# Patient Record
Sex: FEMALE | Race: WHITE | HISPANIC OR LATINO | Employment: OTHER | URBAN - METROPOLITAN AREA
[De-identification: names, ages, dates, MRNs, and addresses within clinical notes are randomized per-mention and may not be internally consistent; named-entity substitution may affect disease eponyms.]

---

## 2017-03-30 ENCOUNTER — OFFICE VISIT (OUTPATIENT)
Dept: INTERNAL MEDICINE | Facility: MEDICAL CENTER | Age: 82
End: 2017-03-30
Payer: MEDICAID

## 2017-03-30 VITALS
DIASTOLIC BLOOD PRESSURE: 81 MMHG | TEMPERATURE: 98.8 F | OXYGEN SATURATION: 97 % | HEIGHT: 61 IN | SYSTOLIC BLOOD PRESSURE: 158 MMHG | WEIGHT: 134.2 LBS | BODY MASS INDEX: 25.34 KG/M2 | HEART RATE: 69 BPM

## 2017-03-30 DIAGNOSIS — Z00.00 HEALTHCARE MAINTENANCE: ICD-10-CM

## 2017-03-30 DIAGNOSIS — F51.01 PRIMARY INSOMNIA: ICD-10-CM

## 2017-03-30 DIAGNOSIS — I10 ESSENTIAL HYPERTENSION: ICD-10-CM

## 2017-03-30 DIAGNOSIS — K21.9 GASTROESOPHAGEAL REFLUX DISEASE WITHOUT ESOPHAGITIS: ICD-10-CM

## 2017-03-30 DIAGNOSIS — I48.91 ATRIAL FIBRILLATION, UNSPECIFIED TYPE (HCC): ICD-10-CM

## 2017-03-30 PROBLEM — G47.00 INSOMNIA: Status: ACTIVE | Noted: 2017-03-30

## 2017-03-30 PROCEDURE — 99204 OFFICE O/P NEW MOD 45 MIN: CPT | Mod: GC | Performed by: INTERNAL MEDICINE

## 2017-03-30 RX ORDER — CLONIDINE HYDROCHLORIDE 0.1 MG/1
0.1 TABLET ORAL
Status: DISCONTINUED | OUTPATIENT
Start: 2017-03-30 | End: 2017-05-05

## 2017-03-30 RX ORDER — LORAZEPAM 0.5 MG/1
0.5 TABLET ORAL EVERY 4 HOURS PRN
Qty: 30 TAB | Refills: 0 | Status: SHIPPED | OUTPATIENT
Start: 2017-03-30 | End: 2017-04-09

## 2017-03-30 RX ORDER — TRAZODONE HYDROCHLORIDE 50 MG/1
25 TABLET ORAL
Qty: 30 TAB | Refills: 3 | Status: SHIPPED | OUTPATIENT
Start: 2017-03-30 | End: 2017-04-09

## 2017-03-30 RX ORDER — TELMISARTAN 20 MG/1
20 TABLET ORAL DAILY
COMMUNITY
End: 2017-03-30 | Stop reason: SDUPTHER

## 2017-03-30 RX ORDER — AMIODARONE HYDROCHLORIDE 200 MG/1
200 TABLET ORAL DAILY
COMMUNITY
End: 2017-03-30 | Stop reason: SDUPTHER

## 2017-03-30 RX ORDER — OMEPRAZOLE 40 MG/1
40 CAPSULE, DELAYED RELEASE ORAL DAILY
COMMUNITY
End: 2017-03-30 | Stop reason: SDUPTHER

## 2017-03-30 RX ORDER — ASPIRIN 81 MG/1
81 TABLET, CHEWABLE ORAL DAILY
COMMUNITY
End: 2017-03-30 | Stop reason: SDUPTHER

## 2017-03-30 RX ORDER — TELMISARTAN 20 MG/1
20 TABLET ORAL DAILY
Qty: 90 TAB | Refills: 2 | Status: SHIPPED | OUTPATIENT
Start: 2017-03-30 | End: 2017-04-09

## 2017-03-30 RX ORDER — LORAZEPAM 0.5 MG/1
0.5 TABLET ORAL EVERY 4 HOURS PRN
COMMUNITY
End: 2017-03-30 | Stop reason: SDUPTHER

## 2017-03-30 RX ORDER — TEMAZEPAM 15 MG/1
15 CAPSULE ORAL NIGHTLY PRN
COMMUNITY
End: 2017-03-30

## 2017-03-30 RX ORDER — AMIODARONE HYDROCHLORIDE 200 MG/1
200 TABLET ORAL DAILY
Qty: 30 TAB | Refills: 1 | Status: SHIPPED | OUTPATIENT
Start: 2017-03-30 | End: 2017-04-09

## 2017-03-30 RX ORDER — OMEPRAZOLE 40 MG/1
40 CAPSULE, DELAYED RELEASE ORAL DAILY
Qty: 30 CAP | Refills: 11 | Status: SHIPPED | OUTPATIENT
Start: 2017-03-30 | End: 2017-04-06

## 2017-03-30 RX ORDER — ASPIRIN 81 MG/1
81 TABLET, CHEWABLE ORAL DAILY
Qty: 100 TAB | Refills: 0 | Status: SHIPPED | OUTPATIENT
Start: 2017-03-30 | End: 2017-04-09

## 2017-03-30 ASSESSMENT — ENCOUNTER SYMPTOMS
NEUROLOGICAL NEGATIVE: 1
FEVER: 0
MUSCULOSKELETAL NEGATIVE: 1
INSOMNIA: 1
NERVOUS/ANXIOUS: 1
HEADACHES: 0
SHORTNESS OF BREATH: 1
EYE REDNESS: 0
CHILLS: 0
GASTROINTESTINAL NEGATIVE: 1
CARDIOVASCULAR NEGATIVE: 1
SPUTUM PRODUCTION: 0

## 2017-03-30 ASSESSMENT — PAIN SCALES - GENERAL: PAINLEVEL: NO PAIN

## 2017-03-30 ASSESSMENT — PATIENT HEALTH QUESTIONNAIRE - PHQ9: CLINICAL INTERPRETATION OF PHQ2 SCORE: 2

## 2017-03-30 NOTE — MR AVS SNAPSHOT
"        Marleen Mix   3/30/2017 1:15 PM   Office Visit   MRN: [id number]    Department:  [facility] Med - Internal Med   Dept Phone:  [phone]    Description:  Female : 1935   Provider:  Taz Reyes M.D.           Reason for Visit     New Patient           Allergies as of 3/30/2017     No Known Allergies      You were diagnosed with     Essential hypertension   [8558620]       Atrial fibrillation, unspecified type (CMS-[facility])   [6223146]       Primary insomnia   [714633]       Gastroesophageal reflux disease without esophagitis   [624576]       Healthcare maintenance   [[id number]]         Vital Signs     Blood Pressure Pulse Temperature Height Weight Body Mass Index    158/81 mmHg 69 37.1 °C (98.8 °F) 1.562 m (5' 1.5\") 60.873 kg (134 lb 3.2 oz) 24.95 kg/m2    Oxygen Saturation Breastfeeding? Smoking Status             97% No Never Smoker          Basic Information     Date Of Birth Sex Race Ethnicity Preferred Language    1935 Female White Non- English      Your appointments     2017  1:15 PM   Established Patient with Criselda Malcolm M.D.   [facility] Med - Internal Medicine (--)    [address] [place] [address]  [address]  [facility] [address] [phone]           You will be receiving a confirmation call a few days before your appointment from our automated call confirmation system.            2017  3:00 PM   New Patient with Jory Funes M.D.   [facility] Med - Internal Medicine (--)    [address]  [address]  [facility] [id number] [phone]           Please bring Photo ID, Insurance Cards, All Medication Bottles and copies of any legal documents (such as Living Will, Power of ) If speaking a language besides English please bring an adult . Please arrive 30 minutes prior for check in and registration. You will be receiving a confirmation call a few days before your appointment from our automated call confirmation system.   "              Problem List              ICD-10-CM Priority Class Noted - Resolved    Essential hypertension I10   3/30/2017 - Present    Atrial fibrillation (CMS-[facility]) I48.91   3/30/2017 - Present    Insomnia G47.00   3/30/2017 - Present    Gastroesophageal reflux disease without esophagitis K21.9   3/30/2017 - Present      Health Maintenance     Patient has no pending health maintenance at this time      Current Immunizations     No immunizations on file.      Below and/or attached are the medications your provider expects you to take. Review all of your home medications and newly ordered medications with your provider and/or pharmacist. Follow medication instructions as directed by your provider and/or pharmacist. Please keep your medication list with you and share with your provider. Update the information when medications are discontinued, doses are changed, or new medications (including over-the-counter products) are added; and carry medication information at all times in the event of emergency situations     Allergies:  No Known Allergies          Medications  Valid as of: March 30, 2017 -  2:33 PM    Generic Name Brand Name Tablet Size Instructions for use    Amiodarone HCl (Tab) CORDARONE 200 MG Take 1 Tab by mouth every day.        Aspirin (Chew Tab) ASA 81 MG Take 1 Tab by mouth every day.        LORazepam (Tab) ATIVAN 0.5 MG Take 1 Tab by mouth every four hours as needed for Anxiety.        Omeprazole (CAPSULE DELAYED RELEASE) PRILOSEC 40 MG Take 1 Cap by mouth every day.        Telmisartan (Tab) MICARDIS 20 MG Take 1 Tab by mouth every day.        Temazepam (Cap) RESTORIL 15 MG Take 15 mg by mouth at bedtime as needed for Sleep.        TraZODone HCl (Tab) DESYREL 50 MG Take 0.5 Tabs by mouth 1 time daily as needed for Sleep.        .                 Medicines prescribed today were sent to:     [facility]Y [facility][id number] - [place], [place] - [address]    [address] [place] [address]    Phone:  [phone] Fax: [phone]    Open 24 Hours?: No      Medication refill instructions:       If your prescription bottle indicates you have medication refills left, it is not necessary to call your provider’s office. Please contact your pharmacy and they will refill your medication.    If your prescription bottle indicates you do not have any refills left, you may request refills at any time through one of the following ways: The online [employer] Liaison system (except Urgent Care), by calling your provider’s office, or by asking your pharmacy to contact your provider’s office with a refill request. Medication refills are processed only during regular business hours and may not be available until the next business day. Your provider may request additional information or to have a follow-up visit with you prior to refilling your medication.   *Please Note: Medication refills are assigned a new Rx number when refilled electronically. Your pharmacy may indicate that no refills were authorized even though a new prescription for the same medication is available at the pharmacy. Please request the medicine by name with the pharmacy before contacting your provider for a refill.        Your To Do List     Future Labs/Procedures Complete By Expires    CBC WITH DIFFERENTIAL  As directed 3/30/2018    COMP METABOLIC PANEL  As directed 3/30/2018    DX-CHEST-2 VIEWS  As directed 3/30/2018    FREE THYROXINE  As directed 3/30/2018    LIPID PROFILE  As directed 3/30/2018    TSH  As directed 3/31/2018      Referral     A referral request has been sent to our patient care coordination department. Please allow 3-5 business days for us to process this request and contact you either by phone or mail. If you do not hear from us by the 5th business day, please call us at [phone].        Instructions    Follow up in 2 wks   Bring BP log  Cardiology referral made  For Insomnia start taking trazadone  Rx sent to pharmacy  Labs before next  visit.              MyChart Status: Patient Declined

## 2017-03-31 NOTE — PROGRESS NOTES
New Patient to Establish    Reason to establish: New patient    CC: Insomnia    HPI:     80 y/o F with a h/o HTN, Afib, Insomnia and anxiety is here to establish.   Mr. Mix is accompanied by her daughter.  She was on benzodiazepine for anxiety and insomnia for years when she was in [facility].  After she moved to [place] from [facility], she was switched to Ativan 0.5mg as the type of Benzo she was getting in [facility] is not available in the use.  She states that Ativan doesn't do anything for her and she can't sleep.   Her daughter gave her 1.5 mg Ativan for sleep which didn't help either.  Her daughter is concerned that she was on benzodiazepine for years and appears that she is dependent on it.  Her daughter also states her BP has been fluctuating with very low bp causing dizziness to high BP with  's.  She was on Telmisartan and amlodipine.  Because of her hypotension, her daughter stopped amlodipine.  Today, her /81.  She has a h/o Atiral fibrillation which she takes amiodarone 200mg M-F and ASA.   She also has a pacemaker.  Her daughter is not sure why she had PM placed.   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  Daughter states that during that time, she developed arrhythmia.  Thus, pacemaker was placed again.  She denies taking susan blockers.            Patient Active Problem List    Diagnosis Date Noted   • Essential hypertension 03/30/2017   • Atrial fibrillation (CMS-[facility]) 03/30/2017   • Insomnia 03/30/2017   • Gastroesophageal reflux disease without esophagitis 03/30/2017       No past medical history on file.    Current Outpatient Prescriptions   Medication Sig Dispense Refill   • trazodone (DESYREL) 50 MG Tab Take 0.5 Tabs by mouth 1 time daily as needed for Sleep. 30 Tab 3   • amiodarone (CORDARONE) 200 MG Tab Take 1 Tab by mouth every day. 30 Tab 1   • aspirin (ASA) 81 MG Chew Tab chewable tablet Take 1 Tab by mouth every day. 100 Tab 0   • lorazepam  "(ATIVAN) 0.5 MG Tab Take 1 Tab by mouth every four hours as needed for Anxiety. 30 Tab 0   • omeprazole (PRILOSEC) 40 MG delayed-release capsule Take 1 Cap by mouth every day. 30 Cap 11   • telmisartan (MICARDIS) 20 MG tablet Take 1 Tab by mouth every day. 90 Tab 2     Current Facility-Administered Medications   Medication Dose Route Frequency Provider Last Rate Last Dose   • clonidine (CATAPRES) tablet 0.1 mg  0.1 mg Oral Once PRN Taz Reyes M.D.           Allergies as of 03/30/2017   • (No Known Allergies)       Social History     Social History   • Marital Status:      Spouse Name: N/A   • Number of Children: N/A   • Years of Education: N/A     Occupational History   • Not on file.     Social History Main Topics   • Smoking status: Never Smoker    • Smokeless tobacco: Never Used   • Alcohol Use: No   • Drug Use: No   • Sexual Activity: Not on file     Other Topics Concern   • Not on file     Social History Narrative   • No narrative on file       No family history on file.    No past surgical history on file.    ROS: As per HPI. Additional pertinent symptoms as noted below.  Review of Systems   Constitutional: Negative for fever and chills.   HENT: Negative for congestion.    Eyes: Negative for redness.   Respiratory: Positive for shortness of breath. Negative for sputum production.    Cardiovascular: Negative.    Gastrointestinal: Negative.    Musculoskeletal: Negative.    Skin: Negative for itching and rash.   Neurological: Negative.  Negative for headaches.   Endo/Heme/Allergies: Negative.    Psychiatric/Behavioral: The patient is nervous/anxious and has insomnia.        /81 mmHg  Pulse 69  Temp(Src) 37.1 °C (98.8 °F)  Ht 1.562 m (5' 1.5\")  Wt 60.873 kg (134 lb 3.2 oz)  BMI 24.95 kg/m2  SpO2 97%  Breastfeeding? No    Physical Exam  General:  Alert and oriented, No apparent distress.    Eyes: EOMI, No scleral icterus.    Throat: Clear no erythema or exudates noted.    Neck: " Supple.     Lungs: Clear to auscultation and percussion bilaterally.    Cardiovascular: Regular rate and rhythm. No murmurs, rubs or gallops.    Abdomen:  Benign. Soft. NT    Extremities: No clubbing, cyanosis, edema.    Skin: Clear. No rash or suspicious skin lesions noted.          Assessment and Plan    1. Essential hypertension  - BP at goal for her age  - Continue taking ARB  - Discontinue Amlodipine  - Patient was advised to check BP daily and bring log  - If BP>200/110, take 1 dose of clonidine 0.1 mg tab  - CloNIDine HCl  - Will re-evaluate BP in 2 wks    2. Atrial fibrillation, unspecified type (CMS-HCC)  - ChadsVasc: 4 (Age, gender, HTN)  - On Amiodarone 200mg M-F, ASA  - OE: NSR today.  HR 69  - Will refer to Cardiology.   - Will check TSH level/CXR given long time use of amiodarone  - CTM    3. Primary insomnia  - Long term benzo use for insomnia  - Currently on Ativan 0.5mg qday prn  - No respiratory or CNS depression reported per seun  - Will start Trazodone  - She may take Ativan 0.5 mg qday prn to avoid benzo withdrawal  - CTM    4. Gastroesophageal reflux disease without esophagitis  - Con't Omeprazole  - CTM    5. Healthcare maintenance  - Will get basic labs  Such as lipid panel, cbc, cmp, tsh  - Declined Pneumonia shot and flu shot  - CTM      Followup: Return in about 2 weeks (around 4/13/2017).    Signed by: Taz Reyes M.D.

## 2017-04-05 ENCOUNTER — TELEPHONE (OUTPATIENT)
Dept: INTERNAL MEDICINE | Facility: MEDICAL CENTER | Age: 82
End: 2017-04-05

## 2017-04-05 DIAGNOSIS — K21.9 GASTROESOPHAGEAL REFLUX DISEASE WITHOUT ESOPHAGITIS: ICD-10-CM

## 2017-04-05 NOTE — TELEPHONE ENCOUNTER
DOCUMENTATION OF PAR STATUS:    1. Name of Medication & Dose: omeprazole 40mg cap take 1 cap by mouth every day     2. Name of Prescription Coverage Company & phone #: Medicaid [phone]    3. Date Prior Auth Submitted: 04/05/17    4. What information was given to obtain insurance decision? Gerd    5. Prior Auth Letter Approved or Denied? Denied - preferred is nexium     6. Action Taken: Pharmacy/Patient Notified: No

## 2017-04-09 ENCOUNTER — APPOINTMENT (OUTPATIENT)
Dept: RADIOLOGY | Facility: MEDICAL CENTER | Age: 82
DRG: 480 | End: 2017-04-09
Attending: EMERGENCY MEDICINE
Payer: MEDICAID

## 2017-04-09 ENCOUNTER — RESOLUTE PROFESSIONAL BILLING HOSPITAL PROF FEE (OUTPATIENT)
Dept: HOSPITALIST | Facility: MEDICAL CENTER | Age: 82
End: 2017-04-09
Payer: MEDICAID

## 2017-04-09 ENCOUNTER — APPOINTMENT (OUTPATIENT)
Dept: RADIOLOGY | Facility: MEDICAL CENTER | Age: 82
DRG: 480 | End: 2017-04-09
Attending: HOSPITALIST
Payer: MEDICAID

## 2017-04-09 ENCOUNTER — HOSPITAL ENCOUNTER (INPATIENT)
Facility: MEDICAL CENTER | Age: 82
LOS: 8 days | DRG: 480 | End: 2017-04-17
Attending: EMERGENCY MEDICINE | Admitting: HOSPITALIST
Payer: MEDICAID

## 2017-04-09 DIAGNOSIS — R55 SYNCOPE, UNSPECIFIED SYNCOPE TYPE: ICD-10-CM

## 2017-04-09 DIAGNOSIS — S72.002A CLOSED FRACTURE OF NECK OF LEFT FEMUR, INITIAL ENCOUNTER (HCC): ICD-10-CM

## 2017-04-09 DIAGNOSIS — S72.002A CLOSED LEFT HIP FRACTURE, INITIAL ENCOUNTER (HCC): ICD-10-CM

## 2017-04-09 PROBLEM — S72.009A FEMORAL NECK FRACTURE (HCC): Status: ACTIVE | Noted: 2017-04-09

## 2017-04-09 LAB
ALBUMIN SERPL BCP-MCNC: 4.2 G/DL (ref 3.2–4.9)
ALBUMIN/GLOB SERPL: 1.5 G/DL
ALP SERPL-CCNC: 64 U/L (ref 30–99)
ALT SERPL-CCNC: 20 U/L (ref 2–50)
ANION GAP SERPL CALC-SCNC: 10 MMOL/L (ref 0–11.9)
APPEARANCE UR: CLEAR
AST SERPL-CCNC: 30 U/L (ref 12–45)
BACTERIA #/AREA URNS HPF: ABNORMAL /HPF
BASOPHILS # BLD AUTO: 0.3 % (ref 0–1.8)
BASOPHILS # BLD: 0.03 K/UL (ref 0–0.12)
BILIRUB SERPL-MCNC: 0.5 MG/DL (ref 0.1–1.5)
BILIRUB UR QL STRIP.AUTO: NEGATIVE
BNP SERPL-MCNC: 308 PG/ML (ref 0–100)
BUN SERPL-MCNC: 32 MG/DL (ref 8–22)
CALCIUM SERPL-MCNC: 9.1 MG/DL (ref 8.4–10.2)
CHLORIDE SERPL-SCNC: 104 MMOL/L (ref 96–112)
CO2 SERPL-SCNC: 24 MMOL/L (ref 20–33)
COLOR UR: YELLOW
CREAT SERPL-MCNC: 1.38 MG/DL (ref 0.5–1.4)
DEPRECATED D DIMER PPP IA-ACNC: 7271 NG/ML(D-DU)
EKG IMPRESSION: NORMAL
EOSINOPHIL # BLD AUTO: 0.02 K/UL (ref 0–0.51)
EOSINOPHIL NFR BLD: 0.2 % (ref 0–6.9)
EPI CELLS #/AREA URNS HPF: ABNORMAL /HPF
ERYTHROCYTE [DISTWIDTH] IN BLOOD BY AUTOMATED COUNT: 51.4 FL (ref 35.9–50)
GFR SERPL CREATININE-BSD FRML MDRD: 37 ML/MIN/1.73 M 2
GLOBULIN SER CALC-MCNC: 2.8 G/DL (ref 1.9–3.5)
GLUCOSE SERPL-MCNC: 93 MG/DL (ref 65–99)
GLUCOSE UR STRIP.AUTO-MCNC: NEGATIVE MG/DL
HCT VFR BLD AUTO: 41.8 % (ref 37–47)
HGB BLD-MCNC: 13.9 G/DL (ref 12–16)
IMM GRANULOCYTES # BLD AUTO: 0.1 K/UL (ref 0–0.11)
IMM GRANULOCYTES NFR BLD AUTO: 1 % (ref 0–0.9)
INR PPP: 1.06 (ref 0.87–1.13)
KETONES UR STRIP.AUTO-MCNC: NEGATIVE MG/DL
LEUKOCYTE ESTERASE UR QL STRIP.AUTO: ABNORMAL
LYMPHOCYTES # BLD AUTO: 1.63 K/UL (ref 1–4.8)
LYMPHOCYTES NFR BLD: 15.5 % (ref 22–41)
MCH RBC QN AUTO: 31.4 PG (ref 27–33)
MCHC RBC AUTO-ENTMCNC: 33.3 G/DL (ref 33.6–35)
MCV RBC AUTO: 94.6 FL (ref 81.4–97.8)
MICRO URNS: ABNORMAL
MONOCYTES # BLD AUTO: 1.02 K/UL (ref 0–0.85)
MONOCYTES NFR BLD AUTO: 9.7 % (ref 0–13.4)
NEUTROPHILS # BLD AUTO: 7.72 K/UL (ref 2–7.15)
NEUTROPHILS NFR BLD: 73.3 % (ref 44–72)
NITRITE UR QL STRIP.AUTO: NEGATIVE
NRBC # BLD AUTO: 0 K/UL
NRBC BLD AUTO-RTO: 0 /100 WBC
PH UR STRIP.AUTO: 6.5 [PH]
PLATELET # BLD AUTO: 215 K/UL (ref 164–446)
PMV BLD AUTO: 10.4 FL (ref 9–12.9)
POTASSIUM SERPL-SCNC: 4.5 MMOL/L (ref 3.6–5.5)
PROT SERPL-MCNC: 7 G/DL (ref 6–8.2)
PROT UR QL STRIP: NEGATIVE MG/DL
PROTHROMBIN TIME: 13.6 SEC (ref 12–14.6)
RBC # BLD AUTO: 4.42 M/UL (ref 4.2–5.4)
RBC # URNS HPF: ABNORMAL /HPF
RBC UR QL AUTO: NEGATIVE
SODIUM SERPL-SCNC: 138 MMOL/L (ref 135–145)
SP GR UR STRIP.AUTO: <=1.005
TROPONIN I SERPL-MCNC: <0.02 NG/ML (ref 0–0.04)
WBC # BLD AUTO: 10.5 K/UL (ref 4.8–10.8)
WBC #/AREA URNS HPF: ABNORMAL /HPF

## 2017-04-09 PROCEDURE — 99285 EMERGENCY DEPT VISIT HI MDM: CPT

## 2017-04-09 PROCEDURE — 85025 COMPLETE CBC W/AUTO DIFF WBC: CPT

## 2017-04-09 PROCEDURE — 73502 X-RAY EXAM HIP UNI 2-3 VIEWS: CPT | Mod: LT

## 2017-04-09 PROCEDURE — A9270 NON-COVERED ITEM OR SERVICE: HCPCS | Performed by: HOSPITALIST

## 2017-04-09 PROCEDURE — 93005 ELECTROCARDIOGRAM TRACING: CPT | Performed by: EMERGENCY MEDICINE

## 2017-04-09 PROCEDURE — 71010 DX-CHEST-PORTABLE (1 VIEW): CPT

## 2017-04-09 PROCEDURE — 99221 1ST HOSP IP/OBS SF/LOW 40: CPT | Performed by: HOSPITALIST

## 2017-04-09 PROCEDURE — 700105 HCHG RX REV CODE 258: Performed by: HOSPITALIST

## 2017-04-09 PROCEDURE — 85379 FIBRIN DEGRADATION QUANT: CPT

## 2017-04-09 PROCEDURE — 73552 X-RAY EXAM OF FEMUR 2/>: CPT | Mod: LT

## 2017-04-09 PROCEDURE — 700117 HCHG RX CONTRAST REV CODE 255: Performed by: HOSPITALIST

## 2017-04-09 PROCEDURE — 85610 PROTHROMBIN TIME: CPT

## 2017-04-09 PROCEDURE — 94760 N-INVAS EAR/PLS OXIMETRY 1: CPT

## 2017-04-09 PROCEDURE — 71275 CT ANGIOGRAPHY CHEST: CPT

## 2017-04-09 PROCEDURE — 83880 ASSAY OF NATRIURETIC PEPTIDE: CPT

## 2017-04-09 PROCEDURE — 36415 COLL VENOUS BLD VENIPUNCTURE: CPT

## 2017-04-09 PROCEDURE — 81001 URINALYSIS AUTO W/SCOPE: CPT

## 2017-04-09 PROCEDURE — 84484 ASSAY OF TROPONIN QUANT: CPT

## 2017-04-09 PROCEDURE — 700102 HCHG RX REV CODE 250 W/ 637 OVERRIDE(OP): Performed by: HOSPITALIST

## 2017-04-09 PROCEDURE — 70450 CT HEAD/BRAIN W/O DYE: CPT

## 2017-04-09 PROCEDURE — 73700 CT LOWER EXTREMITY W/O DYE: CPT | Mod: LT

## 2017-04-09 PROCEDURE — 770020 HCHG ROOM/CARE - TELE (206)

## 2017-04-09 PROCEDURE — 80053 COMPREHEN METABOLIC PANEL: CPT

## 2017-04-09 RX ORDER — OXYCODONE HYDROCHLORIDE 5 MG/1
5 TABLET ORAL
Status: DISCONTINUED | OUTPATIENT
Start: 2017-04-09 | End: 2017-04-12 | Stop reason: SINTOL

## 2017-04-09 RX ORDER — OMEPRAZOLE 20 MG/1
20 CAPSULE, DELAYED RELEASE ORAL DAILY
Status: DISCONTINUED | OUTPATIENT
Start: 2017-04-10 | End: 2017-04-17 | Stop reason: HOSPADM

## 2017-04-09 RX ORDER — HEPARIN SODIUM 5000 [USP'U]/ML
5000 INJECTION, SOLUTION INTRAVENOUS; SUBCUTANEOUS EVERY 8 HOURS
Status: DISCONTINUED | OUTPATIENT
Start: 2017-04-09 | End: 2017-04-09

## 2017-04-09 RX ORDER — AMIODARONE HYDROCHLORIDE 200 MG/1
200 TABLET ORAL SEE ADMIN INSTRUCTIONS
Status: ON HOLD | COMMUNITY
End: 2017-05-05

## 2017-04-09 RX ORDER — ONDANSETRON 2 MG/ML
4 INJECTION INTRAMUSCULAR; INTRAVENOUS EVERY 4 HOURS PRN
Status: DISCONTINUED | OUTPATIENT
Start: 2017-04-09 | End: 2017-04-17 | Stop reason: HOSPADM

## 2017-04-09 RX ORDER — SODIUM CHLORIDE 9 MG/ML
INJECTION, SOLUTION INTRAVENOUS CONTINUOUS
Status: DISCONTINUED | OUTPATIENT
Start: 2017-04-09 | End: 2017-04-12 | Stop reason: ALTCHOICE

## 2017-04-09 RX ORDER — LORAZEPAM 1 MG/1
1 TABLET ORAL
Status: DISCONTINUED | OUTPATIENT
Start: 2017-04-09 | End: 2017-04-09

## 2017-04-09 RX ORDER — ZINC SULFATE 50(220)MG
220 CAPSULE ORAL DAILY
Status: ON HOLD | COMMUNITY
End: 2017-04-09

## 2017-04-09 RX ORDER — OMEPRAZOLE 20 MG/1
20 CAPSULE, DELAYED RELEASE ORAL DAILY
Status: ON HOLD | COMMUNITY
End: 2017-05-05

## 2017-04-09 RX ORDER — ZINC SULFATE 50(220)MG
220 CAPSULE ORAL DAILY
Status: DISCONTINUED | OUTPATIENT
Start: 2017-04-09 | End: 2017-04-17 | Stop reason: HOSPADM

## 2017-04-09 RX ORDER — MORPHINE SULFATE 4 MG/ML
1 INJECTION, SOLUTION INTRAMUSCULAR; INTRAVENOUS
Status: DISCONTINUED | OUTPATIENT
Start: 2017-04-09 | End: 2017-04-12 | Stop reason: SINTOL

## 2017-04-09 RX ORDER — HEPARIN SODIUM 5000 [USP'U]/ML
5000 INJECTION, SOLUTION INTRAVENOUS; SUBCUTANEOUS EVERY 8 HOURS
Status: DISCONTINUED | OUTPATIENT
Start: 2017-04-09 | End: 2017-04-13

## 2017-04-09 RX ORDER — AMIODARONE HYDROCHLORIDE 200 MG/1
200 TABLET ORAL DAILY
Status: DISCONTINUED | OUTPATIENT
Start: 2017-04-09 | End: 2017-04-17 | Stop reason: HOSPADM

## 2017-04-09 RX ORDER — BISACODYL 10 MG
10 SUPPOSITORY, RECTAL RECTAL
Status: DISCONTINUED | OUTPATIENT
Start: 2017-04-09 | End: 2017-04-17 | Stop reason: HOSPADM

## 2017-04-09 RX ORDER — ASPIRIN 325 MG
1 TABLET ORAL DAILY
Status: ON HOLD | COMMUNITY
End: 2017-05-05

## 2017-04-09 RX ORDER — ONDANSETRON 4 MG/1
4 TABLET, ORALLY DISINTEGRATING ORAL EVERY 4 HOURS PRN
Status: DISCONTINUED | OUTPATIENT
Start: 2017-04-09 | End: 2017-04-17 | Stop reason: HOSPADM

## 2017-04-09 RX ORDER — LORAZEPAM 1 MG/1
1-1.5 TABLET ORAL
Status: ON HOLD | COMMUNITY
End: 2017-05-05

## 2017-04-09 RX ORDER — LORAZEPAM 1 MG/1
1 TABLET ORAL
Status: DISCONTINUED | OUTPATIENT
Start: 2017-04-09 | End: 2017-04-11

## 2017-04-09 RX ORDER — TELMISARTAN 40 MG/1
20 TABLET ORAL DAILY
Status: ON HOLD | COMMUNITY
End: 2017-05-05

## 2017-04-09 RX ORDER — OXYCODONE HYDROCHLORIDE 10 MG/1
10 TABLET ORAL
Status: DISCONTINUED | OUTPATIENT
Start: 2017-04-09 | End: 2017-04-12 | Stop reason: SINTOL

## 2017-04-09 RX ORDER — POLYETHYLENE GLYCOL 3350 17 G/17G
1 POWDER, FOR SOLUTION ORAL
Status: DISCONTINUED | OUTPATIENT
Start: 2017-04-09 | End: 2017-04-17 | Stop reason: HOSPADM

## 2017-04-09 RX ORDER — LABETALOL HYDROCHLORIDE 5 MG/ML
10 INJECTION, SOLUTION INTRAVENOUS EVERY 4 HOURS PRN
Status: DISCONTINUED | OUTPATIENT
Start: 2017-04-09 | End: 2017-04-17 | Stop reason: HOSPADM

## 2017-04-09 RX ORDER — TRAZODONE HYDROCHLORIDE 50 MG/1
50 TABLET ORAL NIGHTLY
Status: ON HOLD | COMMUNITY
End: 2017-05-05

## 2017-04-09 RX ORDER — AMOXICILLIN 250 MG
2 CAPSULE ORAL 2 TIMES DAILY
Status: DISCONTINUED | OUTPATIENT
Start: 2017-04-09 | End: 2017-04-17 | Stop reason: HOSPADM

## 2017-04-09 RX ORDER — ACETAMINOPHEN 325 MG/1
650 TABLET ORAL EVERY 6 HOURS PRN
Status: DISCONTINUED | OUTPATIENT
Start: 2017-04-09 | End: 2017-04-17 | Stop reason: HOSPADM

## 2017-04-09 RX ADMIN — LORAZEPAM 1 MG: 1 TABLET ORAL at 21:28

## 2017-04-09 RX ADMIN — IOHEXOL 75 ML: 350 INJECTION, SOLUTION INTRAVENOUS at 18:14

## 2017-04-09 RX ADMIN — SODIUM CHLORIDE: 9 INJECTION, SOLUTION INTRAVENOUS at 18:25

## 2017-04-09 ASSESSMENT — LIFESTYLE VARIABLES
EVER_SMOKED: NEVER
DO YOU DRINK ALCOHOL: NO
EVER_SMOKED: NEVER

## 2017-04-09 NOTE — ED PROVIDER NOTES
ED Provider Note    CHIEF COMPLAINT  Chief Complaint   Patient presents with   • Syncope     syncope with fall today and hit head  LOC for a few seconds  No complaints of head, neck, and back pain       HPI  Marleen Mix is a 81 y.o. female with history of GERD, hypertension and atrial fibrillation on aspirin who presents complaining of syncope and left hip pain.    Patient states she was at [employer] today when she jenifer from the [employer], felt dizzy, and had a syncopal event. She had no preceding chest pain, headache, or shortness of breath. She woke after several seconds per witness reports and complained of left hip pain.      ALLERGIES  No Known Allergies    CURRENT MEDICATIONS  Home Medications     Reviewed by Jade Atkinson R.N. (Registered Nurse) on 04/09/17 at 1830  Med List Status: Complete    Medication Last Dose Status    amiodarone (CORDARONE) 200 MG Tab 4/7/2017 Active    aspirin EC (ECOTRIN) 81 MG Tablet Delayed Response 4/9/2017 Active    Cholecalciferol (VITAMIN D PO) 4/9/2017 Active    clonidine (CATAPRES) tablet 0.1 mg  Active    lorazepam (ATIVAN) 1 MG Tab 4/8/2017 Active    Multiple Vitamins-Minerals (MULTIVITAMIN GUMMIES ADULT) Chew Tab 4/9/2017 Active    Multiple Vitamins-Minerals (ZINC PO) 4/9/2017 Active    omeprazole (PRILOSEC) 20 MG delayed-release capsule 4/9/2017 Active    trazodone (DESYREL) 50 MG Tab 4/8/2017 Active    zinc sulfate (ZINCATE) 220 MG Cap  Active                PAST MEDICAL HISTORY   has a past medical history of Arthritis; Anxiety; Hypertension; Atrial fibrillation (CMS-HCC); and Atrial fibrillation (CMS-HCC).    SURGICAL HISTORY   has past surgical history that includes abdominal hysterectomy total.    SOCIAL HISTORY  Social History     Social History Main Topics   • Smoking status: Never Smoker    • Smokeless tobacco: Never Used   • Alcohol Use: Yes      Comment: kellie 1 shot per day   • Drug Use: No   • Sexual Activity: Not on file     Here with her daughter with whom she  "lives  Patient has 3 grandchildren  Patient is originally from [facility]    REVIEW OF SYSTEMS  See HPI for further details.  All other systems are negative except as above in HPI.    PHYSICAL EXAM  VITAL SIGNS: /66 mmHg  Pulse 69  Resp 16  Ht 1.626 m (5' 4\")  Wt 54.432 kg (120 lb)  BMI 20.59 kg/m2    General:  WDWN, nontoxic appearing in NAD; A+Ox3; V/S as above   Skin: warm and dry; good color; no rash  HEENT: NCAT; EOMs intact; no scleral icterus   Neck: FROM; nontender without step-offs  Cardiovascular: Regular heart rate and rhythm.  No murmurs, rubs, or gallops; pulses 2+ bilaterally radially and DP areas  Lungs: Clear to auscultation with good air movement bilaterally.  No wheezes, rhonchi, or rales.   Abdomen: BS present; soft; NTND; no rebound, guarding, or rigidity.  No organomegaly or pulsatile mass  Extremities: ANG x 3 due to pain in the left hip with any movement, no pedal edema  Neurologic: CNs III-XII grossly intact; speech clear; distal sensation intact; strength 5/5 UE/LEs  Psychiatric: Appropriate affect, normal mood    LABS  Results for orders placed or performed during the hospital encounter of 04/09/17   CBC WITH DIFFERENTIAL   Result Value Ref Range    WBC 10.5 4.8 - 10.8 K/uL    RBC 4.42 4.20 - 5.40 M/uL    Hemoglobin 13.9 12.0 - 16.0 g/dL    Hematocrit 41.8 37.0 - 47.0 %    MCV 94.6 81.4 - 97.8 fL    MCH 31.4 27.0 - 33.0 pg    MCHC 33.3 (L) 33.6 - 35.0 g/dL    RDW 51.4 (H) 35.9 - 50.0 fL    Platelet Count 215 164 - 446 K/uL    MPV 10.4 9.0 - 12.9 fL    Neutrophils-Polys 73.30 (H) 44.00 - 72.00 %    Lymphocytes 15.50 (L) 22.00 - 41.00 %    Monocytes 9.70 0.00 - 13.40 %    Eosinophils 0.20 0.00 - 6.90 %    Basophils 0.30 0.00 - 1.80 %    Immature Granulocytes 1.00 (H) 0.00 - 0.90 %    Nucleated RBC 0.00 /100 WBC    Neutrophils (Absolute) 7.72 (H) 2.00 - 7.15 K/uL    Lymphs (Absolute) 1.63 1.00 - 4.80 K/uL    Monos (Absolute) 1.02 (H) 0.00 - 0.85 K/uL    Eos (Absolute) 0.02 0.00 - " 0.51 K/uL    Baso (Absolute) 0.03 0.00 - 0.12 K/uL    Immature Granulocytes (abs) 0.10 0.00 - 0.11 K/uL    NRBC (Absolute) 0.00 K/uL   CMP   Result Value Ref Range    Total Protein 7.0 6.0 - 8.2 g/dL    Sodium 138 135 - 145 mmol/L    Potassium 4.5 3.6 - 5.5 mmol/L    Chloride 104 96 - 112 mmol/L    Co2 24 20 - 33 mmol/L    Anion Gap 10.0 0.0 - 11.9    Glucose 93 65 - 99 mg/dL    Bun 32 (H) 8 - 22 mg/dL    Creatinine 1.38 0.50 - 1.40 mg/dL    Calcium 9.1 8.4 - 10.2 mg/dL    AST(SGOT) 30 12 - 45 U/L    ALT(SGPT) 20 2 - 50 U/L    Alkaline Phosphatase 64 30 - 99 U/L    Total Bilirubin 0.5 0.1 - 1.5 mg/dL    Albumin 4.2 3.2 - 4.9 g/dL    Globulin 2.8 1.9 - 3.5 g/dL    A-G Ratio 1.5 g/dL   TROPONIN   Result Value Ref Range    Troponin I <0.02 0.00 - 0.04 ng/mL   BNP   Result Value Ref Range    B Natriuretic Peptide 308 (H) 0 - 100 pg/mL   PROTHROMBIN TIME   Result Value Ref Range    PT 13.6 12.0 - 14.6 sec    INR 1.06 0.87 - 1.13   ESTIMATED GFR   Result Value Ref Range    GFR If  44 (A) >60 mL/min/1.73 m 2    GFR If Non  37 (A) >60 mL/min/1.73 m 2   EKG (ER)   Result Value Ref Range    Report       [facility] Emergency Dept.    Test Date:  2017  Pt Name:    DAMARIS FORRESTER               Department: EDSM  MRN:        [id number]                      Room:       -ROOM 9  Gender:     F                            Technician: JOSUE  :        [id number]                   Requested By:JARED WONG  Order #:    [id number]                    Reading MD: JARED WONG MD    Measurements  Intervals                                Axis  Rate:       70                           P:  CO:         240                          QRS:        -46  QRSD:       78                           T:          -8  QT:         404  QTc:        436    Interpretive Statements  ATRIAL-PACED RHYTHM  LEFT ANTERIOR FASCICULAR BLOCK  LOW VOLTAGE IN FRONTAL LEADS  CONSIDER ANTERIOR  INFARCT  BORDERLINE T ABNORMALITIES, INFERIOR LEADS  No previous ECG available for comparison    Electronically Signed On 4-9-2017 14:00:29 PDT by DEBORA SMITH MD         IMAGING  DX-CHEST-PORTABLE (1 VIEW)   Final Result         No acute cardiac or pulmonary abnormality is identified.      CT-HEAD W/O   Final Result      1.  White matter lucencies most consistent with small vessel ischemic change versus demyelination or gliosis.      2.  Otherwise, Head CT without contrast with no acute findings. No evidence of acute cerebral infarction, hemorrhage or mass lesion.      DX-HIP-COMPLETE - UNILATERAL 2+ LEFT   Final Result      Acute mildly impacted fracture of the left femoral neck is identified.      ECHOCARDIOGRAM COMP W/O CONT    (Results Pending)       MEDICAL RECORD  I have reviewed patient's medical record and pertinent results are listed below.      COURSE & MEDICAL DECISION MAKING  I have reviewed any medical record information, laboratory studies and radiographic results as noted.    Marleen Mix is a 81 y.o. female who presents complaining of syncope with resultant hip pain.    Patient has had dizziness on and off for several weeks. This may be related to her antihypertensives or orthostatic in nature.      Patient's blood work demonstrates a BUN of 32 and an elevation of immature granulocytes along with a BNP of 308.    Hip x-ray demonstrates a femoral neck fracture.    CT head is negative for acute process.    2:27 PM  Paging ortho and hospitalist    2:34 PM  Discussed with ortho/Deana who agrees to see the patient  I discussed the case with the hospitalist who agrees to admit the patient    6:36 PM  Urinalysis results are now available to me. It appears the patient has pyuria which is consistent with a urinary tract infection.    FINAL IMPRESSION  1. Syncope, unspecified syncope type    2. Closed left hip fracture, initial encounter (CMS-HCC)      Electronically signed by: Debora Smith,  4/9/2017 11:59 AM

## 2017-04-09 NOTE — CONSULTS
DATE OF SERVICE:  04/09/2017    CHIEF COMPLAINT:  Left hip pain.    HISTORY OF PRESENT ILLNESS:  The patient is an 81-year-old lady who fell at   [employer] today after becoming lightheaded.  She began having left hip pain and   inability to bear weight.    Roughly 1 month ago, she did fall and strike her head.  She has had episodes   of dizziness since that time.  She has no other complaints at this time.    PAST MEDICAL HISTORY:  1.  Atrial fibrillation.  2.  Anxiety.  3.  Hypertension.    PAST SURGICAL HISTORY:  1.  Hysterectomy.  2.  Tonsillectomy.  3.  Pacemaker placement.    FAMILY HISTORY:  Significant for:  1.  Cardiovascular disease.  2.  Alzheimer's.  3.  Myocardial infarction.    SOCIAL HISTORY:  She lives at home with her daughter and denies current   alcohol, tobacco, or recreational drug use.    MEDICATIONS:  Med reconciliation reviewed.    ALLERGIES:  No known drug allergies.    REVIEW OF SYSTEMS:  A 10-point review of systems negative except per HPI.    PHYSICAL EXAMINATION:  VITAL SIGNS:  Afebrile, vital signs stable.  GENERAL:  Alert and oriented x3, in no acute distress.  HEENT:  Pupils are equally round and reactive to light.  Extraocular movements   grossly intact.  NECK:  Supple.  CARDIOVASCULAR:  Regular rate and rhythm.  RESPIRATORY:  Nonlabored breathing.  ABDOMEN:  Soft, nontender, nondistended.  GENITOURINARY AND RECTAL:  Deferred.  MUSCULOSKELETAL:  Examination of the left lower extremity reveals mild   tenderness to palpation about the left hip.  There is significant pain with   log roll of the left lower extremity that localizes to the groin.  No   ecchymoses or bruising over the left hip.  No open wounds.  Left lower   extremity is motor intact to deep/superficial, peroneal, and tibial nerves.    Dorsalis pedis pulse 2+.    IMAGING DATA:  X-rays obtained today as well as CT scan obtained were   reviewed.  These show a left valgus impacted femoral neck fracture.  There is   minimal  displacement.    ASSESSMENT:  Left femoral neck fracture, valgus impacted with minimal   displacement.    PLAN:  At this time, she will be admitted to the hospital by the hospitalist   and will undergo a syncopal workup.  Once she is cleared by the hospitalist, I   will perform a percutaneous pinning of the left hip.  I discussed this plan   with the patient and her daughter.  We discussed the nonoperative and   operative treatment options.  They would like to proceed with surgical   intervention.  Risks and benefits of the procedure were explained and all   questions answered.  She will be n.p.o. after midnight and holding any   anticoagulation in the event that she is cleared for surgery tomorrow.       ____________________________________     MD HANS Salas / MONSE    DD:  04/09/2017 16:24:41  DT:  04/09/2017 16:47:52    D#:  [id number]  Job#:  [id number]

## 2017-04-10 ENCOUNTER — APPOINTMENT (OUTPATIENT)
Dept: RADIOLOGY | Facility: MEDICAL CENTER | Age: 82
DRG: 480 | End: 2017-04-10
Attending: ORTHOPAEDIC SURGERY
Payer: MEDICAID

## 2017-04-10 PROBLEM — F39 MOOD DISORDER (HCC): Status: ACTIVE | Noted: 2017-04-10

## 2017-04-10 LAB
ALBUMIN SERPL BCP-MCNC: 3.4 G/DL (ref 3.2–4.9)
ALBUMIN/GLOB SERPL: 1.1 G/DL
ALP SERPL-CCNC: 56 U/L (ref 30–99)
ALT SERPL-CCNC: 17 U/L (ref 2–50)
ANION GAP SERPL CALC-SCNC: 10 MMOL/L (ref 0–11.9)
AST SERPL-CCNC: 24 U/L (ref 12–45)
BILIRUB SERPL-MCNC: 1.3 MG/DL (ref 0.1–1.5)
BUN SERPL-MCNC: 23 MG/DL (ref 8–22)
CALCIUM SERPL-MCNC: 8.7 MG/DL (ref 8.4–10.2)
CHLORIDE SERPL-SCNC: 106 MMOL/L (ref 96–112)
CO2 SERPL-SCNC: 22 MMOL/L (ref 20–33)
CREAT SERPL-MCNC: 1.15 MG/DL (ref 0.5–1.4)
ERYTHROCYTE [DISTWIDTH] IN BLOOD BY AUTOMATED COUNT: 51 FL (ref 35.9–50)
GFR SERPL CREATININE-BSD FRML MDRD: 45 ML/MIN/1.73 M 2
GLOBULIN SER CALC-MCNC: 3.1 G/DL (ref 1.9–3.5)
GLUCOSE SERPL-MCNC: 101 MG/DL (ref 65–99)
HCT VFR BLD AUTO: 41.1 % (ref 37–47)
HGB BLD-MCNC: 13.8 G/DL (ref 12–16)
LV EJECT FRACT  99904: 70
LV EJECT FRACT MOD 2C 99903: 76.19
LV EJECT FRACT MOD 4C 99902: 68.72
LV EJECT FRACT MOD BP 99901: 74.06
MCH RBC QN AUTO: 31.3 PG (ref 27–33)
MCHC RBC AUTO-ENTMCNC: 33.6 G/DL (ref 33.6–35)
MCV RBC AUTO: 93.2 FL (ref 81.4–97.8)
PLATELET # BLD AUTO: 187 K/UL (ref 164–446)
PMV BLD AUTO: 11 FL (ref 9–12.9)
POTASSIUM SERPL-SCNC: 3.9 MMOL/L (ref 3.6–5.5)
PROT SERPL-MCNC: 6.5 G/DL (ref 6–8.2)
RBC # BLD AUTO: 4.41 M/UL (ref 4.2–5.4)
SODIUM SERPL-SCNC: 138 MMOL/L (ref 135–145)
WBC # BLD AUTO: 11.1 K/UL (ref 4.8–10.8)

## 2017-04-10 PROCEDURE — A9270 NON-COVERED ITEM OR SERVICE: HCPCS | Performed by: INTERNAL MEDICINE

## 2017-04-10 PROCEDURE — 700105 HCHG RX REV CODE 258: Performed by: HOSPITALIST

## 2017-04-10 PROCEDURE — 700105 HCHG RX REV CODE 258: Performed by: ORTHOPAEDIC SURGERY

## 2017-04-10 PROCEDURE — A6402 STERILE GAUZE <= 16 SQ IN: HCPCS | Performed by: ORTHOPAEDIC SURGERY

## 2017-04-10 PROCEDURE — 700101 HCHG RX REV CODE 250

## 2017-04-10 PROCEDURE — C1713 ANCHOR/SCREW BN/BN,TIS/BN: HCPCS | Performed by: ORTHOPAEDIC SURGERY

## 2017-04-10 PROCEDURE — 160041 HCHG SURGERY MINUTES - EA ADDL 1 MIN LEVEL 4: Performed by: ORTHOPAEDIC SURGERY

## 2017-04-10 PROCEDURE — 700111 HCHG RX REV CODE 636 W/ 250 OVERRIDE (IP): Performed by: HOSPITALIST

## 2017-04-10 PROCEDURE — 501445 HCHG STAPLER, SKIN DISP: Performed by: ORTHOPAEDIC SURGERY

## 2017-04-10 PROCEDURE — 73502 X-RAY EXAM HIP UNI 2-3 VIEWS: CPT | Mod: LT

## 2017-04-10 PROCEDURE — 93306 TTE W/DOPPLER COMPLETE: CPT

## 2017-04-10 PROCEDURE — 500891 HCHG PACK, ORTHO MAJOR: Performed by: ORTHOPAEDIC SURGERY

## 2017-04-10 PROCEDURE — 160036 HCHG PACU - EA ADDL 30 MINS PHASE I: Performed by: ORTHOPAEDIC SURGERY

## 2017-04-10 PROCEDURE — 99232 SBSQ HOSP IP/OBS MODERATE 35: CPT | Performed by: INTERNAL MEDICINE

## 2017-04-10 PROCEDURE — 500681: Performed by: ORTHOPAEDIC SURGERY

## 2017-04-10 PROCEDURE — 160048 HCHG OR STATISTICAL LEVEL 1-5: Performed by: ORTHOPAEDIC SURGERY

## 2017-04-10 PROCEDURE — 700111 HCHG RX REV CODE 636 W/ 250 OVERRIDE (IP)

## 2017-04-10 PROCEDURE — 770020 HCHG ROOM/CARE - TELE (206)

## 2017-04-10 PROCEDURE — A9270 NON-COVERED ITEM OR SERVICE: HCPCS | Performed by: HOSPITALIST

## 2017-04-10 PROCEDURE — 700102 HCHG RX REV CODE 250 W/ 637 OVERRIDE(OP): Performed by: INTERNAL MEDICINE

## 2017-04-10 PROCEDURE — 160009 HCHG ANES TIME/MIN: Performed by: ORTHOPAEDIC SURGERY

## 2017-04-10 PROCEDURE — 700102 HCHG RX REV CODE 250 W/ 637 OVERRIDE(OP): Performed by: HOSPITALIST

## 2017-04-10 PROCEDURE — 501838 HCHG SUTURE GENERAL: Performed by: ORTHOPAEDIC SURGERY

## 2017-04-10 PROCEDURE — 160029 HCHG SURGERY MINUTES - 1ST 30 MINS LEVEL 4: Performed by: ORTHOPAEDIC SURGERY

## 2017-04-10 PROCEDURE — 110382 HCHG SHELL REV 271: Performed by: ORTHOPAEDIC SURGERY

## 2017-04-10 PROCEDURE — 93306 TTE W/DOPPLER COMPLETE: CPT | Mod: 26 | Performed by: INTERNAL MEDICINE

## 2017-04-10 PROCEDURE — 110371 HCHG SHELL REV 272: Performed by: ORTHOPAEDIC SURGERY

## 2017-04-10 PROCEDURE — 85027 COMPLETE CBC AUTOMATED: CPT

## 2017-04-10 PROCEDURE — 160002 HCHG RECOVERY MINUTES (STAT): Performed by: ORTHOPAEDIC SURGERY

## 2017-04-10 PROCEDURE — 160035 HCHG PACU - 1ST 60 MINS PHASE I: Performed by: ORTHOPAEDIC SURGERY

## 2017-04-10 PROCEDURE — A4606 OXYGEN PROBE USED W OXIMETER: HCPCS | Performed by: ORTHOPAEDIC SURGERY

## 2017-04-10 PROCEDURE — 700111 HCHG RX REV CODE 636 W/ 250 OVERRIDE (IP): Performed by: ORTHOPAEDIC SURGERY

## 2017-04-10 PROCEDURE — 80053 COMPREHEN METABOLIC PANEL: CPT

## 2017-04-10 PROCEDURE — 0QS734Z REPOSITION LEFT UPPER FEMUR WITH INTERNAL FIXATION DEVICE, PERCUTANEOUS APPROACH: ICD-10-PCS | Performed by: ORTHOPAEDIC SURGERY

## 2017-04-10 DEVICE — IMPLANTABLE DEVICE: Type: IMPLANTABLE DEVICE | Status: FUNCTIONAL

## 2017-04-10 RX ORDER — SODIUM CHLORIDE, SODIUM LACTATE, POTASSIUM CHLORIDE, CALCIUM CHLORIDE 600; 310; 30; 20 MG/100ML; MG/100ML; MG/100ML; MG/100ML
INJECTION, SOLUTION INTRAVENOUS
Status: DISCONTINUED | OUTPATIENT
Start: 2017-04-10 | End: 2017-04-10

## 2017-04-10 RX ORDER — SODIUM CHLORIDE, SODIUM LACTATE, POTASSIUM CHLORIDE, CALCIUM CHLORIDE 600; 310; 30; 20 MG/100ML; MG/100ML; MG/100ML; MG/100ML
INJECTION, SOLUTION INTRAVENOUS
Status: DISCONTINUED | OUTPATIENT
Start: 2017-04-10 | End: 2017-04-12 | Stop reason: SINTOL

## 2017-04-10 RX ORDER — ACETAMINOPHEN 650 MG/1
650 SUPPOSITORY RECTAL EVERY 4 HOURS PRN
Status: DISCONTINUED | OUTPATIENT
Start: 2017-04-10 | End: 2017-04-17 | Stop reason: HOSPADM

## 2017-04-10 RX ORDER — BUPIVACAINE HYDROCHLORIDE AND EPINEPHRINE 5; 5 MG/ML; UG/ML
INJECTION, SOLUTION EPIDURAL; INTRACAUDAL; PERINEURAL
Status: DISCONTINUED | OUTPATIENT
Start: 2017-04-10 | End: 2017-04-10 | Stop reason: HOSPADM

## 2017-04-10 RX ORDER — TRAZODONE HYDROCHLORIDE 50 MG/1
50 TABLET ORAL
Status: DISCONTINUED | OUTPATIENT
Start: 2017-04-10 | End: 2017-04-12 | Stop reason: SINTOL

## 2017-04-10 RX ORDER — LIDOCAINE HYDROCHLORIDE 10 MG/ML
INJECTION, SOLUTION INFILTRATION; PERINEURAL
Status: DISCONTINUED | OUTPATIENT
Start: 2017-04-10 | End: 2017-04-10 | Stop reason: HOSPADM

## 2017-04-10 RX ADMIN — HEPARIN SODIUM 5000 UNITS: 5000 INJECTION, SOLUTION INTRAVENOUS; SUBCUTANEOUS at 21:19

## 2017-04-10 RX ADMIN — CEFAZOLIN 1 G: 1 INJECTION, POWDER, FOR SOLUTION INTRAVENOUS at 21:19

## 2017-04-10 RX ADMIN — LORAZEPAM 1 MG: 1 TABLET ORAL at 21:18

## 2017-04-10 RX ADMIN — AMIODARONE HYDROCHLORIDE 200 MG: 200 TABLET ORAL at 09:00

## 2017-04-10 RX ADMIN — TRAZODONE HYDROCHLORIDE 50 MG: 50 TABLET ORAL at 22:53

## 2017-04-10 RX ADMIN — SODIUM CHLORIDE: 9 INJECTION, SOLUTION INTRAVENOUS at 02:05

## 2017-04-10 RX ADMIN — ACETAMINOPHEN 650 MG: 650 SUPPOSITORY RECTAL at 09:41

## 2017-04-10 RX ADMIN — SODIUM CHLORIDE, POTASSIUM CHLORIDE, SODIUM LACTATE AND CALCIUM CHLORIDE: 600; 310; 30; 20 INJECTION, SOLUTION INTRAVENOUS at 11:26

## 2017-04-10 RX ADMIN — CEFAZOLIN 1 G: 1 INJECTION, POWDER, FOR SOLUTION INTRAVENOUS at 15:10

## 2017-04-10 RX ADMIN — SODIUM CHLORIDE: 9 INJECTION, SOLUTION INTRAVENOUS at 10:13

## 2017-04-10 RX ADMIN — SODIUM CHLORIDE: 9 INJECTION, SOLUTION INTRAVENOUS at 23:25

## 2017-04-10 ASSESSMENT — PAIN SCALES - GENERAL
PAINLEVEL_OUTOF10: 0

## 2017-04-10 ASSESSMENT — ENCOUNTER SYMPTOMS
ABDOMINAL PAIN: 0
HEADACHES: 1
COUGH: 0
PALPITATIONS: 0
NAUSEA: 0
SHORTNESS OF BREATH: 0
VOMITING: 0
DIARRHEA: 0

## 2017-04-10 NOTE — PROGRESS NOTES
"1600: Patient came up to floor, daughter at bedside, reports she must leave soon, would like to assist with the admit profile when she returns.     1630:  Patient reports she is \"allergic to the air conditioning\" she has been covering her face with her blanket.  Mask given to patient.  She reports this helps.      1700:  Patient to CT scan.  "

## 2017-04-10 NOTE — PROGRESS NOTES
Tele summary  Rhythm: Paced  Rate: 60's-70's  NM: 0  QRS: 0.08  QT: 0.38    Ectopy: none    Telemetry strips placed in pt chart.

## 2017-04-10 NOTE — OP REPORT
DATE OF SERVICE:  04/10/2017    PREOPERATIVE DIAGNOSIS:  Left femoral neck fracture, valgus impacted.    POSTOPERATIVE DIAGNOSIS:  Left femoral neck fracture, valgus impacted.    SURGERY PERFORMED:  Closed reduction percutaneous screw fixation, left femoral   neck fracture.    SURGEON:  Harinder Leblanc MD    ASSISTANT:  None.    IMPLANTS:  Synthes cannulated screws x3.    ESTIMATED BLOOD LOSS:  25 mL    COMPLICATIONS:  None.    INDICATIONS FOR PROCEDURE:  The patient is a pleasant 81-year-old lady who   fell yesterday while at [employer].  She had a syncopal episode.  She has been   worked up for this and cleared for surgery today.  Due to the nature of her   injury, the decision was made to take her to the operating room for the   above-mentioned procedure.    DESCRIPTION OF PROCEDURE:  On the day of surgery, the patient was seen in the   preoperative area where informed consent was obtained with all risks and   benefits of the procedure explained and all questions answered.  She wished to   proceed with surgery.  The proper site was marked.  She was subsequently   taken to the operating room and placed in the supine position with all bony   prominences well padded.  General endotracheal anesthesia was induced.  The   left lower extremity was prepped and draped in the usual sterile fashion.  A   closed reduction was performed while on the fracture table with the assistance   of fluoroscopic guidance.    A time-out was performed with all persons in attendance agreeing on the proper   patient, proper surgical site, and proper surgery to be performed.    Using fluoroscopic guidance to determine the proper placement of surgical   incision, a lateral incision was made directly over the lateral aspect of the   proximal femur.  A threaded K wire was then placed on the inferior aspect of   the femoral neck.  This K wire was threaded along the inferior aspect of the   femoral neck into the femoral head.  Fluoroscopy was  used to verify placement   of this.  Next, the provided targeting guide was placed over the initially   placed K wire.  Next, 2 additional K wires were placed in the anterior   superior and posterior inferior manner.  Fluoroscopy was again used to verify   placement of these K wires.  They were then measured for appropriate length.    With K wires in place, these were then overdrilled.  The appropriately length   screws were then placed over the K wires while they remain in place.    Fluoroscopy was used to verify acceptable fracture alignment as well as   placement of the screws.  Once these were felt to be in acceptable position,   all K wires were removed and the wound was thoroughly irrigated with copious   amounts of normal saline.  The IT band was then closed using 0 Vicryl.    Subcutaneous tissues were then closed using 2-0 Vicryl and the skin was closed   using staples.  The wound was then dressed with Xeroform, 4x4s, and Tegaderm   dressings.  General endotracheal anesthesia was reversed and she was taken to   the PACU in good and stable condition.    DISPOSITION:  She will return to the floor where she has been admitted by the   hospitalist.  She will receive 24 hours of postoperative IV antibiotics for   prophylaxis.  She will also receive chemical DVT control per the hospitalist.    She will also have SCDs on for DVT prophylaxis.  She will get up with   physical therapy on postoperative day #1 and begin touchdown weightbearing   only to the left lower extremity.       ____________________________________     MD HANS Salas / MONSE    DD:  04/10/2017 15:54:07  DT:  04/10/2017 16:47:47    D#:  [id number]  Job#:  [id number]

## 2017-04-10 NOTE — DISCHARGE PLANNING
Patient discussed in morning rounds. Patient reportedly lives at home with her daughter and her discharge plan is to return home when medically able. No current SS needs noted.

## 2017-04-10 NOTE — PROGRESS NOTES
Report received from Cyndi WOODALL. Assumed care of pt. Pt resting  in bed. Pt A&Ox4. POC discussed. Pt verbalized understanding. No signs of distress or discomfort noted at this time. Pt instructed to use call light for assistance. Call light and personal belongings within reach. Pt denies any concerns at this time. All questions answered. Safety measures in place. Will continue to monitor.

## 2017-04-10 NOTE — PROGRESS NOTES
1400- Report received from PACU RN    1420- Pt transferred to unit via hospital bed. Assumed care of pt. Pt sleepy and is disoriented to event. Dressing to L leg, CDI, +CMS. Pt denies any pain. No signs of distress or discomfort noted at this time. Pt instructed to use call light for assistance. Call light and personal belongings within reach. Pt denies any concerns at this time. All questions answered. Safety measures in place. Will continue to monitor.

## 2017-04-10 NOTE — OR NURSING
1306: To PACU from OR via bed, sleeping, respirations spontaneous and non-labored via OPA. Icepack applied over c/d/i L hip surgical dressings. L DP+2.   1320: no change  1332: Rouses spontaneously, smiling, denies pain and nausea, ANG.   1339: Report to Rose WOODALL

## 2017-04-10 NOTE — H&P
CHIEF COMPLAINT:  Syncope.    HISTORY OF PRESENT ILLNESS:  This is an 81-year-old female with past medical   history of atrial fibrillation, hypertension, mood disorder, and GERD.  She   comes in after a syncopal episode and a hip fracture.  The patient was at   [employer].  She and her family were walking out of the [employer] at which point, her   daughter reports she stepped aside to speak to someone else for a brief, less   than a minute and was soon asked to come back towards her mother where her   mother had fallen.  The patient reports that she blacked out for a split   second and fell to the ground.  She does not remember if she hit her head.    Per people around her, she may have lost consciousness for a few seconds and   she may have hit her head, but it is unknown for sure.  The paramedics came a   few minutes later.  Per daughter, her blood pressure was fine when the   paramedics came.  Per her daughter, she also had a fall similar to this a   month and a half ago at which time she was standing and peeling potatoes and   she had an episode of syncope and fall, hit her posterior head.  At that time   when her blood pressure was checked, it was low.  She does not remember   exactly what the number was.  She denies any headache at this time.  She   denies any nausea or vomiting.  She denies any fevers or chills.  No   palpitations, no chest pain at this time.  No shortness of breath.  She   reports left hip pain about 5/10 at this time, inability to move the left leg   due to pain.    REVIEW OF SYSTEMS:  As per HPI and according to the AMA and CMS.  All other   systems have been reviewed and are negative otherwise.    PAST MEDICAL HISTORY:  Atrial fibrillation, hypertension, mood disorder, and   GERD.  She has a pacemaker.    MEDICATIONS:  Amiodarone 200 mg daily, Monday through Friday except for   Saturday and Sunday, aspirin 81 mg daily, vitamin D by mouth daily, unknown   dose, Ativan 1-1.5 mg by mouth every  bedtime for sleep, multivitamin daily,   omeprazole 20 mg daily, trazodone 50 mg every evening for sleep, zinc 220 mg   by mouth every day.    PAST SURGICAL HISTORY:  Total hysterectomy and pacemaker.  She also had a   history of complications with her pacemaker with MRSA infection last year and   pacemaker was taken out and replaced.    ALLERGIES:  No known drug allergies.    SOCIAL HISTORY:  Denies tobacco, alcohol or drug use.    PRIMARY CARE PHYSICIAN:  Dr. Taz Reyes.    FAMILY HISTORY:  Mother had Alzheimer's and father had CAD.    PHYSICAL EXAMINATION:  VITAL SIGNS:  On admission, pulse 69, respiratory rate 16, blood pressure   134/66, satting 97% on room air.  CONSTITUTIONAL:  Well-developed, well-nourished, not in acute distress.  HEENT:  Normocephalic, atraumatic.  No oropharyngeal exudates.  Eyes, pupils   are equally round and reactive to light and accommodation.  Conjunctivae are   normal.  Extraocular muscles are intact.  There is no scleral icterus.  NECK:  Normal range of motion and supple.  No stridor or JVD.  CARDIOVASCULAR:  Normal rate with irregular rhythm.  PULMONARY:  Normal effort, no respiratory distress.  Breath sounds are clear.    No wheezes or rales.  ABDOMEN:  Soft, nontender and nondistended.  Positive bowel sounds.  No   rebound or guarding.  MUSCULOSKELETAL:  No edema.  There is tenderness in the left hip area.  She is   unable to move her left leg due to pain which is internally rotated, right   leg normal range of motion, 5/5 strength, upper extremities, 5/5 strength and   normal range of motion.  NEUROLOGIC:  Alert and oriented x4.  No cranial nerve deficits, alert and   keenly responsive, answers questions appropriately.  No visual field loss.  No   facial palsy.  No drift in the upper extremities.  No drift on the right   lower leg.  Unable to assess on the left side.  No upper extremity ataxia.    Normal sensation throughout.  No aphasia or dysarthria appreciated.  No    extinction or inattention.  SKIN:  Warm, dry, with no appreciable rashes.  PSYCHIATRIC:  Mood and affect appropriate to situation.    LABORATORY DATA:  UA with small leukocyte esterase, wbc's 10-20, and bacteria   few.  CMP with a BUN of 32 and creatinine of 1.38.  Troponin less than 0.02.    INR 1.06.  BNP of 308.  CBC within normal limits.    IMAGIN.  CT hip as per radiology, mildly impacted left subcapital femoral neck   fracture, moderate degenerative changes in both hip joints, moderate   degenerative changes in the right SI joint, possibly sequelae of prior   sacroiliitis.  2.  Femur x-ray, left.  There is no change in acute mildly impacted left mid   femoral neck fracture, mid and distal femur intact.  There are moderate   degenerative changes in the knee.  3.  Chest x-ray, no acute cardiac or pulmonary abnormalities identified.  4.  CT head, white matter lucencies most consistent with small vessel ischemic   changes versus demyelination or gliosis, otherwise head CT without contrast   with no acute findings, no evidence of acute cerebral infarct, hemorrhage or   mass lesion.  5.  Dx, hip, left acute mildly impacted fracture of the left femoral neck is   identified.  6.  EKG, atrial paced rate of 70 with .    ASSESSMENT AND PLAN:  1.  Syncopal episode.  This is likely secondary to hypotension versus   arrhythmia; however pacer is in place.  We will rule out pulmonary embolism.    D-dimer is pending; however, the patient is not symptomatic at this time.  She   denies any shortness of breath, chest pain.  There is no tenderness in the   lower extremities.  She has no history of pulmonary embolism.  Her Wells score   is 0, which makes her low risk at this time.  CT head is negative for any   ischemic event or hemorrhage.  Chest x-ray did not show any acute infection.    I have ordered an echocardiogram.  A.m. hospitalist to consider carotid   ultrasound and MRI of the brain.  I have placed her on  neuro checks,   aspiration, fall and seizure precautions.  I will hold off on her home   angiotensin receptor blocker, she is on telmisartan per her daughter, 20 mg.    This may be secondary to hypotension versus orthostatic hypotension as patient   had just gotten up from sitting for prolonged periods of time at [employer].    This happened to her in prior time when she was standing and peeling potatoes   for a prolonged period of time.  Orthostatic vital signs are pending per RN.  2.  Femoral neck fracture, left side.  Dr. Leblanc, orthopedics has been   consulted.  He will take her to operating room tomorrow morning.  I have   placed her on n.p.o. after midnight.  We will hold her heparin in the morning   for deep venous thrombosis prophylaxis, Dx hip as well as Dx femur and CT hip   all consistent with some left-sided femoral neck fracture.  We will provide   for pain control and deep venous thrombosis prophylaxis.  A.m. hospitalist to   order physical therapy, occupational therapy after surgical intervention   tomorrow.  For now, the patient is placed on bed rest.  3.  Asymptomatic bacteriuria.  The patient has some bacteria in the urine.    She denies any dysuria.  She has frequency of urine; however, this is not new   and it is at her baseline.  She has no white count or evidence of sepsis.  We   will hold off on antibiotics for now.  4.  Chronic kidney disease versus acute kidney injury.  Baseline BUN and   creatinine unknown at this time.  BUN is 32 and creatinine is 1.38.  We will   provide for fluid hydration and repeat labs in the morning.  Her GFR is 37.  5.  Atrial fibrillation.  We will continue her aspirin as well as amiodarone.    Patient's rate is controlled at this time in the high 60s-70s.  She also has   a pacemaker.  6.  Hypertension.  Currently, her blood pressure is normotensive.  We will   hold off on her angiotensin receptor blocker.  Per chart, she is on clonidine;   however, patient reports  she does not use this.  We will provide for p.r.n.   labetalol as needed for systolic blood pressure greater than 160 and diastolic   greater than 110.  7.  Mood disorder/insomnia.  We will continue her lorazepam 1 mg every   bedtime; however, placed as p.r.n.  These are unlikely to be component of her   syncope as the patient was in [employer] during the day when this occurred and she   takes her Ativan and trazodone only at night for sleep and anxiety.  8.  Gastroesophageal reflux disease.  We will continue her home omeprazole.    No acute issues at this time.    DISPOSITION:  Inpatient.  The patient will likely need greater than 2   midnights of care.    CORE MEASURES:  1.  Deep venous thrombosis prophylaxis with heparin.  2.  Lines:  Peripheral IV access.  3.  Tubes:  Alvarado catheter.  4.  Code:  Full code.  5.  Gastrointestinal prophylaxis with omeprazole.  6.  Antibiotics:  Not applicable.       ____________________________________     MD BRENDA Moreno / MONSE    DD:  04/09/2017 16:02:20  DT:  04/09/2017 18:47:33    D#:  [id number]  Job#:  [id number]

## 2017-04-11 PROBLEM — G93.40 ACUTE ENCEPHALOPATHY: Status: ACTIVE | Noted: 2017-04-11

## 2017-04-11 PROCEDURE — 700101 HCHG RX REV CODE 250: Performed by: HOSPITALIST

## 2017-04-11 PROCEDURE — 97162 PT EVAL MOD COMPLEX 30 MIN: CPT

## 2017-04-11 PROCEDURE — A9270 NON-COVERED ITEM OR SERVICE: HCPCS | Performed by: INTERNAL MEDICINE

## 2017-04-11 PROCEDURE — 99233 SBSQ HOSP IP/OBS HIGH 50: CPT | Performed by: HOSPITALIST

## 2017-04-11 PROCEDURE — 770020 HCHG ROOM/CARE - TELE (206)

## 2017-04-11 PROCEDURE — A9270 NON-COVERED ITEM OR SERVICE: HCPCS | Performed by: HOSPITALIST

## 2017-04-11 PROCEDURE — 700102 HCHG RX REV CODE 250 W/ 637 OVERRIDE(OP): Performed by: HOSPITALIST

## 2017-04-11 PROCEDURE — G8979 MOBILITY GOAL STATUS: HCPCS | Mod: CI

## 2017-04-11 PROCEDURE — 93880 EXTRACRANIAL BILAT STUDY: CPT

## 2017-04-11 PROCEDURE — 700102 HCHG RX REV CODE 250 W/ 637 OVERRIDE(OP): Performed by: INTERNAL MEDICINE

## 2017-04-11 PROCEDURE — 700111 HCHG RX REV CODE 636 W/ 250 OVERRIDE (IP): Performed by: HOSPITALIST

## 2017-04-11 PROCEDURE — G8978 MOBILITY CURRENT STATUS: HCPCS | Mod: CL

## 2017-04-11 RX ORDER — HALOPERIDOL 5 MG/ML
2 INJECTION INTRAMUSCULAR ONCE
Status: COMPLETED | OUTPATIENT
Start: 2017-04-11 | End: 2017-04-11

## 2017-04-11 RX ORDER — HALOPERIDOL 5 MG/ML
1-2 INJECTION INTRAMUSCULAR EVERY 4 HOURS PRN
Status: DISCONTINUED | OUTPATIENT
Start: 2017-04-11 | End: 2017-04-13

## 2017-04-11 RX ORDER — HALOPERIDOL 5 MG/ML
5 INJECTION INTRAMUSCULAR ONCE
Status: COMPLETED | OUTPATIENT
Start: 2017-04-11 | End: 2017-04-11

## 2017-04-11 RX ORDER — HALOPERIDOL 5 MG/ML
INJECTION INTRAMUSCULAR
Status: COMPLETED
Start: 2017-04-11 | End: 2017-04-11

## 2017-04-11 RX ADMIN — HALOPERIDOL LACTATE 2 MG: 5 INJECTION, SOLUTION INTRAMUSCULAR at 23:57

## 2017-04-11 RX ADMIN — TRAZODONE HYDROCHLORIDE 50 MG: 50 TABLET ORAL at 20:27

## 2017-04-11 RX ADMIN — OMEPRAZOLE 20 MG: 20 CAPSULE, DELAYED RELEASE ORAL at 07:58

## 2017-04-11 RX ADMIN — LABETALOL HYDROCHLORIDE 10 MG: 5 INJECTION INTRAVENOUS at 20:27

## 2017-04-11 RX ADMIN — MULTIVITAMIN TABLET 1 TABLET: TABLET at 07:58

## 2017-04-11 RX ADMIN — HALOPERIDOL LACTATE 2 MG: 5 INJECTION, SOLUTION INTRAMUSCULAR at 11:17

## 2017-04-11 RX ADMIN — HALOPERIDOL LACTATE 2 MG: 5 INJECTION, SOLUTION INTRAMUSCULAR at 18:25

## 2017-04-11 RX ADMIN — ACETAMINOPHEN 650 MG: 325 TABLET, FILM COATED ORAL at 03:05

## 2017-04-11 RX ADMIN — AMIODARONE HYDROCHLORIDE 200 MG: 200 TABLET ORAL at 07:58

## 2017-04-11 RX ADMIN — SENNOSIDES AND DOCUSATE SODIUM 2 TABLET: 8.6; 5 TABLET ORAL at 07:57

## 2017-04-11 RX ADMIN — ASPIRIN 81 MG: 81 TABLET, COATED ORAL at 07:58

## 2017-04-11 RX ADMIN — Medication 220 MG: at 07:57

## 2017-04-11 RX ADMIN — HALOPERIDOL LACTATE 5 MG: 5 INJECTION, SOLUTION INTRAMUSCULAR at 13:36

## 2017-04-11 ASSESSMENT — PAIN SCALES - GENERAL
PAINLEVEL_OUTOF10: 0

## 2017-04-11 ASSESSMENT — GAIT ASSESSMENTS: GAIT LEVEL OF ASSIST: UNABLE TO PARTICIPATE

## 2017-04-11 NOTE — PROGRESS NOTES
Becker cath pulled out by patient, pt stated she does not remember that she pulled out her becker. Re-insertion of new becker cath done, procedure tolerated by patient.

## 2017-04-11 NOTE — PROGRESS NOTES
Telemetry Summary    Rhythm Interpretation: Atrial Paced Rhythm, underline Sinus Rhythm with a First Degree AV Block  Heart Rate: 73  ID Interval: 0.24  QRS Interval: 0.08  QT Interval: 0.40

## 2017-04-11 NOTE — PROGRESS NOTES
Late Entry    4/11/17, 0715- Shift report received from off going RN. Patient in bed, attempting remove IV and Becker catheter. Patient reminded by staff why she has becker catheter and IV.     4/11/17, 0815- Patient removed Becker catheter. Bekcer Catheter replaced.     4/11/17, 0835- Patient fed breakfast.     4/11/17, 0900- Patient states of need to go restroom. Catheter in place.     4/11/17, 0955- Patient up to bedside commode. Full linen change completed. Bath given.     4/11/17, 1045- Patient removed becker catheter. 2 mg of Haldol ordered by physician.

## 2017-04-11 NOTE — THERAPY
"Physical Therapy Evaluation completed.   Bed Mobility:  Supine to Sit: Minimal Assist  Transfers: Sit to Stand: Minimal Assist, max cueing to maintain TTWB L LE  Gait: Level Of Assist: Unable to Participate     Plan of Care: Will benefit from Physical Therapy 7 times per week  Discharge Recommendations: Equipment: Will Continue to Assess for Equipment Needs. Post-acute therapy Discharge to a transitional care facility for continued skilled therapy services.    See \"Rehab Therapy-Acute\" Patient Summary Report for complete documentation.     "

## 2017-04-11 NOTE — PROGRESS NOTES
Hospital Medicine Progress Note, Adult, Complex               Author: Kristie Mckeon Date & Time created: 4/10/2017  7:57 PM     Interval History:  80 YO female w/ PMH A fib (on amiodarone, s/p pacemaker), HTN (on micardis, catapres) Mood disorder (on Trazodone, lorazepam), GERD (on Omeprazole) presents w/ 2nd episode syncope in 2 mos, last episode she was hypotensive but not with this one. This time she sustained a L femoral neck fracture. She was seen in the AM, before OR. Her pacemaker was interrogated this afternoon and found to be working normally. She had no complaints except L hip pain and HA pre- op.    Review of Systems:  Review of Systems   Respiratory: Negative for cough and shortness of breath.    Cardiovascular: Negative for chest pain and palpitations.   Gastrointestinal: Negative for nausea, vomiting, abdominal pain and diarrhea.   Genitourinary: Negative for dysuria and frequency.   Musculoskeletal: Positive for joint pain (L hip).   Neurological: Positive for headaches.       Physical Exam:  Physical Exam   Constitutional: She is oriented to person, place, and time. She appears well-developed and well-nourished. No distress.   Very thin, frail, peitite   HENT:   Head: Normocephalic and atraumatic.   Eyes: EOM are normal. Pupils are equal, round, and reactive to light. Right eye exhibits no discharge. Left eye exhibits no discharge.   Neck: Neck supple.   Cardiovascular: Normal rate and regular rhythm.    paced   Pulmonary/Chest: Effort normal and breath sounds normal. No respiratory distress.   Abdominal: Soft. Bowel sounds are normal. She exhibits no distension. There is no tenderness.   Musculoskeletal: She exhibits no edema.   LLE straight, RLE abducted   Neurological: She is alert and oriented to person, place, and time. No cranial nerve deficit.   Skin: Skin is warm and dry. She is not diaphoretic.   Psychiatric: She has a normal mood and affect.   Nursing note and vitals  reviewed.      Labs:        Invalid input(s): [id number]  Recent Labs      17   125   TROPONINI  <0.02   BNPBTYPENAT  308*     Recent Labs      17   1258  04/10/17   0538   SODIUM  138  138   POTASSIUM  4.5  3.9   CHLORIDE  104  106   CO2  24  22   BUN  32*  23*   CREATININE  1.38  1.15   CALCIUM  9.1  8.7     Recent Labs      17   1258  04/10/17   0538   ALTSGPT  20  17   ASTSGOT  30  24   ALKPHOSPHAT  64  56   TBILIRUBIN  0.5  1.3   GLUCOSE  93  101*     Recent Labs      17   1258  04/10/17   0538   RBC  4.42  4.41   HEMOGLOBIN  13.9  13.8   HEMATOCRIT  41.8  41.1   PLATELETCT  215  187   PROTHROMBTM  13.6   --    INR  1.06   --      Recent Labs      17   1258  04/10/17   0538   WBC  10.5  11.1*   NEUTSPOLYS  73.30*   --    LYMPHOCYTES  15.50*   --    MONOCYTES  9.70   --    EOSINOPHILS  0.20   --    BASOPHILS  0.30   --    ASTSGOT  30  24   ALTSGPT  20  17   ALKPHOSPHAT  64  56   TBILIRUBIN  0.5  1.3           Hemodynamics:  Temp (24hrs), Av.2 °C (98.9 °F), Min:36.6 °C (97.9 °F), Max:37.6 °C (99.6 °F)  Temperature: 36.9 °C (98.4 °F)  Pulse  Av  Min: 69  Max: 71Heart Rate (Monitored): 69  Blood Pressure : 128/68 mmHg, NIBP: 150/76 mmHg     Respiratory:    Respiration: 18, Pulse Oximetry: 99 %           Fluids:    Intake/Output Summary (Last 24 hours) at 04/10/17 1957  Last data filed at 04/10/17 1600   Gross per 24 hour   Intake   2225 ml   Output   2400 ml   Net   -175 ml     Weight: 60.1 kg (132 lb 7.9 oz)  GI/Nutrition:  Orders Placed This Encounter   Procedures   • DIET ORDER     Standing Status: Standing      Number of Occurrences: 1      Standing Expiration Date:      Order Specific Question:  Diet:     Answer:  Regular [1]     Medical Decision Making, by Problem:  Active Hospital Problems    Diagnosis   • Mood disorder (CMS-HCC) [F39] on home meds   • Femoral neck fracture (CMS-HCC) [S72.009A] s/p ORIF today   • Syncope [R55] recurrent, echo OK EF 70, pacer OK,  check carotids, check orthstatics when can get up   • Atrial fibrillation (CMS-HCC) [I48.91] 100% paced   • Gastroesophageal reflux disease without esophagitis [K21.9] on chronic PPI   • Essential hypertension [I10] meds on hold, cuurrently normotensive       Labs reviewed, Medications reviewed and EKG reviewed (paced)  Alvarado catheter: No Alvarado      DVT Prophylaxis: Heparin    Ulcer prophylaxis: Yes (chronic med)    Assessed for rehab: Patient was assess for and/or received rehabilitation services during this hospitalization

## 2017-04-11 NOTE — PROGRESS NOTES
Hospital Medicine Progress Note, Adult, Complex               Author: Louisa Burger Date & Time created: 4/11/2017  11:33 AM     Interval History:  80 YO female w/ PMH A fib (on amiodarone, s/p pacemaker), HTN (on micardis, catapres) Mood disorder (on Trazodone, lorazepam), GERD (on Omeprazole) presents w/ 2nd episode syncope in 2 mos, last episode she was hypotensive but not with this one. This time she sustained a L femoral neck fracture. She was seen in the AM, before OR. Her pacemaker was interrogated this afternoon and found to be working normally. She had no complaints except L hip pain and HA pre- op.    4/11: patient very agitated; pulled out becker*3 since last night; doesn't appear to be in any pain    Review of Systems:  Review of Systems   Unable to perform ROS: dementia       Physical Exam:  Physical Exam   Constitutional: She appears well-developed and well-nourished.   Very thin, frail, peitite   HENT:   Head: Normocephalic and atraumatic.   Eyes: Conjunctivae and EOM are normal. Pupils are equal, round, and reactive to light.   Neck: Normal range of motion. Neck supple.   Cardiovascular: Normal rate and regular rhythm.    paced   Pulmonary/Chest: Effort normal and breath sounds normal. No respiratory distress.   Abdominal: Soft. Bowel sounds are normal. She exhibits no distension.   Musculoskeletal: She exhibits no edema or tenderness.   LLE straight, RLE abducted   Neurological: She is alert. She is disoriented.   Skin: Skin is warm and dry.   Psychiatric: She is agitated, hyperactive and combative. Thought content is delusional.   Nursing note and vitals reviewed.      Labs:        Invalid input(s): [id number]  Recent Labs      04/09/17   1258   TROPONINI  <0.02   BNPBTYPENAT  308*     Recent Labs      04/09/17   1258  04/10/17   0538   SODIUM  138  138   POTASSIUM  4.5  3.9   CHLORIDE  104  106   CO2  24  22   BUN  32*  23*   CREATININE  1.38  1.15   CALCIUM  9.1  8.7     Recent Labs       17   1258  04/10/17   0538   ALTSGPT  20  17   ASTSGOT  30  24   ALKPHOSPHAT  64  56   TBILIRUBIN  0.5  1.3   GLUCOSE  93  101*     Recent Labs      17   1258  04/10/17   0538   RBC  4.42  4.41   HEMOGLOBIN  13.9  13.8   HEMATOCRIT  41.8  41.1   PLATELETCT  215  187   PROTHROMBTM  13.6   --    INR  1.06   --      Recent Labs      17   1258  04/10/17   0538   WBC  10.5  11.1*   NEUTSPOLYS  73.30*   --    LYMPHOCYTES  15.50*   --    MONOCYTES  9.70   --    EOSINOPHILS  0.20   --    BASOPHILS  0.30   --    ASTSGOT  30  24   ALTSGPT  20  17   ALKPHOSPHAT  64  56   TBILIRUBIN  0.5  1.3           Hemodynamics:  Temp (24hrs), Av.6 °C (97.8 °F), Min:36.4 °C (97.5 °F), Max:36.9 °C (98.4 °F)  Temperature: 36.5 °C (97.7 °F)  Pulse  Av.7  Min: 69  Max: 80Heart Rate (Monitored): 69  Blood Pressure : 151/59 mmHg, NIBP: 150/76 mmHg     Respiratory:    Respiration: 19, Pulse Oximetry: 90 %           Fluids:    Intake/Output Summary (Last 24 hours) at 17 1133  Last data filed at 17 0700   Gross per 24 hour   Intake   3870 ml   Output   1300 ml   Net   2570 ml        GI/Nutrition:  Orders Placed This Encounter   Procedures   • DIET ORDER     Standing Status: Standing      Number of Occurrences: 1      Standing Expiration Date:      Order Specific Question:  Diet:     Answer:  Regular [1]     Medical Decision Making, by Problem:  Active Hospital Problems    Diagnosis    Acute Encephalopathy  - haldol prn  - has baseline dementia     • Mood disorder (CMS-HCC) [F39]  - on home meds  - has baseline dementia     • Femoral neck fracture (CMS-HCC) [S72.009A]   - s/p ORIF POD 1  - pain control  - f/u ortho recs  - PT/OT  - will need placement: SNF vs Rehab     • Syncope [R55]   - recurrent, echo OK EF 70, pacer OK  - etiology not clear     • Atrial fibrillation (CMS-HCC) [I48.91]   - 100% paced     • Gastroesophageal reflux disease without esophagitis [K21.9]   - on chronic PPI     • Essential  hypertension [I10]   - meds on hold, currently normotensive         Labs reviewed, Medications reviewed and Radiology images reviewed (paced)  Alvarado catheter: No Alvarado      DVT Prophylaxis: Heparin    Ulcer prophylaxis: Yes (chronic med)    Assessed for rehab: Patient was assess for and/or received rehabilitation services during this hospitalization

## 2017-04-11 NOTE — DISCHARGE PLANNING
Care Transition Team Assessment    Met with pt and daughter at bedside. Pt daughter states pt will most likely discharge to rehab. Pt daughter expressed no other needs.    Information Source  Orientation : Oriented x 4  Information Given By: Relative (Pt daughter)  Informant's Name: Sun Page  Who is responsible for making decisions for patient? : Patient    Readmission Evaluation  Is this a readmission?: No    Elopement Risk  Legal Hold: No  Ambulatory or Self Mobile in Wheelchair: No-Not an Elopement Risk  Elopement Risk: Not at Risk for Elopement    Interdisciplinary Discharge Planning  Does Admitting Nurse Feel This Could be a Complex Discharge?: No  Primary Care Physician:  (Pt states none)  Lives with - Patient's Self Care Capacity: Adult Children  Do You Take your Prescribed Medications Regularly: Yes    Discharge Preparedness  What is your plan after discharge?: Skilled nursing facility  What are your discharge supports?: Child  Prior Functional Level: Ambulatory, Independent with Activities of Daily Living, Independent with Medication Management  Difficulity with ADLs: None  Difficulity with IADLs: None    Functional Assesment  Prior Functional Level: Ambulatory, Independent with Activities of Daily Living, Independent with Medication Management    Finances  Financial Barriers to Discharge: No  Prescription Coverage: Yes (Pharmacy: [facility])    Vision / Hearing Impairment  Vision Impairment : Yes  Right Eye Vision: Impaired  Left Eye Vision: Impaired  Hearing Impairment : No         Advance Directive  Advance Directive?: None  Advance Directive offered?: AD Booklet refused    Domestic Abuse  Have you ever been the victim of abuse or violence?: No  Physical Abuse or Sexual Abuse: No  Verbal Abuse or Emotional Abuse: No  Possible Abuse Reported to:: Not Applicable    Psychological Assessment  History of Substance Abuse: None  History of Psychiatric Problems: No  Non-compliant with Treatment:  No  Newly Diagnosed Illness: No    Discharge Risks or Barriers  Discharge risks or barriers?: No    Anticipated Discharge Information  Anticipated discharge disposition: Discharge needs currently unknown  Discharge Address: N/A  Discharge Contact Phone Number: [phone]

## 2017-04-11 NOTE — PROGRESS NOTES
More agitated and confused, pulling out telemetry box and IV access, talked to Dr. Burger ordered to give extra dose of haldol 5mg IV -given.

## 2017-04-11 NOTE — PROGRESS NOTES
Tele strip at 1919 shows 100% AV Paced with a HR of 71.      Measurements: p/.08/.38    Tele Shift Summary:    Rhythm : 100% AV Paced  Rate : 70s    Ectopy : Per ENOCH Suarez pt had no ectopy    Telemetry monitoring strips placed in pt chart. PRimary RN to monitor

## 2017-04-11 NOTE — PROGRESS NOTES
Pt very confused at this time trying to pull out her becker cath. Explained to pt that she has to keep this cath for her to keep dry in bed. Patient able to follow but then will repeat again her attempt to pull out cath. Pt appears to be restless and stated she has headache, medicated w/. Tylenol 650 mg. Tab. Will continue to monitor.

## 2017-04-11 NOTE — PROGRESS NOTES
Report received from JOSE C Sebastian, MD at bedside, haldol given 2mg IV as ordered for agitation., becker removed by patient. No active bleeding noted.daughter at bedside.alrm on for safety.

## 2017-04-11 NOTE — DISCHARGE PLANNING
Medical Social Work    Referral: Pt discussed at IDT rounds this AM.    Intervention: Per flowsheet, pt lives with adult daughter and expects to d.c home.  Pt possibly going to SNF or Rehab but will wait to see what PT and OT evaluations recommend.  No SS needs identified nor any requests for MARILUZ Newberry during rounds.      Plan: MARILUZ available for any assistance with d.c planning.

## 2017-04-12 ENCOUNTER — TELEPHONE (OUTPATIENT)
Dept: INTERNAL MEDICINE | Facility: MEDICAL CENTER | Age: 82
End: 2017-04-12

## 2017-04-12 DIAGNOSIS — K21.9 GASTROESOPHAGEAL REFLUX DISEASE, ESOPHAGITIS PRESENCE NOT SPECIFIED: ICD-10-CM

## 2017-04-12 PROCEDURE — A9270 NON-COVERED ITEM OR SERVICE: HCPCS | Performed by: HOSPITALIST

## 2017-04-12 PROCEDURE — 700102 HCHG RX REV CODE 250 W/ 637 OVERRIDE(OP): Performed by: HOSPITALIST

## 2017-04-12 PROCEDURE — 700111 HCHG RX REV CODE 636 W/ 250 OVERRIDE (IP): Performed by: HOSPITALIST

## 2017-04-12 PROCEDURE — A9270 NON-COVERED ITEM OR SERVICE: HCPCS | Performed by: INTERNAL MEDICINE

## 2017-04-12 PROCEDURE — 700102 HCHG RX REV CODE 250 W/ 637 OVERRIDE(OP): Performed by: INTERNAL MEDICINE

## 2017-04-12 PROCEDURE — 770006 HCHG ROOM/CARE - MED/SURG/GYN SEMI*

## 2017-04-12 PROCEDURE — 99233 SBSQ HOSP IP/OBS HIGH 50: CPT | Performed by: INTERNAL MEDICINE

## 2017-04-12 PROCEDURE — 94760 N-INVAS EAR/PLS OXIMETRY 1: CPT

## 2017-04-12 RX ORDER — TRAMADOL HYDROCHLORIDE 50 MG/1
50 TABLET ORAL EVERY 4 HOURS PRN
Status: DISCONTINUED | OUTPATIENT
Start: 2017-04-12 | End: 2017-04-17 | Stop reason: HOSPADM

## 2017-04-12 RX ORDER — QUETIAPINE FUMARATE 25 MG/1
25 TABLET, FILM COATED ORAL 3 TIMES DAILY
Status: DISCONTINUED | OUTPATIENT
Start: 2017-04-12 | End: 2017-04-13

## 2017-04-12 RX ORDER — TRAMADOL HYDROCHLORIDE 50 MG/1
100 TABLET ORAL EVERY 6 HOURS PRN
Status: DISCONTINUED | OUTPATIENT
Start: 2017-04-12 | End: 2017-04-17 | Stop reason: HOSPADM

## 2017-04-12 RX ADMIN — QUETIAPINE FUMARATE 25 MG: 25 TABLET, FILM COATED ORAL at 15:11

## 2017-04-12 RX ADMIN — AMIODARONE HYDROCHLORIDE 200 MG: 200 TABLET ORAL at 07:57

## 2017-04-12 RX ADMIN — QUETIAPINE FUMARATE 25 MG: 25 TABLET, FILM COATED ORAL at 21:15

## 2017-04-12 RX ADMIN — MULTIVITAMIN TABLET 1 TABLET: TABLET at 07:57

## 2017-04-12 RX ADMIN — QUETIAPINE FUMARATE 25 MG: 25 TABLET, FILM COATED ORAL at 09:46

## 2017-04-12 RX ADMIN — HALOPERIDOL LACTATE 2 MG: 5 INJECTION, SOLUTION INTRAMUSCULAR at 04:07

## 2017-04-12 RX ADMIN — LABETALOL HYDROCHLORIDE 10 MG: 5 INJECTION INTRAVENOUS at 03:27

## 2017-04-12 RX ADMIN — Medication 220 MG: at 07:57

## 2017-04-12 RX ADMIN — OMEPRAZOLE 20 MG: 20 CAPSULE, DELAYED RELEASE ORAL at 07:57

## 2017-04-12 RX ADMIN — ASPIRIN 81 MG: 81 TABLET, COATED ORAL at 07:57

## 2017-04-12 RX ADMIN — SENNOSIDES AND DOCUSATE SODIUM 2 TABLET: 8.6; 5 TABLET ORAL at 07:57

## 2017-04-12 ASSESSMENT — PAIN SCALES - GENERAL
PAINLEVEL_OUTOF10: 0

## 2017-04-12 NOTE — DISCHARGE PLANNING
Medical Social Work    Referral: Pt discussed at IDT rounds this AM.    Intervention: Pt will need to go to SNF.      Plan: Pt has Medicaid so SW will obtain permission to send blanket referral to all SNF's in the area including  and [place].

## 2017-04-12 NOTE — DISCHARGE PLANNING
CCS received a SNF choice form. Per the choice form the referral has been sent to all local facilities.

## 2017-04-12 NOTE — DISCHARGE PLANNING
CCS received notification from [facility] the referral has been declined no beds available.

## 2017-04-12 NOTE — PROGRESS NOTES
Bedside report received from Sathya WOODALL. Assumed care. POC discussed. Pt resting comfortably in bed. Safety precautions in place. Family at bedside. Pt calm at this time.

## 2017-04-12 NOTE — DISCHARGE PLANNING
CCS received calls from the following facilities.     [facility] - the referral has been denied due to the patient's behaviors    [facility]- the referral has been denied to no medicaid beds.

## 2017-04-12 NOTE — DISCHARGE PLANNING
CCS received notification from the following facilities.     [facility]- referral denied due to behaviors.     [facility] - the referral has been denied. No [facility]

## 2017-04-12 NOTE — PROGRESS NOTES
Ms. Mix has been agitated, irritated and disoriented.  She is being treated for this.  During my exam she is resting in bed and oriented to person and place.    AF/VSS  Dressings c/d/i  Incision well healing without evidence of infection  Motor/sensory intact to left deep/sup peroneal and tibial nerves  Mild pain in the groin with movement of the LLE    A: Left fem neck fx, s/p closed reduction and percutaneous pinning on 4/10/2017  P: Cont PT/OT as able  Touchdown WB only LLE  Daily dressing changes per nursing staff  DVT prophylaxis with SCDs and chemoprophylaxis per primary  Will cont to follow

## 2017-04-12 NOTE — PROGRESS NOTES
2040--Assessment completed. Pt medicated per MAR, declines senna and heparin. Family at bedside. Pt AAO to self only, knows she's in [place] but not the hospital, cannot remember why she is here. Pt calm and mostly cooperative at this time. Neuro check completed, no deficits noted other than confusion. Pt medicated for SBP>160. Will reassess. Pt states no other needs at this time, will monitor. Bed alarm on at all times.    2140--SBP still above 160, pt refusing repeat dose of labetalol. Will CTM.    2220--Pt resting in bed, no s/s of distress. Family at bedside. Will CTM. Bed alarm on.

## 2017-04-12 NOTE — DISCHARGE PLANNING
MARILUZ Newberry met with pt's daughter, Sun as pt is disoriented.  MARILUZ explained that due to pt insurance being Medicaid that a blanket referral to all SNF's would be a good idea.  Daughter only wants referrals sent to [place] and [place] because she doesn't even know how to get to [place] or [place].  MARILUZ will follow up with a list of SNF in [place] and [place] so she can have the addresses.  MARILUZ faxed choice form to [facility].

## 2017-04-12 NOTE — PROGRESS NOTES
Hospital Medicine Progress Note, Adult, Complex               Author: Kristie Mckeon Date & Time created: 4/12/2017  9:11 AM     Interval History:  82 YO female w/ PMH A fib (on amiodarone, s/p pacemaker), HTN (on micardis, catapres) Mood disorder (on Trazodone, lorazepam), GERD (on Omeprazole) presents w/ 2nd episode syncope in 2 mos, last episode she was hypotensive but not with this one. This time she sustained a L femoral neck fracture. She was seen in the AM, before OR. Her pacemaker was interrogated and was properly functioning.     4/11- delirious- started on Haldol    4/12 Daughter very anxious (several relatives had dementia, she worries this will be permanent) patient pulled out becker multiple times, would not leave heart monitor on. Reassured daughter that we can stabilize her with medications, will limit haldol, d/c'd narcotics and trazodone which can be contributing. Starting low dose scheduled seroquel. Patient confused and agitated, has lost her English, she is calmer if spoken to in [place].      Review of Systems:  Review of Systems   Unable to perform ROS: mental status change       Physical Exam:  Physical Exam   Constitutional: She appears well-developed. No distress.   Petite and thin   HENT:   Head: Normocephalic and atraumatic.   Eyes: EOM are normal. Pupils are equal, round, and reactive to light. Right eye exhibits no discharge. Left eye exhibits no discharge.   Neck: Normal range of motion.   Cardiovascular: Normal rate and regular rhythm.    Pulmonary/Chest: Effort normal and breath sounds normal.   Abdominal: Soft. Bowel sounds are normal. She exhibits no distension. There is no tenderness.   Musculoskeletal: She exhibits no edema.   Moves both LE's   Neurological: She is alert. No cranial nerve deficit.   Disoriented, tremulous   Skin: Skin is warm and dry. She is not diaphoretic.   Psychiatric: Her mood appears anxious. She is agitated and hyperactive. Cognition and memory are  impaired. She expresses impulsivity.   Nursing note and vitals reviewed.      Labs:        Invalid input(s): [id number]  Recent Labs      17   125   TROPONINI  <0.02   BNPBTYPENAT  308*     Recent Labs      178  04/10/17   0538   SODIUM  138  138   POTASSIUM  4.5  3.9   CHLORIDE  104  106   CO2  24  22   BUN  32*  23*   CREATININE  1.38  1.15   CALCIUM  9.1  8.7     Recent Labs      17   1258  04/10/17   0538   ALTSGPT  20  17   ASTSGOT  30  24   ALKPHOSPHAT  64  56   TBILIRUBIN  0.5  1.3   GLUCOSE  93  101*     Recent Labs      17   1258  04/10/17   0538   RBC  4.42  4.41   HEMOGLOBIN  13.9  13.8   HEMATOCRIT  41.8  41.1   PLATELETCT  215  187   PROTHROMBTM  13.6   --    INR  1.06   --      Recent Labs      178  04/10/17   0538   WBC  10.5  11.1*   NEUTSPOLYS  73.30*   --    LYMPHOCYTES  15.50*   --    MONOCYTES  9.70   --    EOSINOPHILS  0.20   --    BASOPHILS  0.30   --    ASTSGOT  30  24   ALTSGPT  20  17   ALKPHOSPHAT  64  56   TBILIRUBIN  0.5  1.3           Hemodynamics:  Temp (24hrs), Av.7 °C (98.1 °F), Min:36.7 °C (98 °F), Max:36.8 °C (98.2 °F)  Temperature: 36.8 °C (98.2 °F)  Pulse  Av.8  Min: 69  Max: 80   Blood Pressure : (!) 168/92 mmHg     Respiratory:    Respiration: 20, Pulse Oximetry: 93 %           Fluids:    Intake/Output Summary (Last 24 hours) at 17 0911  Last data filed at 17 0332   Gross per 24 hour   Intake    170 ml   Output    880 ml   Net   -710 ml        GI/Nutrition:  Orders Placed This Encounter   Procedures   • DIET ORDER     Standing Status: Standing      Number of Occurrences: 1      Standing Expiration Date:      Order Specific Question:  Diet:     Answer:  Regular [1]     Medical Decision Making, by Problem:  Active Hospital Problems    Diagnosis   • Acute encephalopathy [G93.40] try seroquel scheduled, limit haldol, stop other meds that can contribute, limit uneccesary tubes, disturbances   • Mood disorder  (CMS-HCC) [F39] on home meds (trazodone held 2/2 delirium)   • Femoral neck fracture (CMS-HCC) [S72.009A] SNF when mentation stable (order placed)   • Syncope [R55] carotids and echo OK, ? orthostasis   • Atrial fibrillation (CMS-HCC) [I48.91] chronic, paced, on Amiodarone   • Gastroesophageal reflux disease without esophagitis [K21.9] on PPI   • Essential hypertension [I10] currently increased 2/2 agitated. Monitor       Labs reviewed and Medications reviewed  Alvarado catheter: No Alvarado      DVT Prophylaxis: Heparin    Ulcer prophylaxis: Yes    Assessed for rehab: Patient unable to tolerate rehabilitation therapeutic regimen

## 2017-04-12 NOTE — PROGRESS NOTES
Neuro check completed. Pt AAO to self, time and event now. More alert and cooperative at this time. Pt bladder scanned, >500 in bladder. Pt placed on bed pan, 700 ml yellow urine out. Pt changed, perineal care performed, repositioned in bed. Pt medicated for SBP>160. Warm blanket provided to pt. Bed alarm on, pt states no other needs at this time, will CTM.

## 2017-04-12 NOTE — PROGRESS NOTES
Confused and agitated, safety precaution in placed, took medication this am with applesauce, seen by MD talked to the daughter, POC discussed, started on seroquel, foam dressing left hip CDI, will continue to monitor.

## 2017-04-12 NOTE — PROGRESS NOTES
Neuro check completed. Pt AAO to self, year and event now. Pt agitated, medicated per MAR. Pt SBP>160, refusing PRN management at this time. Will try again when pt is more calm. Pt declines need to void. Bed alarm on, will CTM.

## 2017-04-12 NOTE — PROGRESS NOTES
Bedside report given to Jacoby WOODALL. POC discussed. Pt resting comfortably in bed. Safety precautions in place.

## 2017-04-12 NOTE — DISCHARGE PLANNING
CCS received notification from [facility] the referral has been declined no beds available.

## 2017-04-12 NOTE — PROGRESS NOTES
*Pt refusing/pulling off telemetry monitor throughout the night, tele summary reflects small window of tele activity*    Tele strip at 0302 shows a-paced rhythm w/ HR of 69.    Tele Shift Summary:  Rhythm: A-paced  Rate: 60's-70's  Ectopy: Unable to assess    Telemetry monitoring strips placed in pt chart.

## 2017-04-12 NOTE — DISCHARGE PLANNING
All [place] and [place] SNF have declined.   will bring a list of [place] and [place] SNF's and obtain permission to send referrals to them.

## 2017-04-13 PROCEDURE — A9270 NON-COVERED ITEM OR SERVICE: HCPCS | Performed by: INTERNAL MEDICINE

## 2017-04-13 PROCEDURE — 700111 HCHG RX REV CODE 636 W/ 250 OVERRIDE (IP): Performed by: HOSPITALIST

## 2017-04-13 PROCEDURE — 99232 SBSQ HOSP IP/OBS MODERATE 35: CPT | Performed by: HOSPITALIST

## 2017-04-13 PROCEDURE — 770006 HCHG ROOM/CARE - MED/SURG/GYN SEMI*

## 2017-04-13 PROCEDURE — 700102 HCHG RX REV CODE 250 W/ 637 OVERRIDE(OP): Performed by: HOSPITALIST

## 2017-04-13 PROCEDURE — 700102 HCHG RX REV CODE 250 W/ 637 OVERRIDE(OP): Performed by: INTERNAL MEDICINE

## 2017-04-13 PROCEDURE — A9270 NON-COVERED ITEM OR SERVICE: HCPCS | Performed by: HOSPITALIST

## 2017-04-13 RX ORDER — QUETIAPINE FUMARATE 25 MG/1
25 TABLET, FILM COATED ORAL EVERY EVENING
Status: DISCONTINUED | OUTPATIENT
Start: 2017-04-13 | End: 2017-04-17 | Stop reason: HOSPADM

## 2017-04-13 RX ADMIN — ASPIRIN 81 MG: 81 TABLET, COATED ORAL at 08:26

## 2017-04-13 RX ADMIN — OMEPRAZOLE 20 MG: 20 CAPSULE, DELAYED RELEASE ORAL at 08:25

## 2017-04-13 RX ADMIN — QUETIAPINE FUMARATE 25 MG: 25 TABLET, FILM COATED ORAL at 08:26

## 2017-04-13 RX ADMIN — QUETIAPINE FUMARATE 25 MG: 25 TABLET, FILM COATED ORAL at 14:04

## 2017-04-13 RX ADMIN — ENOXAPARIN SODIUM 40 MG: 40 INJECTION, SOLUTION INTRAVENOUS; SUBCUTANEOUS at 15:30

## 2017-04-13 RX ADMIN — SENNOSIDES AND DOCUSATE SODIUM 2 TABLET: 8.6; 5 TABLET ORAL at 09:00

## 2017-04-13 RX ADMIN — AMIODARONE HYDROCHLORIDE 200 MG: 200 TABLET ORAL at 08:25

## 2017-04-13 RX ADMIN — Medication 220 MG: at 08:26

## 2017-04-13 RX ADMIN — MULTIVITAMIN TABLET 1 TABLET: TABLET at 08:25

## 2017-04-13 RX ADMIN — SENNOSIDES AND DOCUSATE SODIUM 2 TABLET: 8.6; 5 TABLET ORAL at 20:40

## 2017-04-13 RX ADMIN — QUETIAPINE FUMARATE 25 MG: 25 TABLET, FILM COATED ORAL at 20:40

## 2017-04-13 ASSESSMENT — PAIN SCALES - GENERAL
PAINLEVEL_OUTOF10: 0
PAINLEVEL_OUTOF10: 0

## 2017-04-13 NOTE — DISCHARGE PLANNING
MARILUZ Newberry received a call from Jaki at [facility] stating that pt looks to be a candidate for Rehab and requested a physiatry consult.  Dr. Morales entered and SW notified pt's daughter, Sun who is very happy that pt is being considered for [facility].

## 2017-04-13 NOTE — PROGRESS NOTES
Assessment and med pass completed. Pt AAO to self only at this time; knows she is in [place], not the hospital and knows the year but not the month. Cannot remember why she is here. Pt repositioned in bed, no void at this time. Pt provided with snacks/fluids with nighttime meds. Pt family at bedside, leaving now for the night. POC discussed at length, all questions/concerns addressed at this time. Bed alarm on, will monitor.

## 2017-04-13 NOTE — PROGRESS NOTES
Pt sleeping throughout the night, no s/s of distress. Pt turning self in bed. Pt declined need to void, bladder scan >500. Pt encouraged to use bedpan, void 500 ml. Neuro check unchanged, pt calm and cooperative at this time. Bed alarm on, will CTM.

## 2017-04-13 NOTE — DISCHARGE PLANNING
Aware of PMR referral from Dr. Morales. Will forward to Physiatry per protocol. Dr. Kylah Marvin to consult. Pt has Medicaid FFS insurance. [facility] to follow for Pysiatry recommendation. Thank you for the opportunity to assist this individual as she transitions to post acute services.

## 2017-04-13 NOTE — DISCHARGE PLANNING
Physiatry Dr. Felix recommending updated therapy evals to assess tolerance/participation in IRF level therapy regimen. Update to MARILUZ Newberry with request for evals. [facility] to follow for therapy notes.

## 2017-04-13 NOTE — PROGRESS NOTES
Bedside report received from Sathya WOODALL. Assumed care. POC discussed. Pt resting comfortably in bed. Safety precautions in place. Family at bedside.

## 2017-04-13 NOTE — DISCHARGE PLANNING
Medical Social Work    Referral: Pt discussed at IDT rounds this AM.    Intervention: [place] and [place] SNF all declined.  SW is waiting on decision from pt's daughter, Sun as to which [place] and  SNF's to send referrals to.    Plan: SW available for any assistance with d.c planning.

## 2017-04-13 NOTE — PROGRESS NOTES
Bedside report given to Sarahy WOODALL. POC discussed. Pt resting comfortably in bed. Safety precautions in place. Family at bedside.

## 2017-04-13 NOTE — DISCHARGE PLANNING
Current chart documentation indicates potential for inpatient rehabilitation. Please consider a PMR referral to assist with d/c planning. MARILUZ Hausery aware.

## 2017-04-13 NOTE — PROGRESS NOTES
Received bedside report from Vandana WOODALL. Patient slept through the night. Family is at beside. Plan is for the patient's daughter to find an appropriate SNF for her mother to go to until she can get strong enough to come home. Patient states that she does not have any pain.

## 2017-04-13 NOTE — CONSULTS
PM&R/Physiatry Note:    The patient is an 81-year-old female with past medical history of A. fib status post pacemaker placement, hypertension, GERD, arthritis, kidney disease, mood disorder admitted on 4/9/2017 following syncopal episode and fall, suffered left femoral neck fracture. Following medical clearance underwent closed reduction and percutaneous screw fixation on 4/10/2017. The patient is now toe-touch weightbearing left lower limb. Medical team working through medical stabilization, the patient is noted to have encephalopathy symptoms, primary team working through workup, medication adjustments. Reviewed  records, patient lives with her daughter.    Reviewed physical therapy notes from 4/11/2017, note functional deficits with transitions, was unable to proceed with gait training.    I recommend updated physical therapy evaluation, as well as occupational therapy evaluation, to assess the patient's ability to participate with therapy/rehabilitation program. In order to meet criteria for acute inpatient rehabilitation, the patient will need to be able to particpiate with 3 hours of therapy per day. Will continue to follow.

## 2017-04-13 NOTE — PROGRESS NOTES
Hospital Medicine Progress Note, Adult, Complex               Author: Juwan Morales Date & Time created: 4/13/2017  8:17 AM     Interval History:  ID: [id number] F with recent presumed syncope and had subsequent left hip fracture with repair this admit.  After surgery she has had complication of confusion.      Today:  Met with daughter and discussed patient's care.  Some baseline dementia from discussion w/ daughter with moderate improvement in confusion since surgery.  Discussed medication adjustments.  Possible Rehab discharge await OT/PT.  Daughter states they have been refusing heparin shots and I explained that the risk of refusing is a life threatening DVT potentially.  I changed heparin to lovenox in hopes they will accept as we discussed.    Review of Systems:  Review of Systems   Unable to perform ROS: dementia       Physical Exam:  Physical Exam   Constitutional: No distress.   HENT:   Head: Normocephalic and atraumatic.   Nose: Nose normal.   Mouth/Throat: Oropharynx is clear and moist. No oropharyngeal exudate.   Eyes: Conjunctivae and EOM are normal. Right eye exhibits no discharge. Left eye exhibits no discharge. No scleral icterus.   Neck: Normal range of motion. No tracheal deviation present.   Cardiovascular: Normal rate, regular rhythm, normal heart sounds and intact distal pulses.    No murmur heard.  Pulmonary/Chest: Effort normal and breath sounds normal. No stridor. No respiratory distress. She has no wheezes.   Abdominal: Soft. Bowel sounds are normal. She exhibits no distension. There is no tenderness. There is no rebound.   Musculoskeletal: She exhibits no edema.   Left lateral thigh with clean bandage no sign of bleeding.   Lymphadenopathy:     She has no cervical adenopathy.   Neurological: She is alert. No cranial nerve deficit.   Skin: Skin is warm and dry. She is not diaphoretic.   Psychiatric: She has a normal mood and affect.   Vitals reviewed.      Labs:        Invalid input(s):  [id number]      No results for input(s): SODIUM, POTASSIUM, CHLORIDE, CO2, BUN, CREATININE, MAGNESIUM, PHOSPHORUS, CALCIUM in the last 72 hours.  No results for input(s): ALTSGPT, ASTSGOT, ALKPHOSPHAT, TBILIRUBIN, DBILIRUBIN, GAMMAGT, AMYLASE, LIPASE, ALB, PREALBUMIN, GLUCOSE in the last 72 hours.  No results for input(s): RBC, HEMOGLOBIN, HEMATOCRIT, PLATELETCT, PROTHROMBTM, APTT, INR, IRON, FERRITIN, TOTIRONBC in the last 72 hours.            Hemodynamics:  Temp (24hrs), Av.8 °C (98.2 °F), Min:36.8 °C (98.2 °F), Max:36.8 °C (98.2 °F)  Temperature: 36.8 °C (98.2 °F)  Pulse  Av.7  Min: 69  Max: 80   Blood Pressure : 148/70 mmHg     Respiratory:    Respiration: 20, Pulse Oximetry: 91 %, O2 Daily Delivery Respiratory : Nasal Cannula        RUL Breath Sounds: Clear, RML Breath Sounds: Clear, RLL Breath Sounds: Diminished, MARILOU Breath Sounds: Clear, LLL Breath Sounds: Diminished  Fluids:    Intake/Output Summary (Last 24 hours) at 17 0817  Last data filed at 17 0600   Gross per 24 hour   Intake    220 ml   Output    700 ml   Net   -480 ml        GI/Nutrition:  Orders Placed This Encounter   Procedures   • DIET ORDER     Standing Status: Standing      Number of Occurrences: 1      Standing Expiration Date:      Order Specific Question:  Diet:     Answer:  Regular [1]     Medical Decision Making, by Problem:  Active Hospital Problems    Diagnosis   • Acute encephalopathy [G93.40]  - Quetiapine to be weaned to only at night.  Watch for agitation.  - stop ativan ( she has not received any ativan overnight nor today)     • Mood disorder (CMS-[facility]) [F39]     • Femoral neck fracture (CMS-[facility]) [S72.009A]  - PT/OT  - Rehab consult  - add vit D and calcium     • Syncope [R55]  - Echo, carotid U/S unremarkable.     • Atrial fibrillation (CMS-[facility]) [I48.91]  - amiodarone     • Gastroesophageal reflux disease without esophagitis [K21.9]     • Essential hypertension [I10]  - monitor vitals.       Labs reviewed,  Medications reviewed and Radiology images reviewed  Alvarado catheter: No Alvarado      DVT Prophylaxis: Enoxaparin (Lovenox)

## 2017-04-13 NOTE — DISCHARGE PLANNING
Thank you for the opportunity to assist with this patient as they transition to post acute services.  We are aware of the Rehab referral from Dr. Morales.  Dr. Felix to consult this referral. Our Transitional Case Coordinator will follow. At this time patient is showing to have Medicaid as their coverage.   Please do not hesitate to call us if you require additional assistance my phone number is [phone] [place].

## 2017-04-14 LAB
ANION GAP SERPL CALC-SCNC: 8 MMOL/L (ref 0–11.9)
BUN SERPL-MCNC: 28 MG/DL (ref 8–22)
CALCIUM SERPL-MCNC: 8.8 MG/DL (ref 8.4–10.2)
CHLORIDE SERPL-SCNC: 111 MMOL/L (ref 96–112)
CO2 SERPL-SCNC: 23 MMOL/L (ref 20–33)
CREAT SERPL-MCNC: 1.09 MG/DL (ref 0.5–1.4)
ERYTHROCYTE [DISTWIDTH] IN BLOOD BY AUTOMATED COUNT: 49.6 FL (ref 35.9–50)
GFR SERPL CREATININE-BSD FRML MDRD: 48 ML/MIN/1.73 M 2
GLUCOSE SERPL-MCNC: 113 MG/DL (ref 65–99)
HCT VFR BLD AUTO: 38.9 % (ref 37–47)
HGB BLD-MCNC: 13 G/DL (ref 12–16)
MCH RBC QN AUTO: 30.8 PG (ref 27–33)
MCHC RBC AUTO-ENTMCNC: 33.4 G/DL (ref 33.6–35)
MCV RBC AUTO: 92.2 FL (ref 81.4–97.8)
PLATELET # BLD AUTO: 205 K/UL (ref 164–446)
PMV BLD AUTO: 11.3 FL (ref 9–12.9)
POTASSIUM SERPL-SCNC: 3.8 MMOL/L (ref 3.6–5.5)
RBC # BLD AUTO: 4.22 M/UL (ref 4.2–5.4)
SODIUM SERPL-SCNC: 142 MMOL/L (ref 135–145)
WBC # BLD AUTO: 9.6 K/UL (ref 4.8–10.8)

## 2017-04-14 PROCEDURE — 770006 HCHG ROOM/CARE - MED/SURG/GYN SEMI*

## 2017-04-14 PROCEDURE — 97165 OT EVAL LOW COMPLEX 30 MIN: CPT

## 2017-04-14 PROCEDURE — A9270 NON-COVERED ITEM OR SERVICE: HCPCS | Performed by: HOSPITALIST

## 2017-04-14 PROCEDURE — 80048 BASIC METABOLIC PNL TOTAL CA: CPT

## 2017-04-14 PROCEDURE — 700102 HCHG RX REV CODE 250 W/ 637 OVERRIDE(OP): Performed by: HOSPITALIST

## 2017-04-14 PROCEDURE — G8988 SELF CARE GOAL STATUS: HCPCS | Mod: CJ

## 2017-04-14 PROCEDURE — 97530 THERAPEUTIC ACTIVITIES: CPT

## 2017-04-14 PROCEDURE — 99232 SBSQ HOSP IP/OBS MODERATE 35: CPT | Performed by: HOSPITALIST

## 2017-04-14 PROCEDURE — 700111 HCHG RX REV CODE 636 W/ 250 OVERRIDE (IP): Performed by: HOSPITALIST

## 2017-04-14 PROCEDURE — 85027 COMPLETE CBC AUTOMATED: CPT

## 2017-04-14 PROCEDURE — A9270 NON-COVERED ITEM OR SERVICE: HCPCS

## 2017-04-14 PROCEDURE — 700102 HCHG RX REV CODE 250 W/ 637 OVERRIDE(OP)

## 2017-04-14 PROCEDURE — G8987 SELF CARE CURRENT STATUS: HCPCS | Mod: CK

## 2017-04-14 RX ORDER — CALCIUM CARBONATE 500(1250)
500 TABLET ORAL
Status: DISCONTINUED | OUTPATIENT
Start: 2017-04-14 | End: 2017-04-17 | Stop reason: HOSPADM

## 2017-04-14 RX ORDER — CALCIUM CARBONATE 500(1250)
TABLET ORAL
Status: COMPLETED
Start: 2017-04-14 | End: 2017-04-14

## 2017-04-14 RX ORDER — CALCIUM CARBONATE 500 MG/1
TABLET, CHEWABLE ORAL
Status: DISCONTINUED
Start: 2017-04-14 | End: 2017-04-14

## 2017-04-14 RX ADMIN — CALCIUM 500 MG: 500 TABLET ORAL at 22:13

## 2017-04-14 RX ADMIN — SENNOSIDES AND DOCUSATE SODIUM 2 TABLET: 8.6; 5 TABLET ORAL at 07:49

## 2017-04-14 RX ADMIN — QUETIAPINE FUMARATE 25 MG: 25 TABLET, FILM COATED ORAL at 20:38

## 2017-04-14 RX ADMIN — OMEPRAZOLE 20 MG: 20 CAPSULE, DELAYED RELEASE ORAL at 07:49

## 2017-04-14 RX ADMIN — Medication 220 MG: at 07:49

## 2017-04-14 RX ADMIN — ASPIRIN 81 MG: 81 TABLET, COATED ORAL at 07:49

## 2017-04-14 RX ADMIN — ENOXAPARIN SODIUM 40 MG: 40 INJECTION, SOLUTION INTRAVENOUS; SUBCUTANEOUS at 07:48

## 2017-04-14 RX ADMIN — MULTIVITAMIN TABLET 1 TABLET: TABLET at 07:48

## 2017-04-14 RX ADMIN — AMIODARONE HYDROCHLORIDE 200 MG: 200 TABLET ORAL at 07:48

## 2017-04-14 ASSESSMENT — ACTIVITIES OF DAILY LIVING (ADL): TOILETING: INDEPENDENT

## 2017-04-14 ASSESSMENT — PAIN SCALES - GENERAL
PAINLEVEL_OUTOF10: 0

## 2017-04-14 ASSESSMENT — GAIT ASSESSMENTS
ASSISTIVE DEVICE: FRONT WHEEL WALKER
GAIT LEVEL OF ASSIST: MINIMAL ASSIST
DEVIATION: OTHER (COMMENT)
DISTANCE (FEET): 2

## 2017-04-14 NOTE — PROGRESS NOTES
Patient resting on and off in bed with no s/s of distress noted, respirations even and unlabored, bed alarm is on, CLIP

## 2017-04-14 NOTE — THERAPY
"Occupational Therapy Evaluation completed.   Functional Status:  Pt alert, able to follow 1-2 step commands in English, cooperative.  Supervised to roll with use of rail in bed, modA sidelying to sit.  Sit to stand with Carley, and Carley x2 people to pivot to bedside commode.  Needs max verbal and physical cues to keep LLE TTWB.  Pt able to toilet with modA for hygiene.  Carley x2 with FWW pivot back to bed.  Able to work on standing EOB 1-2 min with cues for TTWB, c/o dizziness, so returned to sitting.  Pt able to get back into bed SBA, and scoot hips over in bed with cues not to WB on LLE.  HOB up in bed, pt was able to feed self snack with setup.  Plan of Care: Will benefit from Occupational Therapy 3 times per week  Discharge Recommendations:  Equipment: Will Continue to Assess for Equipment Needs. Post-acute therapy:  Discharge to a transitional care facility for continued skilled therapy services.    Pt shows great potential for recovery.  She was very active prior to this hip fracture, and independent with ADL's and mobility.  She is now cognitively appropriate, and eager and willing to participate in therapy.  She is having difficulty with maintaining TTWB on LLE but likely because she is not greatly limited by pain at this time so she will require great repetition with mobility and transfers during ADL's to be able to develop a good motor pattern for TTWB precautions.  Pt appears that she would tolerate up to 3 hours of therapy a day, and when safe to d/c home will be living with daughter and 3 grandkids who are very active and motivating for her.  Will follow while in house.      See \"Rehab Therapy-Acute\" Patient Summary Report for complete documentation.    "

## 2017-04-14 NOTE — PROGRESS NOTES
2230: Patient unable to empty bladder at this time, bladder scan performed (837 ml) found in bladder, MD paged to update, received orders for prn straight cath    2300: Straight cath inserted with 1000 ml output of clear yellow urine, patient expressed comfort after catherization, linens changed, patient now laying comfortably in bed attempting to rest, denies any needs or complaints at this time

## 2017-04-14 NOTE — THERAPY
"Physical Therapy Treatment completed.   Bed Mobility:  Supine to Sit: Moderate Assist to sit up. CGA to return to supine.   Transfers: Sit to Stand: Contact Guard Assist  Gait: Level Of Assist: Minimal Assist with Front-Wheel Walker TTWB on LLE to bedside commode. Pt pivoting on RLE to move to commode.       Plan of Care: Will benefit from Physical Therapy 7 times per week  Discharge Recommendations: Equipment: Will Continue to Assess for Equipment Needs. Post-acute therapy Discharge to a transitional care facility for continued skilled therapy services.    Pt alert, agreeable and participatory in therapy today. Pt able to mobilize to bedside commode w/ min A, pivoting on RLE but limited in ability to hop due to TTWB LLE restriction. Pt was quite active and independent prior to her fall and admission. PT recommends continued intensive post acute therapy to promote return to her baseline. Pt and daughter in agreement. RN and CNA updated.      See \"Rehab Therapy-Acute\" Patient Summary Report for complete documentation.       "

## 2017-04-14 NOTE — PROGRESS NOTES
NAC overnight, no new c/o, pain well controlled.    AF/VSS  AOx3  Dressings changed  Wound well healing  No signs of infection  Minimal hip pain with LLE movement  Motor intact to deep/sup peroneal and tibial nerves  Cap refill <2 sec    A: Left fem neck fx, s/p closed reduction and percutaneous pinning on 4/10/2017  P: Cont PT/OT as able  Primary team working toward rehab/SNF placement, appreciate their help  Touchdown WB only LLE  Daily dressing changes per nursing staff  DVT prophylaxis with SCDs and chemoprophylaxis per primary  Will cont to follow

## 2017-04-14 NOTE — PREADMISSION SCREENING NOTE
Pre-Admission Screening Form    Patient Information:   Name: Marleen Mix     MRN: [id number]       : 1935      Age: 81 y.o.   Gender: female      Race: White [7]       Marital Status:  [5]  Family Contact: Sun Page Michael        Relationship: Daughter [2]  Grandchild [6]  Home Phone: [phone]             Cell Phone:   [phone]  Advanced Directives: None  Code Status:  FULL  Current Attending Provider: Juwan Morales D.O.  Referring Physician: Dr. Morales.       Physiatrist Consult: Dr. Harinder Felix       Referral Date: 2017  Primary Payor Source:  MEDICAID FFS  Secondary Payor Source:      Medical Information:   Date of Admission to [facility]  Room Number: 3311/01  Rehabilitation Diagnosis: 08.51 Unilateral Hip Replacement  @IMM@  No Known Allergies  Past Medical History   Diagnosis Date   • Arthritis    • Anxiety    • Hypertension    • Atrial fibrillation (CMS-HCC)    • Atrial fibrillation (CMS-HCC)    • Pacemaker      Past Surgical History   Procedure Laterality Date   • Abdominal hysterectomy total     • Hip cannulated screw Left 4/10/2017     Procedure: HIP CANNULATED SCREW;  Surgeon: Harinder Leblanc M.D.;  Location: SURGERY [facility];  Service:        History Leading to Admission, Conditions that Caused the Need for Rehab (CMS):     Gardenia Stoll M.D. Physician Signed  H&P 2017  4:02 PM      Expand All Collapse All    CHIEF COMPLAINT:  Syncope.    HISTORY OF PRESENT ILLNESS:  This is an 81-year-old female with past medical    history of atrial fibrillation, hypertension, mood disorder, and GERD.  She    comes in after a syncopal episode and a hip fracture.  The patient was at    [facility].  She and her family were walking out of the [facility] at which point, her   daughter reports she stepped aside to speak to someone else for a brief, less   than a minute and was soon asked to come back towards her mother where her    mother  had fallen.  The patient reports that she blacked out for a split    second and fell to the ground.  She does not remember if she hit her head.     Per people around her, she may have lost consciousness for a few seconds and    she may have hit her head, but it is unknown for sure.  The paramedics came a    few minutes later.  Per daughter, her blood pressure was fine when the    paramedics came.  Per her daughter, she also had a fall similar to this a    month and a half ago at which time she was standing and peeling potatoes and    she had an episode of syncope and fall, hit her posterior head.  At that time    when her blood pressure was checked, it was low.  She does not remember    exactly what the number was.  She denies any headache at this time.  She    denies any nausea or vomiting.  She denies any fevers or chills.  No    palpitations, no chest pain at this time.  No shortness of breath.  She    reports left hip pain about 5/10 at this time, inability to move the left leg    due to pain.        IMAGIN.  CT hip as per radiology, mildly impacted left subcapital femoral neck    fracture, moderate degenerative changes in both hip joints, moderate    degenerative changes in the right SI joint, possibly sequelae of prior    sacroiliitis.  2.  Femur x-ray, left.  There is no change in acute mildly impacted left mid    femoral neck fracture, mid and distal femur intact.  There are moderate    degenerative changes in the knee.  3.  Chest x-ray, no acute cardiac or pulmonary abnormalities identified.  4.  CT head, white matter lucencies most consistent with small vessel ischemic   changes versus demyelination or gliosis, otherwise head CT without contrast    with no acute findings, no evidence of acute cerebral infarct, hemorrhage or    mass lesion.  5.  Dx, hip, left acute mildly impacted fracture of the left femoral neck is    identified.  6.  EKG, atrial paced rate of 70 with .    ASSESSMENT AND  PLAN:  1.  Syncopal episode.  This is likely secondary to hypotension versus    arrhythmia; however pacer is in place.  We will rule out pulmonary embolism.     D-dimer is pending; however, the patient is not symptomatic at this time.  She   denies any shortness of breath, chest pain.  There is no tenderness in the    lower extremities.  She has no history of pulmonary embolism.  Her Wells score   is 0, which makes her low risk at this time.  CT head is negative for any    ischemic event or hemorrhage.  Chest x-ray did not show any acute infection.     I have ordered an echocardiogram.  A.m. hospitalist to consider carotid    ultrasound and MRI of the brain.  I have placed her on neuro checks,    aspiration, fall and seizure precautions.  I will hold off on her home    angiotensin receptor blocker, she is on telmisartan per her daughter, 20 mg.     This may be secondary to hypotension versus orthostatic hypotension as patient   had just gotten up from sitting for prolonged periods of time at [employer].     This happened to her in prior time when she was standing and peeling potatoes    for a prolonged period of time.  Orthostatic vital signs are pending per RN.  2.  Femoral neck fracture, left side.  Dr. Leblanc, orthopedics has been    consulted.  He will take her to operating room tomorrow morning.  I have    placed her on n.p.o. after midnight.  We will hold her heparin in the morning    for deep venous thrombosis prophylaxis, Dx hip as well as Dx femur and CT hip    all consistent with some left-sided femoral neck fracture.  We will provide    for pain control and deep venous thrombosis prophylaxis.  A.m. hospitalist to    order physical therapy, occupational therapy after surgical intervention    tomorrow.  For now, the patient is placed on bed rest.  3.  Asymptomatic bacteriuria.  The patient has some bacteria in the urine.     She denies any dysuria.  She has frequency of urine; however, this is not new    and  it is at her baseline.  She has no white count or evidence of sepsis.  We    will hold off on antibiotics for now.  4.  Chronic kidney disease versus acute kidney injury.  Baseline BUN and    creatinine unknown at this time.  BUN is 32 and creatinine is 1.38.  We will    provide for fluid hydration and repeat labs in the morning.  Her GFR is 37.  5.  Atrial fibrillation.  We will continue her aspirin as well as amiodarone.     Patient's rate is controlled at this time in the high 60s-70s.  She also has    a pacemaker.  6.  Hypertension.  Currently, her blood pressure is normotensive.  We will    hold off on her angiotensin receptor blocker.  Per chart, she is on clonidine;   however, patient reports she does not use this.  We will provide for p.r.n.    labetalol as needed for systolic blood pressure greater than 160 and diastolic   greater than 110.  7.  Mood disorder/insomnia.  We will continue her lorazepam 1 mg every    bedtime; however, placed as p.r.n.  These are unlikely to be component of her    syncope as the patient was in [facility] during the day when this occurred and she   takes her Ativan and trazodone only at night for sleep and anxiety.  8.  Gastroesophageal reflux disease.  We will continue her home omeprazole.     No acute issues at this time.    DISPOSITION:  Inpatient.  The patient will likely need greater than 2    midnights of care.    CORE MEASURES:  1.  Deep venous thrombosis prophylaxis with heparin.  2.  Lines:  Peripheral IV access.  3.  Tubes:  Alvarado catheter.  4.  Code:  Full code.  5.  Gastrointestinal prophylaxis with omeprazole.  6.  Antibiotics:  Not applicable.      Harinder Leblanc M.D. Physician Signed ORTHOPEDICS Consults 4/9/2017  4:24 PM      Expand All Collapse All    DATE OF SERVICE:  04/09/2017     CHIEF COMPLAINT:  Left hip pain.     HISTORY OF PRESENT ILLNESS:  The patient is an 81-year-old lady who fell at    [facility] today after becoming lightheaded.  She began having left  hip pain and    inability to bear weight.     Roughly 1 month ago, she did fall and strike her head.  She has had episodes    of dizziness since that time.  She has no other complaints at this time.           ASSESSMENT:  Left femoral neck fracture, valgus impacted with minimal    displacement.     PLAN:  At this time, she will be admitted to the hospital by the hospitalist    and will undergo a syncopal workup.  Once she is cleared by the hospitalist, I   will perform a percutaneous pinning of the left hip.  I discussed this plan    with the patient and her daughter.  We discussed the nonoperative and    operative treatment options.  They would like to proceed with surgical    intervention.  Risks and benefits of the procedure were explained and all    questions answered.  She will be n.p.o. after midnight and holding any    anticoagulation in the event that she is cleared for surgery tomorrow.        ____________________________________     Harinder Leblanc MD     MBL / NTS     DD:  04/09/2017 16:24:41    Harinder Leblanc M.D. Physician Signed ORTHOPEDICS OP Report 4/10/2017  3:54 PM      Expand All Collapse All    DATE OF SERVICE:  04/10/2017     PREOPERATIVE DIAGNOSIS:  Left femoral neck fracture, valgus impacted.     POSTOPERATIVE DIAGNOSIS:  Left femoral neck fracture, valgus impacted.     SURGERY PERFORMED:  Closed reduction percutaneous screw fixation, left femoral   neck fracture.     SURGEON:  Harinder Leblanc MD        DISPOSITION:  She will return to the floor where she has been admitted by the    hospitalist.  She will receive 24 hours of postoperative IV antibiotics for    prophylaxis.  She will also receive chemical DVT control per the hospitalist.     She will also have SCDs on for DVT prophylaxis.  She will get up with    physical therapy on postoperative day #1 and begin touchdown weightbearing    only to the left lower extremity.        ____________________________________      "Harinder Leblanc MD            Co-morbidities: as listed above   Potential Risk - Complications: Cognitive Impairment, Contractures, Deep Vein Thrombosis, Pain and Perceptual Impairment Cardiac wound and infection risk Urinary retention UTI  Level of Risk: High    Ongoing Medical Management Needed (Medical/Nursing Needs):   Patient Active Problem List    Diagnosis Date Noted   • Acute encephalopathy 04/11/2017   • Mood disorder (CMS-HCC) 04/10/2017   • Femoral neck fracture (CMS-HCC) 04/09/2017   • Syncope 04/09/2017   • Essential hypertension 03/30/2017   • Atrial fibrillation (CMS-HCC) 03/30/2017   • Insomnia 03/30/2017   • Gastroesophageal reflux disease without esophagitis 03/30/2017     Dulce Perez R.N. Registered Nurse Signed  Progress Notes 4/13/2017 11:13 PM      Expand All Collapse All    2230: Patient unable to empty bladder at this time, bladder scan performed (837 ml) found in bladder, MD paged to update, received orders for prn straight cath    2300: Straight cath inserted with 1000 ml output of clear yellow urine, patient expressed comfort after catherization, linens changed, patient now laying comfortably in bed attempting to rest, denies any needs or complaints at this time                            Current Vital Signs:   Temperature: 36.3 °C (97.3 °F) Pulse: 69 Respiration: 18 Blood Pressure : 129/69 mmHg  Weight: 60.1 kg (132 lb 7.9 oz) Height: 162.6 cm (5' 4\")  Pulse Oximetry: 97 % O2 (LPM): 0      Completed Laboratory Reports:  Recent Labs      04/14/17   0429  04/14/17   0430   WBC   --   9.6   HEMOGLOBIN   --   13.0   HEMATOCRIT   --   38.9   PLATELETCT   --   205   SODIUM  142   --    POTASSIUM  3.8   --    BUN  28*   --    CREATININE  1.09   --    GLUCOSE  113*   --    Results for DAMARIS FORRESTER (MRN [id number]) as of 4/14/2017 16:21   Ref. Range 4/9/2017 14:34   Color Unknown Yellow   Character Unknown Clear   Specific Gravity Latest Ref Range: <1.035  <=1.005   Ph Latest Ref Range: " 5.0-8.0  6.5   Glucose Latest Ref Range: Negative mg/dL Negative   Ketones Latest Ref Range: Negative mg/dL Negative   Bilirubin Latest Ref Range: Negative  Negative   Occult Blood Latest Ref Range: Negative  Negative   Protein Latest Ref Range: Negative mg/dL Negative   Nitrite Latest Ref Range: Negative  Negative   Leukocyte Esterase Latest Ref Range: Negative  Small (A)   Micro Urine Req Unknown Microscopic   WBC Latest Units: /hpf 10-20 (A)   RBC Latest Units: /hpf 0-2   Epithelial Cells Latest Ref Range: Few /hpf Few   Bacteria Latest Ref Range: None /hpf Few (A)   Culture pending  Additional Labs: Not Applicable    Prior Living Situation:   Housing / Facility: 1 Story House  Steps Into Home: 2  Steps In Home: 0  Lives with - Patient's Self Care Capacity: Adult Children, Other (Comments) (grandkids.  )  Equipment Owned: None    Prior Level of Function / Living Situation:   Physical Therapy: Prior Services: None  Housing / Facility: 1 Story House  Steps Into Home: 2  Steps In Home: 0  Rail: None  Bathroom Set up: Walk In Shower  Equipment Owned: None  Lives with - Patient's Self Care Capacity: Adult Children, Other (Comments) (grandkids.  )  Bed Mobility: Independent  Transfer Status: Independent  Ambulation: Independent  Assistive Devices Used: None  Current Level of Function:   Level Of Assist: Minimal Assist  Assistive Device: Front Wheel Walker  Distance (Feet): 2  Deviation: Other (Comment)  # of Stairs Climbed: 0  Weight Bearing Status: TTWB LLE  Skilled Intervention: Verbal Cuing, Compensatory Strategies  Comments: PT educated pt on using FWW to offload LLE and allow her to hop on RLE, however pt only have to take acouple steps due to TTWB restriction.   Supine to Sit: Moderate Assist  Sit to Supine: Contact Guard Assist  Scooting: Contact Guard Assist  Rolling: Supervised (with rail)  Skilled Intervention: Compensatory Strategies  Comments: Needed assist for trunk to sit up to EOB but able to get back in  with VCs only.   Sit to Stand: Minimal Assist  Bed, Chair, Wheelchair Transfer: Minimal Assist  Toilet Transfers: Minimal Assist (x2)  Transfer Method: Stand Pivot  Skilled Intervention: Verbal Cuing, Compensatory Strategies  Comments: PT monitoring LLE throughout  Sitting Edge of Bed: > 15 min  Standin min total  Occupational Therapy:   Self Feeding: Independent  Grooming / Hygiene: Independent  Bathing: Independent  Dressing: Independent  Toileting: Independent  Medication Management: Independent  Laundry: Independent  Kitchen Mobility: Independent  Finances: Requires Assist  Home Management: Requires Assist  Shopping: Requires Assist  Prior Level Of Mobility: Independent Without Device in Community  Driving / Transportation: Relatives / Others Provide Transportation  Prior Services: None  Housing / Facility: [address]  Occupation (Pre-Hospital Vocational): Retired Due To Age  Leisure Interests: Exercise, Hobbies  Current Level of Function:   Eating: Supervision  Upper Body Dressing: Minimal Assist  Lower Body Dressing: Not Tested  Toileting: Moderate Assist (bsc)  Speech Language Pathology:      Rehabilitation Prognosis/Potential: Good  Estimated Length of Stay: 14 days    Nursing:   Orientation : Disoriented to Time  Continent   ICP     Scope/Intensity of Services Recommended:  Physical Therapy: 1.5 hr / day  5 days / week. Therapeutic Interventions Required: Maximize Endurance, Mobility, Strength and Safety  Occupational Therapy: 1.5 hr / day 5 days / week. Therapeutic Interventions Required: Maximize Self Care, ADLs, IADLs and Energy Conservation  Rehabilitation Nursin/7. Therapeutic Interventions Required: Monitor Pain, Skin, Wound(s), Vital Signs, Intake and Output, Labs, Safety and Family Training  Rehabilitation Physician: 3 - 5 days / week. Therapeutic Interventions Required: Medical Management  Dietician: consult . Therapeutic Interventions Required: nutritional needs     Rehabilitation  Goals and Plan (Expected frequency & duration of treatment in the IRF):   Return to the Community and Modified Independent Level of Care  Anticipated Date of Rehabilitation Admission: 04/1/2017  2017Patient/Family oriented IRF level of care/facility/plan: Yes  Patient/Family willing to participate in IRF care/facility/plan: Yes  Patient able to tolerate IRF level of care proposed: Yes  Patient has potential to benefit IRF level of care proposed: Yes  Comments: Not Applicable    Special Needs or Precautions - Medical Necessity:  Safety Concerns/Precautions:  Fall Risk / High Risk for Falls and Balance  Complex Wound Care: post surgical   Pain Management  Cardiac Precautions  Current Medications:    Current Facility-Administered Medications Ordered in Epic   Medication Dose Route Frequency Provider Last Rate Last Dose   • quetiapine (SEROQUEL) tablet 25 mg  25 mg Oral Q EVENING Juwan Morales D.O.   25 mg at 04/13/17 2040   • enoxaparin (LOVENOX) inj 40 mg  40 mg Subcutaneous DAILY Juwan Morales D.O.   40 mg at 04/14/17 0748   • tramadol (ULTRAM) 50 MG tablet 50 mg  50 mg Oral Q4HRS PRN Kristie Mckeon M.D.       • tramadol (ULTRAM) 50 MG tablet 100 mg  100 mg Oral Q6HRS PRN Kristie Mckeon M.D.       • acetaminophen (TYLENOL) suppository 650 mg  650 mg Rectal Q4HRS PRN Kristie Mckeon M.D.   650 mg at 04/10/17 0941   • amiodarone (CORDARONE) tablet 200 mg  200 mg Oral DAILY Gardenia Stoll M.D.   200 mg at 04/14/17 0748   • aspirin EC (ECOTRIN) tablet 81 mg  81 mg Oral DAILY DANITZA MorenoDParesh   81 mg at 04/14/17 0749   • omeprazole (PRILOSEC) capsule 20 mg  20 mg Oral DAILY Gardenia Stoll M.D.   20 mg at 04/14/17 0749   • zinc sulfate (ZINCATE) capsule 220 mg  220 mg Oral DAILY DANITZA MorenoDParesh   220 mg at 04/14/17 0749   • multivitamin (THERAGRAN) tablet 1 Tab  1 Tab Oral DAILY Gardenia Stoll M.D.   1 Tab at 04/14/17 0748   • senna-docusate (PERICOLACE or SENOKOT S)  8.6-50 MG per tablet 2 Tab  2 Tab Oral BID Gardenia Stoll M.D.   2 Tab at 04/14/17 0749    And   • polyethylene glycol/lytes (MIRALAX) PACKET 1 Packet  1 Packet Oral QDAY PRN Gardenia Stoll M.D.        And   • magnesium hydroxide (MILK OF MAGNESIA) suspension 30 mL  30 mL Oral QDAY PRN Gardenia Stoll M.D.        And   • bisacodyl (DULCOLAX) suppository 10 mg  10 mg Rectal QDAY PRN Gardenia Stoll M.D.       • Respiratory Care per Protocol   Nebulization Continuous RT Gardenia Stoll M.D.       • acetaminophen (TYLENOL) tablet 650 mg  650 mg Oral Q6HRS PRN Gardenia Stoll M.D.   650 mg at 04/11/17 0305   • labetalol (NORMODYNE,TRANDATE) injection 10 mg  10 mg Intravenous Q4HRS PRVIOLET Stoll M.D.   10 mg at 04/12/17 0327   • ondansetron (ZOFRAN) syringe/vial injection 4 mg  4 mg Intravenous Q4HRS PRVIOLET Stoll M.D.       • ondansetron (ZOFRAN ODT) dispertab 4 mg  4 mg Oral Q4HRS EDUARDO Stoll M.D.         No current [facility]-ordered outpatient prescriptions on file.     Diet:   DIET ORDERS (Through next 24h)    Start     Ordered    04/14/17 0802  DIET COMMENT   ALL MEALS     Comments:  Decaf coffee with b-fast.    04/14/17 0801    04/10/17 1320  DIET ORDER   ALL MEALS     Question:  Diet:  Answer:  Regular    04/10/17 1326          Anticipated Discharge Destination / Patient/Family Goal:  Destination: Home with Assistance Support System: Family   Anticipated home health services: OT, PT, Nursing, Social Work and Aide  Previously used HH service/ provider: Not Applicable  Anticipated DME Needs: Walker  Outpatient Services: OT and PT  Alternative resources to address additional identified needs:     Pre-Screen Completed: 4/14/2017 4:16 PM Jason Blount

## 2017-04-14 NOTE — PROGRESS NOTES
Bedside report received from Lenka RN, patient laying comfortably in bed and denies any needs or complaints at this time, safety precautions in place, CLIP

## 2017-04-14 NOTE — PROGRESS NOTES
Assessment performed, patient remains lethargic at this time but is A&O x 2 (self and location), patient states it is April 1917 - patient reoriented to date and event at this time, neuro checks performed, due medications administered per MAR, patient's grandson is at bedside, safety precautions in place, CLIP

## 2017-04-14 NOTE — PROGRESS NOTES
Report given to Day Shift RN Lenka, patient laying comfortably in bed and has no complaints at this time, safety precautions in place, CLIP

## 2017-04-15 PROCEDURE — 700102 HCHG RX REV CODE 250 W/ 637 OVERRIDE(OP): Performed by: HOSPITALIST

## 2017-04-15 PROCEDURE — 99232 SBSQ HOSP IP/OBS MODERATE 35: CPT | Performed by: HOSPITALIST

## 2017-04-15 PROCEDURE — A9270 NON-COVERED ITEM OR SERVICE: HCPCS | Performed by: HOSPITALIST

## 2017-04-15 PROCEDURE — 770006 HCHG ROOM/CARE - MED/SURG/GYN SEMI*

## 2017-04-15 PROCEDURE — 97110 THERAPEUTIC EXERCISES: CPT

## 2017-04-15 PROCEDURE — 97530 THERAPEUTIC ACTIVITIES: CPT

## 2017-04-15 PROCEDURE — 700111 HCHG RX REV CODE 636 W/ 250 OVERRIDE (IP): Performed by: HOSPITALIST

## 2017-04-15 RX ADMIN — SENNOSIDES AND DOCUSATE SODIUM 2 TABLET: 8.6; 5 TABLET ORAL at 21:32

## 2017-04-15 RX ADMIN — OMEPRAZOLE 20 MG: 20 CAPSULE, DELAYED RELEASE ORAL at 08:37

## 2017-04-15 RX ADMIN — CALCIUM 500 MG: 500 TABLET ORAL at 17:14

## 2017-04-15 RX ADMIN — ASPIRIN 81 MG: 81 TABLET, COATED ORAL at 08:37

## 2017-04-15 RX ADMIN — AMIODARONE HYDROCHLORIDE 200 MG: 200 TABLET ORAL at 08:36

## 2017-04-15 RX ADMIN — Medication 220 MG: at 08:36

## 2017-04-15 RX ADMIN — MULTIVITAMIN TABLET 1 TABLET: TABLET at 08:37

## 2017-04-15 RX ADMIN — ENOXAPARIN SODIUM 40 MG: 40 INJECTION, SOLUTION INTRAVENOUS; SUBCUTANEOUS at 08:37

## 2017-04-15 RX ADMIN — CALCIUM 500 MG: 500 TABLET ORAL at 12:51

## 2017-04-15 RX ADMIN — CALCIUM 500 MG: 500 TABLET ORAL at 08:41

## 2017-04-15 RX ADMIN — QUETIAPINE FUMARATE 25 MG: 25 TABLET, FILM COATED ORAL at 21:32

## 2017-04-15 RX ADMIN — ACETAMINOPHEN 650 MG: 325 TABLET, FILM COATED ORAL at 13:36

## 2017-04-15 RX ADMIN — VITAMIN D, TAB 1000IU (100/BT) 1000 UNITS: 25 TAB at 08:37

## 2017-04-15 ASSESSMENT — PAIN SCALES - GENERAL
PAINLEVEL_OUTOF10: 5
PAINLEVEL_OUTOF10: 0
PAINLEVEL_OUTOF10: 0

## 2017-04-15 ASSESSMENT — GAIT ASSESSMENTS
ASSISTIVE DEVICE: FRONT WHEEL WALKER
GAIT LEVEL OF ASSIST: CONTACT GUARD ASSIST
DEVIATION: OTHER (COMMENT)
DISTANCE (FEET): 2

## 2017-04-15 NOTE — PROGRESS NOTES
1530 Patient's sitting up in bed, no c/o's at this time. No distress noted. Fall Protocol in effect.

## 2017-04-15 NOTE — PROGRESS NOTES
Assessment performed, patient is A&O x 2 (self and place) at this time, neuro checks assessed and remains at baseline, patient denies any pain or discomfort at this time, safety precautions in place, CLIP

## 2017-04-15 NOTE — PROGRESS NOTES
Bedside report received from Lenka RN, patient updated about POC and denies any needs at this time, safety precautions in place, CLIP

## 2017-04-15 NOTE — PROGRESS NOTES
0655 Bedside report received from [facility] RN (Dulce). Patient's sitting up in bed, no distress noted. Fall protocol in effect.    0836 Patient's sitting up in bed, having Breakfast. No c/o's at this time. Fall Protocol in effect. Call light within reach. Reminded patient to call for assist. Noted confusion, re-oriented. Due am medications given. Dressing to left hip CD&I. Assessment completed. No distress noted.

## 2017-04-15 NOTE — PROGRESS NOTES
7146 Patient's sitting up in a chair, no c/o's at this time. Granddaughter visiting. No distress noted.    1015 Dr Morales visited.

## 2017-04-15 NOTE — PROGRESS NOTES
Report given to Day Shift RN Alea, patient laying comfortably in bed and has no complaints at this time, safety precautions in place, CLIP

## 2017-04-15 NOTE — DISCHARGE PLANNING
MARILUZ Newberry informed bs JOSE C Cosby and then called pt's daughter to let her know that Rehab has submitted to Medicaid for approval.  Medicaid won't have an answer over the weekend but hopefully early next week.

## 2017-04-15 NOTE — PROGRESS NOTES
Patient resting comfortably in bed with no s/s of distress noted, respirations even and unlabored, safety precautions in place, CLIP

## 2017-04-15 NOTE — DISCHARGE PLANNING
Medical Social Work    Referral: Pt discussed at IDT rounds this AM.    Intervention: Rehab has submitted for Medicaid approval.  Waiting approval.      Plan: SW available for any assistance with d.c planning.

## 2017-04-15 NOTE — PROGRESS NOTES
Hospital Medicine Progress Note, Adult, Complex               Author: Juwan Morales Date & Time created: 4/15/2017  8:47 AM     Interval History:  ID: 80yo F with recent presumed syncope and had subsequent left hip fracture with repair this admit.  After surgery she has had complication of confusion.      Today:  Awake and alert.  Some confusion and had to be reminded what happen to her left leg.  Her granddaughter is at bedside.  No acute distress.      Review of Systems:  Review of Systems   Unable to perform ROS: dementia       Physical Exam:  Physical Exam   Constitutional: She is oriented to person, place, and time. She appears well-developed. No distress.   HENT:   Head: Normocephalic and atraumatic.   Nose: Nose normal.   Mouth/Throat: Oropharynx is clear and moist.   Eyes: Conjunctivae and EOM are normal. Right eye exhibits no discharge. Left eye exhibits no discharge. No scleral icterus.   Neck: No tracheal deviation present.   Cardiovascular: Normal rate, regular rhythm, normal heart sounds and intact distal pulses.    No murmur heard.  Pulmonary/Chest: Effort normal and breath sounds normal. No stridor. No respiratory distress. She has no wheezes.   Abdominal: Soft. Bowel sounds are normal. She exhibits no distension. There is no tenderness. There is no rebound.   Musculoskeletal: She exhibits no edema.   Left lateral thigh with clean bandage no sign of bleeding.   Neurological: She is alert and oriented to person, place, and time. No cranial nerve deficit.   Skin: Skin is warm and dry. She is not diaphoretic.   Slight bruising near site of left lateral surgery site.   Psychiatric: She has a normal mood and affect. Her behavior is normal.   Vitals reviewed.      Labs:        Invalid input(s): [id number]      Recent Labs      04/14/17   0429   SODIUM  142   POTASSIUM  3.8   CHLORIDE  111   CO2  23   BUN  28*   CREATININE  1.09   CALCIUM  8.8     Recent Labs      04/14/17 0429   GLUCOSE  113*      Recent Labs      17   0430   RBC  4.22   HEMOGLOBIN  13.0   HEMATOCRIT  38.9   PLATELETCT  205     Recent Labs      17   0430   WBC  9.6           Hemodynamics:  Temp (24hrs), Av.3 °C (97.4 °F), Min:36.3 °C (97.3 °F), Max:36.5 °C (97.7 °F)  Temperature: 36.5 °C (97.7 °F)  Pulse  Av.5  Min: 69  Max: 80   Blood Pressure : 117/68 mmHg     Respiratory:    Respiration: 20, Pulse Oximetry: 97 %        RUL Breath Sounds: Clear, RML Breath Sounds: Clear, RLL Breath Sounds: Diminished, MARILOU Breath Sounds: Clear, LLL Breath Sounds: Diminished  Fluids:    Intake/Output Summary (Last 24 hours) at 04/15/17 0847  Last data filed at 04/15/17 0400   Gross per 24 hour   Intake    400 ml   Output      0 ml   Net    400 ml        GI/Nutrition:  Orders Placed This Encounter   Procedures   • DIET ORDER     Standing Status: Standing      Number of Occurrences: 1      Standing Expiration Date:      Order Specific Question:  Diet:     Answer:  Regular [1]     Medical Decision Making, by Problem:  Active Hospital Problems    Diagnosis   • Acute encephalopathy [G93.40]  - Quetiapine at night only.      • Mood disorder (CMS-[facility]) [F39]     • Femoral neck fracture (CMS-[facility]) [S72.009A]  - PT/OT  - Rehab consult pending OT/PT  - vit D and calcium     • Syncope [R55]  - Echo, carotid U/S unremarkable.     • Atrial fibrillation (CMS-[facility]) [I48.91]  - amiodarone     • Gastroesophageal reflux disease without esophagitis [K21.9]     • Essential hypertension [I10]  - monitor vitals.       Labs reviewed and Medications reviewed  Alvarado catheter: No Alvarado      DVT Prophylaxis: Enoxaparin (Lovenox)

## 2017-04-15 NOTE — PROGRESS NOTES
Hospital Medicine Progress Note, Adult, Complex               Author: Juwan Morales Date & Time created: 4/14/2017  6:58 PM     Interval History:  ID: 80yo F with recent presumed syncope and had subsequent left hip fracture with repair this admit.  After surgery she has had complication of confusion.      Today:  Slightly more awake.  Daughter at bedside.  Patient with clear speech.  No distress.      Review of Systems:  Review of Systems   Unable to perform ROS: dementia       Physical Exam:  Physical Exam   Constitutional: She is oriented to person, place, and time. She appears well-developed. No distress.   HENT:   Head: Normocephalic and atraumatic.   Nose: Nose normal.   Eyes: Conjunctivae and EOM are normal. Right eye exhibits no discharge. Left eye exhibits no discharge. No scleral icterus.   Neck: No tracheal deviation present.   Cardiovascular: Normal rate, regular rhythm, normal heart sounds and intact distal pulses.    No murmur heard.  Pulmonary/Chest: Effort normal and breath sounds normal. No stridor. No respiratory distress. She has no wheezes.   Abdominal: Soft. Bowel sounds are normal. She exhibits no distension. There is no tenderness. There is no rebound.   Musculoskeletal: She exhibits no edema.   Left lateral thigh with clean bandage no sign of bleeding.   Neurological: She is alert and oriented to person, place, and time. No cranial nerve deficit.   Skin: Skin is warm and dry. She is not diaphoretic.   Slight bruising near site of left lateral surgery site.   Psychiatric: She has a normal mood and affect. Her behavior is normal.   Vitals reviewed.      Labs:        Invalid input(s): [id number]      Recent Labs      04/14/17   0429   SODIUM  142   POTASSIUM  3.8   CHLORIDE  111   CO2  23   BUN  28*   CREATININE  1.09   CALCIUM  8.8     Recent Labs      04/14/17   0429   GLUCOSE  113*     Recent Labs      04/14/17   0430   RBC  4.22   HEMOGLOBIN  13.0   HEMATOCRIT  38.9   PLATELETCT  205      Recent Labs      17   0430   WBC  9.6           Hemodynamics:  Temp (24hrs), Av.7 °C (98 °F), Min:36.3 °C (97.3 °F), Max:36.8 °C (98.3 °F)  Temperature: 36.3 °C (97.3 °F)  Pulse  Av.5  Min: 69  Max: 80   Blood Pressure : 129/69 mmHg     Respiratory:    Respiration: 18, Pulse Oximetry: 97 %        RUL Breath Sounds: Clear, RML Breath Sounds: Clear, RLL Breath Sounds: Diminished, MARILOU Breath Sounds: Clear, LLL Breath Sounds: Diminished  Fluids:    Intake/Output Summary (Last 24 hours) at 17 1858  Last data filed at 17 0820   Gross per 24 hour   Intake    560 ml   Output   1400 ml   Net   -840 ml        GI/Nutrition:  Orders Placed This Encounter   Procedures   • DIET ORDER     Standing Status: Standing      Number of Occurrences: 1      Standing Expiration Date:      Order Specific Question:  Diet:     Answer:  Regular [1]     Medical Decision Making, by Problem:  Active Hospital Problems    Diagnosis   • Acute encephalopathy [G93.40]  - Quetiapine at night only.  May discontinue it doing well.     • Mood disorder (CMS-[facility]) [F39]     • Femoral neck fracture (CMS-[facility]) [S72.009A]  - PT/OT  - Rehab consult pending OT/PT  - vit D and calcium     • Syncope [R55]  - Echo, carotid U/S unremarkable.     • Atrial fibrillation (CMS-[facility]) [I48.91]  - amiodarone     • Gastroesophageal reflux disease without esophagitis [K21.9]     • Essential hypertension [I10]  - monitor vitals.       Labs reviewed and Medications reviewed  Alvarado catheter: No Alvarado      DVT Prophylaxis: Enoxaparin (Lovenox)

## 2017-04-15 NOTE — PROGRESS NOTES
Due medications administered per MAR, patient up to the BSC with 1 person assist, patient is now back in bed laying comfortably, safety precautions in place, family remains at bedside, CLIP

## 2017-04-15 NOTE — PROGRESS NOTES
1135 Patient's sitting up in a chair, having lunch. No c/o's at this time. No distress noted. Granddaughter visiting.  Fall Protocol in effect.

## 2017-04-15 NOTE — PROGRESS NOTES
1336 Medicated with Tylenol (see MAR) for c/o's left hip pain, rates pain 5/10. Female friend visiting. Fall Protocol in effect. No distress noted.

## 2017-04-15 NOTE — THERAPY
"Physical Therapy Treatment completed.   Bed Mobility:  Supine to Sit: Moderate Assist  Transfers: Sit to Stand: Minimal Assist  Gait: Level Of Assist: Contact Guard Assist with Front-Wheel Walker pivoting on RLE to move bed<>chair. (See below).      Plan of Care: Will benefit from Physical Therapy 7 times per week  Discharge Recommendations: Equipment: Will Continue to Assess for Equipment Needs. Post-acute therapy Discharge to a transitional care facility for continued skilled therapy services.    Pt remains alert and agreeable to therapy. She is unable to take steps/walk maintaining her TTWB precaution on LLE but is able to pivot on RLE to complete transfers. Continue to recommend post acute therapy prior to return home. RN updated.      See \"Rehab Therapy-Acute\" Patient Summary Report for complete documentation.       "

## 2017-04-16 PROCEDURE — 770006 HCHG ROOM/CARE - MED/SURG/GYN SEMI*

## 2017-04-16 PROCEDURE — A9270 NON-COVERED ITEM OR SERVICE: HCPCS | Performed by: HOSPITALIST

## 2017-04-16 PROCEDURE — 700102 HCHG RX REV CODE 250 W/ 637 OVERRIDE(OP): Performed by: HOSPITALIST

## 2017-04-16 PROCEDURE — 700111 HCHG RX REV CODE 636 W/ 250 OVERRIDE (IP): Performed by: HOSPITALIST

## 2017-04-16 PROCEDURE — 97530 THERAPEUTIC ACTIVITIES: CPT

## 2017-04-16 PROCEDURE — 97116 GAIT TRAINING THERAPY: CPT

## 2017-04-16 PROCEDURE — 99232 SBSQ HOSP IP/OBS MODERATE 35: CPT | Performed by: HOSPITALIST

## 2017-04-16 RX ADMIN — QUETIAPINE FUMARATE 25 MG: 25 TABLET, FILM COATED ORAL at 21:37

## 2017-04-16 RX ADMIN — CALCIUM 500 MG: 500 TABLET ORAL at 12:11

## 2017-04-16 RX ADMIN — ASPIRIN 81 MG: 81 TABLET, COATED ORAL at 08:18

## 2017-04-16 RX ADMIN — SENNOSIDES AND DOCUSATE SODIUM 2 TABLET: 8.6; 5 TABLET ORAL at 08:18

## 2017-04-16 RX ADMIN — AMIODARONE HYDROCHLORIDE 200 MG: 200 TABLET ORAL at 08:17

## 2017-04-16 RX ADMIN — OMEPRAZOLE 20 MG: 20 CAPSULE, DELAYED RELEASE ORAL at 08:18

## 2017-04-16 RX ADMIN — VITAMIN D, TAB 1000IU (100/BT) 1000 UNITS: 25 TAB at 08:18

## 2017-04-16 RX ADMIN — MULTIVITAMIN TABLET 1 TABLET: TABLET at 08:18

## 2017-04-16 RX ADMIN — ENOXAPARIN SODIUM 40 MG: 40 INJECTION, SOLUTION INTRAVENOUS; SUBCUTANEOUS at 08:18

## 2017-04-16 RX ADMIN — CALCIUM 500 MG: 500 TABLET ORAL at 08:15

## 2017-04-16 RX ADMIN — Medication 220 MG: at 08:16

## 2017-04-16 ASSESSMENT — GAIT ASSESSMENTS
DISTANCE (FEET): 75
GAIT LEVEL OF ASSIST: CONTACT GUARD ASSIST
ASSISTIVE DEVICE: FRONT WHEEL WALKER

## 2017-04-16 ASSESSMENT — ENCOUNTER SYMPTOMS
ABDOMINAL PAIN: 0
NAUSEA: 0
NERVOUS/ANXIOUS: 0
FEVER: 0
DIZZINESS: 0
SORE THROAT: 0
SPEECH CHANGE: 0
HEADACHES: 0
SPUTUM PRODUCTION: 0

## 2017-04-16 ASSESSMENT — PAIN SCALES - GENERAL
PAINLEVEL_OUTOF10: 0
PAINLEVEL_OUTOF10: 0

## 2017-04-16 NOTE — PROGRESS NOTES
Received bedside report from Evan WOODALL. Assumed care of pt who is resting in bed with eyes closed, RR even/unlabored. Fall protocol in place. CLIP. Will continue to monitor.

## 2017-04-16 NOTE — PROGRESS NOTES
Hospital Medicine Progress Note, Adult, Complex               Author: Juwan Morales Date & Time created: 4/16/2017  8:30 AM     Interval History:  ID: 80yo F with recent presumed syncope and had subsequent left hip fracture with repair this admit.  After surgery she has had complication of confusion.      Today:  Some forgetfulness but alert to place, year.  Her speech is clear.  Some left leg pain with movement.  Eating okay.  Awaits possible Rehab acceptance.      Review of Systems:  Review of Systems   Constitutional: Negative for fever.   HENT: Negative for sore throat.    Respiratory: Negative for sputum production.    Cardiovascular: Negative for chest pain and leg swelling.   Gastrointestinal: Negative for nausea and abdominal pain.   Musculoskeletal: Positive for joint pain (left leg pain w/ movement).   Neurological: Negative for dizziness, speech change and headaches.   Psychiatric/Behavioral: The patient is not nervous/anxious.        Physical Exam:  Physical Exam   Constitutional: She is oriented to person, place, and time. She appears well-developed. No distress.   HENT:   Head: Normocephalic and atraumatic.   Nose: Nose normal.   Eyes: Conjunctivae and EOM are normal. Right eye exhibits no discharge. Left eye exhibits no discharge. No scleral icterus.   Neck: No tracheal deviation present.   Cardiovascular: Normal rate, regular rhythm, normal heart sounds and intact distal pulses.    No murmur heard.  Pulses:       Radial pulses are 2+ on the right side, and 2+ on the left side.        Dorsalis pedis pulses are 2+ on the right side, and 2+ on the left side.   Pulmonary/Chest: Effort normal and breath sounds normal. No respiratory distress. She has no wheezes. She has no rales.   Abdominal: Soft. Bowel sounds are normal. She exhibits no distension. There is no tenderness. There is no rebound.   Musculoskeletal: She exhibits no edema.   Left lateral thigh with clean bandage no sign of bleeding.    Neurological: She is alert and oriented to person, place, and time. No cranial nerve deficit.   Skin: Skin is warm and dry. She is not diaphoretic.   Slight bruising near site of left lateral surgery site.   Psychiatric: She has a normal mood and affect. Her behavior is normal.   Vitals reviewed.      Labs:        Invalid input(s): [id number]      Recent Labs      17   SODIUM  142   POTASSIUM  3.8   CHLORIDE  111   CO2  23   BUN  28*   CREATININE  1.09   CALCIUM  8.8     Recent Labs      17   GLUCOSE  113*     Recent Labs      17   RBC  4.22   HEMOGLOBIN  13.0   HEMATOCRIT  38.9   PLATELETCT  205     Recent Labs      17   WBC  9.6           Hemodynamics:  Temp (24hrs), Av.7 °C (98 °F), Min:36.4 °C (97.5 °F), Max:36.9 °C (98.4 °F)  Temperature: 36.4 °C (97.5 °F)  Pulse  Av.5  Min: 69  Max: 80   Blood Pressure : (!) 170/74 mmHg     Respiratory:    Respiration: 18, Pulse Oximetry: 98 %        RUL Breath Sounds: Diminished, RML Breath Sounds: Diminished, RLL Breath Sounds: Diminished, MARILOU Breath Sounds: Diminished, LLL Breath Sounds: Diminished  Fluids:    Intake/Output Summary (Last 24 hours) at 17 0830  Last data filed at 04/15/17 1800   Gross per 24 hour   Intake    540 ml   Output    300 ml   Net    240 ml        GI/Nutrition:  Orders Placed This Encounter   Procedures   • DIET ORDER     Standing Status: Standing      Number of Occurrences: 1      Standing Expiration Date:      Order Specific Question:  Diet:     Answer:  Regular [1]     Medical Decision Making, by Problem:  Active Hospital Problems    Diagnosis   • Acute encephalopathy [G93.40]  - Quetiapine at night only.      • Mood disorder (CMS-[facility]) [F39]     • Femoral neck fracture (CMS-[facility]) [S72.009A]  - PT/OT  - Rehab consult pending OT/PT  - vit D and calcium     • Syncope [R55]  - Echo, carotid U/S unremarkable.     • Atrial fibrillation (CMS-[facility]) [I48.91]  - amiodarone     •  Gastroesophageal reflux disease without esophagitis [K21.9]     • Essential hypertension [I10]  - monitor vitals.       Labs reviewed and Medications reviewed  Alvarado catheter: No Alvarado      DVT Prophylaxis: Enoxaparin (Lovenox)

## 2017-04-16 NOTE — PROGRESS NOTES
Assessment completed, medicated per MAR. Denies pain and nausea at this time. Bed alarm on, clip in place. AAOx3 at this time.

## 2017-04-17 ENCOUNTER — RESOLUTE PROFESSIONAL BILLING HOSPITAL PROF FEE (OUTPATIENT)
Dept: PHYSICAL MEDICINE AND REHAB | Facility: REHABILITATION | Age: 82
End: 2017-04-17
Payer: MEDICAID

## 2017-04-17 ENCOUNTER — HOSPITAL ENCOUNTER (INPATIENT)
Facility: REHABILITATION | Age: 82
LOS: 18 days | DRG: 559 | End: 2017-05-05
Attending: PHYSICAL MEDICINE & REHABILITATION | Admitting: PHYSICAL MEDICINE & REHABILITATION
Payer: MEDICAID

## 2017-04-17 VITALS
RESPIRATION RATE: 18 BRPM | HEART RATE: 70 BPM | OXYGEN SATURATION: 98 % | HEIGHT: 64 IN | DIASTOLIC BLOOD PRESSURE: 65 MMHG | TEMPERATURE: 98.7 F | WEIGHT: 132.5 LBS | BODY MASS INDEX: 22.62 KG/M2 | SYSTOLIC BLOOD PRESSURE: 153 MMHG

## 2017-04-17 PROBLEM — S72.009A HIP FRACTURE (HCC): Status: ACTIVE | Noted: 2017-04-17

## 2017-04-17 PROCEDURE — A9270 NON-COVERED ITEM OR SERVICE: HCPCS | Performed by: HOSPITALIST

## 2017-04-17 PROCEDURE — 97116 GAIT TRAINING THERAPY: CPT

## 2017-04-17 PROCEDURE — 99238 HOSP IP/OBS DSCHRG MGMT 30/<: CPT | Performed by: HOSPITALIST

## 2017-04-17 PROCEDURE — 770010 HCHG ROOM/CARE - REHAB SEMI PRIVAT*

## 2017-04-17 PROCEDURE — A9270 NON-COVERED ITEM OR SERVICE: HCPCS | Performed by: PHYSICAL MEDICINE & REHABILITATION

## 2017-04-17 PROCEDURE — 700101 HCHG RX REV CODE 250: Performed by: HOSPITALIST

## 2017-04-17 PROCEDURE — 700102 HCHG RX REV CODE 250 W/ 637 OVERRIDE(OP): Performed by: PHYSICAL MEDICINE & REHABILITATION

## 2017-04-17 PROCEDURE — 97530 THERAPEUTIC ACTIVITIES: CPT

## 2017-04-17 PROCEDURE — 700102 HCHG RX REV CODE 250 W/ 637 OVERRIDE(OP): Performed by: HOSPITALIST

## 2017-04-17 PROCEDURE — 94760 N-INVAS EAR/PLS OXIMETRY 1: CPT

## 2017-04-17 RX ORDER — OMEPRAZOLE 20 MG/1
20 CAPSULE, DELAYED RELEASE ORAL DAILY
Status: DISCONTINUED | OUTPATIENT
Start: 2017-04-18 | End: 2017-05-05 | Stop reason: HOSPADM

## 2017-04-17 RX ORDER — ZINC SULFATE 50(220)MG
220 CAPSULE ORAL DAILY
Status: CANCELLED | OUTPATIENT
Start: 2017-04-18

## 2017-04-17 RX ORDER — ACETAMINOPHEN 650 MG/1
650 SUPPOSITORY RECTAL EVERY 4 HOURS PRN
Status: DISCONTINUED | OUTPATIENT
Start: 2017-04-17 | End: 2017-05-05 | Stop reason: HOSPADM

## 2017-04-17 RX ORDER — AMIODARONE HYDROCHLORIDE 200 MG/1
200 TABLET ORAL DAILY
Status: DISCONTINUED | OUTPATIENT
Start: 2017-04-18 | End: 2017-05-05 | Stop reason: HOSPADM

## 2017-04-17 RX ORDER — TRAMADOL HYDROCHLORIDE 50 MG/1
50 TABLET ORAL EVERY 4 HOURS PRN
Status: DISCONTINUED | OUTPATIENT
Start: 2017-04-17 | End: 2017-05-05 | Stop reason: HOSPADM

## 2017-04-17 RX ORDER — ACETAMINOPHEN 325 MG/1
650 TABLET ORAL EVERY 6 HOURS PRN
Qty: 30 TAB | Refills: 0 | Status: ON HOLD
Start: 2017-04-17 | End: 2017-05-05

## 2017-04-17 RX ORDER — POLYETHYLENE GLYCOL 3350 17 G/17G
1 POWDER, FOR SOLUTION ORAL
Status: DISCONTINUED | OUTPATIENT
Start: 2017-04-17 | End: 2017-05-05 | Stop reason: HOSPADM

## 2017-04-17 RX ORDER — TRAMADOL HYDROCHLORIDE 50 MG/1
100 TABLET ORAL EVERY 6 HOURS PRN
Qty: 30 TAB | Refills: 0 | Status: ON HOLD
Start: 2017-04-17 | End: 2017-05-05

## 2017-04-17 RX ORDER — QUETIAPINE FUMARATE 25 MG/1
25 TABLET, FILM COATED ORAL EVERY EVENING
Status: CANCELLED | OUTPATIENT
Start: 2017-04-17

## 2017-04-17 RX ORDER — POLYETHYLENE GLYCOL 3350 17 G/17G
17 POWDER, FOR SOLUTION ORAL
Refills: 3 | Status: ON HOLD
Start: 2017-04-17 | End: 2017-05-05

## 2017-04-17 RX ORDER — CALCIUM CARBONATE 500(1250)
500 TABLET ORAL
Qty: 90 TAB | Status: ON HOLD
Start: 2017-04-17 | End: 2017-05-05

## 2017-04-17 RX ORDER — AMOXICILLIN 250 MG
2 CAPSULE ORAL 2 TIMES DAILY
Status: CANCELLED | OUTPATIENT
Start: 2017-04-17

## 2017-04-17 RX ORDER — AMOXICILLIN 250 MG
2 CAPSULE ORAL 2 TIMES DAILY
Qty: 30 TAB | Refills: 0 | Status: ON HOLD
Start: 2017-04-17 | End: 2017-05-05

## 2017-04-17 RX ORDER — ONDANSETRON 4 MG/1
4 TABLET, ORALLY DISINTEGRATING ORAL EVERY 4 HOURS PRN
Status: CANCELLED | OUTPATIENT
Start: 2017-04-17

## 2017-04-17 RX ORDER — BISACODYL 10 MG
10 SUPPOSITORY, RECTAL RECTAL
Status: CANCELLED | OUTPATIENT
Start: 2017-04-17

## 2017-04-17 RX ORDER — AMOXICILLIN 250 MG
2 CAPSULE ORAL 2 TIMES DAILY
Status: DISCONTINUED | OUTPATIENT
Start: 2017-04-17 | End: 2017-05-05 | Stop reason: HOSPADM

## 2017-04-17 RX ORDER — TRAMADOL HYDROCHLORIDE 50 MG/1
50 TABLET ORAL EVERY 4 HOURS PRN
Status: CANCELLED | OUTPATIENT
Start: 2017-04-17

## 2017-04-17 RX ORDER — AMIODARONE HYDROCHLORIDE 200 MG/1
200 TABLET ORAL DAILY
Status: CANCELLED | OUTPATIENT
Start: 2017-04-18

## 2017-04-17 RX ORDER — POLYETHYLENE GLYCOL 3350 17 G/17G
1 POWDER, FOR SOLUTION ORAL
Status: CANCELLED | OUTPATIENT
Start: 2017-04-17

## 2017-04-17 RX ORDER — BISACODYL 10 MG
10 SUPPOSITORY, RECTAL RECTAL
Refills: 0 | Status: ON HOLD
Start: 2017-04-17 | End: 2017-05-05

## 2017-04-17 RX ORDER — CALCIUM CARBONATE 500(1250)
500 TABLET ORAL
Status: DISCONTINUED | OUTPATIENT
Start: 2017-04-17 | End: 2017-05-05 | Stop reason: HOSPADM

## 2017-04-17 RX ORDER — ONDANSETRON 4 MG/1
4 TABLET, ORALLY DISINTEGRATING ORAL EVERY 4 HOURS PRN
Qty: 10 TAB | Refills: 0 | Status: ON HOLD
Start: 2017-04-17 | End: 2017-05-05

## 2017-04-17 RX ORDER — ACETAMINOPHEN 650 MG/1
650 SUPPOSITORY RECTAL EVERY 4 HOURS PRN
Status: CANCELLED | OUTPATIENT
Start: 2017-04-17

## 2017-04-17 RX ORDER — ONDANSETRON 4 MG/1
4 TABLET, ORALLY DISINTEGRATING ORAL EVERY 4 HOURS PRN
Status: DISCONTINUED | OUTPATIENT
Start: 2017-04-17 | End: 2017-05-05 | Stop reason: HOSPADM

## 2017-04-17 RX ORDER — TRAMADOL HYDROCHLORIDE 50 MG/1
100 TABLET ORAL EVERY 6 HOURS PRN
Status: DISCONTINUED | OUTPATIENT
Start: 2017-04-17 | End: 2017-05-05 | Stop reason: HOSPADM

## 2017-04-17 RX ORDER — ZINC SULFATE 50(220)MG
220 CAPSULE ORAL DAILY
Status: DISCONTINUED | OUTPATIENT
Start: 2017-04-18 | End: 2017-05-05 | Stop reason: HOSPADM

## 2017-04-17 RX ORDER — OMEPRAZOLE 20 MG/1
20 CAPSULE, DELAYED RELEASE ORAL DAILY
Status: CANCELLED | OUTPATIENT
Start: 2017-04-18

## 2017-04-17 RX ORDER — QUETIAPINE FUMARATE 25 MG/1
25 TABLET, FILM COATED ORAL EVERY EVENING
Status: DISCONTINUED | OUTPATIENT
Start: 2017-04-17 | End: 2017-04-25

## 2017-04-17 RX ORDER — CALCIUM CARBONATE 500(1250)
500 TABLET ORAL
Status: CANCELLED | OUTPATIENT
Start: 2017-04-17

## 2017-04-17 RX ORDER — TRAMADOL HYDROCHLORIDE 50 MG/1
100 TABLET ORAL EVERY 6 HOURS PRN
Status: CANCELLED | OUTPATIENT
Start: 2017-04-17

## 2017-04-17 RX ORDER — BISACODYL 10 MG
10 SUPPOSITORY, RECTAL RECTAL
Status: DISCONTINUED | OUTPATIENT
Start: 2017-04-17 | End: 2017-05-05 | Stop reason: HOSPADM

## 2017-04-17 RX ADMIN — LABETALOL HYDROCHLORIDE 10 MG: 5 INJECTION INTRAVENOUS at 03:39

## 2017-04-17 RX ADMIN — QUETIAPINE FUMARATE 25 MG: 25 TABLET, FILM COATED ORAL at 21:07

## 2017-04-17 RX ADMIN — STANDARDIZED SENNA CONCENTRATE AND DOCUSATE SODIUM 2 TABLET: 8.6; 5 TABLET, FILM COATED ORAL at 21:07

## 2017-04-17 RX ADMIN — AMIODARONE HYDROCHLORIDE 200 MG: 200 TABLET ORAL at 09:31

## 2017-04-17 ASSESSMENT — COPD QUESTIONNAIRES
DURING THE PAST 4 WEEKS HOW MUCH DID YOU FEEL SHORT OF BREATH: NONE/LITTLE OF THE TIME
COPD SCREENING SCORE: 2
DO YOU EVER COUGH UP ANY MUCUS OR PHLEGM?: NO/ONLY WITH OCCASIONAL COLDS OR INFECTIONS
HAVE YOU SMOKED AT LEAST 100 CIGARETTES IN YOUR ENTIRE LIFE: NO/DON'T KNOW

## 2017-04-17 ASSESSMENT — LIFESTYLE VARIABLES
ALCOHOL_USE: NO
EVER_SMOKED: NEVER
EVER_SMOKED: NEVER

## 2017-04-17 ASSESSMENT — PAIN SCALES - GENERAL: PAINLEVEL_OUTOF10: 0

## 2017-04-17 ASSESSMENT — GAIT ASSESSMENTS: GAIT LEVEL OF ASSIST: UNABLE TO PARTICIPATE

## 2017-04-17 NOTE — PROGRESS NOTES
Assessment and med pass completed. Pt up to bathroom with CNA x1 assist FWW. Back to bed safely. Pt states no pain, n/v. Dressing to left leg CDI. Safety precautions in place, CLIP, bed alarm on, will monitor.

## 2017-04-17 NOTE — DISCHARGE PLANNING
Insurance has authorized IRF for post acute services Dr. Marvin agreeable to accept when medically cleared. MARILUZ monroe.

## 2017-04-17 NOTE — THERAPY
"Physical Therapy Treatment completed.   Bed Mobility:  Supine to Sit:  (NT, pt sitting at EOB upon entry)  Transfers: Sit to Stand: Contact Guard Assist  Gait: Level Of Assist: Unable to Participate (pt reports feeling dizzy, also unable to maintain TTWB L LE)      Plan of Care: Will benefit from Physical Therapy 7 times per week  Discharge Recommendations: Equipment: Will Continue to Assess for Equipment Needs. Post-acute therapy Discharge to a transitional care facility for continued skilled therapy services.    Pt is participatry with therapy and follows simple commands but has a difficult time maintaining TTWB L LE, needs constant cues and repetition.     See \"Rehab Therapy-Acute\" Patient Summary Report for complete documentation.       "

## 2017-04-17 NOTE — PROGRESS NOTES
Pt still refusing all morning medications. Discussed importance of medications. Would take amiodarone for me. Still refused all other medications after education. Pt is aaox4, denies pain and nausea. Bed alarm on, new water provided. Adjusted blankets on bed. Bed in low position.

## 2017-04-17 NOTE — IP AVS SNAPSHOT
" Home Care Instructions                                                                                                                  Name:Marleen Mix  Medical Record Number:[id number]  CSN: [id number]    YOB: 1935   Age: 81 y.o.  Sex: female  HT:1.575 m (5' 2\") WT: 61.2 kg (134 lb 14.7 oz)          Admit Date: 4/17/2017     Discharge Date:   Today's Date: 5/5/2017  Attending Doctor:  Daya att. providers found                  Allergies:  Review of patient's allergies indicates no known allergies.            Discharge Instructions             [facility] NURSING DISCHARGE INSTRUCTIONS    Blood Pressure : 147/84 mmHg  Weight: 61.2 kg (134 lb 14.7 oz)  Nursing recommendations for Marleen Mix at time of discharge are as follows:  Client and Family Member verbalized understanding of all discharge instructions and prescriptions.     Review all your home medications and newly ordered medications with your doctor and/or pharmacist. Follow medication instructions as directed by your doctor and/or pharmacist.    Pain Management:   Discharge Pain Medication Instructions:  Comfort Goal: Comfort at Rest, Comfort with Movement, Perform Activity, Stay Alert  Notify your primary care provider if pain is unrelieved with these measures, if the pain is new, or increased in intensity.    Discharge Skin Characteristics: Warm (dry heels)  Discharge Skin Exam: Clear  Surgical Incision  Incision Left Lateral Hip (Active)   Wound Bed Pink 5/4/2017 10:00 AM   Drainage  None 5/4/2017 10:00 AM   Periwound Skin Normal;Intact 5/4/2017 10:00 AM   Daily - Wound Closure Open to Air 5/4/2017 10:00 AM   Dressing Options Open to Air 5/4/2017 10:00 AM   Dressing Status / Change Clean;Dry;Intact 4/25/2017  9:00 AM   Daily - Dressing Change Observed 4/25/2017  9:00 AM   Dressing Change Frequency As Needed 4/25/2017  9:00 AM   Weekly Photo (Inpatient Only) 04/30/17 5/3/2017  7:15 AM     Skin / Wound Care Instructions: " Please contact your primary care physician for any change in skin integrity.     If You Have Surgical Incisions / Wounds:  Monitor surgical site(s) for signs of increased swelling, redness or symptoms of drainage from the site or fever as this could indicate signs and symptoms of infection. If these symptoms are noted, notifiy your primary care provider.      Discharge Safety Instructions: Should Not Be Left Alone In The House     Discharge Safety Concerns: Wandering, Balance Problems (Dizziness, Light Headedness), History Of Falls, Unsteady Gait, Weakness (forgetfulness)  The interdisciplinary team has made recommendation that you should not be left alone  in the house due to balance problem, history of falls, weakness and unsteady gait  Anti-embolic stockings are required during the day and off at night to increase circulation to the lower extremities.    Discharge Diet: regular     Discharge Liquids: thin  Discharge Bowel Function: Continent  Please contact your primary care physician for any changes in bowel habits.  Discharge Bowel Program:    Discharge Bladder Function: Continent  Discharge Urinary Devices:        Nursing Discharge Plan:   Influenza Vaccine Indication: Patient Refuses    Depression / Suicide Risk    As you are discharged from this [facility] facility, it is important to learn how to keep safe from harming yourself.    Recognize the warning signs:  · Abrupt changes in personality, positive or negative- including increase in energy   · Giving away possessions  · Change in eating patterns- significant weight changes-  positive or negative  · Change in sleeping patterns- unable to sleep or sleeping all the time   · Unwillingness or inability to communicate  · Depression  · Unusual sadness, discouragement and loneliness  · Talk of wanting to die  · Neglect of personal appearance   · Rebelliousness- reckless behavior  · Withdrawal from people/activities they love  · Confusion- inability to  concentrate     If you or a loved one observes any of these behaviors or has concerns about self-harm, here's what you can do:  · Talk about it- your feelings and reasons for harming yourself  · Remove any means that you might use to hurt yourself (examples: pills, rope, extension cords, firearm)  · Get professional help from the community (Mental Health, Substance Abuse, psychological counseling)  · Do not be alone:Call your Safe Contact- someone whom you trust who will be there for you.  · Call your local CRISIS HOTLINE 615-6855 or 908-783-1504  · Call your local Children's Mobile Crisis Response Team [place] (603) 992-7396 or www.[employer]  · Call the toll free National Suicide Prevention Hotlines   · National Suicide Prevention Lifeline 857-222-ELTO (2162)  · National Hope Line Network 800-SUICIDE (497-9403)        Case Management Discharge Instructions:   Discharge Location: Skilled Nursing Facility  Agency Name/Address/Phone: [place] [id number]  Home Health:    Outpatient Services:    DME Provider/Phone:    Medical Equipment Ordered: Front Wheel Walker, Three in One Commode, Wheelchair (Tub transfer bench)  Prescription Faxed to:        Discharge Medication Instructions:  Below are the medications your physician expects you to take upon discharge:      Occupational Therapy Discharge Instructions for Marleen Mix    5/4/2017    Level of Assist Required for Eating: Able to Complete Eating without Assist  Level of Assist Required for Grooming: Requires Supervision with Grooming  Level of Assist Required for Dressing: Requires Physical Assist with Dressing  Equipment for Dressing: Sock Aid, Reacher, Dressing Stick  Level of Assist Required for Toileting: Requires Physical Assist with Toileting  Level of Assist Required for Toilet Transfer: Requires Physical Assist with Toilet Transfer  Equipment for Toilet Transfer: Grab Bars by Toilet, Other (Front wheeled walker)  Level of Assist Required for  Bathing: Requires Physical Assist with Bathing  Equipment for Bathing: Shower Chair, Grab Bars in Tub / Shower, Hand Held Shower Head, Long Handled Sponge  Level of Assist Required for Shower Transfer: Requires Physical Assist with Shower Transfer  Equipment for Shower Transfer: Shower Chair, Grab Bars in Tub / Shower, Other (Front wheeled walker)  Level of Assist Required for Home Mgmt: Requires Physical Assist with Home Management  Level of Assist Required for Meal Prep: Requires Physical Assist with Meal Preparation  Driving: May not Drive, Please Contact Physician for Further Information  Home Exercise Program: None Issued      Speech Therapy Discharge Instructions for Marleen Mix    5/4/2017    Diet: Regular - all foods allowed, Thin Liquids - any liquid like water, coffee, tea, juices, or clear fluids like Gatorade, etc.  Swallow Strategies: none required   Supervision: 24 hour supervision is recommended for safety   Cognition / Communication: It is recommended that Marleen use a memory notebook when at home to keep track of important appointment information, medications, and any other information she might want to remember.  Make sure you slow down and only complete one task at a time.  When completing difficult activities make sure you do them in a quiet location, free from distractions so you are able to focus.   Break complex problems down into smaller parts.  Take breaks during tasks when you feel you are having difficulty maintaining attention.  Pay attention to your fatigue level, you may need more assistance with activities if you are tired.       Follow-up Information     1. Follow up with Hairnder Leblanc M.D. On 5/16/2017.    Specialty:  Orthopaedics    Why:  Ortho-Tuesday @ 10:45am.     Contact information    [address]  [facility] [address]  [facility] [address] [phone]          2. Follow up with Jessie Hernandez M.D. On 5/10/2017.    Specialty:  Internal Medicine    Why:  Wednesday @  1:15pm with a 1:00pm check in time.  Appointiment is with another MD is the same office as Dr. Mcghee-PCP.     Contact information    [address]  [address]  [place] [address] [phone]           Discharge Medication Instructions:    Below are the medications your physician expects you to take upon discharge:    Review all your home medications and newly ordered medications with your doctor and/or pharmacist. Follow medication instructions as directed by your doctor and/or pharmacist.    Please keep your medication list with you and share with your physician.               Medication List      START taking these medications        Instructions    Morning Afternoon Evening Bedtime    amlodipine 2.5 MG Tabs   Last time this was given:  2.5 mg on 5/4/2017  5:21 PM   Commonly known as:  NORVASC        Take 1 Tab by mouth every evening.   Dose:  2.5 mg                        calcium carbonate 500 MG Tabs   Last time this was given:  500 mg on 5/5/2017  8:26 AM   Commonly known as:  OS-DIANNA 500        Take 1 Tab by mouth 3 times a day, with meals.   Dose:  500 mg                        Cholecalciferol 1000 UNIT Tabs   Last time this was given:  1,000 Units on 5/5/2017  8:25 AM   Commonly known as:  VITAMIND D3        Take 1 Tab by mouth every day.   Dose:  1000 Units                        enoxaparin 40 MG/0.4ML Soln inj   Last time this was given:  40 mg on 5/5/2017  8:25 AM   Commonly known as:  LOVENOX        Inject 40 mg as instructed every day.   Dose:  40 mg                        fludrocortisone 0.1 MG Tabs   Last time this was given:  0.1 mg on 5/5/2017  8:26 AM   Commonly known as:  FLORINEF        Take 1 Tab by mouth every morning.   Dose:  0.1 mg                        FORMULATION R 0.25-3-14-71.9 % Oint   Last time this was given:  5/5/2017 10:42 AM        Apply bid prn                        miconazole 2 % Crea   Last time this was given:  1 Applicator on 5/4/2017  9:25 PM   Commonly known as:   MONISTAT        Insert 1 Applicator in vagina every evening.   Dose:  1 Applicator                        midodrine 10 MG tablet   Last time this was given:  10 mg on 5/5/2017  8:26 AM   Commonly known as:  PROAMATINE        Take 1 Tab by mouth 3 times a day, with meals.   Dose:  10 mg                        multivitamin Tabs   Last time this was given:  1 Tab on 5/5/2017  8:26 AM        Take 1 Tab by mouth every day.   Dose:  1 Tab                        ondansetron 4 MG Tbdp   Commonly known as:  ZOFRAN ODT        Take 1 Tab by mouth every four hours as needed for Nausea/Vomiting (give PO if IV route is unavailable. May give per feeding tube.).   Dose:  4 mg                        oxybutynin 5 MG Tabs   Last time this was given:  5 mg on 5/4/2017  9:16 PM   Commonly known as:  DITROPAN        Take 1 Tab by mouth every bedtime.   Dose:  5 mg                        potassium chloride SA 20 MEQ Tbcr   Last time this was given:  40 mEq on 5/5/2017  8:25 AM   Commonly known as:  Kdur        Take 2 Tabs by mouth every day.   Dose:  40 mEq                        senna-docusate 8.6-50 MG Tabs   Last time this was given:  2 Tabs on 5/5/2017  8:25 AM   Commonly known as:  PERICOLACE or SENOKOT S        Take 2 Tabs by mouth 2 Times a Day.   Dose:  2 Tab                        trazodone 50 MG Tabs   Last time this was given:  50 mg on 5/4/2017  9:16 PM   Commonly known as:  DESYREL        Take 1 Tab by mouth every bedtime.   Dose:  50 mg                        zinc sulfate 220 MG Caps   Last time this was given:  220 mg on 5/5/2017  8:25 AM   Commonly known as:  ZINCATE        Take 1 Cap by mouth every day.   Dose:  220 mg                          CHANGE how you take these medications        Instructions    Morning Afternoon Evening Bedtime    amiodarone 200 MG Tabs   What changed:    - when to take this  - additional instructions   Last time this was given:  200 mg on 5/5/2017  8:26 AM   Commonly known as:  CORDARONE         Take 1 Tab by mouth every day.   Dose:  200 mg                        aspirin 81 MG EC tablet   What changed:  additional instructions   Last time this was given:  81 mg on 5/5/2017  8:25 AM        Take 1 Tab by mouth every day.   Dose:  81 mg                          CONTINUE taking these medications        Instructions    Morning Afternoon Evening Bedtime    omeprazole 20 MG delayed-release capsule   Last time this was given:  20 mg on 5/5/2017  8:25 AM   Commonly known as:  PRILOSEC        Take 1 Cap by mouth every day.   Dose:  20 mg                             Where to Get Your Medications      Information about where to get these medications is not yet available     ! Ask your nurse or doctor about these medications    - amiodarone 200 MG Tabs  - amlodipine 2.5 MG Tabs  - aspirin 81 MG EC tablet  - calcium carbonate 500 MG Tabs  - Cholecalciferol 1000 UNIT Tabs  - enoxaparin 40 MG/0.4ML Soln inj  - fludrocortisone 0.1 MG Tabs  - FORMULATION R 0.25-3-14-71.9 % Oint  - miconazole 2 % Crea  - midodrine 10 MG tablet  - multivitamin Tabs  - omeprazole 20 MG delayed-release capsule  - ondansetron 4 MG Tbdp  - oxybutynin 5 MG Tabs  - potassium chloride SA 20 MEQ Tbcr  - senna-docusate 8.6-50 MG Tabs  - trazodone 50 MG Tabs  - zinc sulfate 220 MG Caps            Instructions           Diet / Nutrition:    Follow any diet instructions given to you by your doctor or the dietician, including how much salt (sodium) you are allowed each day.    If you are overweight, talk to your doctor about a weight reduction plan.    Activity:    Remain physically active following your doctor's instructions about exercise and activity.    Rest often.     Any time you become even a little tired or short of breath, SIT DOWN and rest.    Worsening Symptoms:    Report any of the following signs and symptoms to the doctor's office immediately:    *Pain of jaw, arm, or neck  *Chest pain not relieved by medication                                  *Dizziness or loss of consciousness  *Difficulty breathing even when at rest   *More tired than usual                                       *Bleeding drainage or swelling of surgical site  *Swelling of feet, ankles, legs or stomach                 *Fever (>100ºF)  *Pink or blood tinged sputum  *Weight gain (3lbs/day or 5lbs /week)           *Shock from internal defibrillator (if applicable)  *Palpitations or irregular heartbeats                *Cool and/or numb extremities    Stroke Awareness    Common Risk Factors for Stroke include:    Age  Atrial Fibrillation  Carotid Artery Stenosis  Diabetes Mellitus  Excessive alcohol consumption  High blood pressure  Overweight   Physical inactivity  Smoking    Warning signs and symptoms of a stroke include:    *Sudden numbness or weakness of the face, arm or leg (especially on one side of the body).  *Sudden confusion, trouble speaking or understanding.  *Sudden trouble seeing in one or both eyes.  *Sudden trouble walking, dizziness, loss of balance or coordination.Sudden severe headache with no known cause.    It is very important to get treatment quickly when a stroke occurs. If you experience any of the above warning signs, call 911 immediately.                   Disclaimer         Quit Smoking / Tobacco Use:    I understand the use of any tobacco products increases my chance of suffering from future heart disease or stroke and could cause other illnesses which may shorten my life. Quitting the use of tobacco products is the single most important thing I can do to improve my health. For further information on smoking / tobacco cessation call a Toll Free Quit Line at 1-346.312.5833 (*National [employer] [place]) or 1-706.484.1723 (American Lung Association) or you can access the web based program at www.lungusa.org.    [place] Tobacco Users Help Line:  (994) 968-1282       Toll Free: 1-210.114.6356  Quit Tobacco Program [facility]  ([phone]    Crisis Hotline:    [place] Crisis Hotline:  7-543-UJVGZLV or 1-515.816.9094    [place] Crisis Hotline:    1-624.235.1534 or 474-717-1163    Discharge Survey:   Thank you for choosing [facility]. We hope we did everything we could to make your hospital stay a pleasant one. You may be receiving a phone survey and we would appreciate your time and participation in answering the questions. Your input is very valuable to us in our efforts to improve our service to our patients and their families.        My signature on this form indicates that:    1. I have reviewed and understand the above information.  2. My questions regarding this information have been answered to my satisfaction.  3. I have formulated a plan with my discharge nurse to obtain my prescribed medications for home.                  Disclaimer         __________________________________                     __________       ________                       Patient Signature                                                 Date                    Time

## 2017-04-17 NOTE — PROGRESS NOTES
Pt requesting bedpan, small amount of urine. Pt SBP>160, medicated per MAR. IV infiltrated, d/c'd with tip intact. New access obtained, 22g in right upper arm. Pt states no other needs, will CTM.

## 2017-04-17 NOTE — PROGRESS NOTES
Bedside report received from Adrianna WOODALL. Assumed care. POC discussed. Pt resting comfortably in bed. Safety precautions in place.

## 2017-04-17 NOTE — PROGRESS NOTES
PIV removed, tip intact. Transfer discussed, verbalized understanding. Daughter at bedside. Vitals completed.

## 2017-04-17 NOTE — PROGRESS NOTES
Pt sitting up in bed eating breakfast, pt refused medication at this time. Plan of care discussed. Will come back later for 9 am medications. Denies pain and nausea at this time. Warm wash cloth provided to wash face. CLIP, bed alarm on, fall protocol in place.

## 2017-04-17 NOTE — PROGRESS NOTES
Pt sitting up in cardiac chair at this time. Denies needs. Chair alarm on. TV on. Bedside table next to pt.

## 2017-04-17 NOTE — DISCHARGE PLANNING
MARILUZ informed that [facility] today. MARILUZ informed Dr. Morales.  Dr. Morales to completed discharge summary at this time for discharge today.

## 2017-04-17 NOTE — PREADMISSION SCREENING NOTE
Updated Pre-Screen Assessment     Name: Marleen Mix  MRN: [id number]  : 1935    Medical Status/ Changes:     Juwan Morales D.O. Physician Signed  Progress Notes 2017  8:30 AM      Expand All Collapse All    Hospital Medicine Progress Note, Adult, Complex                Author: Juwan Kellerck  Date & Time created: 2017  8:30 AM      Interval History:  ID: [id number] F with recent presumed syncope and had subsequent left hip fracture with repair this admit.  After surgery she has had complication of confusion.      Today:  Some forgetfulness but alert to place, year.  Her speech is clear.  Some left leg pain with movement.  Eating okay.  Awaits possible Rehab acceptance.      Review of Systems:  Review of Systems   Constitutional: Negative for fever.   HENT: Negative for sore throat.    Respiratory: Negative for sputum production.    Cardiovascular: Negative for chest pain and leg swelling.   Gastrointestinal: Negative for nausea and abdominal pain.   Musculoskeletal: Positive for joint pain (left leg pain w/ movement).   Neurological: Negative for dizziness, speech change and headaches.   Psychiatric/Behavioral: The patient is not nervous/anxious.        Physical Exam:  Physical Exam   Constitutional: She is oriented to person, place, and time. She appears well-developed. No distress.   HENT:    Head: Normocephalic and atraumatic.    Nose: Nose normal.   Eyes: Conjunctivae and EOM are normal. Right eye exhibits no discharge. Left eye exhibits no discharge. No scleral icterus.   Neck: No tracheal deviation present.   Cardiovascular: Normal rate, regular rhythm, normal heart sounds and intact distal pulses.     No murmur heard.  Pulses:       Radial pulses are 2+ on the right side, and 2+ on the left side.         Dorsalis pedis pulses are 2+ on the right side, and 2+ on the left side.   Pulmonary/Chest: Effort normal and breath sounds normal. No respiratory distress. She has no wheezes. She has no rales.    Abdominal: Soft. Bowel sounds are normal. She exhibits no distension. There is no tenderness. There is no rebound.   Musculoskeletal: She exhibits no edema.   Left lateral thigh with clean bandage no sign of bleeding.   Neurological: She is alert and oriented to person, place, and time. No cranial nerve deficit.   Skin: Skin is warm and dry. She is not diaphoretic.   Slight bruising near site of left lateral surgery site.   Psychiatric: She has a normal mood and affect. Her behavior is normal.   Vitals reviewed.      Labs:        Invalid input(s): [id number]      Recent Labs       17    SODIUM   142    POTASSIUM   3.8    CHLORIDE   111    CO2   23    BUN   28*    CREATININE   1.09    CALCIUM   8.8      Recent Labs       17    GLUCOSE   113*      Recent Labs       17    RBC   4.22    HEMOGLOBIN   13.0    HEMATOCRIT   38.9    PLATELETCT   205      Recent Labs       17    WBC   9.6            Hemodynamics:  Temp (24hrs), Av.7 °C (98 °F), Min:36.4 °C (97.5 °F), Max:36.9 °C (98.4 °F)  Temperature: 36.4 °C (97.5 °F)  Pulse  Av.5  Min: 69  Max: 80   Blood Pressure : (!) 170/74 mmHg     Respiratory:    Respiration: 18, Pulse Oximetry: 98 %        RUL Breath Sounds: Diminished, RML Breath Sounds: Diminished, RLL Breath Sounds: Diminished, MARILOU Breath Sounds: Diminished, LLL Breath Sounds: Diminished  Fluids:    Intake/Output Summary (Last 24 hours) at 17 0830  Last data filed at 04/15/17 1800    Gross per 24 hour    Intake     540 ml    Output     300 ml    Net     240 ml         GI/Nutrition:  Orders Placed This Encounter    Procedures    •  DIET ORDER        Standing Status:  Standing          Number of Occurrences:  1          Standing Expiration Date:          Order Specific Question:   Diet:        Answer:   Regular [1]      Medical Decision Making, by Problem:  Active Hospital Problems      Diagnosis    •  Acute encephalopathy [G93.40]  -  "Quetiapine at night only.        •  Mood disorder (CMS-HCC) [F39]      •  Femoral neck fracture (CMS-HCC) [S72.009A]  - PT/OT  - Rehab consult pending OT/PT  - vit D and calcium      •  Syncope [R55]  - Echo, carotid U/S unremarkable.      •  Atrial fibrillation (CMS-HCC) [I48.91]  - amiodarone      •  Gastroesophageal reflux disease without esophagitis [K21.9]      •  Essential hypertension [I10]  - monitor vitals.        Labs reviewed and Medications reviewed  Alvarado catheter: No Alvarado  DVT Prophylaxis: Enoxaparin (Lovenox)                                            Functional Status/ Changes:       Elisa Edouard P.T. Physical Therapist Signed  Therapy 4/15/2017 12:12 PM      Expand All Collapse All    Physical Therapy Treatment completed.    Bed Mobility:  Supine to Sit: Moderate Assist  Transfers: Sit to Stand: Minimal Assist  Gait: Level Of Assist: Contact Guard Assist with Front-Wheel Walker pivoting on RLE to move bed<>chair. (See below).      Plan of Care: Will benefit from Physical Therapy 7 times per week  Discharge Recommendations: Equipment: Will Continue to Assess for Equipment Needs. Post-acute therapy Discharge to a transitional care facility for continued skilled therapy services.    Pt remains alert and agreeable to therapy. She is unable to take steps/walk maintaining her TTWB precaution on LLE but is able to pivot on RLE to complete transfers. Continue to recommend post acute therapy prior to return home. RN updated.      See \"Rehab Therapy-Acute\" Patient Summary Report for complete documentation.                               Reviewer: Jason Blount  Date: 4/17/2017  Time: 2:44 PM      "

## 2017-04-17 NOTE — PROGRESS NOTES
Received bedside report from Vandana WOODALL. Assumed care of pt who is resting in bed with eyes closed, RR even/unlabored. Fall protocol in place. CLIP. Will continue to monitor.

## 2017-04-18 LAB
ALBUMIN SERPL BCP-MCNC: 3.5 G/DL (ref 3.2–4.9)
ALBUMIN/GLOB SERPL: 1.1 G/DL
ALP SERPL-CCNC: 70 U/L (ref 30–99)
ALT SERPL-CCNC: 25 U/L (ref 2–50)
ANION GAP SERPL CALC-SCNC: 11 MMOL/L (ref 0–11.9)
AST SERPL-CCNC: 30 U/L (ref 12–45)
BASOPHILS # BLD AUTO: 0.6 % (ref 0–1.8)
BASOPHILS # BLD: 0.06 K/UL (ref 0–0.12)
BILIRUB SERPL-MCNC: 0.5 MG/DL (ref 0.1–1.5)
BUN SERPL-MCNC: 28 MG/DL (ref 8–22)
CALCIUM SERPL-MCNC: 9.3 MG/DL (ref 8.5–10.5)
CHLORIDE SERPL-SCNC: 105 MMOL/L (ref 96–112)
CO2 SERPL-SCNC: 20 MMOL/L (ref 20–33)
CREAT SERPL-MCNC: 1.31 MG/DL (ref 0.5–1.4)
EOSINOPHIL # BLD AUTO: 0.1 K/UL (ref 0–0.51)
EOSINOPHIL NFR BLD: 1 % (ref 0–6.9)
ERYTHROCYTE [DISTWIDTH] IN BLOOD BY AUTOMATED COUNT: 50.4 FL (ref 35.9–50)
GFR SERPL CREATININE-BSD FRML MDRD: 39 ML/MIN/1.73 M 2
GLOBULIN SER CALC-MCNC: 3.2 G/DL (ref 1.9–3.5)
GLUCOSE SERPL-MCNC: 125 MG/DL (ref 65–99)
HCT VFR BLD AUTO: 39.6 % (ref 37–47)
HGB BLD-MCNC: 12.9 G/DL (ref 12–16)
IMM GRANULOCYTES # BLD AUTO: 0.16 K/UL (ref 0–0.11)
IMM GRANULOCYTES NFR BLD AUTO: 1.6 % (ref 0–0.9)
LYMPHOCYTES # BLD AUTO: 2.31 K/UL (ref 1–4.8)
LYMPHOCYTES NFR BLD: 22.8 % (ref 22–41)
MCH RBC QN AUTO: 30.8 PG (ref 27–33)
MCHC RBC AUTO-ENTMCNC: 32.6 G/DL (ref 33.6–35)
MCV RBC AUTO: 94.5 FL (ref 81.4–97.8)
MONOCYTES # BLD AUTO: 1.15 K/UL (ref 0–0.85)
MONOCYTES NFR BLD AUTO: 11.4 % (ref 0–13.4)
NEUTROPHILS # BLD AUTO: 6.35 K/UL (ref 2–7.15)
NEUTROPHILS NFR BLD: 62.6 % (ref 44–72)
NRBC # BLD AUTO: 0 K/UL
NRBC BLD AUTO-RTO: 0 /100 WBC
PLATELET # BLD AUTO: 248 K/UL (ref 164–446)
PMV BLD AUTO: 11.7 FL (ref 9–12.9)
POTASSIUM SERPL-SCNC: 4.4 MMOL/L (ref 3.6–5.5)
PREALB SERPL-MCNC: 21 MG/DL (ref 18–38)
PROT SERPL-MCNC: 6.7 G/DL (ref 6–8.2)
RBC # BLD AUTO: 4.19 M/UL (ref 4.2–5.4)
SODIUM SERPL-SCNC: 136 MMOL/L (ref 135–145)
WBC # BLD AUTO: 10.1 K/UL (ref 4.8–10.8)

## 2017-04-18 PROCEDURE — 700102 HCHG RX REV CODE 250 W/ 637 OVERRIDE(OP): Performed by: PHYSICAL MEDICINE & REHABILITATION

## 2017-04-18 PROCEDURE — 80053 COMPREHEN METABOLIC PANEL: CPT

## 2017-04-18 PROCEDURE — A9270 NON-COVERED ITEM OR SERVICE: HCPCS

## 2017-04-18 PROCEDURE — A9270 NON-COVERED ITEM OR SERVICE: HCPCS | Performed by: PHYSICAL MEDICINE & REHABILITATION

## 2017-04-18 PROCEDURE — 97530 THERAPEUTIC ACTIVITIES: CPT

## 2017-04-18 PROCEDURE — 99223 1ST HOSP IP/OBS HIGH 75: CPT | Performed by: PHYSICAL MEDICINE & REHABILITATION

## 2017-04-18 PROCEDURE — 700102 HCHG RX REV CODE 250 W/ 637 OVERRIDE(OP)

## 2017-04-18 PROCEDURE — 700111 HCHG RX REV CODE 636 W/ 250 OVERRIDE (IP): Performed by: PHYSICAL MEDICINE & REHABILITATION

## 2017-04-18 PROCEDURE — 97112 NEUROMUSCULAR REEDUCATION: CPT

## 2017-04-18 PROCEDURE — 84134 ASSAY OF PREALBUMIN: CPT

## 2017-04-18 PROCEDURE — 770010 HCHG ROOM/CARE - REHAB SEMI PRIVAT*

## 2017-04-18 PROCEDURE — 85025 COMPLETE CBC W/AUTO DIFF WBC: CPT

## 2017-04-18 PROCEDURE — 97166 OT EVAL MOD COMPLEX 45 MIN: CPT

## 2017-04-18 PROCEDURE — 97110 THERAPEUTIC EXERCISES: CPT

## 2017-04-18 PROCEDURE — 81001 URINALYSIS AUTO W/SCOPE: CPT

## 2017-04-18 PROCEDURE — 97162 PT EVAL MOD COMPLEX 30 MIN: CPT

## 2017-04-18 RX ADMIN — AMIODARONE HYDROCHLORIDE 200 MG: 200 TABLET ORAL at 08:29

## 2017-04-18 RX ADMIN — CHOLECALCIFEROL TAB 25 MCG (1000 UNIT) 1000 UNITS: 25 TAB at 08:29

## 2017-04-18 RX ADMIN — Medication 500 MG: at 08:29

## 2017-04-18 RX ADMIN — THERA TABS 1 TABLET: TAB at 08:30

## 2017-04-18 RX ADMIN — Medication 220 MG: at 08:29

## 2017-04-18 RX ADMIN — STANDARDIZED SENNA CONCENTRATE AND DOCUSATE SODIUM 2 TABLET: 8.6; 5 TABLET, FILM COATED ORAL at 08:29

## 2017-04-18 RX ADMIN — OMEPRAZOLE 20 MG: 20 CAPSULE, DELAYED RELEASE ORAL at 08:30

## 2017-04-18 RX ADMIN — STANDARDIZED SENNA CONCENTRATE AND DOCUSATE SODIUM 2 TABLET: 8.6; 5 TABLET, FILM COATED ORAL at 20:38

## 2017-04-18 RX ADMIN — MINERAL OIL, PETROLATUM, PHENYLEPHRINE HCL: 2.5; 140; 749 OINTMENT TOPICAL at 20:39

## 2017-04-18 RX ADMIN — ENOXAPARIN SODIUM 40 MG: 100 INJECTION SUBCUTANEOUS at 08:30

## 2017-04-18 RX ADMIN — QUETIAPINE FUMARATE 25 MG: 25 TABLET, FILM COATED ORAL at 20:38

## 2017-04-18 RX ADMIN — Medication 500 MG: at 17:57

## 2017-04-18 RX ADMIN — Medication 500 MG: at 12:03

## 2017-04-18 RX ADMIN — ASPIRIN 81 MG: 81 TABLET, COATED ORAL at 08:29

## 2017-04-18 ASSESSMENT — BRIEF INTERVIEW FOR MENTAL STATUS (BIMS)
ASKED TO RECALL BED: NO, COULD NOT RECALL
WHAT DAY OF THE WEEK IS IT: INCORRECT
ASKED TO RECALL BLUE: NO, COULD NOT RECALL
INITIAL REPETITION OF BED BLUE SOCK - FIRST ATTEMPT: 3
WHAT MONTH IS IT: ACCURATE WITHIN 5 DAYS
BIMS SUMMARY SCORE: 8
WHAT YEAR IS IT: CORRECT
ASKED TO RECALL SOCK: NO, COULD NOT RECALL

## 2017-04-18 ASSESSMENT — PAIN SCALES - GENERAL
PAINLEVEL_OUTOF10: 0
PAINLEVEL_OUTOF10: 0

## 2017-04-18 ASSESSMENT — GAIT ASSESSMENTS
ASSISTIVE DEVICE: FRONT WHEEL WALKER
DISTANCE (FEET): 20
DEVIATION: STEP TO;DECREASED BASE OF SUPPORT;BRADYKINETIC;DECREASED HEEL STRIKE;DECREASED TOE OFF;OTHER (COMMENT)
DISTANCE (FEET): 5
DEVIATION: STEP TO;ANTALGIC;BRADYKINETIC;SHUFFLED GAIT
GAIT LEVEL OF ASSIST: CONTACT GUARD ASSIST
ASSISTIVE DEVICE: FRONT WHEEL WALKER
GAIT LEVEL OF ASSIST: MINIMAL ASSIST

## 2017-04-18 ASSESSMENT — ACTIVITIES OF DAILY LIVING (ADL): TOILETING: INDEPENDENT

## 2017-04-18 NOTE — PROGRESS NOTES
"Two RN skin check performed with Missy Redman RN.  Marleen's skin is intact excepting the dressing present over the surgical wound on her L hip.  We left this dressing intact at this time, as it was clean and dry, and only a 1 mm square area of bruising was visible extending out from the island dressing.    Marleen has no noticeable swelling around this site, and has minimal complaints of pain.  She says \"It only hurts a little bit, like it stings.\"  She rolled over onto her L side without complaint for the skin check.      Marleen could not recall why she was in the hospital and asked for details of her surgery.  We reviewed her hospital stay together and she recalled being in [employer] before passing out.  She asked if she was in [place].      She is a native Chinese speaker from [facility], although she speaks English well, only searching for an occasional word (like \"hammer\", when she was trying to describe her surgery.)  She was calm and extremely pleasant.  "

## 2017-04-18 NOTE — CARE PLAN
Problem: Safety  Goal: Will remain free from injury  Patient has intermittent confusion. Does not consistently use call light. Bed alarm and hourly rounds in placed for safety.    Problem: Bowel/Gastric:  Goal: Normal bowel function is maintained or improved  Patient continent of bowel. On bowel meds. LBM 4/17/17.

## 2017-04-18 NOTE — PROGRESS NOTES
Patient admitted to facility at 1700 via w/c; accompanied by hospital transport.  Patient assisted to room and positioned in bed for comfort and safety; call light within reach.  Patient assisted with stowing belongings and oriented to room and facility.  Admission assessment performed and documented in computer.  Admission paperwork completed; signed copies placed in chart.  Will continue to monitor.

## 2017-04-18 NOTE — H&P
REHABILITATION HISTORY AND PHYSICAL/POST ADMISSION EVALUATION    4/18/2017  9:32 AM  Marleen Mix  [id number]/02    Reason for admission: left femoral neck fracture      HPI:  The patient is an 81-year-old female.  She was at [employer] and  had syncopal episode. She was brought April 9, 2017 to [facility] and found to have a femoral neck fracture.   Echocardiogram on 4/10/2017 with left ventricular ejection fraction 70%,  moderate concentric left ventricular hypertrophy, aortic sclerosis without  stenosis, RVSP with moderate pulmonary hypertension.  Carotid duplex on  4/11/2017 showed mild carotid atherosclerotic plaque with no stenosis or  occlusion noted.  Chest x-ray on April 10th shows portable fluoroscopy for    surgical repair.  X-ray of the femur, 2 view, on 4/9/2017 showed no change in  acutely mildly impacted left mid femoral neck fracture.  Mid and distal femur  are intact.  There is moderate degenerative change in the knee.  CT head shows white matter lucencies most consistent with small vessel ischemic change  versus demyelination or gliosis.  Otherwise, no acute findings.  CT hip  without contrast showed mildly impacted left subcapital femoral neck fracture.  Moderate degenerative change in both the hip joints.  There is moderate  degenerative change in the right SI joint, possibly sequelae of prior  sacroiliitis.  CTA of the chest on April 9 showed no evidence of acute  pulmonary embolism, no pneumonia or effusion.  There is atherosclerosis.his was surgically repaired by SURGEON:  Harinder Leblanc MD   during course of hospitalization.  During the  patient's hospitalization, she was having confusion secondary to medications  and anesthesia.  She eventually had improvement of this during her  hospitalization to what appeared to be her baseline. The patient requires assistance with self-care and mobility.    Patient was evaluated by Rehab Medicine physician and therapists and determined to be  appropriate for acute inpatient rehab and was transferred to [facility] on 4-.    ROS:  no F/C, N/V, HA, vision changes/dizziness, CP/SOB, abd pain/constipation, diarrhea, dysuria/frequency/urgency, bowel/bladder incontinence, calf pain/swelling, joint pain/swelling/myalgias, anxiety/depression/mood changes.    PMH:  Past Medical History   Diagnosis Date   • Arthritis    • Anxiety    • Hypertension    • Atrial fibrillation (CMS-HCC)    • Atrial fibrillation (CMS-HCC)    • Pacemaker        PSH:  Past Surgical History   Procedure Laterality Date   • Abdominal hysterectomy total     • Hip cannulated screw Left 4/10/2017     Procedure: HIP CANNULATED SCREW;  Surgeon: Harinder Leblanc M.D.;  Location: SURGERY [facility];  Service:        Family Hx: no hx of DVT    MEDICATIONS:  Current Facility-Administered Medications   Medication Dose   • ondansetron (ZOFRAN ODT) dispertab 4 mg  4 mg   • senna-docusate (PERICOLACE or SENOKOT S) 8.6-50 MG per tablet 2 Tab  2 Tab    And   • polyethylene glycol/lytes (MIRALAX) PACKET 1 Packet  1 Packet    And   • magnesium hydroxide (MILK OF MAGNESIA) suspension 30 mL  30 mL    And   • bisacodyl (DULCOLAX) suppository 10 mg  10 mg   • acetaminophen (TYLENOL) suppository 650 mg  650 mg   • amiodarone (CORDARONE) tablet 200 mg  200 mg   • aspirin EC (ECOTRIN) tablet 81 mg  81 mg   • calcium carbonate (OS-DIANNA 500) tablet 500 mg  500 mg   • enoxaparin (LOVENOX) inj 40 mg  40 mg   • multivitamin (THERAGRAN) tablet 1 Tab  1 Tab   • omeprazole (PRILOSEC) capsule 20 mg  20 mg   • quetiapine (SEROQUEL) tablet 25 mg  25 mg   • tramadol (ULTRAM) 50 MG tablet 100 mg  100 mg   • tramadol (ULTRAM) 50 MG tablet 50 mg  50 mg   • vitamin D (cholecalciferol) tablet 1,000 Units  1,000 Units   • zinc sulfate (ZINCATE) capsule 220 mg  220 mg       ALLERGIES:  Review of patient's allergies indicates no known allergies.  Prior Living Situation:    Housing / Facility: 1  [address]  Steps Into Home: 2  Steps In Home: 0  Lives with - Patient's Self Care Capacity: Adult Children, Other (Comments) (grandkids.  )  Equipment Owned: None    Prior Level of Function / Living Situation:    Physical Therapy: Prior Services: None  Housing / Facility: 1 Story House  Steps Into Home: 2  Steps In Home: 0  Rail: None  Bathroom Set up: Walk In Shower  Equipment Owned: None  Lives with - Patient's Self Care Capacity: Adult Children, Other (Comments) (grandkids.  )  Bed Mobility: Independent  Transfer Status: Independent  Ambulation: Independent  Assistive Devices Used: None  Current Level of Function:   Level Of Assist: Minimal Assist  Assistive Device: Front Wheel Walker  Distance (Feet): 2  Deviation: Other (Comment)  # of Stairs Climbed: 0  Weight Bearing Status: TTWB LLE  Skilled Intervention: Verbal Cuing, Compensatory Strategies  Comments: PT educated pt on using FWW to offload LLE and allow her to hop on RLE, however pt only have to take acouple steps due to TTWB restriction.    Supine to Sit: Moderate Assist  Sit to Supine: Contact Guard Assist  Scooting: Contact Guard Assist  Rolling: Supervised (with rail)  Skilled Intervention: Compensatory Strategies  Comments: Needed assist for trunk to sit up to EOB but able to get back in with VCs only.    Sit to Stand: Minimal Assist  Bed, Chair, Wheelchair Transfer: Minimal Assist  Toilet Transfers: Minimal Assist (x2)  Transfer Method: Stand Pivot  Skilled Intervention: Verbal Cuing, Compensatory Strategies  Comments: PT monitoring LLE throughout  Sitting Edge of Bed: > 15 min  Standin min total  Occupational Therapy:    Self Feeding: Independent  Grooming / Hygiene: Independent  Bathing: Independent  Dressing: Independent  Toileting: Independent  Medication Management: Independent  Laundry: Independent  Kitchen Mobility: Independent  Finances: Requires Assist  Home Management: Requires Assist  Shopping: Requires Assist  Prior Level Of  "Mobility: Independent Without Device in Community  Driving / Transportation: Relatives / Others Provide Transportation  Prior Services: None  Housing / Facility: 1 Story House  Occupation (Pre-Hospital Vocational): Retired Due To Age  Leisure Interests: Exercise, Hobbies  Current Level of Function:   Eating: Supervision  Upper Body Dressing: Minimal Assist  Lower Body Dressing: Not Tested  Toileting: Moderate Assist (bsc)  Speech Language Pathology:        Rehabilitation Prognosis/Potential: Good  Estimated Length of Stay: 14 days    Nursing:    Orientation : Disoriented to Time  Continent    ICP   PHYSICAL EXAM:     height is 1.575 m (5' 2\") and weight is 60.419 kg (133 lb 3.2 oz). Her temperature is 37.1 °C (98.7 °F). Her blood pressure is 139/64 and her pulse is 70. Her respiration is 18 and oxygen saturation is 95%.     GEN: NAD  HEENT: NC/AT; EOMI, PERRL, no nystagmus  CARDIAC: RRR  LUNGS: CTA  ABD: +BS, soft, NT/ND  EXT: No contractures, spasticity,+ edema thigh, with ecchymosis around the incision site staples in place.  No bilateral calf tenderness  2+ bilateral DP/PT pulses.    NEURO:    Mental status:  A&Ox4 (person, place, date, situation), answers questions appropriately, follows commands    Speech: fluent, no aphasia or dysarthria    CRANIAL NERVES:  2,3: visual acuity grossly intact, PERRL  3,4,6: EOMI bilaterally, no nystagmus or diplopia  7: no facial asymmetry  8: hearing grossly intact  9,10: symmetric palate elevation  11: SCM/Trapezius strength 5/5 bilaterally  12: tongue protrudes midline    Motor:    Right Left  Upper extremities 3+ bilaterally  Right lower extremity 3+, left lower extremity trace for hip flexion extension knee flexion extension, dorsi flexion and plantar flexion is 3 out of 5  Sensory: intact to light touch through out    DTRs: 2+ in bilateral biceps and patellar tendons      Tone: no spasticity noted    Proprioception:  Coordination: finger to nose, fine motor slowed with " fingers to thumb    Imaging:    Labs:                  PRIMARY REHAB DIAGNOSIS:    left femoral fracture Closed reduction percutaneous screw fixation with  ttwb left lower extremity    syncope  Early dementia with some encephalopathy during hospitalization, likely  secondary to medications with improvement during hospitalization.  Atrial fibrillation.  Gastroesophageal reflux disease.  Hypertension  Initiate bladder program  Initiate bowel program  Check admit labs  IMPAIRMENTS:   Mobility  Self-care  IADLs    SECONDARY DIAGNOSIS/MEDICAL CO-MORBIDITIES AFFECTING FUNCTION:    RELEVANT CHANGES SINCE PREADMISSION EVALUATION:    Status unchanged    The patient's rehabilitation potential is good  The patient's medical prognosis is good    PLAN:   Discussion and Recommendations:   1. The patient requires an acute inpatient rehabilitation program with a coordinated program of care at an intensity and frequency not available at a lower level of care. This recommendation is substantiated by the patient's medical physicians who recommend that the patient's intervention and assessment of medical issues needs to be done at an acute level of care for patient's safety and maximum outcome.   2. A coordinated program of care will be supplied by an interdisciplinary team of physical therapy, occupational therapy, rehab physician, rehab nursing, and, if needed, speech therapy and rehab psychology. Rehab team presents a patient-specific rehabilitation and education program concentrating on prevention of future problems related to accessibility, mobility, skin, bowel, bladder, sexuality, and psychosocial and medical/surgical problems.   3. Need for Rehabilitation Physician: The rehab physician will be evaluating the patient on a multi-weekly basis to help coordinate the program of care. The rehab physician communicates between medical physicians, therapists, and nurses to maximize the patient's potential outcome. Specific areas in  which the rehab physician will be providing daily assessment include the following:   A. Assessing the patient's heart rate and blood pressure response (vitals monitoring) to activity and making adjustments in medications or conservative measures as needed.   B. The rehab physician will be assessing the frequency at which the program can be increased to allow the patient to reach optimal functional outcome.   C. The rehab physician will also provide assessments in daily skin care, especially in light of patient's impairments in mobility.   D. The rehab physician will provide special expertise in understanding how to work with functional impairment and recommend appropriate interventions, compensatory techniques, and education that will facilitate the patient's outcome.   4. Rehab R.N.   The rehab RN will be working with patient to carry over in room mobility and activities of daily living when the patient is not in 3 hours of skilled therapy. Rehab nursing will be working in conjunction with rehab physician to address all the medical issues above and continue to assess laboratory work and discuss abnormalities with the treating physicians, assess vitals, and response to activity, and discuss and report abnormalities with the rehab physician. Rehab RN will also continue daily skin care, supervise bladder/bowel program, instruct in medication administration, and ensure patient safety.     MEDICAL DECISION MAKING and INTERDISCIPLINARY PLAN OF CARE:    REHABILITATION ISSUES/ADVERSE POTENTIAL::  1. Left femoral neck fracture Patient demonstrates functional deficits in strength, balance, coordination, and ADL's. Patient is admitted to [facility] for comprehensive rehabilitation therapy as described below.   Rehabilitation nursing monitors bowel and bladder control, educates on medication administration, co-morbidities and monitors patient safety.    Therapies to treat at intensity and frequency of (may  change after completion of evaluation by all therapeutic disciplines):       PT:  Physical therapy to address mobility, transfer, gait training and evaluation for adaptive equipment needs 1.5hour/day at least 5 days/week for the duration of the ELOS (see below)       OT:  Occupational therapy to address ADLs, self-care, home management training, functional mobility/transfers and assistive device evaluation, and community re-intergration 1.5hour/day at least 5 days/week for the duration of the ELOS (see below).        ST/Dysphagia:  Speech therapy to address speech, language,and cognitive deficits as well as swallowing difficulties with retraining/dysphagia management and community re-integration with comprehension, expression, cognitive training 0 hour/day at least 5 days/week for the duration of the ELOS (see below).     2.  Neurostimulants: None at this time but continue to assess daily for need to initiate should status change.    3.  DVT prophylaxis:  Patient is on Lovenox for anticoagulation upon transfer. Encourage OOB. Monitor daily for signs and symptoms of DVT including but not limited to swelling and pain to prevent the development of DVT that may interfere with therapies.    4.  GI prophylaxis:  On prilosec to prevent gastritis/dyspepsia which may interfere with therapies.    5.  Pain: No issues with pain currently / Controlled with Ultram    6.  Nutrition/Dysphagia: Dietician monitors nutrient intake, recommend supplements prn and provide nutrition education to pt/family to promote optimal nutrition for wound healing/recovery.     7.  Bladder/bowel:  Start bowel and bladder program as described below, to prevent constipation, urinary retention (which may lead to UTI), and urinary incontinence (which will impact upon pt's functional independence).   - TV Q3h while awake with post void bladder scans, I&O cath for PVRs >400  - up to commode after meal     8.  Skin/dermal ulcer prophylaxis: Monitor for new  skin conditions with q.2 h. turns as required to prevent the development of skin breakdown.     9.  Cognition/Behavior:  Psychologist Dr. Schaefr provides adjustment counseling to illness and psychosocial barriers that may be potential barriers to rehabilitation.     10. Respiratory therapy: RT performs O2 management prn, breathing retraining, pulmonary hygeine and bronchospasm management prn to optimize participation in therapies.     MEDICAL CO-MORBIDITIES/ADVERSE POTENTIAL AFFECTING FUNCTION:       GOALS/EXPECTED LEVEL OF FUNCTION BASED ON CURRENT MEDICAL AND FUNCTIONAL STATUS (may change based on patient's medical status and rate of impairment recovery):     Transfers: Supervised to modified independent     Mobility/Gait: Supervised to modified independent     ADL's: Supervised to modified independent     Cognition: Independent    DISPOSITION: home with assistance     ELOS: 14 days

## 2017-04-18 NOTE — DISCHARGE PLANNING
CASE MANAGEMENT INITIAL ASSESSMENT    Admit Date:  4/17/2017     Reviewed medical record; Attempted to meet w/ pt, but not in room.  I left my card and introduction letter at bedside.  Will follow up.     Patient is a  81 y.o. female transferred from [facility] where she was hospitalized from 4/9 to 4/17 after falling at [employer].   She is s/p left hip replacement performed by Dr. Leblanc on 4/10/2017.      Accepting MD to [facility] is Dr. Kylah Marvin.     Diagnosis: Unilateral Hip Replacement  Hip fracture (CMS-HCC)    Co-morbidities:   Patient Active Problem List    Diagnosis Date Noted   • Hip fracture (CMS-HCC) 04/17/2017   • Acute encephalopathy 04/11/2017   • Mood disorder (CMS-HCC) 04/10/2017   • Femoral neck fracture (CMS-HCC) 04/09/2017   • Syncope 04/09/2017   • Essential hypertension 03/30/2017   • Atrial fibrillation (CMS-HCC) 03/30/2017   • Insomnia 03/30/2017   • Gastroesophageal reflux disease without esophagitis 03/30/2017     Prior Living Situation:  Housing / Facility: 1 Story House in [place]; 2 steps to enter; walk in shower  Lives with - Patient's Self Care Capacity: Adult Children (+ grandchildren )    Prior Level of Function:  Medication Management: Independent  Finances: Independent  Home Management: Independent  Shopping: Independent  Prior Level Of Mobility: Independent Without Device in Community  Driving / Transportation: Relatives / Others Provide Transportation    Support Systems:  Primary : Sun Page Dtr   Other support systems: 3 grandchildren to include Harinder Page ; member of her [employer].   Advance Directives: No  Power of  (Name & Phone): none    Previous Services Utilized:   Equipment Owned: None  Prior Services: None    Other Information:  Pharmacy: [facility]  Primary Payor Source: Medicaid  Primary Care Practitioner : Dr. Mcghee   Other MDs: Dr. Deana Morataya     Patient / Family Goal:  Increase  strength/endurance=return home with help from family    Plan:  1. Continue to follow patient through hospitalization and provide discharge planning in collaboration with patient, family, physicians and ancillary services.     2. Utilize community resources to ensure a safe discharge.     3.  Will meet w/ pt on 4/20.

## 2017-04-18 NOTE — PROGRESS NOTES
Assumed care of patient, received report from Night Shift RN. Assessment completed. A/O x4. Patient denies pain. Call light within reach, educated about fall and safety precautions, bed alarm is on and in use. No complaints at this time. Hourly rounding in place. All needs met.    Patient stated that she was in pain on her buttocks, took out pain medication then patient stated that she no longer had pain to buttocks area, CNA states no wound on buttocks. Will monitor and assess. Returned pain medication.

## 2017-04-18 NOTE — THERAPY
Occupational Therapy Initial Plan of Care Note    1) Assessment:  Patient is 81 y.o. female with a diagnosis of L hip replacement post surgery confusion.  Additional factors influencing patient status / progress (ie: cognitive factors, co-morbidities, social support, etc): PT lives with supportive available daughter.  Long term and short term goals have been discussed with patient and they are in agreement.  2) Plan:  Recommend Occupational Therapy  minutes per day 5-7 days per week for 1-2  weeks for the following treatments:  OT Self Care/ADL, OT Cognitive Skill Dev, OT Community Reintegration, OT Neuro Re-Ed/Balance, OT Therapeutic Activity, OT Evaluation and OT Therapeutic Exercise.  3) Goals:  Please refer to care plan for goals.

## 2017-04-18 NOTE — DISCHARGE SUMMARY
DATE OF ADMISSION:  2017    DATE OF TRANSFER:  2017    ADMITTING PHYSICIAN:  Gardenia Stoll MD    CONSULTING PHYSICIAN:  Harinder Leblanc MD, orthopedist.    PROCEDURES AND IMAGIN.  Closed reduction percutaneous screw fixation of the left femoral neck   fracture.  2.  Echocardiogram on 4/10/2017 with left ventricular ejection fraction 70%,   moderate concentric left ventricular hypertrophy, aortic sclerosis without   stenosis, RVSP with moderate pulmonary hypertension.  Carotid duplex on   2017 showed mild carotid atherosclerotic plaque with no stenosis or   occlusion noted.  Chest x-ray on  shows portable fluoroscopy for   surgical repair.  X-ray of the femur, 2 view, on 2017 showed no change in   acutely mildly impacted left mid femoral neck fracture.  Mid and distal femur   are intact.  There is moderate degenerative change in the knee.  CT head shows   white matter lucencies most consistent with small vessel ischemic change   versus demyelination or gliosis.  Otherwise, no acute findings.  CT hip   without contrast showed mildly impacted left subcapital femoral neck fracture.    Moderate degenerative change in both the hip joints.  There is moderate   degenerative change in the right SI joint, possibly sequelae of prior   sacroiliitis.  CTA of the chest on  showed no evidence of acute   pulmonary embolism, no pneumonia or effusion.  There is atherosclerosis.    DISCHARGE DIAGNOSES:  1.  Concern of syncope.  2.  Left femoral neck fracture with repair during course of hospitalization.  3.  Early dementia with some encephalopathy during hospitalization, likely   secondary to medications with improvement during hospitalization.  4.  Physical debilitation secondary to #2 femoral neck fracture.  5.  Atrial fibrillation.  6.  Gastroesophageal reflux disease.  7.  Hypertension.    COURSE OF HOSPITALIZATION:  This is an 81-year-old female.  She was at [facility].    She had  syncopal episode.  She was found to have a femoral neck fracture.    This was surgically repaired during course of hospitalization.  During the   patient's hospitalization, she was having confusion secondary to medications   and anesthesia.  She eventually had improvement of this during her   hospitalization to what appeared to be her baseline.  She was having ongoing   physical debility.  Rehab had been consulted and will be taking the patient to   rehabilitation.    LABORATORY DATA:  On April 10, her white blood cell count is 10.5 and   hemoglobin of 13.9.  On April 14, her white count was 9.6 and hemoglobin is   13.  Comprehensive metabolic panel showed a BUN of 32 and creatinine of 1.38.    On April 9 and April 14, her BUN is 28 and creatinine of 1.09.  Liver enzymes   are unremarkable.  Troponin less than 0.02 and BNP of 308.  She did have a   d-dimer of 7271 on April 9th.  Urinalysis was unremarkable for acute   infection.    DISCHARGE DIET:  Recommend a healthy balanced diet.    DISCHARGE INSTRUCTIONS:  Patient will follow up with Dr. Leblanc.  The   patient will have ongoing physical and occupational therapy at rehabilitation.    Watch for any medication causing confusion.    MEDICATIONS AT TIME OF ADMISSION WERE AS FOLLOWS:  1.  Amiodarone 200 mg Monday, Tuesday, Wednesday, Thursday, and Friday.  2.  Aspirin 81 mg daily.  3.  Vitamin D, unknown dose daily.  4.  Clonidine 0.1 mg as needed for elevated blood pressure.  5.  Ativan 1 mg at bedtime, but may take an additional 0.5 mg if needed for   sleep.  6.  Multivitamin with minerals daily.  7.  Omeprazole 20 mg daily.  8.  Micardis 20 mg daily.  9.  Trazodone 50 mg in the evening.    MEDICATIONS AT THE TIME OF TRANSFER:  1.  Amiodarone daily.  2.  Aspirin 81 mg a day.  3.  Calcium carbonate 500 mg 3 times a day with meals.  4.  Enoxaparin 40 mg subcutaneous daily.  5.  Multivitamin daily.  6.  Omeprazole 20 mg daily.  7.  Trazodone 50 mg at bedtime.   Please note, patient was on Seroquel 25 mg   every evening.  Family has requested we convert her back to her home   medications and watch for any mental status changes.  8.  Bowel protocol.  9.  Vitamin D 1000 units daily.  10.  Zinc sulfate 220 mg oral daily.  11.  P.r.n. medications is Tylenol 650 mg q. 4 hours p.r.n. for fever or mild   pain.  12.  Zofran ODT 4 mg every 4 hours p.r.n. for nausea or vomiting.  13.  Ultram 50 mg every 6 hours p.r.n. for moderate left hip pain.    Please note that discharge procedural discharge process 32 minutes.       ____________________________________     DO EILEEN CORNELL / MONSE    DD:  04/17/2017 16:06:06  DT:  04/17/2017 18:09:03    D#:  [id number]  Job#:  [id number]

## 2017-04-18 NOTE — THERAPY
Physical Therapy Initial Plan of Care Note    1) Assessment: Patient is 81 y.o. female with a diagnosis of left femoral neck fracture postoperative TTWB.  Additional factors influencing patient status / progress (ie: cognitive factors, co-morbidities, social support, etc): Toe-touch weightbearing, syncope, confusion postoperatively, impaired bed mobility and transfers, impaired standing balance, impaired gait, fall risk, complaints of dizziness, TTWB.  Long term and short term goals have been discussed with patient and they are in agreement.  2) Plan: Recommend Physical Therapy  minutes per day 5-7 days per week for 2  weeks for the following treatments:  PT Gait Training, PT Therapeutic Exercises, PT Neuro Re-Ed/Balance, PT Therapeutic Activity and PT Evaluation.  3) Goals:  Please refer to care plan for goals.

## 2017-04-18 NOTE — CARE PLAN
Problem: Safety  Goal: Will remain free from falls  Outcome: PROGRESSING AS EXPECTED  Patient educated about safety and fall prevention, prevention measures in place. Non skid socks on, call light within reach, bed/chair alarms in use and hourly rounding in place.    Problem: Knowledge Deficit  Goal: Knowledge of disease process/condition, treatment plan, diagnostic tests, and medications will improve  Outcome: PROGRESSING AS EXPECTED  Updated and educated patient about plan of care for the day, answered all of patient's and daughters questions. Patient is forgetful at times.

## 2017-04-19 LAB
APPEARANCE UR: ABNORMAL
APPEARANCE UR: ABNORMAL
BACTERIA #/AREA URNS HPF: ABNORMAL /HPF
BACTERIA #/AREA URNS HPF: ABNORMAL /HPF
BILIRUB UR QL STRIP.AUTO: NEGATIVE
BILIRUB UR QL STRIP.AUTO: NEGATIVE
COLOR UR: COLORLESS
COLOR UR: YELLOW
EPI CELLS #/AREA URNS HPF: ABNORMAL /HPF
GLUCOSE UR STRIP.AUTO-MCNC: NEGATIVE MG/DL
GLUCOSE UR STRIP.AUTO-MCNC: NEGATIVE MG/DL
KETONES UR STRIP.AUTO-MCNC: NEGATIVE MG/DL
KETONES UR STRIP.AUTO-MCNC: NEGATIVE MG/DL
LEUKOCYTE ESTERASE UR QL STRIP.AUTO: ABNORMAL
LEUKOCYTE ESTERASE UR QL STRIP.AUTO: ABNORMAL
MICRO URNS: ABNORMAL
MICRO URNS: ABNORMAL
MUCOUS THREADS #/AREA URNS HPF: ABNORMAL /HPF
NITRITE UR QL STRIP.AUTO: NEGATIVE
NITRITE UR QL STRIP.AUTO: NEGATIVE
PH UR STRIP.AUTO: 5 [PH]
PH UR STRIP.AUTO: 5.5 [PH]
PROT UR QL STRIP: NEGATIVE MG/DL
PROT UR QL STRIP: NEGATIVE MG/DL
RBC # URNS HPF: ABNORMAL /HPF
RBC # URNS HPF: ABNORMAL /HPF
RBC UR QL AUTO: NEGATIVE
RBC UR QL AUTO: NEGATIVE
SP GR UR STRIP.AUTO: 1
SP GR UR STRIP.AUTO: 1.01
TRANS CELLS #/AREA URNS HPF: ABNORMAL /HPF
WBC #/AREA URNS HPF: ABNORMAL /HPF
WBC #/AREA URNS HPF: ABNORMAL /HPF

## 2017-04-19 PROCEDURE — 97116 GAIT TRAINING THERAPY: CPT

## 2017-04-19 PROCEDURE — 770010 HCHG ROOM/CARE - REHAB SEMI PRIVAT*

## 2017-04-19 PROCEDURE — 700111 HCHG RX REV CODE 636 W/ 250 OVERRIDE (IP): Performed by: PHYSICAL MEDICINE & REHABILITATION

## 2017-04-19 PROCEDURE — 87077 CULTURE AEROBIC IDENTIFY: CPT

## 2017-04-19 PROCEDURE — 87186 SC STD MICRODIL/AGAR DIL: CPT

## 2017-04-19 PROCEDURE — 99232 SBSQ HOSP IP/OBS MODERATE 35: CPT | Performed by: PHYSICAL MEDICINE & REHABILITATION

## 2017-04-19 PROCEDURE — 97110 THERAPEUTIC EXERCISES: CPT

## 2017-04-19 PROCEDURE — 87086 URINE CULTURE/COLONY COUNT: CPT

## 2017-04-19 PROCEDURE — 97530 THERAPEUTIC ACTIVITIES: CPT

## 2017-04-19 PROCEDURE — 97535 SELF CARE MNGMENT TRAINING: CPT

## 2017-04-19 PROCEDURE — 81001 URINALYSIS AUTO W/SCOPE: CPT

## 2017-04-19 PROCEDURE — 700102 HCHG RX REV CODE 250 W/ 637 OVERRIDE(OP): Performed by: PHYSICAL MEDICINE & REHABILITATION

## 2017-04-19 PROCEDURE — A9270 NON-COVERED ITEM OR SERVICE: HCPCS | Performed by: PHYSICAL MEDICINE & REHABILITATION

## 2017-04-19 RX ORDER — AMLODIPINE BESYLATE 5 MG/1
5 TABLET ORAL
Status: DISCONTINUED | OUTPATIENT
Start: 2017-04-19 | End: 2017-04-26

## 2017-04-19 RX ADMIN — Medication 220 MG: at 08:37

## 2017-04-19 RX ADMIN — AMIODARONE HYDROCHLORIDE 200 MG: 200 TABLET ORAL at 08:37

## 2017-04-19 RX ADMIN — Medication 500 MG: at 17:39

## 2017-04-19 RX ADMIN — THERA TABS 1 TABLET: TAB at 08:37

## 2017-04-19 RX ADMIN — OMEPRAZOLE 20 MG: 20 CAPSULE, DELAYED RELEASE ORAL at 08:37

## 2017-04-19 RX ADMIN — Medication 500 MG: at 12:11

## 2017-04-19 RX ADMIN — Medication 500 MG: at 08:38

## 2017-04-19 RX ADMIN — ASPIRIN 81 MG: 81 TABLET, COATED ORAL at 08:37

## 2017-04-19 RX ADMIN — STANDARDIZED SENNA CONCENTRATE AND DOCUSATE SODIUM 2 TABLET: 8.6; 5 TABLET, FILM COATED ORAL at 08:37

## 2017-04-19 RX ADMIN — ENOXAPARIN SODIUM 40 MG: 100 INJECTION SUBCUTANEOUS at 08:37

## 2017-04-19 RX ADMIN — TRAMADOL HYDROCHLORIDE 50 MG: 50 TABLET, FILM COATED ORAL at 16:34

## 2017-04-19 RX ADMIN — STANDARDIZED SENNA CONCENTRATE AND DOCUSATE SODIUM 2 TABLET: 8.6; 5 TABLET, FILM COATED ORAL at 19:52

## 2017-04-19 RX ADMIN — CHOLECALCIFEROL TAB 25 MCG (1000 UNIT) 1000 UNITS: 25 TAB at 08:37

## 2017-04-19 RX ADMIN — AMLODIPINE BESYLATE 5 MG: 5 TABLET ORAL at 10:23

## 2017-04-19 RX ADMIN — QUETIAPINE FUMARATE 25 MG: 25 TABLET, FILM COATED ORAL at 19:52

## 2017-04-19 ASSESSMENT — GAIT ASSESSMENTS
GAIT LEVEL OF ASSIST: MINIMAL ASSIST
DEVIATION: STEP TO;DECREASED BASE OF SUPPORT;BRADYKINETIC;SHUFFLED GAIT;DECREASED HEEL STRIKE;DECREASED TOE OFF;OTHER (COMMENT)
ASSISTIVE DEVICE: PARALLEL BARS;FRONT WHEEL WALKER
DISTANCE (FEET): 20

## 2017-04-19 ASSESSMENT — PAIN SCALES - GENERAL
PAINLEVEL_OUTOF10: 7
PAINLEVEL_OUTOF10: 0

## 2017-04-19 NOTE — CARE PLAN
Problem: Infection  Goal: Will remain free from infection  Outcome: PROGRESSING AS EXPECTED  Patient remains free from s/s infection; afebrile. Patient's skin remains intact and free from new or accidental injury this shift.      Problem: Venous Thromboembolism (VTW)/Deep Vein Thrombosis (DVT) Prevention:  Goal: Patient will participate in Venous Thrombosis (VTE)/Deep Vein Thrombosis (DVT)Prevention Measures  Outcome: PROGRESSING AS EXPECTED  Pt is up and, participates in therapies, and up to dinning room for all meals.

## 2017-04-19 NOTE — PROGRESS NOTES
LMM for on call MD and patient's doctor, Yon Asking for preparation H. Patient complains of burning/itching of anus area. Will await order.

## 2017-04-19 NOTE — REHAB-PHARMACY IDT TEAM NOTE
Pharmacy  Pharmacy  Antibiotics/Antifungals/Antivirals:  Medication:      Active Orders     None        Route:         None  Stop Date:  N/A  Reason:   Antihypertensives/Cardiac:  Medication:    Orders (72h ago through future)    Start     Ordered    04/19/17 0915  amlodipine (NORVASC) tablet 5 mg   Q DAY      04/19/17 0858    04/18/17 0900  amiodarone (CORDARONE) tablet 200 mg   DAILY      04/17/17 1724        Patient Vitals for the past 24 hrs:   BP Pulse   04/19/17 1023 122/78 mmHg -   04/19/17 0929 (!) 99/71 mmHg 75   04/19/17 0925 124/69 mmHg 70   04/19/17 0700 140/88 mmHg 69   04/18/17 1900 151/79 mmHg 69   04/18/17 1430 132/68 mmHg 68       Anticoagulation:  Medication:  lovenox  INR:      Other key medications:          A review of the medication list reveals no issues at this time. Patient is currently on antihypertensive(s). Recommend home blood pressure monitoring/recording if antihypertensive(s) regimen(s) continue.    Section completed by:  Rafael Medina [facility]

## 2017-04-19 NOTE — CARE PLAN
Problem: Bathing  Goal: STG-Within one week, patient will bathe  1) Individualized Goal: Min A seated on tub bench  2) Interventions: OT Self Care/ADL, OT Cognitive Skill Dev, OT Community Reintegration, OT Neuro Re-Ed/Balance, OT Therapeutic Activity, OT Evaluation and OT Therapeutic Exercise  Outcome: MET Date Met:  04/19/17  CGA in stance to wash buttocks able to wash 10/10 body parts    Problem: Dressing  Goal: STG-Within one week, patient will dress LB  1) Individualized Goal: MIn A with AE prn  2) Interventions: OT Self Care/ADL, OT Cognitive Skill Dev, OT Community Reintegration, OT Neuro Re-Ed/Balance, OT Therapeutic Activity, OT Evaluation and OT Therapeutic Exercise   Outcome: MET Date Met:  04/19/17  Min A to don sock onto LLE and intermittent assist to thread LLE, max v/cs for seq  Goal: STG-Within one week, patient will retrieve clothing  1) Individualized Goal: With CGA mainaining WB precautions  2) Interventions: OT Self Care/ADL, OT Cognitive Skill Dev, OT Community Reintegration, OT Neuro Re-Ed/Balance, OT Therapeutic Activity, OT Evaluation and OT Therapeutic Exercise   Outcome: NOT MET  Poor adherence to WB precautions

## 2017-04-19 NOTE — REHAB-OT IDT TEAM NOTE
Occupational Therapy  Activities of Daily Living  Eating Initial:  6 - Modified Independent  Eating Current:  6 - Modified Independent   Eating Description:     Grooming Initial:  5 - Standby Prompting/Supervision or Set-up  Grooming Current:  5 - Standby Prompting/Supervision or Set-up   Grooming Description:   (set-up seated)  Bathing Initial:  0 - Not tested, patient refused  Bathing Current:  4 - Minimal Assistance   Bathing Description:  Grab bar, Tub bench (CGA in stance v/cs for thoroughness and sequencing)  Upper Body Dressing Initial:  5 - Standby Prompting/Supervision or Set-up  Upper Body Dressing Current:  4 - Minimal Assistance   Upper Body Dressing Description:   (Min A to clasp bra and bring sweater behind back 9/11 tasks)  Lower Body Dressing Initial:  1 - Total Assistance  Lower Body Dressing Current:  4 - Minimal Assistance   Lower Body Dressing Description:  4 - Minimal Assistance  Toileting Initial:  2 - Max Assistance  Toileting Current:  4 - Minimal Assistance   Toileting Description:  Grab bar (CGA in stance poor adherence to TTWB LLE)  Toilet Transfer Initial:  2 - Max Assistance  Toilet Transfer Current:  4 - Minimal Assistance   Toilet Transfer Description:  4 - Minimal Assistance  Tub / Shower Transfer Initial:  0 - Not tested, patient refused  Tub / Shower Transfer Current:  4 - Minimal Assistance   Tub / Shower Transfer Description:  Grab bar, Verbal cueing, Supervision for safety (CGA v/cs for seq poor TTWB LLE)  IADL:  N/A   Family Training/Education:  TBD   DME/DC Recommendations:  TBD      Strengths:  Making steady progress towards goals, Pleasant and cooperative and Willingly participates in therapeutic activities  Barriers:  Other: poor memory/carryover of learning, poor adherence to WB precautions, poor sequencing, impaired insight, generalized weakness     # of short term goals set= 3    # of short term goals met= 2          Occupational Therapy Goals           Problem: Dressing      Dates: Start: 04/18/17       Goal: STG-Within one week, patient will retrieve clothing     Dates: Start: 04/18/17      Description: 1) Individualized Goal:  With CGA mainaining WB precautions    2) Interventions:  OT Self Care/ADL, OT Cognitive Skill Dev, OT Community Reintegration, OT Neuro Re-Ed/Balance, OT Therapeutic Activity, OT Evaluation and OT Therapeutic Exercise    Note: Goal Note filed on 04/19/17 4136 by Willow Jama MS,OTR/L    Goal: STG-Within one week, patient will retrieve clothing  Outcome: NOT MET  Poor adherence to WB precautions            Problem: OT Long Term Goals     Dates: Start: 04/18/17       Goal: LTG-By discharge, patient will complete basic self care tasks     Dates: Start: 04/18/17      Description: 1) Individualized Goal:  MIn A per daughter    2) Interventions:  OT Self Care/ADL, OT Cognitive Skill Dev, OT Community Reintegration, OT Neuro Re-Ed/Balance, OT Therapeutic Activity, OT Evaluation and OT Therapeutic Exercise        Goal: LTG-By discharge, patient will perform bathroom transfers     Dates: Start: 04/18/17      Description: 1) Individualized Goal:  SUpervision maintaining WB precautions    2) Interventions:  OT Self Care/ADL, OT Cognitive Skill Dev, OT Community Reintegration, OT Neuro Re-Ed/Balance, OT Therapeutic Activity, OT Evaluation and OT Therapeutic Exercise              Section completed by:  Willow Jama MS,OTR/L

## 2017-04-19 NOTE — CARE PLAN
Problem: Communication  Goal: The ability to communicate needs accurately and effectively will improve  Outcome: PROGRESSING AS EXPECTED  Per pt's daughter, pt was taking Telmisartan QDay to manage blood pressure. Pt's daughter has a bag of home medications that she showed this RN and took home with her. Will follow up in the morning.

## 2017-04-19 NOTE — CARE PLAN
Problem: Bowel/Gastric:  Goal: Normal bowel function is maintained or improved  Outcome: PROGRESSING SLOWER THAN EXPECTED  Pt reports pain and burning to area around anus. PRN order for Preparation H received and cream applied this HS. Will monitor for improvement of symptoms.

## 2017-04-19 NOTE — CARE PLAN
Problem: Urinary Elimination:  Goal: Ability to reestablish a normal urinary elimination pattern will improve  Outcome: PROGRESSING SLOWER THAN EXPECTED  Pt is having urinary frequency and urgency despite low PVRs. UA sent during day shift. Awaiting results.

## 2017-04-19 NOTE — PROGRESS NOTES
"Rehab Progress Note     Interval Events (Subjective)  Patient seen and examined today. Patient not drinking enough fluids  Ros: Last bowel movement 4/19/17    Objective:  VITAL SIGNS: /88 mmHg  Pulse 69  Temp(Src) 36.4 °C (97.6 °F)  Resp 16  Ht 1.575 m (5' 2\")  Wt 60.419 kg (133 lb 3.2 oz)  BMI 24.36 kg/m2  SpO2 96%  Heart regular rate and rhythm   lungs clear to auscultation  Abdominal exam bowel sounds ×4      Recent Results (from the past 72 hour(s))   CBC WITH DIFFERENTIAL    Collection Time: 04/18/17  2:35 PM   Result Value Ref Range    WBC 10.1 4.8 - 10.8 K/uL    RBC 4.19 (L) 4.20 - 5.40 M/uL    Hemoglobin 12.9 12.0 - 16.0 g/dL    Hematocrit 39.6 37.0 - 47.0 %    MCV 94.5 81.4 - 97.8 fL    MCH 30.8 27.0 - 33.0 pg    MCHC 32.6 (L) 33.6 - 35.0 g/dL    RDW 50.4 (H) 35.9 - 50.0 fL    Platelet Count 248 164 - 446 K/uL    MPV 11.7 9.0 - 12.9 fL    Neutrophils-Polys 62.60 44.00 - 72.00 %    Lymphocytes 22.80 22.00 - 41.00 %    Monocytes 11.40 0.00 - 13.40 %    Eosinophils 1.00 0.00 - 6.90 %    Basophils 0.60 0.00 - 1.80 %    Immature Granulocytes 1.60 (H) 0.00 - 0.90 %    Nucleated RBC 0.00 /100 WBC    Neutrophils (Absolute) 6.35 2.00 - 7.15 K/uL    Lymphs (Absolute) 2.31 1.00 - 4.80 K/uL    Monos (Absolute) 1.15 (H) 0.00 - 0.85 K/uL    Eos (Absolute) 0.10 0.00 - 0.51 K/uL    Baso (Absolute) 0.06 0.00 - 0.12 K/uL    Immature Granulocytes (abs) 0.16 (H) 0.00 - 0.11 K/uL    NRBC (Absolute) 0.00 K/uL   COMP METABOLIC PANEL    Collection Time: 04/18/17  2:35 PM   Result Value Ref Range    Sodium 136 135 - 145 mmol/L    Potassium 4.4 3.6 - 5.5 mmol/L    Chloride 105 96 - 112 mmol/L    Co2 20 20 - 33 mmol/L    Anion Gap 11.0 0.0 - 11.9    Glucose 125 (H) 65 - 99 mg/dL    Bun 28 (H) 8 - 22 mg/dL    Creatinine 1.31 0.50 - 1.40 mg/dL    Calcium 9.3 8.5 - 10.5 mg/dL    AST(SGOT) 30 12 - 45 U/L    ALT(SGPT) 25 2 - 50 U/L    Alkaline Phosphatase 70 30 - 99 U/L    Total Bilirubin 0.5 0.1 - 1.5 mg/dL    Albumin 3.5 " 3.2 - 4.9 g/dL    Total Protein 6.7 6.0 - 8.2 g/dL    Globulin 3.2 1.9 - 3.5 g/dL    A-G Ratio 1.1 g/dL   PREALBUMIN    Collection Time: 04/18/17  2:35 PM   Result Value Ref Range    Pre-Albumin 21.0 18.0 - 38.0 mg/dL   ESTIMATED GFR    Collection Time: 04/18/17  2:35 PM   Result Value Ref Range    GFR If  47 (A) >60 mL/min/1.73 m 2    GFR If Non  39 (A) >60 mL/min/1.73 m 2       Current Facility-Administered Medications   Medication Frequency   • FORMULATION R 0.25-3-14-71.9 % ointment PRN   • ondansetron (ZOFRAN ODT) dispertab 4 mg Q4HRS PRN   • senna-docusate (PERICOLACE or SENOKOT S) 8.6-50 MG per tablet 2 Tab BID    And   • polyethylene glycol/lytes (MIRALAX) PACKET 1 Packet QDAY PRN    And   • magnesium hydroxide (MILK OF MAGNESIA) suspension 30 mL QDAY PRN    And   • bisacodyl (DULCOLAX) suppository 10 mg QDAY PRN   • acetaminophen (TYLENOL) suppository 650 mg Q4HRS PRN   • amiodarone (CORDARONE) tablet 200 mg DAILY   • aspirin EC (ECOTRIN) tablet 81 mg DAILY   • calcium carbonate (OS-DIANNA 500) tablet 500 mg TID WITH MEALS   • enoxaparin (LOVENOX) inj 40 mg DAILY   • multivitamin (THERAGRAN) tablet 1 Tab DAILY   • omeprazole (PRILOSEC) capsule 20 mg DAILY   • quetiapine (SEROQUEL) tablet 25 mg Q EVENING   • tramadol (ULTRAM) 50 MG tablet 100 mg Q6HRS PRN   • tramadol (ULTRAM) 50 MG tablet 50 mg Q4HRS PRN   • vitamin D (cholecalciferol) tablet 1,000 Units DAILY   • zinc sulfate (ZINCATE) capsule 220 mg DAILY       Orders Placed This Encounter   Procedures   • DIET ORDER     Standing Status: Standing      Number of Occurrences: 1      Standing Expiration Date:      Order Specific Question:  Diet:     Answer:  Regular [1]       Assessment:  Active Hospital Problems    Diagnosis   • Hip fracture (CMS-HCC)   left femoral fracture Closed reduction percutaneous screw fixation with  ttwb left lower extremity    syncope  Early dementia with some encephalopathy during hospitalization,  likely  secondary to medications with improvement during hospitalization.  Atrial fibrillation.  Gastroesophageal reflux disease.  Hypertension  Initiate bladder program  Initiate bowel program  Check admit labs  IMPAIRMENTS:    Mobility  Self-care  IADLs    PLAN:    Discussion and Recommendations:    1. The patient requires an acute inpatient rehabilitation program with a coordinated program of care at an intensity and frequency not available at a lower level of care. This recommendation is substantiated by the patient's medical physicians who recommend that the patient's intervention and assessment of medical issues needs to be done at an acute level of care for patient's safety and maximum outcome.    2. A coordinated program of care will be supplied by an interdisciplinary team of physical therapy, occupational therapy, rehab physician, rehab nursing, and, if needed, speech therapy and rehab psychology. Rehab team presents a patient-specific rehabilitation and education program concentrating on prevention of future problems related to accessibility, mobility, skin, bowel, bladder, sexuality, and psychosocial and medical/surgical problems.    3. Need for Rehabilitation Physician: The rehab physician will be evaluating the patient on a multi-weekly basis to help coordinate the program of care. The rehab physician communicates between medical physicians, therapists, and nurses to maximize the patient's potential outcome. Specific areas in which the rehab physician will be providing daily assessment include the following:    A. Assessing the patient's heart rate and blood pressure response (vitals monitoring) to activity and making adjustments in medications or conservative measures as needed.    B. The rehab physician will be assessing the frequency at which the program can be increased to allow the patient to reach optimal functional outcome.    C. The rehab physician will also provide assessments in daily skin  care, especially in light of patient's impairments in mobility.    D. The rehab physician will provide special expertise in understanding how to work with functional impairment and recommend appropriate interventions, compensatory techniques, and education that will facilitate the patient's outcome.    4. Rehab R.N.    The rehab RN will be working with patient to carry over in room mobility and activities of daily living when the patient is not in 3 hours of skilled therapy. Rehab nursing will be working in conjunction with rehab physician to address all the medical issues above and continue to assess laboratory work and discuss abnormalities with the treating physicians, assess vitals, and response to activity, and discuss and report abnormalities with the rehab physician. Rehab RN will also continue daily skin care, supervise bladder/bowel program, instruct in medication administration, and ensure patient safety.     MEDICAL DECISION MAKING and INTERDISCIPLINARY PLAN OF CARE:    REHABILITATION ISSUES/ADVERSE POTENTIAL::  1. Left femoral neck fracture Patient demonstrates functional deficits in strength, balance, coordination, and ADL's. Patient is admitted to [facility] for comprehensive rehabilitation therapy as described below.   Rehabilitation nursing monitors bowel and bladder control, educates on medication administration, co-morbidities and monitors patient safety.    Therapies to treat at intensity and frequency of (may change after completion of evaluation by all therapeutic disciplines):       PT:  Physical therapy to address mobility, transfer, gait training and evaluation for adaptive equipment needs 1.5hour/day at least 5 days/week for the duration of the ELOS (see below)       OT:  Occupational therapy to address ADLs, self-care, home management training, functional mobility/transfers and assistive device evaluation, and community re-intergration 1.5hour/day at least 5 days/week  for the duration of the ELOS (see below).        ST/Dysphagia:  Speech therapy to address speech, language,and cognitive deficits as well as swallowing difficulties with retraining/dysphagia management and community re-integration with comprehension, expression, cognitive training 0 hour/day at least 5 days/week for the duration of the ELOS (see below).     2.  Neurostimulants: None at this time but continue to assess daily for need to initiate should status change.    3.  DVT prophylaxis:  Patient is on Lovenox for anticoagulation upon transfer. Encourage OOB. Monitor daily for signs and symptoms of DVT including but not limited to swelling and pain to prevent the development of DVT that may interfere with therapies.    4.  GI prophylaxis:  On prilosec to prevent gastritis/dyspepsia which may interfere with therapies.    5.  Pain: No issues with pain currently / Controlled with Ultram    6.  Nutrition/Dysphagia: Dietician monitors nutrient intake, recommend supplements prn and provide nutrition education to pt/family to promote optimal nutrition for wound healing/recovery.     7.  Bladder/bowel:  Start bowel and bladder program as described below, to prevent constipation, urinary retention (which may lead to UTI), and urinary incontinence (which will impact upon pt's functional independence).    - TV Q3h while awake with post void bladder scans, I&O cath for PVRs >400  - up to commode after meal     8.  Skin/dermal ulcer prophylaxis: Monitor for new skin conditions with q.2 h. turns as required to prevent the development of skin breakdown.     9.  Cognition/Behavior:  Psychologist Dr. Schafer provides adjustment counseling to illness and psychosocial barriers that may be potential barriers to rehabilitation.     10. Respiratory therapy: RT performs O2 management prn, breathing retraining, pulmonary hygeine and bronchospasm management prn to optimize participation in therapies.      MEDICAL CO-MORBIDITIES/ADVERSE  POTENTIAL AFFECTING FUNCTION:        GOALS/EXPECTED LEVEL OF FUNCTION BASED ON CURRENT MEDICAL AND FUNCTIONAL STATUS (may change based on patient's medical status and rate of impairment recovery):     Transfers: Supervised to modified independent     Mobility/Gait: Supervised to modified independent     ADL's: Supervised to modified independent     Cognition: Independent    DISPOSITION: home with assistance     ELOS: 14 days    Medical Decision Making and Plan:  Nursing to assist patient with fluid intake patient to be reminded every couple of hours to take into 200 mL of fluids while awake    Total time:  >25 minutes.  I spent greater than 50% of the time for patient care and coordination on this date, including unit/floor time, and face-to-face time with the patient as per assessment and plan above.    Kylah Marvin D.O.

## 2017-04-20 PROCEDURE — 97535 SELF CARE MNGMENT TRAINING: CPT

## 2017-04-20 PROCEDURE — A9270 NON-COVERED ITEM OR SERVICE: HCPCS | Performed by: PHYSICAL MEDICINE & REHABILITATION

## 2017-04-20 PROCEDURE — 99233 SBSQ HOSP IP/OBS HIGH 50: CPT | Performed by: PHYSICAL MEDICINE & REHABILITATION

## 2017-04-20 PROCEDURE — 97530 THERAPEUTIC ACTIVITIES: CPT

## 2017-04-20 PROCEDURE — 770010 HCHG ROOM/CARE - REHAB SEMI PRIVAT*

## 2017-04-20 PROCEDURE — 700111 HCHG RX REV CODE 636 W/ 250 OVERRIDE (IP): Performed by: PHYSICAL MEDICINE & REHABILITATION

## 2017-04-20 PROCEDURE — 92523 SPEECH SOUND LANG COMPREHEN: CPT

## 2017-04-20 PROCEDURE — 97110 THERAPEUTIC EXERCISES: CPT

## 2017-04-20 PROCEDURE — 97116 GAIT TRAINING THERAPY: CPT

## 2017-04-20 PROCEDURE — 700102 HCHG RX REV CODE 250 W/ 637 OVERRIDE(OP): Performed by: PHYSICAL MEDICINE & REHABILITATION

## 2017-04-20 RX ORDER — SULFAMETHOXAZOLE AND TRIMETHOPRIM 800; 160 MG/1; MG/1
0.5 TABLET ORAL EVERY 12 HOURS
Status: DISCONTINUED | OUTPATIENT
Start: 2017-04-20 | End: 2017-04-26

## 2017-04-20 RX ORDER — SULFAMETHOXAZOLE AND TRIMETHOPRIM 800; 160 MG/1; MG/1
1 TABLET ORAL EVERY 12 HOURS
Status: DISCONTINUED | OUTPATIENT
Start: 2017-04-20 | End: 2017-04-20

## 2017-04-20 RX ORDER — CIPROFLOXACIN 500 MG/1
250 TABLET, FILM COATED ORAL EVERY 12 HOURS
Status: DISCONTINUED | OUTPATIENT
Start: 2017-04-20 | End: 2017-04-20

## 2017-04-20 RX ADMIN — ASPIRIN 81 MG: 81 TABLET, COATED ORAL at 08:36

## 2017-04-20 RX ADMIN — SULFAMETHOXAZOLE AND TRIMETHOPRIM 0.5 TABLET: 800; 160 TABLET ORAL at 11:41

## 2017-04-20 RX ADMIN — AMIODARONE HYDROCHLORIDE 200 MG: 200 TABLET ORAL at 08:36

## 2017-04-20 RX ADMIN — Medication 500 MG: at 08:36

## 2017-04-20 RX ADMIN — Medication 500 MG: at 17:43

## 2017-04-20 RX ADMIN — Medication 220 MG: at 08:36

## 2017-04-20 RX ADMIN — ENOXAPARIN SODIUM 40 MG: 100 INJECTION SUBCUTANEOUS at 08:35

## 2017-04-20 RX ADMIN — THERA TABS 1 TABLET: TAB at 08:36

## 2017-04-20 RX ADMIN — AMLODIPINE BESYLATE 5 MG: 5 TABLET ORAL at 05:25

## 2017-04-20 RX ADMIN — QUETIAPINE FUMARATE 25 MG: 25 TABLET, FILM COATED ORAL at 20:52

## 2017-04-20 RX ADMIN — STANDARDIZED SENNA CONCENTRATE AND DOCUSATE SODIUM 2 TABLET: 8.6; 5 TABLET, FILM COATED ORAL at 08:35

## 2017-04-20 RX ADMIN — SULFAMETHOXAZOLE AND TRIMETHOPRIM 0.5 TABLET: 800; 160 TABLET ORAL at 20:52

## 2017-04-20 RX ADMIN — OMEPRAZOLE 20 MG: 20 CAPSULE, DELAYED RELEASE ORAL at 08:35

## 2017-04-20 RX ADMIN — STANDARDIZED SENNA CONCENTRATE AND DOCUSATE SODIUM 2 TABLET: 8.6; 5 TABLET, FILM COATED ORAL at 20:52

## 2017-04-20 RX ADMIN — CHOLECALCIFEROL TAB 25 MCG (1000 UNIT) 1000 UNITS: 25 TAB at 08:35

## 2017-04-20 RX ADMIN — Medication 500 MG: at 11:41

## 2017-04-20 ASSESSMENT — GAIT ASSESSMENTS
ASSISTIVE DEVICE: PARALLEL BARS
GAIT LEVEL OF ASSIST: MINIMAL ASSIST
DISTANCE (FEET): 40

## 2017-04-20 ASSESSMENT — BRIEF INTERVIEW FOR MENTAL STATUS (BIMS)
ASKED TO RECALL BED: NO, COULD NOT RECALL
INITIAL REPETITION OF BED BLUE SOCK - FIRST ATTEMPT: 3
WHAT MONTH IS IT: ACCURATE WITHIN 5 DAYS
WHAT DAY OF THE WEEK IS IT: INCORRECT
ASKED TO RECALL SOCK: NO, COULD NOT RECALL
ASKED TO RECALL BLUE: NO, COULD NOT RECALL
WHAT YEAR IS IT: CORRECT
BIMS SUMMARY SCORE: 8

## 2017-04-20 ASSESSMENT — PAIN SCALES - GENERAL: PAINLEVEL_OUTOF10: 0

## 2017-04-20 NOTE — CARE PLAN
Problem: Safety  Goal: Will remain free from falls  Intervention: Assess risk factors for falls  Patient is impulsive, educated pt on use of call light we she wants to get up. Alarms in place patient in w/c.      Problem: Infection  Goal: Will remain free from infection  Patient placed on Bactrim for UTI per Dr. Marvin orders.

## 2017-04-20 NOTE — REHAB-NURSING IDT TEAM NOTE
Nursing  Nursing  Diet/Nutrition:  Regular  Medication Administration:  Whole with Liquid Wash  % consumed at meals in last 24 hours:  Consumed 30%-100% of meals per documentation.  Breakfast:  100%   Lunch:  100%  Dinner:  30%   Snack schedule:  None  Appetite:  Good  Fluid Intake/Output in past 24 hours: In: 1140 [P.O.:1140]  Out: 950   Admit Weight:  Weight: 60 kg (132 lb 4.4 oz)  Weight Last 7 Days   [60 kg (132 lb 4.4 oz)-60.419 kg (133 lb 3.2 oz)] 60.419 kg (133 lb 3.2 oz) (04/17 1735)    Pain Issues:    Location:  Leg (04/19 1635)  Left (04/19 1635)         Severity:  Moderate   Scheduled pain medications:  None     PRN pain medications used in last 24 hours:  tramadol (Ultram)   Non Pharmacologic Interventions:  emotional support, repositioned and rest  Effectiveness of pain management plan:  good=patient states acceptable comfort after interventions    Bowel:    Bowel Assist Initial Score:  2 - Max Assistance  Bowel Assist Current Score:  2 - Max Assistance  Bowl Accidents in last 7 days:  1  Last bowel movement: 04/19/17  Stool: Small, Soft     Usual bowel pattern:  every other day  Scheduled bowel medications:  senna-docusate (PERICOLACE or SENOKOT S)   PRN bowel medications used in last 24 hours:  None  Nursing Interventions:  Increased time, Scheduled medication, Supervision, Positioning on commode/toilet, Brief  Effectiveness of bowel program:   fair=sometimes needs prn bowel meds for constipation  Bladder:    Bladder Assist Initial Score:  2 - Max Assistance  Bladder Assist Current Score:  3 - Moderate Assistance  Bladder Accidents in last 7 days:     PVR range for past 24-48 hours:  []   Medications affecting bladder:  None    Interventions:  Increased time, Supervision, Brief  Effectiveness of bladder training:  Good=regular, predictable, emptying of bladder, patient initiates time voiding    Wound:      Patient Lines/Drains/Airways Status    Active Current Wounds     Name: Placement date:  Placement time: Site: Days:    Surgical Incision  Incision Left Lateral Hip 04/17/17  2100    2                   Effectiveness of intervention:  wound is unchanged     Sleep/wake cycle:   Average hours slept:  Sleeps 4-6 hours without waking  Sleep medication usage:  None    Patient/Family Training/Education:  Bladder Management/Training, Bowel Management/Training, Diet/Nutrition, Fall Prevention, General Self Care, Medication Management, Pain Management, Respiratory Hygiene, Safety and Wound Care    Strengths: Supportive family and Manages pain appropriately   Barriers:   Confused, Fatigue, Generalized weakness, Impulsive and Limited mobility            Nursing Problems           Problem: Bowel/Gastric:     Goal: Normal bowel function is maintained or improved         Goal: Will not experience complications related to bowel motility           Problem: Communication     Goal: The ability to communicate needs accurately and effectively will improve           Problem: Discharge Barriers/Planning     Goal: Patient's continuum of care needs will be met           Problem: Infection     Goal: Will remain free from infection           Problem: Knowledge Deficit     Goal: Knowledge of disease process/condition, treatment plan, diagnostic tests, and medications will improve         Goal: Knowledge of the prescribed therapeutic regimen will improve           Problem: Mobility     Goal: Risk for activity intolerance will decrease           Problem: Pain Management     Goal: Pain level will decrease to patient's comfort goal           Problem: Safety     Goal: Will remain free from injury         Goal: Will remain free from falls           Problem: Urinary Elimination:     Goal: Ability to reestablish a normal urinary elimination pattern will improve           Problem: Venous Thromboembolism (VTW)/Deep Vein Thrombosis (DVT) Prevention:     Goal: Patient will participate in Venous Thrombosis (VTE)/Deep Vein Thrombosis  (DVT)Prevention Measures     Flowsheet: Taken at 04/19/17 1506    Pharmacologic Prophylaxis Used LMWH: Enoxaparin(Lovenox) by Nancy Fernando R.N.              Long Term Goals:   At discharge patient will be able to function safely at home and in the community with support.    Section completed by:  Bijal Glass R.N.

## 2017-04-20 NOTE — REHAB-SLP IDT TEAM NOTE
Speech Therapy  Cognitive Linquistic Functions  Comprehension Initial:  3 - Moderate Assistance  Comprehension Current:  4 - Minimal Assistance   Comprehension Description:  Verbal cues  Expression Initial:  5 - Stand-by Prompting/Supervision or Set-up  Expression Current:  5 - Stand-by Prompting/Supervision or Set-up   Expression Description:  Verbal cueing  Social Interaction Initial:  5 - Stand-by Prompting/Supervision or Set-up  Social Interaction Current:  5 - Stand-by Prompting/Supervision or Set-up   Social Interaction Description:     Problem Solving Initial:  3 - Moderate Assistance  Problem Solving Current:  2 - Max Assistance   Problem Solving Description:  Verbal cueing, Increased time  Memory Initial:  3 - Moderate Assistance  Memory Current:  2 - Max Assistance   Memory Description:  Verbal cueing  Executive Functioning / Organization Initial:     Executive Functioning / Organization Current:  2- Max Assistance    Executive Functioning Desciption:  Verbal cuing.  Swallowing  Swallowing Status:  Patient does not receive ST intervention at this time.   Orders Placed This Encounter   Procedures   • DIET ORDER     Standing Status: Standing      Number of Occurrences: 1      Standing Expiration Date:      Order Specific Question:  Diet:     Answer:  Regular [1]     Behavior Modification Plan  Keep instructions simple/concrete, Give clear feedback, Set clear goals, Redirect to task/topic, Decrease the chance of failure by offering activities that are within the patient's abilities and Analyze tasks (break down into smaller steps)  Assistive Technology  Low tech: Calendar, planner, schedule, alarms/timers, pill organizer, post-it notes, lists  Family Training/Education:  To be scheduled.  DC Recommendations: To be determined.  Strengths:  Alert and oriented, Motivated for self care and independence, Pleasant and cooperative, Supportive family and Willingly participates in therapeutic activities  Barriers:   Poor carryover of learning and Poor insight/denial of deficits  # of short term goals set= 2  # of short term goals met= 0        Speech Therapy Problems           Problem: Memory STGs     Dates: Start: 04/20/17       Goal: STG-Within one week, patient will     Dates: Start: 04/20/17      Description: 1) Individualized goal:  With recall safety sequences with 60% acc or mod A.    2) Interventions: SLP Speech Language Treatment, SLP Self Care / ADL Training  and SLP Cognitive Skill Development              Problem: Problem Solving STGs     Dates: Start: 04/20/17       Goal: STG-Within one week, patient will solve basic problems     Dates: Start: 04/20/17      Description: 1) Individualized goal:  With 60%with mod A.    2) Interventions:  SLP Speech Language Treatment, SLP Self Care / ADL Training  and SLP Cognitive Skill Development              Problem: Speech/Swallowing LTGs     Dates: Start: 04/20/17       Goal: LTG-By discharge, patient will solve basic problems     Dates: Start: 04/20/17      Description: 1) Individualized goal:  With 70% acc with min to mod A.    2) Interventions:  SLP Speech Language Treatment and SLP Self Care / ADL Training     2) Interventions:  SLP Speech Language Treatment, SLP Self Care / ADL Training  and SLP Cognitive Skill Development            Goal: LTG-By discharge, patient will     Dates: Start: 04/20/17                 Section completed by:  Jacquelyn Ambriz MS,CCC-SLP

## 2017-04-20 NOTE — PROGRESS NOTES
"Rehab Progress Note     Interval Events (Subjective)  Patient seen and examined today. Patient not drinking enough fluids  Ros: Last bowel movement 4/19/17    Objective:  VITAL SIGNS: /80 mmHg  Pulse 70  Temp(Src) 36.2 °C (97.1 °F)  Resp 18  Ht 1.575 m (5' 2\")  Wt 60.419 kg (133 lb 3.2 oz)  BMI 24.36 kg/m2  SpO2 96%  Heart regular rate and rhythm   lungs clear to auscultation  Abdominal exam bowel sounds ×4      Recent Results (from the past 72 hour(s))   CBC WITH DIFFERENTIAL    Collection Time: 04/18/17  2:35 PM   Result Value Ref Range    WBC 10.1 4.8 - 10.8 K/uL    RBC 4.19 (L) 4.20 - 5.40 M/uL    Hemoglobin 12.9 12.0 - 16.0 g/dL    Hematocrit 39.6 37.0 - 47.0 %    MCV 94.5 81.4 - 97.8 fL    MCH 30.8 27.0 - 33.0 pg    MCHC 32.6 (L) 33.6 - 35.0 g/dL    RDW 50.4 (H) 35.9 - 50.0 fL    Platelet Count 248 164 - 446 K/uL    MPV 11.7 9.0 - 12.9 fL    Neutrophils-Polys 62.60 44.00 - 72.00 %    Lymphocytes 22.80 22.00 - 41.00 %    Monocytes 11.40 0.00 - 13.40 %    Eosinophils 1.00 0.00 - 6.90 %    Basophils 0.60 0.00 - 1.80 %    Immature Granulocytes 1.60 (H) 0.00 - 0.90 %    Nucleated RBC 0.00 /100 WBC    Neutrophils (Absolute) 6.35 2.00 - 7.15 K/uL    Lymphs (Absolute) 2.31 1.00 - 4.80 K/uL    Monos (Absolute) 1.15 (H) 0.00 - 0.85 K/uL    Eos (Absolute) 0.10 0.00 - 0.51 K/uL    Baso (Absolute) 0.06 0.00 - 0.12 K/uL    Immature Granulocytes (abs) 0.16 (H) 0.00 - 0.11 K/uL    NRBC (Absolute) 0.00 K/uL   COMP METABOLIC PANEL    Collection Time: 04/18/17  2:35 PM   Result Value Ref Range    Sodium 136 135 - 145 mmol/L    Potassium 4.4 3.6 - 5.5 mmol/L    Chloride 105 96 - 112 mmol/L    Co2 20 20 - 33 mmol/L    Anion Gap 11.0 0.0 - 11.9    Glucose 125 (H) 65 - 99 mg/dL    Bun 28 (H) 8 - 22 mg/dL    Creatinine 1.31 0.50 - 1.40 mg/dL    Calcium 9.3 8.5 - 10.5 mg/dL    AST(SGOT) 30 12 - 45 U/L    ALT(SGPT) 25 2 - 50 U/L    Alkaline Phosphatase 70 30 - 99 U/L    Total Bilirubin 0.5 0.1 - 1.5 mg/dL    Albumin 3.5 " 3.2 - 4.9 g/dL    Total Protein 6.7 6.0 - 8.2 g/dL    Globulin 3.2 1.9 - 3.5 g/dL    A-G Ratio 1.1 g/dL   PREALBUMIN    Collection Time: 04/18/17  2:35 PM   Result Value Ref Range    Pre-Albumin 21.0 18.0 - 38.0 mg/dL   ESTIMATED GFR    Collection Time: 04/18/17  2:35 PM   Result Value Ref Range    GFR If  47 (A) >60 mL/min/1.73 m 2    GFR If Non  39 (A) >60 mL/min/1.73 m 2   URINALYSIS    Collection Time: 04/18/17  5:00 PM   Result Value Ref Range    Micro Urine Req Microscopic     Color Yellow     Character Sl Cloudy (A)     Specific Gravity 1.013 <1.035    Ph 5.5 5.0-8.0    Glucose Negative Negative mg/dL    Ketones Negative Negative mg/dL    Protein Negative Negative mg/dL    Bilirubin Negative Negative    Nitrite Negative Negative    Leukocyte Esterase Moderate (A) Negative    Occult Blood Negative Negative   URINE MICROSCOPIC (W/UA)    Collection Time: 04/18/17  5:00 PM   Result Value Ref Range    WBC 20-50 (A) /hpf    RBC 0-2 /hpf    Bacteria Many (A) None /hpf    Mucous Threads Few /hpf   URINALYSIS    Collection Time: 04/19/17  3:00 PM   Result Value Ref Range    Micro Urine Req Microscopic     Color Colorless     Character Sl Cloudy (A)     Specific Gravity 1.005 <1.035    Ph 5.0 5.0-8.0    Glucose Negative Negative mg/dL    Ketones Negative Negative mg/dL    Protein Negative Negative mg/dL    Bilirubin Negative Negative    Nitrite Negative Negative    Leukocyte Esterase Large (A) Negative    Occult Blood Negative Negative   URINE MICROSCOPIC (W/UA)    Collection Time: 04/19/17  3:00 PM   Result Value Ref Range    WBC 20-50 (A) /hpf    RBC 2-5 (A) /hpf    Bacteria Moderate (A) None /hpf    Epithelial Cells Few /hpf    Trans Epithelial Cells Few /hpf       Current Facility-Administered Medications   Medication Frequency   • amlodipine (NORVASC) tablet 5 mg Q DAY   • FORMULATION R 0.25-3-14-71.9 % ointment PRN   • ondansetron (ZOFRAN ODT) dispertab 4 mg Q4HRS PRN   •  senna-docusate (PERICOLACE or SENOKOT S) 8.6-50 MG per tablet 2 Tab BID    And   • polyethylene glycol/lytes (MIRALAX) PACKET 1 Packet QDAY PRN    And   • magnesium hydroxide (MILK OF MAGNESIA) suspension 30 mL QDAY PRN    And   • bisacodyl (DULCOLAX) suppository 10 mg QDAY PRN   • acetaminophen (TYLENOL) suppository 650 mg Q4HRS PRN   • amiodarone (CORDARONE) tablet 200 mg DAILY   • aspirin EC (ECOTRIN) tablet 81 mg DAILY   • calcium carbonate (OS-DIANNA 500) tablet 500 mg TID WITH MEALS   • enoxaparin (LOVENOX) inj 40 mg DAILY   • multivitamin (THERAGRAN) tablet 1 Tab DAILY   • omeprazole (PRILOSEC) capsule 20 mg DAILY   • quetiapine (SEROQUEL) tablet 25 mg Q EVENING   • tramadol (ULTRAM) 50 MG tablet 100 mg Q6HRS PRN   • tramadol (ULTRAM) 50 MG tablet 50 mg Q4HRS PRN   • vitamin D (cholecalciferol) tablet 1,000 Units DAILY   • zinc sulfate (ZINCATE) capsule 220 mg DAILY       Orders Placed This Encounter   Procedures   • DIET ORDER     Standing Status: Standing      Number of Occurrences: 1      Standing Expiration Date:      Order Specific Question:  Diet:     Answer:  Regular [1]       Assessment:  Active Hospital Problems    Diagnosis   • Hip fracture (CMS-HCC)   left femoral fracture Closed reduction percutaneous screw fixation with  ttwb left lower extremity    syncope  Early dementia with some encephalopathy during hospitalization, likely  secondary to medications with improvement during hospitalization.  Atrial fibrillation.  Gastroesophageal reflux disease.  Hypertension  Initiate bladder program  Initiate bowel program  Check admit labs  IMPAIRMENTS:    Mobility  Self-care  IADLs    PLAN:    Discussion and Recommendations:    1. The patient requires an acute inpatient rehabilitation program with a coordinated program of care at an intensity and frequency not available at a lower level of care. This recommendation is substantiated by the patient's medical physicians who recommend that the patient's  intervention and assessment of medical issues needs to be done at an acute level of care for patient's safety and maximum outcome.    2. A coordinated program of care will be supplied by an interdisciplinary team of physical therapy, occupational therapy, rehab physician, rehab nursing, and, if needed, speech therapy and rehab psychology. Rehab team presents a patient-specific rehabilitation and education program concentrating on prevention of future problems related to accessibility, mobility, skin, bowel, bladder, sexuality, and psychosocial and medical/surgical problems.    3. Need for Rehabilitation Physician: The rehab physician will be evaluating the patient on a multi-weekly basis to help coordinate the program of care. The rehab physician communicates between medical physicians, therapists, and nurses to maximize the patient's potential outcome. Specific areas in which the rehab physician will be providing daily assessment include the following:    A. Assessing the patient's heart rate and blood pressure response (vitals monitoring) to activity and making adjustments in medications or conservative measures as needed.    B. The rehab physician will be assessing the frequency at which the program can be increased to allow the patient to reach optimal functional outcome.    C. The rehab physician will also provide assessments in daily skin care, especially in light of patient's impairments in mobility.    D. The rehab physician will provide special expertise in understanding how to work with functional impairment and recommend appropriate interventions, compensatory techniques, and education that will facilitate the patient's outcome.    4. Rehab R.N.    The rehab RN will be working with patient to carry over in room mobility and activities of daily living when the patient is not in 3 hours of skilled therapy. Rehab nursing will be working in conjunction with rehab physician to address all the medical issues  above and continue to assess laboratory work and discuss abnormalities with the treating physicians, assess vitals, and response to activity, and discuss and report abnormalities with the rehab physician. Rehab RN will also continue daily skin care, supervise bladder/bowel program, instruct in medication administration, and ensure patient safety.     MEDICAL DECISION MAKING and INTERDISCIPLINARY PLAN OF CARE:    REHABILITATION ISSUES/ADVERSE POTENTIAL::  1. Left femoral neck fracture Patient demonstrates functional deficits in strength, balance, coordination, and ADL's. Patient is admitted to [facility] for comprehensive rehabilitation therapy as described below.   Rehabilitation nursing monitors bowel and bladder control, educates on medication administration, co-morbidities and monitors patient safety.    Therapies to treat at intensity and frequency of (may change after completion of evaluation by all therapeutic disciplines):       PT:  Physical therapy to address mobility, transfer, gait training and evaluation for adaptive equipment needs 1.5hour/day at least 5 days/week for the duration of the ELOS (see below)       OT:  Occupational therapy to address ADLs, self-care, home management training, functional mobility/transfers and assistive device evaluation, and community re-intergration 1.5hour/day at least 5 days/week for the duration of the ELOS (see below).        ST/Dysphagia:  Speech therapy to address speech, language,and cognitive deficits as well as swallowing difficulties with retraining/dysphagia management and community re-integration with comprehension, expression, cognitive training 0 hour/day at least 5 days/week for the duration of the ELOS (see below).     2.  Neurostimulants: None at this time but continue to assess daily for need to initiate should status change.    3.  DVT prophylaxis:  Patient is on Lovenox for anticoagulation upon transfer. Encourage OOB. Monitor daily  for signs and symptoms of DVT including but not limited to swelling and pain to prevent the development of DVT that may interfere with therapies.    4.  GI prophylaxis:  On prilosec to prevent gastritis/dyspepsia which may interfere with therapies.    5.  Pain: No issues with pain currently / Controlled with Ultram    6.  Nutrition/Dysphagia: Dietician monitors nutrient intake, recommend supplements prn and provide nutrition education to pt/family to promote optimal nutrition for wound healing/recovery.     7.  Bladder/bowel:  Start bowel and bladder program as described below, to prevent constipation, urinary retention (which may lead to UTI), and urinary incontinence (which will impact upon pt's functional independence).    - TV Q3h while awake with post void bladder scans, I&O cath for PVRs >400  - up to commode after meal     8.  Skin/dermal ulcer prophylaxis: Monitor for new skin conditions with q.2 h. turns as required to prevent the development of skin breakdown.     9.  Cognition/Behavior:  Psychologist Dr. Schafer provides adjustment counseling to illness and psychosocial barriers that may be potential barriers to rehabilitation.     10. Respiratory therapy: RT performs O2 management prn, breathing retraining, pulmonary hygeine and bronchospasm management prn to optimize participation in therapies.      MEDICAL CO-MORBIDITIES/ADVERSE POTENTIAL AFFECTING FUNCTION:        GOALS/EXPECTED LEVEL OF FUNCTION BASED ON CURRENT MEDICAL AND FUNCTIONAL STATUS (may change based on patient's medical status and rate of impairment recovery):     Transfers: Supervised to modified independent     Mobility/Gait: Supervised to modified independent     ADL's: Supervised to modified independent     Cognition: Independent    DISPOSITION: home with assistance     ELOS: 14 days    Medical Decision Making and Plan:  Nursing to assist patient with fluid intake patient to be reminded every couple of hours to take into 200 mL of  fluids while awake    I attended and led team conference 4-  Nursing:uti on bactrim, confusion, continent of bowel and bladder, incision intact, fluids 500cc, meals % , daughter brought in food  PT: limited by ttwb, bed mob sba, transfers with fww cga and lots of cues, gait with 2 step pattern but difficult will work on // bars.   OT: sup seated grooming, mod I eating, cga bathing stance, impaired balance and weight bearing, thoroughness, upper body dressing bra sweater seated in wc min, lower body min, with max verbal cues for strategies, cga toileting with poor adherance to ttwb, poor carryover  ST: max problem solving memory and executive function, continuum for day program  Case management:lives with daughter and grandchildren , neurology consult after discharge, 24 hour supervision and assist  Discharge date: 4-      Total time:  >35 minutes.  I spent greater than 50% of the time for patient care and coordination on this date, including unit/floor time, and face-to-face time with the patient as per assessment and plan above.    Kylah Marvin D.O.

## 2017-04-20 NOTE — THERAPY
Speech Therapy Initial Plan of Care Note    1) Assessment:  Patient is 81 y.o. female with a diagnosis of left hip fracture.  Additional factors influencing patient status / progress (ie: cognitive factors, co-morbidities, social support, etc): early dementia.  Long term and short term goals have been discussed with patient and they are in agreement.  2) Plan:  Recommend Speech Therapy 30-60 minutes per day 5-6 days per week for 3  weeks for the following treatments:  SLP Speech Language Treatment, SLP Self Care / ADL Training  and SLP Cognitive Skill Development.  3) Goals:  Please refer to care plan for goals.

## 2017-04-20 NOTE — REHAB-PT IDT TEAM NOTE
Physical Therapy  Mobility  Bed mobility: Standby assist supine to/from sit  Bed /Chair/Wheelchair Transfer Initial:  3 - Moderate Assistance  Bed /Chair/Wheelchair Transfer Current:  5 - Standby Prompting/Supervision or Set-up   Bed/Chair/Wheelchair Transfer Description:  Adaptive equipment, Supervision for safety, Verbal cueing, Set-up of equipment, verbal and tactile cues for TTWB LLE  Walk Initial:  1 - Total Assistance  Walk Current:  1 - Total Assistance   Walk Description:   (20 feet parallel bars inconsistent TTWB precaution, minimal assistance)  Wheelchair Initial:  5E - Household Exception  Wheelchair Current:  2 - Max Assistance   Wheelchair Description:   (x120' in manual wc with max cues for technique, and one instance of physical assist to complete turn)  Stairs Initial:  0 - Not tested,medical condition  Stairs Current: 0 - Not tested,unsafe activity   Stairs Description:    Patient/Family Training/Education: In progress  DME/DC Recommendations:  TBD  Strengths:  Independent PLOF, Making steady progress towards goals, Pleasant and cooperative, Supportive family and Willingly participates in therapeutic activities  Barriers:   Other: TTWB LLE, impaired cognition, impaired memory for sequencing TTWB, orthostatic hypotension, fall risk, needs 24-hour supervision  # of short term goals set= 5  # of short term goals met=2        Physical Therapy Problems           Problem: Balance     Dates: Start: 04/18/17       Goal: STG-Within one week, patient will maintain dynamic standing     Dates: Start: 04/18/17      Description: 1) Individualized goal: Maintain dynamic standing balance fww sba for transfers and gait one week    2) Interventions:  PT Gait Training, PT Therapeutic Exercises, PT Neuro Re-Ed/Balance and PT Therapeutic Activity        Note: Goal Note filed on 04/20/17 1148 by Lico Rodriguez M.S.P.T.    Goal: STG-Within one week, patient will maintain dynamic standing  Outcome: NOT MET  Complaints of  dizziness when standing, orthostatic hypotension          Goal: STG-Within one week, patient will     Dates: Start: 04/18/17      Description: 1) Individualized goal: Maintain TTWB LLE fww during transfers and gait one week    2) Interventions:  PT Gait Training, PT Therapeutic Exercises, PT Neuro Re-Ed/Balance, PT Therapeutic Activity and PT Evaluation        Note: Goal Note filed on 04/20/17 1148 by TRENA Tello.S.P.T.    Goal: STG-Within one week, patient will  Outcome: NOT MET  Inconsistent TTWB LLE            Problem: Mobility     Dates: Start: 04/18/17       Goal: STG-Within one week, patient will     Dates: Start: 04/18/17      Description: 1) Individualized goal: Gait fww sba 125 ft one week    2) Interventions:  PT Gait Training, PT Therapeutic Exercises, PT Therapeutic Activity and PT Evaluation        Note: Goal Note filed on 04/20/17 1148 by TRENA Tello.S.P.T.    Goal: STG-Within one week, patient will  Outcome: NOT MET  Orthostatic hypotension, inconsistent TTWB            Problem: Mobility Transfers     Dates: Start: 04/18/17       Goal: STG-Within one week, patient will transfer bed to chair     Dates: Start: 04/20/17      Description: 1) Individualized goal: Transfer bed to/from chair fww spv ttwb one week    2) Interventions:  PT Gait Training, PT Therapeutic Exercises and PT Therapeutic Activity              Problem: PT-Long Term Goals     Dates: Start: 04/18/17       Goal: LTG-By discharge, patient will ambulate     Dates: Start: 04/18/17      Description: 1) Individualized goal: Gait fww household 150 ft, community 300 ft modified independent at discharge    2) Interventions:  PT Gait Training, PT Therapeutic Exercises, PT Neuro Re-Ed/Balance, PT Therapeutic Activity and PT Evaluation            Goal: LTG-By discharge, patient will transfer one surface to another     Dates: Start: 04/18/17      Description: 1) Individualized goal: Transfer one surface to another fww modified  independent at discharge    2) Interventions:  PT Gait Training, PT Therapeutic Exercises, PT Neuro Re-Ed/Balance, PT Therapeutic Activity and PT Evaluation            Goal: LTG-By discharge, patient will ambulate up/down 4-6 stairs     Dates: Start: 04/18/17      Description: 1) Individualized goal: Up/down ramp or 2 stairs with railings supervision at discharge    2) Interventions:  PT Gait Training, PT Therapeutic Exercises, PT Neuro Re-Ed/Balance, PT Therapeutic Activity and PT Evaluation            Goal: LTG-By discharge, patient will transfer in/out of a car     Dates: Start: 04/18/17      Description: 1) Individualized goal: Car transfer fww supervision at discharge    2) Interventions:  PT Gait Training, PT Therapeutic Exercises, PT Neuro Re-Ed/Balance, PT Therapeutic Activity and PT Evaluation                    Section completed by:  Lico Rodriguez M.S.P.T.

## 2017-04-20 NOTE — REHAB-COLLABORATIVE ONGOING IDT TEAM NOTE
Weekly Interdisciplinary Team Conference Note    Weekly Interdisciplinary Team Conference # 1  Date:  4/20/2017    Clinicians present and reporting at team conference include the following:   MD: Kylah Marvin DO   RN:  Nancy Fernando RN   PT:   Lico Rodriguez, PT, MPT, MEd  OT:  Willow Jama, MS, OTR/L   ST:  Jacquelyn Ambriz, MS, CCC-SLP  CM:  Kylah August, LSW, LUIS  REC:  Not Applicable  RT:  Not Applicable  RPh:  Eliu Castillo [facility]  Other:   None  All reporting clinicians have a working knowledge of this patient's plan of care.    Targeted DC Date:  4/28/2017 Friday     Medical    Patient Active Problem List    Diagnosis Date Noted   • Hip fracture (CMS-HCC) 04/17/2017   • Acute encephalopathy 04/11/2017   • Mood disorder (CMS-HCC) 04/10/2017   • Femoral neck fracture (CMS-HCC) 04/09/2017   • Syncope 04/09/2017   • Essential hypertension 03/30/2017   • Atrial fibrillation (CMS-HCC) 03/30/2017   • Insomnia 03/30/2017   • Gastroesophageal reflux disease without esophagitis 03/30/2017     Results     Procedure Component Value Ref Range Date/Time    URINE CULTURE-EXISTING-LESS THAN 48 HOURS [[id number]] Collected:  04/19/17 1500    Order Status:  Completed Lab Status:  In process Updated:  04/20/17 1152    Specimen Information:  Urine from Urine, Clean Catch     Narrative:      [facility]  Indication for culture:->Dysuria/Frequency/Burning    URINALYSIS [[id number]]  (Abnormal) Collected:  04/19/17 1500    Order Status:  Completed Lab Status:  Final result Updated:  04/19/17 1922    Specimen Information:  Urine from Urine, Clean Catch      Micro Urine Req Microscopic      Color Colorless      Character Sl Cloudy (A)      Specific Gravity 1.005 <1.035       Ph 5.0 5.0-8.0       Glucose Negative Negative mg/dL      Ketones Negative Negative mg/dL      Protein Negative Negative mg/dL      Bilirubin Negative Negative       Nitrite Negative Negative       Leukocyte Esterase Large (A) Negative        Occult Blood Negative Negative      Narrative:      [facility]    URINE MICROSCOPIC (W/UA) [[id number]]  (Abnormal) Collected:  04/19/17 1500    Order Status:  Completed Lab Status:  Final result Updated:  04/19/17 1922     WBC 20-50 (A) /hpf      Comment: Female  <12 Yr 0-2  >12 Yr 0-5  Male   None          RBC 2-5 (A) /hpf      Comment: Female  >12 Yr 0-2  Male   None          Bacteria Moderate (A) None /hpf      Epithelial Cells Few /hpf      Trans Epithelial Cells Few /hpf     Narrative:      Collected By:[facility] PITTMAN        Nursing  Diet/Nutrition:  Regular  Medication Administration:  Whole with Liquid Wash  % consumed at meals in last 24 hours:  Consumed 30%-100% of meals per documentation.  Breakfast:  100%   Lunch:  100%  Dinner:  30%   Snack schedule:  None  Appetite:  Good  Fluid Intake/Output in past 24 hours: In: 1140 [P.O.:1140]  Out: 950   Admit Weight:  Weight: 60 kg (132 lb 4.4 oz)  Weight Last 7 Days   [60 kg (132 lb 4.4 oz)-60.419 kg (133 lb 3.2 oz)] 60.419 kg (133 lb 3.2 oz) (04/17 1735)    Pain Issues:    Location:  Leg (04/19 1635)  Left (04/19 1635)         Severity:  Moderate   Scheduled pain medications:  None     PRN pain medications used in last 24 hours:  tramadol (Ultram)   Non Pharmacologic Interventions:  emotional support, repositioned and rest  Effectiveness of pain management plan:  good=patient states acceptable comfort after interventions    Bowel:    Bowel Assist Initial Score:  2 - Max Assistance  Bowel Assist Current Score:  2 - Max Assistance  Bowl Accidents in last 7 days:  1  Last bowel movement: 04/19/17  Stool: Small, Soft     Usual bowel pattern:  every other day  Scheduled bowel medications:  senna-docusate (PERICOLACE or SENOKOT S)   PRN bowel medications used in last 24 hours:  None  Nursing Interventions:  Increased time, Scheduled medication, Supervision, Positioning on commode/toilet, Brief  Effectiveness of bowel  program:   fair=sometimes needs prn bowel meds for constipation  Bladder:    Bladder Assist Initial Score:  2 - Max Assistance  Bladder Assist Current Score:  3 - Moderate Assistance  Bladder Accidents in last 7 days:     PVR range for past 24-48 hours:  []   Medications affecting bladder:  None    Interventions:  Increased time, Supervision, Brief  Effectiveness of bladder training:  Good=regular, predictable, emptying of bladder, patient initiates time voiding    Wound:      Patient Lines/Drains/Airways Status    Active Current Wounds     Name: Placement date: Placement time: Site: Days:    Surgical Incision  Incision Left Lateral Hip 04/17/17  2100    2                   Effectiveness of intervention:  wound is unchanged     Sleep/wake cycle:   Average hours slept:  Sleeps 4-6 hours without waking  Sleep medication usage:  None    Patient/Family Training/Education:  Bladder Management/Training, Bowel Management/Training, Diet/Nutrition, Fall Prevention, General Self Care, Medication Management, Pain Management, Respiratory Hygiene, Safety and Wound Care    Strengths: Supportive family and Manages pain appropriately   Barriers:   Confused, Fatigue, Generalized weakness, Impulsive and Limited mobility            Nursing Problems           Problem: Bowel/Gastric:     Goal: Normal bowel function is maintained or improved         Goal: Will not experience complications related to bowel motility           Problem: Communication     Goal: The ability to communicate needs accurately and effectively will improve           Problem: Discharge Barriers/Planning     Goal: Patient's continuum of care needs will be met           Problem: Infection     Goal: Will remain free from infection           Problem: Knowledge Deficit     Goal: Knowledge of disease process/condition, treatment plan, diagnostic tests, and medications will improve         Goal: Knowledge of the prescribed therapeutic regimen will improve            Problem: Mobility     Goal: Risk for activity intolerance will decrease           Problem: Pain Management     Goal: Pain level will decrease to patient's comfort goal           Problem: Safety     Goal: Will remain free from injury         Goal: Will remain free from falls           Problem: Urinary Elimination:     Goal: Ability to reestablish a normal urinary elimination pattern will improve           Problem: Venous Thromboembolism (VTW)/Deep Vein Thrombosis (DVT) Prevention:     Goal: Patient will participate in Venous Thrombosis (VTE)/Deep Vein Thrombosis (DVT)Prevention Measures     Flowsheet: Taken at 04/19/17 5147    Pharmacologic Prophylaxis Used LMWH: Enoxaparin(Lovenox) by Nancy Fernando R.N.              Long Term Goals:   At discharge patient will be able to function safely at home and in the community with support.    Section completed by:  Bijal Glass R.N.           Mobility  Bed mobility: Standby assist supine to/from sit  Bed /Chair/Wheelchair Transfer Initial:  3 - Moderate Assistance  Bed /Chair/Wheelchair Transfer Current:  5 - Standby Prompting/Supervision or Set-up   Bed/Chair/Wheelchair Transfer Description:  Adaptive equipment, Supervision for safety, Verbal cueing, Set-up of equipment, verbal and tactile cues for TTWB LLE  Walk Initial:  1 - Total Assistance  Walk Current:  1 - Total Assistance   Walk Description:   (20 feet parallel bars inconsistent TTWB precaution, minimal assistance)  Wheelchair Initial:  5E - Household Exception  Wheelchair Current:  2 - Max Assistance   Wheelchair Description:   (x120' in manual wc with max cues for technique, and one instance of physical assist to complete turn)  Stairs Initial:  0 - Not tested,medical condition  Stairs Current: 0 - Not tested,unsafe activity   Stairs Description:    Patient/Family Training/Education: In progress  DME/DC Recommendations:  TBD  Strengths:  Independent PLOF, Making steady progress towards goals,  Pleasant and cooperative, Supportive family and Willingly participates in therapeutic activities  Barriers:   Other: TTWB LLE, impaired cognition, impaired memory for sequencing TTWB, orthostatic hypotension, fall risk, needs 24-hour supervision  # of short term goals set= 5  # of short term goals met=2        Physical Therapy Problems           Problem: Balance     Dates: Start: 04/18/17       Goal: STG-Within one week, patient will maintain dynamic standing     Dates: Start: 04/18/17      Description: 1) Individualized goal: Maintain dynamic standing balance fww sba for transfers and gait one week    2) Interventions:  PT Gait Training, PT Therapeutic Exercises, PT Neuro Re-Ed/Balance and PT Therapeutic Activity        Note: Goal Note filed on 04/20/17 1148 by TRENA Tello.S.P.T.    Goal: STG-Within one week, patient will maintain dynamic standing  Outcome: NOT MET  Complaints of dizziness when standing, orthostatic hypotension          Goal: STG-Within one week, patient will     Dates: Start: 04/18/17      Description: 1) Individualized goal: Maintain TTWB LLE fww during transfers and gait one week    2) Interventions:  PT Gait Training, PT Therapeutic Exercises, PT Neuro Re-Ed/Balance, PT Therapeutic Activity and PT Evaluation        Note: Goal Note filed on 04/20/17 1148 by TRENA Tello.S.P.T.    Goal: STG-Within one week, patient will  Outcome: NOT MET  Inconsistent TTWB LLE            Problem: Mobility     Dates: Start: 04/18/17       Goal: STG-Within one week, patient will     Dates: Start: 04/18/17      Description: 1) Individualized goal: Gait fww sba 125 ft one week    2) Interventions:  PT Gait Training, PT Therapeutic Exercises, PT Therapeutic Activity and PT Evaluation        Note: Goal Note filed on 04/20/17 1148 by Lico Rodriguez M.S.P.T.    Goal: STG-Within one week, patient will  Outcome: NOT MET  Orthostatic hypotension, inconsistent TTWB            Problem: Mobility Transfers      Dates: Start: 04/18/17       Goal: STG-Within one week, patient will transfer bed to chair     Dates: Start: 04/20/17      Description: 1) Individualized goal: Transfer bed to/from chair fww spv ttwb one week    2) Interventions:  PT Gait Training, PT Therapeutic Exercises and PT Therapeutic Activity              Problem: PT-Long Term Goals     Dates: Start: 04/18/17       Goal: LTG-By discharge, patient will ambulate     Dates: Start: 04/18/17      Description: 1) Individualized goal: Gait fww household 150 ft, community 300 ft modified independent at discharge    2) Interventions:  PT Gait Training, PT Therapeutic Exercises, PT Neuro Re-Ed/Balance, PT Therapeutic Activity and PT Evaluation            Goal: LTG-By discharge, patient will transfer one surface to another     Dates: Start: 04/18/17      Description: 1) Individualized goal: Transfer one surface to another fww modified independent at discharge    2) Interventions:  PT Gait Training, PT Therapeutic Exercises, PT Neuro Re-Ed/Balance, PT Therapeutic Activity and PT Evaluation            Goal: LTG-By discharge, patient will ambulate up/down 4-6 stairs     Dates: Start: 04/18/17      Description: 1) Individualized goal: Up/down ramp or 2 stairs with railings supervision at discharge    2) Interventions:  PT Gait Training, PT Therapeutic Exercises, PT Neuro Re-Ed/Balance, PT Therapeutic Activity and PT Evaluation            Goal: LTG-By discharge, patient will transfer in/out of a car     Dates: Start: 04/18/17      Description: 1) Individualized goal: Car transfer fww supervision at discharge    2) Interventions:  PT Gait Training, PT Therapeutic Exercises, PT Neuro Re-Ed/Balance, PT Therapeutic Activity and PT Evaluation                    Section completed by:  DANITZA TelloS.P.TParesh    Activities of Daily Living  Eating Initial:  6 - Modified Independent  Eating Current:  6 - Modified Independent   Eating Description:     Grooming Initial:  5 - Standby  Prompting/Supervision or Set-up  Grooming Current:  5 - Standby Prompting/Supervision or Set-up   Grooming Description:   (set-up seated)  Bathing Initial:  0 - Not tested, patient refused  Bathing Current:  4 - Minimal Assistance   Bathing Description:  Grab bar, Tub bench (CGA in stance v/cs for thoroughness and sequencing)  Upper Body Dressing Initial:  5 - Standby Prompting/Supervision or Set-up  Upper Body Dressing Current:  4 - Minimal Assistance   Upper Body Dressing Description:   (Min A to clasp bra and bring sweater behind back 9/11 tasks)  Lower Body Dressing Initial:  1 - Total Assistance  Lower Body Dressing Current:  4 - Minimal Assistance   Lower Body Dressing Description:  4 - Minimal Assistance  Toileting Initial:  2 - Max Assistance  Toileting Current:  4 - Minimal Assistance   Toileting Description:  Grab bar (CGA in stance poor adherence to TTWB LLE)  Toilet Transfer Initial:  2 - Max Assistance  Toilet Transfer Current:  4 - Minimal Assistance   Toilet Transfer Description:  4 - Minimal Assistance  Tub / Shower Transfer Initial:  0 - Not tested, patient refused  Tub / Shower Transfer Current:  4 - Minimal Assistance   Tub / Shower Transfer Description:  Grab bar, Verbal cueing, Supervision for safety (CGA v/cs for seq poor TTWB LLE)  IADL:  N/A   Family Training/Education:  TBD   DME/DC Recommendations:  TBD      Strengths:  Making steady progress towards goals, Pleasant and cooperative and Willingly participates in therapeutic activities  Barriers:  Other: poor memory/carryover of learning, poor adherence to WB precautions, poor sequencing, impaired insight, generalized weakness     # of short term goals set= 3    # of short term goals met= 2          Occupational Therapy Goals           Problem: Dressing     Dates: Start: 04/18/17       Goal: STG-Within one week, patient will retrieve clothing     Dates: Start: 04/18/17      Description: 1) Individualized Goal:  With CGA mainaining WB  precautions    2) Interventions:  OT Self Care/ADL, OT Cognitive Skill Dev, OT Community Reintegration, OT Neuro Re-Ed/Balance, OT Therapeutic Activity, OT Evaluation and OT Therapeutic Exercise    Note: Goal Note filed on 04/19/17 1516 by Willow Jama, MS,OTR/L    Goal: STG-Within one week, patient will retrieve clothing  Outcome: NOT MET  Poor adherence to WB precautions            Problem: OT Long Term Goals     Dates: Start: 04/18/17       Goal: LTG-By discharge, patient will complete basic self care tasks     Dates: Start: 04/18/17      Description: 1) Individualized Goal:  MIn A per daughter    2) Interventions:  OT Self Care/ADL, OT Cognitive Skill Dev, OT Community Reintegration, OT Neuro Re-Ed/Balance, OT Therapeutic Activity, OT Evaluation and OT Therapeutic Exercise        Goal: LTG-By discharge, patient will perform bathroom transfers     Dates: Start: 04/18/17      Description: 1) Individualized Goal:  SUpervision maintaining WB precautions    2) Interventions:  OT Self Care/ADL, OT Cognitive Skill Dev, OT Community Reintegration, OT Neuro Re-Ed/Balance, OT Therapeutic Activity, OT Evaluation and OT Therapeutic Exercise              Section completed by:  Willow Jama, MS,OTR/L    Cognitive Linquistic Functions  Comprehension Initial:  3 - Moderate Assistance  Comprehension Current:  4 - Minimal Assistance   Comprehension Description:  Verbal cues  Expression Initial:  5 - Stand-by Prompting/Supervision or Set-up  Expression Current:  5 - Stand-by Prompting/Supervision or Set-up   Expression Description:  Verbal cueing  Social Interaction Initial:  5 - Stand-by Prompting/Supervision or Set-up  Social Interaction Current:  5 - Stand-by Prompting/Supervision or Set-up   Social Interaction Description:     Problem Solving Initial:  3 - Moderate Assistance  Problem Solving Current:  2 - Max Assistance   Problem Solving Description:  Verbal cueing, Increased time  Memory Initial:  3 - Moderate  Assistance  Memory Current:  2 - Max Assistance   Memory Description:  Verbal cueing  Executive Functioning / Organization Initial:     Executive Functioning / Organization Current:  2- Max Assistance    Executive Functioning Desciption:  Verbal cuing.  Swallowing  Swallowing Status:  Patient does not receive ST intervention at this time.   Orders Placed This Encounter   Procedures   • DIET ORDER     Standing Status: Standing      Number of Occurrences: 1      Standing Expiration Date:      Order Specific Question:  Diet:     Answer:  Regular [1]     Behavior Modification Plan  Keep instructions simple/concrete, Give clear feedback, Set clear goals, Redirect to task/topic, Decrease the chance of failure by offering activities that are within the patient's abilities and Analyze tasks (break down into smaller steps)  Assistive Technology  Low tech: Calendar, planner, schedule, alarms/timers, pill organizer, post-it notes, lists  Family Training/Education:  To be scheduled.  DC Recommendations: To be determined.  Strengths:  Alert and oriented, Motivated for self care and independence, Pleasant and cooperative, Supportive family and Willingly participates in therapeutic activities  Barriers:  Poor carryover of learning and Poor insight/denial of deficits  # of short term goals set= 2  # of short term goals met= 0        Speech Therapy Problems           Problem: Memory STGs     Dates: Start: 04/20/17       Goal: STG-Within one week, patient will     Dates: Start: 04/20/17      Description: 1) Individualized goal:  With recall safety sequences with 60% acc or mod A.    2) Interventions: SLP Speech Language Treatment, SLP Self Care / ADL Training  and SLP Cognitive Skill Development              Problem: Problem Solving STGs     Dates: Start: 04/20/17       Goal: STG-Within one week, patient will solve basic problems     Dates: Start: 04/20/17      Description: 1) Individualized goal:  With 60%with mod A.    2)  Interventions:  SLP Speech Language Treatment, SLP Self Care / ADL Training  and SLP Cognitive Skill Development              Problem: Speech/Swallowing LTGs     Dates: Start: 04/20/17       Goal: LTG-By discharge, patient will solve basic problems     Dates: Start: 04/20/17      Description: 1) Individualized goal:  With 70% acc with min to mod A.    2) Interventions:  SLP Speech Language Treatment and SLP Self Care / ADL Training     2) Interventions:  SLP Speech Language Treatment, SLP Self Care / ADL Training  and SLP Cognitive Skill Development            Goal: LTG-By discharge, patient will     Dates: Start: 04/20/17                 Section completed by:  Jacquelyn Ambriz MS,CCC-SLP          REHAB-Pharmacy IDT Team Note by Rafael Medina RPH at 4/19/2017  2:21 PM  Version 1 of 1    Author:  Rafael Medina RPH Service:  (none) Author Type:  Pharmacist    Filed:  4/19/2017  2:22 PM Note Time:  4/19/2017  2:21 PM Status:  Signed    :  Rafael Medina RPH (Pharmacist)           Pharmacy  Pharmacy  Antibiotics/Antifungals/Antivirals:  Medication:      Active Orders     None        Route:         None  Stop Date:  N/A  Reason:   Antihypertensives/Cardiac:  Medication:    Orders (72h ago through future)    Start     Ordered    04/19/17 0915  amlodipine (NORVASC) tablet 5 mg   Q DAY      04/19/17 0858    04/18/17 0900  amiodarone (CORDARONE) tablet 200 mg   DAILY      04/17/17 1724        Patient Vitals for the past 24 hrs:   BP Pulse   04/19/17 1023 122/78 mmHg -   04/19/17 0929 (!) 99/71 mmHg 75   04/19/17 0925 124/69 mmHg 70   04/19/17 0700 140/88 mmHg 69   04/18/17 1900 151/79 mmHg 69   04/18/17 1430 132/68 mmHg 68       Anticoagulation:  Medication:  lovenox  INR:      Other key medications:          A review of the medication list reveals no issues at this time. Patient is currently on antihypertensive(s). Recommend home blood pressure monitoring/recording if antihypertensive(s) regimen(s)  continue.    Section completed by:  Rafael Medina [facility]           DC Planning  DC destination/dispostion:  Return to her one story home in [place] with 2 entry steps, where she lives with her daughter, Sun and grandchildren    Referrals: Possible home health services    DC Needs: DME - TBD, patient currently does not have any medical equipment; family training for daughter; follow up visits with PCP, Dr. Mcghee and ortho, Dr. Leblanc    Barriers to discharge:  Decreased mobility due to hip fracture, WB status     Strengths:  Supportive family, cooperative with therapy program    Section completed by:  Umu Foster CM MSW    Summary:  +UTI; AB started; Neurology consult; recommend pt will need 24 hour care @ home; If dc home, pt will need home health for PT/OT and a fww.     Physician Summary  Kylah Marvin DO participated and led team conference discussion.

## 2017-04-20 NOTE — REHAB-CM IDT TEAM NOTE
Case Management   DC Planning  DC destination/dispostion:  Return to her one story home in [place] with 2 entry steps, where she lives with her daughter, Sun and grandchildren    Referrals: Possible home health services    DC Needs: DME - TBD, patient currently does not have any medical equipment; family training for daughter; follow up visits with PCP, Dr. Mcghee and ortho, Dr. Leblanc    Barriers to discharge:  Decreased mobility due to hip fracture, WB status     Strengths:  Supportive family, cooperative with therapy program    Section completed by:  Umu Foster CM MSW

## 2017-04-20 NOTE — CARE PLAN
Problem: Safety  Goal: Will remain free from falls  Intervention: Implement fall precautions  Pt is impulsive and restless at the start of shift.Side rails up, bed in low position, non skid socks/shoes on when out of bed.Alarms in place for safety.Call light and things within reach.Will continue to monitor and assess needs and safety.      Problem: Pain Management  Goal: Pain level will decrease to patient’s comfort goal  Pt denies any pain and no sign of acute distress noted.Repositioned with pillows for comfort.Will continue to monitor and assess pain level and medicate as needed.

## 2017-04-21 PROCEDURE — 97110 THERAPEUTIC EXERCISES: CPT

## 2017-04-21 PROCEDURE — 97532 HCHG COGNITIVE SKILL DEV. 15 MIN: CPT

## 2017-04-21 PROCEDURE — 770010 HCHG ROOM/CARE - REHAB SEMI PRIVAT*

## 2017-04-21 PROCEDURE — 700102 HCHG RX REV CODE 250 W/ 637 OVERRIDE(OP): Performed by: PHYSICAL MEDICINE & REHABILITATION

## 2017-04-21 PROCEDURE — 97116 GAIT TRAINING THERAPY: CPT

## 2017-04-21 PROCEDURE — 97535 SELF CARE MNGMENT TRAINING: CPT

## 2017-04-21 PROCEDURE — 700111 HCHG RX REV CODE 636 W/ 250 OVERRIDE (IP): Performed by: PHYSICAL MEDICINE & REHABILITATION

## 2017-04-21 PROCEDURE — A9270 NON-COVERED ITEM OR SERVICE: HCPCS | Performed by: PHYSICAL MEDICINE & REHABILITATION

## 2017-04-21 RX ADMIN — Medication 500 MG: at 08:30

## 2017-04-21 RX ADMIN — Medication 500 MG: at 17:41

## 2017-04-21 RX ADMIN — SULFAMETHOXAZOLE AND TRIMETHOPRIM 0.5 TABLET: 800; 160 TABLET ORAL at 08:30

## 2017-04-21 RX ADMIN — AMIODARONE HYDROCHLORIDE 200 MG: 200 TABLET ORAL at 08:31

## 2017-04-21 RX ADMIN — ENOXAPARIN SODIUM 40 MG: 100 INJECTION SUBCUTANEOUS at 08:31

## 2017-04-21 RX ADMIN — ASPIRIN 81 MG: 81 TABLET, COATED ORAL at 08:31

## 2017-04-21 RX ADMIN — THERA TABS 1 TABLET: TAB at 08:31

## 2017-04-21 RX ADMIN — QUETIAPINE FUMARATE 25 MG: 25 TABLET, FILM COATED ORAL at 20:26

## 2017-04-21 RX ADMIN — STANDARDIZED SENNA CONCENTRATE AND DOCUSATE SODIUM 2 TABLET: 8.6; 5 TABLET, FILM COATED ORAL at 08:31

## 2017-04-21 RX ADMIN — Medication 220 MG: at 08:30

## 2017-04-21 RX ADMIN — CHOLECALCIFEROL TAB 25 MCG (1000 UNIT) 1000 UNITS: 25 TAB at 08:31

## 2017-04-21 RX ADMIN — STANDARDIZED SENNA CONCENTRATE AND DOCUSATE SODIUM 2 TABLET: 8.6; 5 TABLET, FILM COATED ORAL at 20:26

## 2017-04-21 RX ADMIN — Medication 500 MG: at 11:32

## 2017-04-21 RX ADMIN — SULFAMETHOXAZOLE AND TRIMETHOPRIM 0.5 TABLET: 800; 160 TABLET ORAL at 20:27

## 2017-04-21 RX ADMIN — OMEPRAZOLE 20 MG: 20 CAPSULE, DELAYED RELEASE ORAL at 08:31

## 2017-04-21 RX ADMIN — AMLODIPINE BESYLATE 5 MG: 5 TABLET ORAL at 05:12

## 2017-04-21 ASSESSMENT — PAIN SCALES - GENERAL
PAINLEVEL_OUTOF10: 0
PAINLEVEL_OUTOF10: 0

## 2017-04-21 ASSESSMENT — GAIT ASSESSMENTS
DEVIATION: STEP TO;DECREASED BASE OF SUPPORT;BRADYKINETIC;DECREASED HEEL STRIKE;DECREASED TOE OFF;OTHER (COMMENT)
DISTANCE (FEET): 40
GAIT LEVEL OF ASSIST: MINIMAL ASSIST
ASSISTIVE DEVICE: PARALLEL BARS

## 2017-04-21 NOTE — CARE PLAN
Problem: Safety  Goal: Will remain free from falls  Intervention: Implement fall precautions  Pt is confused and impulsive.Constant reminder to use call light for assistance needed.Bed in low position, non skid socks/shoes on when out of bed, side rails up.Alarms in place for safety.Call light and things within reach.Will continue to monitor and assess needs and safety.      Problem: Infection  Goal: Will remain free from infection  Intervention: Assess signs and symptoms of infection  Pt on scheduled Bactrim DS for UTI, no adverse reaction noted.Will continue to monitor and assess.      Problem: Pain Management  Goal: Pain level will decrease to patient’s comfort goal  Pt denies any pain.Repositioned with pillows for comfort.Will continue to monitor and assess pain level and medicate as needed.

## 2017-04-21 NOTE — PROGRESS NOTES
Received shift report and assumed care of patient.  Patient awake, calm and stable, currently sitting up in wheelchair. call light within reach.  Denies pain or discomfort at this time.  Will continue to monitor.

## 2017-04-22 LAB
BACTERIA UR CULT: ABNORMAL
BACTERIA UR CULT: ABNORMAL
SIGNIFICANT IND 70042: ABNORMAL
SITE SITE: ABNORMAL
SOURCE SOURCE: ABNORMAL

## 2017-04-22 PROCEDURE — 97110 THERAPEUTIC EXERCISES: CPT

## 2017-04-22 PROCEDURE — 770010 HCHG ROOM/CARE - REHAB SEMI PRIVAT*

## 2017-04-22 PROCEDURE — 97530 THERAPEUTIC ACTIVITIES: CPT

## 2017-04-22 PROCEDURE — 700111 HCHG RX REV CODE 636 W/ 250 OVERRIDE (IP): Performed by: PHYSICAL MEDICINE & REHABILITATION

## 2017-04-22 PROCEDURE — 97116 GAIT TRAINING THERAPY: CPT

## 2017-04-22 PROCEDURE — 700102 HCHG RX REV CODE 250 W/ 637 OVERRIDE(OP): Performed by: PHYSICAL MEDICINE & REHABILITATION

## 2017-04-22 PROCEDURE — A9270 NON-COVERED ITEM OR SERVICE: HCPCS | Performed by: PHYSICAL MEDICINE & REHABILITATION

## 2017-04-22 PROCEDURE — 97535 SELF CARE MNGMENT TRAINING: CPT

## 2017-04-22 RX ADMIN — ENOXAPARIN SODIUM 40 MG: 100 INJECTION SUBCUTANEOUS at 08:20

## 2017-04-22 RX ADMIN — AMLODIPINE BESYLATE 5 MG: 5 TABLET ORAL at 05:07

## 2017-04-22 RX ADMIN — THERA TABS 1 TABLET: TAB at 08:20

## 2017-04-22 RX ADMIN — QUETIAPINE FUMARATE 25 MG: 25 TABLET, FILM COATED ORAL at 21:47

## 2017-04-22 RX ADMIN — STANDARDIZED SENNA CONCENTRATE AND DOCUSATE SODIUM 2 TABLET: 8.6; 5 TABLET, FILM COATED ORAL at 21:47

## 2017-04-22 RX ADMIN — CHOLECALCIFEROL TAB 25 MCG (1000 UNIT) 1000 UNITS: 25 TAB at 08:20

## 2017-04-22 RX ADMIN — Medication 500 MG: at 11:40

## 2017-04-22 RX ADMIN — Medication 220 MG: at 08:20

## 2017-04-22 RX ADMIN — SULFAMETHOXAZOLE AND TRIMETHOPRIM 0.5 TABLET: 800; 160 TABLET ORAL at 21:46

## 2017-04-22 RX ADMIN — AMIODARONE HYDROCHLORIDE 200 MG: 200 TABLET ORAL at 08:20

## 2017-04-22 RX ADMIN — Medication 500 MG: at 17:35

## 2017-04-22 RX ADMIN — ASPIRIN 81 MG: 81 TABLET, COATED ORAL at 08:20

## 2017-04-22 RX ADMIN — Medication 500 MG: at 08:20

## 2017-04-22 RX ADMIN — OMEPRAZOLE 20 MG: 20 CAPSULE, DELAYED RELEASE ORAL at 08:20

## 2017-04-22 RX ADMIN — STANDARDIZED SENNA CONCENTRATE AND DOCUSATE SODIUM 2 TABLET: 8.6; 5 TABLET, FILM COATED ORAL at 08:20

## 2017-04-22 RX ADMIN — SULFAMETHOXAZOLE AND TRIMETHOPRIM 0.5 TABLET: 800; 160 TABLET ORAL at 08:20

## 2017-04-22 ASSESSMENT — GAIT ASSESSMENTS
DISTANCE (FEET): 2
ASSISTIVE DEVICE: PARALLEL BARS
ASSISTIVE DEVICE: PARALLEL BARS
GAIT LEVEL OF ASSIST: MODERATE ASSIST
GAIT LEVEL OF ASSIST: MODERATE ASSIST
DISTANCE (FEET): 2

## 2017-04-22 ASSESSMENT — PAIN SCALES - GENERAL
PAINLEVEL_OUTOF10: 0

## 2017-04-22 NOTE — PROGRESS NOTES
Received shift report and assumed care of patient.  Patient awake, calm and stable, currently assisted into wheelchair. Denies pain or discomfort at this time.  Will continue to monitor.

## 2017-04-22 NOTE — CARE PLAN
Problem: Safety  Goal: Will remain free from injury  Outcome: PROGRESSING AS EXPECTED  Reviewed fall and safety precautions with patient at start of shift and throughout shift. Patient has periodic confusion and requires reminders to lock her wheelchair before transfers. Pt has used her call light consistently and appropriately and has not attempted self transfer so far this shift. Able to verbalize needs. Call light and belongings within reach, bed and chair alarms in use, bed in lowest position, treaded socks in use, and hourly rounding in place.        Problem: Pain Management  Goal: Pain level will decrease to patient’s comfort goal  Outcome: PROGRESSING AS EXPECTED  Patient reported occasional mild pain with movement of her left leg this shift, but she declined pain interventions. Patient with toe touch weight bearing on LLE.  Will continue to monitor for pain.

## 2017-04-22 NOTE — CARE PLAN
Problem: Safety  Goal: Will remain free from injury  Outcome: PROGRESSING SLOWER THAN EXPECTED  Patient is confused and impulsive. Have poor safety awareness. Hourly Rounds done by staff. Remains free from injury.     Problem: Pain Management  Goal: Pain level will decrease to patient’s comfort goal  Outcome: PROGRESSING AS EXPECTED  Patient denied any pain or discomfort during shift.

## 2017-04-23 PROCEDURE — 700111 HCHG RX REV CODE 636 W/ 250 OVERRIDE (IP): Performed by: PHYSICAL MEDICINE & REHABILITATION

## 2017-04-23 PROCEDURE — 700102 HCHG RX REV CODE 250 W/ 637 OVERRIDE(OP): Performed by: PHYSICAL MEDICINE & REHABILITATION

## 2017-04-23 PROCEDURE — A9270 NON-COVERED ITEM OR SERVICE: HCPCS | Performed by: PHYSICAL MEDICINE & REHABILITATION

## 2017-04-23 PROCEDURE — 770010 HCHG ROOM/CARE - REHAB SEMI PRIVAT*

## 2017-04-23 RX ADMIN — AMIODARONE HYDROCHLORIDE 200 MG: 200 TABLET ORAL at 08:12

## 2017-04-23 RX ADMIN — STANDARDIZED SENNA CONCENTRATE AND DOCUSATE SODIUM 2 TABLET: 8.6; 5 TABLET, FILM COATED ORAL at 20:09

## 2017-04-23 RX ADMIN — SULFAMETHOXAZOLE AND TRIMETHOPRIM 0.5 TABLET: 800; 160 TABLET ORAL at 20:09

## 2017-04-23 RX ADMIN — ENOXAPARIN SODIUM 40 MG: 100 INJECTION SUBCUTANEOUS at 08:12

## 2017-04-23 RX ADMIN — TRAMADOL HYDROCHLORIDE 50 MG: 50 TABLET, FILM COATED ORAL at 21:29

## 2017-04-23 RX ADMIN — Medication 220 MG: at 08:12

## 2017-04-23 RX ADMIN — THERA TABS 1 TABLET: TAB at 08:12

## 2017-04-23 RX ADMIN — Medication 500 MG: at 08:12

## 2017-04-23 RX ADMIN — CHOLECALCIFEROL TAB 25 MCG (1000 UNIT) 1000 UNITS: 25 TAB at 08:12

## 2017-04-23 RX ADMIN — ASPIRIN 81 MG: 81 TABLET, COATED ORAL at 08:12

## 2017-04-23 RX ADMIN — QUETIAPINE FUMARATE 25 MG: 25 TABLET, FILM COATED ORAL at 20:09

## 2017-04-23 RX ADMIN — Medication 500 MG: at 11:40

## 2017-04-23 RX ADMIN — SULFAMETHOXAZOLE AND TRIMETHOPRIM 0.5 TABLET: 800; 160 TABLET ORAL at 08:12

## 2017-04-23 RX ADMIN — OMEPRAZOLE 20 MG: 20 CAPSULE, DELAYED RELEASE ORAL at 08:12

## 2017-04-23 RX ADMIN — Medication 500 MG: at 17:46

## 2017-04-23 RX ADMIN — AMLODIPINE BESYLATE 5 MG: 5 TABLET ORAL at 05:31

## 2017-04-23 ASSESSMENT — PAIN SCALES - GENERAL
PAINLEVEL_OUTOF10: 0
PAINLEVEL_OUTOF10: 4
PAINLEVEL_OUTOF10: 0

## 2017-04-23 NOTE — CARE PLAN
Problem: Safety  Goal: Will remain free from injury  Outcome: PROGRESSING SLOWER THAN EXPECTED  Patient has periodic confusion and is forgetful. Use call light occasionally for assistance. Have poor safety awareness. Patient said she feels bored in her room. Kept patient close to nurses station and provided her with coloring book for distraction. Remains free from injury. Will continue to monitor.       Problem: Pain Management  Goal: Pain level will decrease to patient’s comfort goal  Outcome: PROGRESSING AS EXPECTED  Patient denied any pain or discomfort during shift.

## 2017-04-23 NOTE — PROGRESS NOTES
Received shift report and assumed care of patient.  Patient awake, calm and stable, assisted up in wheelchair. call light within reach.  Denies pain or discomfort at this time.  Will continue to monitor.

## 2017-04-23 NOTE — CARE PLAN
"Problem: Pain Management  Goal: Pain level will decrease to patient’s comfort goal  Outcome: PROGRESSING AS EXPECTED  Patient c/o mild neck pain 2/10. Decline any pain med, said \" I am fine.\" Will continue to monitor.     Problem: Urinary Elimination:  Goal: Ability to reestablish a normal urinary elimination pattern will improve  Outcome: PROGRESSING SLOWER THAN EXPECTED  Patient is currently on antibiotic for UTI. C/o urgency and asked to go to bathroom very often. Denied any pain or burning when urinate. Will continue to monitor.         "

## 2017-04-23 NOTE — CARE PLAN
Problem: Safety  Goal: Will remain free from injury  Outcome: PROGRESSING AS EXPECTED  Reviewed fall and safety precautions with patient at start of shift and throughout shift. Patient has periodic confusion and requires reminders to lock her wheelchair before transfers. Patient assisted with transfers to maintain toe touch weight bearing on LLE. Call light and belongings within reach, bed and chair alarms in use, bed in lowest position, treaded socks in use, and hourly rounding in place.        Problem: Pain Management  Goal: Pain level will decrease to patient’s comfort goal  Outcome: PROGRESSING AS EXPECTED  Patient has denied pain so far this shift. Will continue to monitor for pain.

## 2017-04-24 PROCEDURE — 97116 GAIT TRAINING THERAPY: CPT

## 2017-04-24 PROCEDURE — 700111 HCHG RX REV CODE 636 W/ 250 OVERRIDE (IP): Performed by: PHYSICAL MEDICINE & REHABILITATION

## 2017-04-24 PROCEDURE — 97535 SELF CARE MNGMENT TRAINING: CPT

## 2017-04-24 PROCEDURE — 99232 SBSQ HOSP IP/OBS MODERATE 35: CPT | Performed by: PHYSICAL MEDICINE & REHABILITATION

## 2017-04-24 PROCEDURE — 97110 THERAPEUTIC EXERCISES: CPT

## 2017-04-24 PROCEDURE — 700102 HCHG RX REV CODE 250 W/ 637 OVERRIDE(OP): Performed by: PHYSICAL MEDICINE & REHABILITATION

## 2017-04-24 PROCEDURE — 97532 HCHG COGNITIVE SKILL DEV. 15 MIN: CPT

## 2017-04-24 PROCEDURE — A9270 NON-COVERED ITEM OR SERVICE: HCPCS | Performed by: PHYSICAL MEDICINE & REHABILITATION

## 2017-04-24 PROCEDURE — 770010 HCHG ROOM/CARE - REHAB SEMI PRIVAT*

## 2017-04-24 RX ADMIN — QUETIAPINE FUMARATE 25 MG: 25 TABLET, FILM COATED ORAL at 19:45

## 2017-04-24 RX ADMIN — ASPIRIN 81 MG: 81 TABLET, COATED ORAL at 08:10

## 2017-04-24 RX ADMIN — Medication 500 MG: at 17:27

## 2017-04-24 RX ADMIN — TRAMADOL HYDROCHLORIDE 50 MG: 50 TABLET, FILM COATED ORAL at 19:45

## 2017-04-24 RX ADMIN — AMLODIPINE BESYLATE 5 MG: 5 TABLET ORAL at 05:33

## 2017-04-24 RX ADMIN — AMIODARONE HYDROCHLORIDE 200 MG: 200 TABLET ORAL at 08:10

## 2017-04-24 RX ADMIN — Medication 220 MG: at 08:10

## 2017-04-24 RX ADMIN — SULFAMETHOXAZOLE AND TRIMETHOPRIM 0.5 TABLET: 800; 160 TABLET ORAL at 08:10

## 2017-04-24 RX ADMIN — STANDARDIZED SENNA CONCENTRATE AND DOCUSATE SODIUM 2 TABLET: 8.6; 5 TABLET, FILM COATED ORAL at 19:45

## 2017-04-24 RX ADMIN — OMEPRAZOLE 20 MG: 20 CAPSULE, DELAYED RELEASE ORAL at 08:10

## 2017-04-24 RX ADMIN — SULFAMETHOXAZOLE AND TRIMETHOPRIM 0.5 TABLET: 800; 160 TABLET ORAL at 19:45

## 2017-04-24 RX ADMIN — CHOLECALCIFEROL TAB 25 MCG (1000 UNIT) 1000 UNITS: 25 TAB at 08:10

## 2017-04-24 RX ADMIN — TRAMADOL HYDROCHLORIDE 50 MG: 50 TABLET, FILM COATED ORAL at 08:10

## 2017-04-24 RX ADMIN — Medication 500 MG: at 08:10

## 2017-04-24 RX ADMIN — THERA TABS 1 TABLET: TAB at 08:10

## 2017-04-24 RX ADMIN — ENOXAPARIN SODIUM 40 MG: 100 INJECTION SUBCUTANEOUS at 08:12

## 2017-04-24 RX ADMIN — Medication 500 MG: at 11:30

## 2017-04-24 RX ADMIN — STANDARDIZED SENNA CONCENTRATE AND DOCUSATE SODIUM 2 TABLET: 8.6; 5 TABLET, FILM COATED ORAL at 08:10

## 2017-04-24 ASSESSMENT — PAIN SCALES - GENERAL
PAINLEVEL_OUTOF10: 4
PAINLEVEL_OUTOF10: 0
PAINLEVEL_OUTOF10: 2
PAINLEVEL_OUTOF10: 4

## 2017-04-24 ASSESSMENT — GAIT ASSESSMENTS
DISTANCE (FEET): 40
ASSISTIVE DEVICE: PARALLEL BARS;FRONT WHEEL WALKER
GAIT LEVEL OF ASSIST: CONTACT GUARD ASSIST

## 2017-04-24 NOTE — PROGRESS NOTES
"Rehab Progress Note     Interval Events (Subjective)  Patient seen and examined today. Patient more alert today.  Ros: Last bowel movement 4/23/17    Objective:  VITAL SIGNS: /68 mmHg  Pulse 71  Temp(Src) 36.8 °C (98.2 °F)  Resp 18  Ht 1.575 m (5' 2\")  Wt 56.1 kg (123 lb 10.9 oz)  BMI 22.62 kg/m2  SpO2 95%  Heart regular rate and rhythm   lungs clear to auscultation  Abdominal exam bowel sounds ×4      No results found for this or any previous visit (from the past 72 hour(s)).    Current Facility-Administered Medications   Medication Frequency   • sulfamethoxazole-trimethoprim (BACTRIM DS) 800-160 MG tablet 0.5 Tab Q12HRS   • amlodipine (NORVASC) tablet 5 mg Q DAY   • FORMULATION R 0.25-3-14-71.9 % ointment PRN   • ondansetron (ZOFRAN ODT) dispertab 4 mg Q4HRS PRN   • senna-docusate (PERICOLACE or SENOKOT S) 8.6-50 MG per tablet 2 Tab BID    And   • polyethylene glycol/lytes (MIRALAX) PACKET 1 Packet QDAY PRN    And   • magnesium hydroxide (MILK OF MAGNESIA) suspension 30 mL QDAY PRN    And   • bisacodyl (DULCOLAX) suppository 10 mg QDAY PRN   • acetaminophen (TYLENOL) suppository 650 mg Q4HRS PRN   • amiodarone (CORDARONE) tablet 200 mg DAILY   • aspirin EC (ECOTRIN) tablet 81 mg DAILY   • calcium carbonate (OS-DIANNA 500) tablet 500 mg TID WITH MEALS   • enoxaparin (LOVENOX) inj 40 mg DAILY   • multivitamin (THERAGRAN) tablet 1 Tab DAILY   • omeprazole (PRILOSEC) capsule 20 mg DAILY   • quetiapine (SEROQUEL) tablet 25 mg Q EVENING   • tramadol (ULTRAM) 50 MG tablet 100 mg Q6HRS PRN   • tramadol (ULTRAM) 50 MG tablet 50 mg Q4HRS PRN   • vitamin D (cholecalciferol) tablet 1,000 Units DAILY   • zinc sulfate (ZINCATE) capsule 220 mg DAILY       Orders Placed This Encounter   Procedures   • DIET ORDER     Standing Status: Standing      Number of Occurrences: 1      Standing Expiration Date:      Order Specific Question:  Diet:     Answer:  Regular [1]       Assessment:  Active Hospital Problems    Diagnosis "   • Hip fracture (CMS[facility])   left femoral fracture Closed reduction percutaneous screw fixation with  ttwb left lower extremity    syncope  Early dementia with some encephalopathy during hospitalization, likely  secondary to medications with improvement during hospitalization.  Atrial fibrillation.  Gastroesophageal reflux disease.  Hypertension  bladder program  bowel program  UTI: On Bactrim and culture is sensitive we'll treat for 1 more day  IMPAIRMENTS:    Mobility  Self-care  IADLs    PLAN:    Discussion and Recommendations:    1. The patient requires an acute inpatient rehabilitation program with a coordinated program of care at an intensity and frequency not available at a lower level of care. This recommendation is substantiated by the patient's medical physicians who recommend that the patient's intervention and assessment of medical issues needs to be done at an acute level of care for patient's safety and maximum outcome.    2. A coordinated program of care will be supplied by an interdisciplinary team of physical therapy, occupational therapy, rehab physician, rehab nursing, and, if needed, speech therapy and rehab psychology. Rehab team presents a patient-specific rehabilitation and education program concentrating on prevention of future problems related to accessibility, mobility, skin, bowel, bladder, sexuality, and psychosocial and medical/surgical problems.    3. Need for Rehabilitation Physician: The rehab physician will be evaluating the patient on a multi-weekly basis to help coordinate the program of care. The rehab physician communicates between medical physicians, therapists, and nurses to maximize the patient's potential outcome. Specific areas in which the rehab physician will be providing daily assessment include the following:    A. Assessing the patient's heart rate and blood pressure response (vitals monitoring) to activity and making adjustments in medications or conservative measures  as needed.    B. The rehab physician will be assessing the frequency at which the program can be increased to allow the patient to reach optimal functional outcome.    C. The rehab physician will also provide assessments in daily skin care, especially in light of patient's impairments in mobility.    D. The rehab physician will provide special expertise in understanding how to work with functional impairment and recommend appropriate interventions, compensatory techniques, and education that will facilitate the patient's outcome.    4. Rehab R.N.    The rehab RN will be working with patient to carry over in room mobility and activities of daily living when the patient is not in 3 hours of skilled therapy. Rehab nursing will be working in conjunction with rehab physician to address all the medical issues above and continue to assess laboratory work and discuss abnormalities with the treating physicians, assess vitals, and response to activity, and discuss and report abnormalities with the rehab physician. Rehab RN will also continue daily skin care, supervise bladder/bowel program, instruct in medication administration, and ensure patient safety.     MEDICAL DECISION MAKING and INTERDISCIPLINARY PLAN OF CARE:    REHABILITATION ISSUES/ADVERSE POTENTIAL::  1. Left femoral neck fracture Patient demonstrates functional deficits in strength, balance, coordination, and ADL's. Patient is admitted to [facility] for comprehensive rehabilitation therapy as described below.   Rehabilitation nursing monitors bowel and bladder control, educates on medication administration, co-morbidities and monitors patient safety.    Therapies to treat at intensity and frequency of (may change after completion of evaluation by all therapeutic disciplines):       PT:  Physical therapy to address mobility, transfer, gait training and evaluation for adaptive equipment needs 1.5hour/day at least 5 days/week for the duration of  the ELOS (see below)       OT:  Occupational therapy to address ADLs, self-care, home management training, functional mobility/transfers and assistive device evaluation, and community re-intergration 1.5hour/day at least 5 days/week for the duration of the ELOS (see below).        ST/Dysphagia:  Speech therapy to address speech, language,and cognitive deficits as well as swallowing difficulties with retraining/dysphagia management and community re-integration with comprehension, expression, cognitive training 0 hour/day at least 5 days/week for the duration of the ELOS (see below).     2.  Neurostimulants: None at this time but continue to assess daily for need to initiate should status change.    3.  DVT prophylaxis:  Patient is on Lovenox for anticoagulation upon transfer. Encourage OOB. Monitor daily for signs and symptoms of DVT including but not limited to swelling and pain to prevent the development of DVT that may interfere with therapies.    4.  GI prophylaxis:  On prilosec to prevent gastritis/dyspepsia which may interfere with therapies.    5.  Pain: No issues with pain currently / Controlled with Ultram    6.  Nutrition/Dysphagia: Dietician monitors nutrient intake, recommend supplements prn and provide nutrition education to pt/family to promote optimal nutrition for wound healing/recovery.     7.  Bladder/bowel:  Start bowel and bladder program as described below, to prevent constipation, urinary retention (which may lead to UTI), and urinary incontinence (which will impact upon pt's functional independence).    - TV Q3h while awake with post void bladder scans, I&O cath for PVRs >400  - up to commode after meal     8.  Skin/dermal ulcer prophylaxis: Monitor for new skin conditions with q.2 h. turns as required to prevent the development of skin breakdown.     9.  Cognition/Behavior:  Psychologist Dr. Schafer provides adjustment counseling to illness and psychosocial barriers that may be potential  barriers to rehabilitation.     10. Respiratory therapy: RT performs O2 management prn, breathing retraining, pulmonary hygeine and bronchospasm management prn to optimize participation in therapies.      MEDICAL CO-MORBIDITIES/ADVERSE POTENTIAL AFFECTING FUNCTION:        GOALS/EXPECTED LEVEL OF FUNCTION BASED ON CURRENT MEDICAL AND FUNCTIONAL STATUS (may change based on patient's medical status and rate of impairment recovery):     Transfers: Supervised to modified independent     Mobility/Gait: Supervised to modified independent     ADL's: Supervised to modified independent     Cognition: Independent    DISPOSITION: home with assistance     ELOS: 14 days    Medical Decision Making and Plan:  Nursing to assist patient with fluid intake patient to be reminded every couple of hours to take into 200 mL of fluids while awake    I attended and led team conference 4-  Nursing:uti on bactrim, confusion, continent of bowel and bladder, incision intact, fluids 500cc, meals % , daughter brought in food  PT: limited by ttwb, bed mob sba, transfers with fww cga and lots of cues, gait with 2 step pattern but difficult will work on // bars.   OT: sup seated grooming, mod I eating, cga bathing stance, impaired balance and weight bearing, thoroughness, upper body dressing bra sweater seated in wc min, lower body min, with max verbal cues for strategies, cga toileting with poor adherance to ttwb, poor carryover  ST: max problem solving memory and executive function, continuum for day program  Case management:lives with daughter and grandchildren , neurology consult after discharge, 24 hour supervision and assist  Discharge date: 4-    Urine culture sensitive for Bactrim we'll treat for 1 more day  Total time:  >25 minutes.  I spent greater than 50% of the time for patient care and coordination on this date, including unit/floor time, and face-to-face time with the patient as per assessment and plan  above.    Kylah Marvin D.O.

## 2017-04-24 NOTE — PROGRESS NOTES
Assumed care of patient, received report from Night Shift RN. Assessment completed. A/O x3-4. Patient medicated for pain last night, will medicate this AM as necesary. Call light within reach, educated about fall and safety precautions, bed alarm is on and in use. No complaints at this time. Hourly rounding in place. All needs met.

## 2017-04-24 NOTE — CARE PLAN
Problem: Safety  Goal: Will remain free from falls  Outcome: PROGRESSING AS EXPECTED  Patient is a fall risk and is sometimes confused during the shift. Safety and fall precautions in place, non skid socks on, bed alarm and chair alarms in use, call light within reach and hourly rounding in place.    Problem: Venous Thromboembolism (VTW)/Deep Vein Thrombosis (DVT) Prevention:  Goal: Patient will participate in Venous Thrombosis (VTE)/Deep Vein Thrombosis (DVT)Prevention Measures  Outcome: PROGRESSING AS EXPECTED  Patient is TTWB on the left from nailing of hip and fracture, is at risk for DVT. Medicated per the MAR with lovenox.

## 2017-04-24 NOTE — CARE PLAN
Problem: Safety  Goal: Will remain free from falls  Intervention: Implement fall precautions  Use of kam light encouraged to make needs known.Side rails up, bed in low position, non skid socks/shoes on when out of bed.Alarms in place for safety.Call light and things within reach.Will continue to monitor and assess needs and safety.      Problem: Pain Management  Goal: Pain level will decrease to patient’s comfort goal  Intervention: Follow pain managment plan developed in collaboration with patient and Interdisciplinary Team  Medicated per request for pain with good effect.Repositioned with pillows for comfort.Will continue to monitor and assess pain level and medicate as needed.      Problem: Urinary Elimination:  Goal: Ability to reestablish a normal urinary elimination pattern will improve  Intervention: Assist patient to sit on commode or toilet for voiding  Assisted to the bathroom, continent of bladder.Will continue to monitor and assess.

## 2017-04-25 PROCEDURE — 97110 THERAPEUTIC EXERCISES: CPT

## 2017-04-25 PROCEDURE — 700111 HCHG RX REV CODE 636 W/ 250 OVERRIDE (IP): Performed by: PHYSICAL MEDICINE & REHABILITATION

## 2017-04-25 PROCEDURE — 99232 SBSQ HOSP IP/OBS MODERATE 35: CPT | Performed by: PHYSICAL MEDICINE & REHABILITATION

## 2017-04-25 PROCEDURE — 770010 HCHG ROOM/CARE - REHAB SEMI PRIVAT*

## 2017-04-25 PROCEDURE — 97535 SELF CARE MNGMENT TRAINING: CPT

## 2017-04-25 PROCEDURE — 97532 HCHG COGNITIVE SKILL DEV. 15 MIN: CPT

## 2017-04-25 PROCEDURE — 700102 HCHG RX REV CODE 250 W/ 637 OVERRIDE(OP): Performed by: PHYSICAL MEDICINE & REHABILITATION

## 2017-04-25 PROCEDURE — A9270 NON-COVERED ITEM OR SERVICE: HCPCS | Performed by: PHYSICAL MEDICINE & REHABILITATION

## 2017-04-25 PROCEDURE — 97530 THERAPEUTIC ACTIVITIES: CPT

## 2017-04-25 RX ORDER — TRAZODONE HYDROCHLORIDE 50 MG/1
50 TABLET ORAL
Status: DISCONTINUED | OUTPATIENT
Start: 2017-04-25 | End: 2017-05-05 | Stop reason: HOSPADM

## 2017-04-25 RX ADMIN — Medication 220 MG: at 08:03

## 2017-04-25 RX ADMIN — ENOXAPARIN SODIUM 40 MG: 100 INJECTION SUBCUTANEOUS at 08:03

## 2017-04-25 RX ADMIN — OMEPRAZOLE 20 MG: 20 CAPSULE, DELAYED RELEASE ORAL at 08:03

## 2017-04-25 RX ADMIN — POLYETHYLENE GLYCOL 3350 1 PACKET: 17 POWDER, FOR SOLUTION ORAL at 11:36

## 2017-04-25 RX ADMIN — Medication 500 MG: at 17:49

## 2017-04-25 RX ADMIN — Medication 500 MG: at 11:35

## 2017-04-25 RX ADMIN — STANDARDIZED SENNA CONCENTRATE AND DOCUSATE SODIUM 2 TABLET: 8.6; 5 TABLET, FILM COATED ORAL at 20:16

## 2017-04-25 RX ADMIN — AMLODIPINE BESYLATE 5 MG: 5 TABLET ORAL at 05:18

## 2017-04-25 RX ADMIN — ASPIRIN 81 MG: 81 TABLET, COATED ORAL at 08:03

## 2017-04-25 RX ADMIN — CHOLECALCIFEROL TAB 25 MCG (1000 UNIT) 1000 UNITS: 25 TAB at 08:03

## 2017-04-25 RX ADMIN — STANDARDIZED SENNA CONCENTRATE AND DOCUSATE SODIUM 2 TABLET: 8.6; 5 TABLET, FILM COATED ORAL at 08:03

## 2017-04-25 RX ADMIN — SULFAMETHOXAZOLE AND TRIMETHOPRIM 0.5 TABLET: 800; 160 TABLET ORAL at 08:03

## 2017-04-25 RX ADMIN — TRAZODONE HYDROCHLORIDE 50 MG: 50 TABLET ORAL at 20:16

## 2017-04-25 RX ADMIN — TRAMADOL HYDROCHLORIDE 50 MG: 50 TABLET, FILM COATED ORAL at 22:14

## 2017-04-25 RX ADMIN — SULFAMETHOXAZOLE AND TRIMETHOPRIM 0.5 TABLET: 800; 160 TABLET ORAL at 20:16

## 2017-04-25 RX ADMIN — THERA TABS 1 TABLET: TAB at 08:03

## 2017-04-25 RX ADMIN — Medication 500 MG: at 08:03

## 2017-04-25 RX ADMIN — AMIODARONE HYDROCHLORIDE 200 MG: 200 TABLET ORAL at 08:03

## 2017-04-25 ASSESSMENT — PAIN SCALES - GENERAL
PAINLEVEL_OUTOF10: 4
PAINLEVEL_OUTOF10: 0

## 2017-04-25 NOTE — CARE PLAN
Problem: Infection  Goal: Will remain free from infection  Intervention: Assess signs and symptoms of infection  Pt is on Bactrim DS every 12 hours for UTI.Pt is afebrile.Will continue to monitor and assess.      Problem: Pain Management  Goal: Pain level will decrease to patient’s comfort goal  Intervention: Follow pain managment plan developed in collaboration with patient and Interdisciplinary Team  Medicated per request for pain with good relief.Repositioned with pillows for comfort.Will continue to monitor and assess pain level and medicate as needed.

## 2017-04-25 NOTE — PROGRESS NOTES
Patient was found on the floor by housekeeping who called for a RN who called for this RN. Patient got up from  to go the bathroom and fell near her bed. Staff assisted patient to her WC and to bathroom where she had a BM. Charge RN present for transport. VSS after patient placed into bed to rest. No neuro changes, no pain, no redness to skin. Patient says she fell onto right side. Reminded patient that she cannot walk as she has fractured her left hip. She has forgotten. Will notify family now, MD already notified, will continue to monitor.

## 2017-04-25 NOTE — PROGRESS NOTES
Assumed care of patient, received report from Night Shift RN. Assessment completed. A/O x3-4. Patient denies pain. Call light within reach, educated about fall and safety precautions, bed alarm is on and in use. No complaints at this time. Hourly rounding in place. All needs met.

## 2017-04-25 NOTE — DISCHARGE PLANNING
Case Management.   · Dtr is concerned about pt's fall last night and prefers to have her mom remain in the acute rehab setting longer than 5/1; discussed with md/ therapy who are in agreement /w continued therapies if approval receivied from pt's insurance - Medicaid.  Dtr is requesting Urology follow up due to frequent urination; Dr. Miranda/Hospitalists consulted also.     · Tc to [facility]   Home and Community Based Waiver [phone] as message received that pt is already on this program.  Left message requesting additional  PCA services be in the home along with home health.   · Return TC to pt's dtr Sun  @  [phone] as she was concerned about pt's fall and dc date.  Left detailed message confirming pt's rehab stay can be extended with authorization from insurance for an extension. Will submit on Friday 4/28/2017.

## 2017-04-25 NOTE — CARE PLAN
Problem: Infection  Goal: Will remain free from infection  Outcome: PROGRESSING AS EXPECTED  Patient's left hip is healing nicely, staples still in place after 15 days-awaiting order for staple removal and will redress wound. No s/s of infection but around staples it is pink. Should clear up after removal.    Problem: Venous Thromboembolism (VTW)/Deep Vein Thrombosis (DVT) Prevention:  Goal: Patient will participate in Venous Thrombosis (VTE)/Deep Vein Thrombosis (DVT)Prevention Measures  Outcome: PROGRESSING AS EXPECTED  Medicated patient per the MAR with lovenox to prevent clot formation from immobility after surgery on hip.

## 2017-04-25 NOTE — DISCHARGE PLANNING
Case Management  I met with pt 's dtr, Sun who confirms she is committed to having her mother return home with her and her 3 children (ages 18, 19, and 21).  She realizes being a caregiver can be overwhelming, but she does not want he mother to go to a skilled nursing facility.  Family training ongoing.      PT will need a fww/wheelchair with cushion/3:1 commode.  OT to confirm if pt needs a tub transfer bench or a shower chair.     Discussed home health initially and then transition to [facility] for their adult day care program.  Pt had been going 5 days a week w/ dtr providing transportation as RTC does not service where they reside.  At this time, pt is sba/cga with transfer-will confirm if [facility] is able to accommodate assistance w/ transfers or if pt needs to be at an Indep level.  Tc to [facility] to confirm if they are able to accept pt at a SBA/CGA transfer. Not available; left message.      Provided emotional support to Sun as she spoke of her 's illness and death.     If possible, dtr prefers dc on Monday 5/1/2017.

## 2017-04-25 NOTE — PROGRESS NOTES
"Rehab Progress Note     Interval Events (Subjective)  Patient seen and examined today. Patient slipped today without injury, vitals taken and stable.   Ros: Last bowel movement 4/23/17    Objective:  VITAL SIGNS: /76 mmHg  Pulse 70  Temp(Src) 36.5 °C (97.7 °F)  Resp 18  Ht 1.575 m (5' 2\")  Wt 56.1 kg (123 lb 10.9 oz)  BMI 22.62 kg/m2  SpO2 97%  Heart regular rate and rhythm   lungs clear to auscultation  Abdominal exam bowel sounds ×4      No results found for this or any previous visit (from the past 72 hour(s)).    Current Facility-Administered Medications   Medication Frequency   • trazodone (DESYREL) tablet 50 mg QHS   • sulfamethoxazole-trimethoprim (BACTRIM DS) 800-160 MG tablet 0.5 Tab Q12HRS   • amlodipine (NORVASC) tablet 5 mg Q DAY   • FORMULATION R 0.25-3-14-71.9 % ointment PRN   • ondansetron (ZOFRAN ODT) dispertab 4 mg Q4HRS PRN   • senna-docusate (PERICOLACE or SENOKOT S) 8.6-50 MG per tablet 2 Tab BID    And   • polyethylene glycol/lytes (MIRALAX) PACKET 1 Packet QDAY PRN    And   • magnesium hydroxide (MILK OF MAGNESIA) suspension 30 mL QDAY PRN    And   • bisacodyl (DULCOLAX) suppository 10 mg QDAY PRN   • acetaminophen (TYLENOL) suppository 650 mg Q4HRS PRN   • amiodarone (CORDARONE) tablet 200 mg DAILY   • aspirin EC (ECOTRIN) tablet 81 mg DAILY   • calcium carbonate (OS-DIANNA 500) tablet 500 mg TID WITH MEALS   • enoxaparin (LOVENOX) inj 40 mg DAILY   • multivitamin (THERAGRAN) tablet 1 Tab DAILY   • omeprazole (PRILOSEC) capsule 20 mg DAILY   • tramadol (ULTRAM) 50 MG tablet 100 mg Q6HRS PRN   • tramadol (ULTRAM) 50 MG tablet 50 mg Q4HRS PRN   • vitamin D (cholecalciferol) tablet 1,000 Units DAILY   • zinc sulfate (ZINCATE) capsule 220 mg DAILY       Orders Placed This Encounter   Procedures   • DIET ORDER     Standing Status: Standing      Number of Occurrences: 1      Standing Expiration Date:      Order Specific Question:  Diet:     Answer:  Regular [1]       Assessment:  Active " Hospital Problems    Diagnosis   • Hip fracture (CMS-HCC)   left femoral fracture Closed reduction percutaneous screw fixation with  ttwb left lower extremity    syncope  Early dementia with some encephalopathy during hospitalization, likely  secondary to medications with improvement during hospitalization.  Atrial fibrillation.  Gastroesophageal reflux disease.  Hypertension  bladder program  bowel program  UTI: On Bactrim and culture is sensitive we'll treat for 1 more day  IMPAIRMENTS:    Mobility  Self-care  IADLs    PLAN:    Discussion and Recommendations:    1. The patient requires an acute inpatient rehabilitation program with a coordinated program of care at an intensity and frequency not available at a lower level of care. This recommendation is substantiated by the patient's medical physicians who recommend that the patient's intervention and assessment of medical issues needs to be done at an acute level of care for patient's safety and maximum outcome.    2. A coordinated program of care will be supplied by an interdisciplinary team of physical therapy, occupational therapy, rehab physician, rehab nursing, and, if needed, speech therapy and rehab psychology. Rehab team presents a patient-specific rehabilitation and education program concentrating on prevention of future problems related to accessibility, mobility, skin, bowel, bladder, sexuality, and psychosocial and medical/surgical problems.    3. Need for Rehabilitation Physician: The rehab physician will be evaluating the patient on a multi-weekly basis to help coordinate the program of care. The rehab physician communicates between medical physicians, therapists, and nurses to maximize the patient's potential outcome. Specific areas in which the rehab physician will be providing daily assessment include the following:    A. Assessing the patient's heart rate and blood pressure response (vitals monitoring) to activity and making adjustments in  medications or conservative measures as needed.    B. The rehab physician will be assessing the frequency at which the program can be increased to allow the patient to reach optimal functional outcome.    C. The rehab physician will also provide assessments in daily skin care, especially in light of patient's impairments in mobility.    D. The rehab physician will provide special expertise in understanding how to work with functional impairment and recommend appropriate interventions, compensatory techniques, and education that will facilitate the patient's outcome.    4. Rehab R.N.    The rehab RN will be working with patient to carry over in room mobility and activities of daily living when the patient is not in 3 hours of skilled therapy. Rehab nursing will be working in conjunction with rehab physician to address all the medical issues above and continue to assess laboratory work and discuss abnormalities with the treating physicians, assess vitals, and response to activity, and discuss and report abnormalities with the rehab physician. Rehab RN will also continue daily skin care, supervise bladder/bowel program, instruct in medication administration, and ensure patient safety.     MEDICAL DECISION MAKING and INTERDISCIPLINARY PLAN OF CARE:    REHABILITATION ISSUES/ADVERSE POTENTIAL::  1. Left femoral neck fracture Patient demonstrates functional deficits in strength, balance, coordination, and ADL's. Patient is admitted to [facility] for comprehensive rehabilitation therapy as described below.   Rehabilitation nursing monitors bowel and bladder control, educates on medication administration, co-morbidities and monitors patient safety.    Therapies to treat at intensity and frequency of (may change after completion of evaluation by all therapeutic disciplines):       PT:  Physical therapy to address mobility, transfer, gait training and evaluation for adaptive equipment needs 1.5hour/day at  least 5 days/week for the duration of the ELOS (see below)       OT:  Occupational therapy to address ADLs, self-care, home management training, functional mobility/transfers and assistive device evaluation, and community re-intergration 1.5hour/day at least 5 days/week for the duration of the ELOS (see below).        ST/Dysphagia:  Speech therapy to address speech, language,and cognitive deficits as well as swallowing difficulties with retraining/dysphagia management and community re-integration with comprehension, expression, cognitive training 0 hour/day at least 5 days/week for the duration of the ELOS (see below).     2.  Neurostimulants: None at this time but continue to assess daily for need to initiate should status change.    3.  DVT prophylaxis:  Patient is on Lovenox for anticoagulation upon transfer. Encourage OOB. Monitor daily for signs and symptoms of DVT including but not limited to swelling and pain to prevent the development of DVT that may interfere with therapies.    4.  GI prophylaxis:  On prilosec to prevent gastritis/dyspepsia which may interfere with therapies.    5.  Pain: No issues with pain currently / Controlled with Ultram    6.  Nutrition/Dysphagia: Dietician monitors nutrient intake, recommend supplements prn and provide nutrition education to pt/family to promote optimal nutrition for wound healing/recovery.     7.  Bladder/bowel:  Start bowel and bladder program as described below, to prevent constipation, urinary retention (which may lead to UTI), and urinary incontinence (which will impact upon pt's functional independence).    - TV Q3h while awake with post void bladder scans, I&O cath for PVRs >400  - up to commode after meal     8.  Skin/dermal ulcer prophylaxis: Monitor for new skin conditions with q.2 h. turns as required to prevent the development of skin breakdown.     9.  Cognition/Behavior:  Psychologist Dr. Schafer provides adjustment counseling to illness and  psychosocial barriers that may be potential barriers to rehabilitation.     10. Respiratory therapy: RT performs O2 management prn, breathing retraining, pulmonary hygeine and bronchospasm management prn to optimize participation in therapies.      MEDICAL CO-MORBIDITIES/ADVERSE POTENTIAL AFFECTING FUNCTION:        GOALS/EXPECTED LEVEL OF FUNCTION BASED ON CURRENT MEDICAL AND FUNCTIONAL STATUS (may change based on patient's medical status and rate of impairment recovery):     Transfers: Supervised to modified independent     Mobility/Gait: Supervised to modified independent     ADL's: Supervised to modified independent     Cognition: Independent    DISPOSITION: home with assistance     ELOS: 14 days    Medical Decision Making and Plan:  Nursing to assist patient with fluid intake patient to be reminded every couple of hours to take into 200 mL of fluids while awake    I attended and led team conference 4-  Nursing:uti on bactrim, confusion, continent of bowel and bladder, incision intact, fluids 500cc, meals % , daughter brought in food  PT: limited by ttwb, bed mob sba, transfers with fww cga and lots of cues, gait with 2 step pattern but difficult will work on // bars.   OT: sup seated grooming, mod I eating, cga bathing stance, impaired balance and weight bearing, thoroughness, upper body dressing bra sweater seated in wc min, lower body min, with max verbal cues for strategies, cga toileting with poor adherance to ttwb, poor carryover  ST: max problem solving memory and executive function, continuum for day program  Case management:lives with daughter and grandchildren , neurology consult after discharge, 24 hour supervision and assist  Discharge date: 4-    May be orthostatic, discussed case with Dr Miranda and he will consult.   Total time:  >25 minutes.  I spent greater than 50% of the time for patient care and coordination on this date, including unit/floor time, and face-to-face time with  the patient as per assessment and plan above.    Kylah Marvin D.O.

## 2017-04-25 NOTE — DISCHARGE PLANNING
[facility] is able to accept pt back at a sba/cga level for transfers.     Tc to Medicaid COPE program [phone] for assistance w/ personal care/bathing/dressing.  VM referral made to [facility]/Referral placed to [facility] (preferred provider with Medicaid) for wheelchair w/ cushion, fww, 3:1 commode which should all be covered.     Follow up appts made with PCP and Ortho.

## 2017-04-26 ENCOUNTER — APPOINTMENT (OUTPATIENT)
Dept: RADIOLOGY | Facility: REHABILITATION | Age: 82
DRG: 559 | End: 2017-04-26
Attending: PHYSICAL MEDICINE & REHABILITATION
Payer: MEDICAID

## 2017-04-26 PROCEDURE — 99223 1ST HOSP IP/OBS HIGH 75: CPT | Performed by: HOSPITALIST

## 2017-04-26 PROCEDURE — A9270 NON-COVERED ITEM OR SERVICE: HCPCS | Performed by: PHYSICAL MEDICINE & REHABILITATION

## 2017-04-26 PROCEDURE — 97535 SELF CARE MNGMENT TRAINING: CPT

## 2017-04-26 PROCEDURE — 73502 X-RAY EXAM HIP UNI 2-3 VIEWS: CPT | Mod: LT

## 2017-04-26 PROCEDURE — 97532 HCHG COGNITIVE SKILL DEV. 15 MIN: CPT

## 2017-04-26 PROCEDURE — 700102 HCHG RX REV CODE 250 W/ 637 OVERRIDE(OP)

## 2017-04-26 PROCEDURE — A9270 NON-COVERED ITEM OR SERVICE: HCPCS

## 2017-04-26 PROCEDURE — 97110 THERAPEUTIC EXERCISES: CPT

## 2017-04-26 PROCEDURE — 97116 GAIT TRAINING THERAPY: CPT

## 2017-04-26 PROCEDURE — 700102 HCHG RX REV CODE 250 W/ 637 OVERRIDE(OP): Performed by: PHYSICAL MEDICINE & REHABILITATION

## 2017-04-26 PROCEDURE — 700111 HCHG RX REV CODE 636 W/ 250 OVERRIDE (IP): Performed by: PHYSICAL MEDICINE & REHABILITATION

## 2017-04-26 PROCEDURE — 770010 HCHG ROOM/CARE - REHAB SEMI PRIVAT*

## 2017-04-26 PROCEDURE — 99232 SBSQ HOSP IP/OBS MODERATE 35: CPT | Performed by: PHYSICAL MEDICINE & REHABILITATION

## 2017-04-26 RX ORDER — MIDODRINE HYDROCHLORIDE 2.5 MG/1
TABLET ORAL
Status: COMPLETED
Start: 2017-04-26 | End: 2017-04-26

## 2017-04-26 RX ORDER — AMLODIPINE BESYLATE 5 MG/1
5 TABLET ORAL EVERY EVENING
Status: DISCONTINUED | OUTPATIENT
Start: 2017-04-26 | End: 2017-04-28

## 2017-04-26 RX ORDER — MIDODRINE HYDROCHLORIDE 2.5 MG/1
7.5 TABLET ORAL EVERY MORNING
Status: DISCONTINUED | OUTPATIENT
Start: 2017-04-26 | End: 2017-04-27

## 2017-04-26 RX ORDER — AMLODIPINE BESYLATE 5 MG/1
TABLET ORAL
Status: COMPLETED
Start: 2017-04-26 | End: 2017-04-26

## 2017-04-26 RX ADMIN — Medication 500 MG: at 11:06

## 2017-04-26 RX ADMIN — ENOXAPARIN SODIUM 40 MG: 100 INJECTION SUBCUTANEOUS at 08:43

## 2017-04-26 RX ADMIN — SULFAMETHOXAZOLE AND TRIMETHOPRIM 0.5 TABLET: 800; 160 TABLET ORAL at 08:42

## 2017-04-26 RX ADMIN — Medication 500 MG: at 17:20

## 2017-04-26 RX ADMIN — MIDODRINE HYDROCHLORIDE 7.5 MG: 2.5 TABLET ORAL at 19:54

## 2017-04-26 RX ADMIN — CHOLECALCIFEROL TAB 25 MCG (1000 UNIT) 1000 UNITS: 25 TAB at 08:43

## 2017-04-26 RX ADMIN — AMLODIPINE BESYLATE 5 MG: 5 TABLET ORAL at 19:56

## 2017-04-26 RX ADMIN — OMEPRAZOLE 20 MG: 20 CAPSULE, DELAYED RELEASE ORAL at 08:42

## 2017-04-26 RX ADMIN — ASPIRIN 81 MG: 81 TABLET, COATED ORAL at 08:42

## 2017-04-26 RX ADMIN — STANDARDIZED SENNA CONCENTRATE AND DOCUSATE SODIUM 2 TABLET: 8.6; 5 TABLET, FILM COATED ORAL at 08:42

## 2017-04-26 RX ADMIN — THERA TABS 1 TABLET: TAB at 08:42

## 2017-04-26 RX ADMIN — MICONAZOLE NITRATE 1 APPLICATOR: 20 CREAM VAGINAL at 19:58

## 2017-04-26 RX ADMIN — AMIODARONE HYDROCHLORIDE 200 MG: 200 TABLET ORAL at 08:42

## 2017-04-26 RX ADMIN — AMLODIPINE BESYLATE 5 MG: 5 TABLET ORAL at 05:40

## 2017-04-26 RX ADMIN — STANDARDIZED SENNA CONCENTRATE AND DOCUSATE SODIUM 2 TABLET: 8.6; 5 TABLET, FILM COATED ORAL at 19:53

## 2017-04-26 RX ADMIN — Medication 500 MG: at 08:42

## 2017-04-26 RX ADMIN — Medication 220 MG: at 08:43

## 2017-04-26 RX ADMIN — TRAZODONE HYDROCHLORIDE 50 MG: 50 TABLET ORAL at 19:54

## 2017-04-26 ASSESSMENT — PAIN SCALES - GENERAL
PAINLEVEL_OUTOF10: 0
PAINLEVEL_OUTOF10: 0

## 2017-04-26 ASSESSMENT — GAIT ASSESSMENTS
DEVIATION: STEP TO;DECREASED BASE OF SUPPORT;BRADYKINETIC;DECREASED HEEL STRIKE;DECREASED TOE OFF
ASSISTIVE DEVICE: PARALLEL BARS;FRONT WHEEL WALKER
GAIT LEVEL OF ASSIST: CONTACT GUARD ASSIST
DISTANCE (FEET): 40

## 2017-04-26 NOTE — REHAB-PHARMACY IDT TEAM NOTE
Pharmacy  Pharmacy  Antibiotics/Antifungals/Antivirals:  Medication:      Active Orders     None        Route:         None  Stop Date:  N/A  Reason:   Antihypertensives/Cardiac:  Medication:    Orders (72h ago through future)    Start     Ordered    04/19/17 0915  amlodipine (NORVASC) tablet 5 mg   Q DAY      04/19/17 0858    04/18/17 0900  amiodarone (CORDARONE) tablet 200 mg   DAILY      04/17/17 1724        Patient Vitals for the past 24 hrs:   BP Pulse [facility]   04/26/17 1400 104/68 mmHg 70 -   04/26/17 0831 (!) 80/26 mmHg - -   04/26/17 0727 154/80 mmHg 70 -   04/26/17 0540 150/70 mmHg - -   04/25/17 2020 155/80 mmHg - -   04/25/17 1933 (!) 172/84 mmHg 70 -   04/25/17 1612 116/72 mmHg 68 Standing   04/25/17 1610 154/82 mmHg 82 Sitting   04/25/17 1605 146/78 mmHg 70 Supine       Anticoagulation:  Medication:  lovenox  INR:      Other key medications:       A review of the medication list reveals no issues at this time. Patient is currently on an antihypertensive. Recommend home blood pressure monitoring/recording if antihypertensive regimen continues.    Section completed by:  Rafael Medina RPH

## 2017-04-26 NOTE — REHAB-OT IDT TEAM NOTE
Occupational Therapy  Activities of Daily Living  Eating Initial:  6 - Modified Independent  Eating Current:  6 - Modified Independent   Eating Description:     Grooming Initial:  5 - Standby Prompting/Supervision or Set-up  Grooming Current:  5 - Standby Prompting/Supervision or Set-up   Grooming Description:  Supervision for safety  Bathing Initial:  0 - Not tested, patient refused  Bathing Current:  4 - Minimal Assistance   Bathing Description:  Grab bar, Tub bench, Supervision for safety, Verbal cueing (min A for LLE, CGA in stance with GB for balance)  Upper Body Dressing Initial:  5 - Standby Prompting/Supervision or Set-up  Upper Body Dressing Current:  5 - Standby Prompting/Supervision or Set-up   Upper Body Dressing Description:   (set-up v/cs for orientation of shirt)  Lower Body Dressing Initial:  1 - Total Assistance  Lower Body Dressing Current:  4 - Minimal Assistance   Lower Body Dressing Description:  4 - Minimal Assistance  Toileting Initial:  2 - Max Assistance  Toileting Current:  5 - Standby Prompting/Supervision or Set-up   Toileting Description:  Grab bar, Supervision for safety, Verbal cueing, Set-up of equipment  Toilet Transfer Initial:  2 - Max Assistance  Toilet Transfer Current:  4 - Minimal Assistance   Toilet Transfer Description:  4 - Minimal Assistance  Tub / Shower Transfer Initial:  0 - Not tested, patient refused  Tub / Shower Transfer Current:  4 - Minimal Assistance   Tub / Shower Transfer Description:  Grab bar, Supervision for safety, Verbal cueing (CGA using GB squat pivot.  Pt could be SBA however CGA this date for demonstration with gait belt for pt's daughter for FT)  IADL:  N/A   Family Training/Education:  Ongoing, daughter apprehensive 2/2 to recent fall   DME/DC Recommendations:  Intermittent caregiver assist for family, 24 hour SPV, Home health initially, continuum, BSC, tub transfer bench, manual w/c with seat belt, FWW     Strengths:  Pleasant and cooperative,  Supportive family and Willingly participates in therapeutic activities  Barriers:  Decreased endurance, Dementia, Generalized weakness, Orthostatic hypotension, Impulsive, Limited mobility, Poor balance and Poor carryover of learning     # of short term goals set= 1     # of short term goals met= 0- d/c current goal and added 4 for family training.           Occupational Therapy Goals           Problem: Bathing     Dates: Start: 04/18/17       Goal: STG-Within one week, patient will     Dates: Start: 04/26/17      Description: Pt's daughter will indep bathe pt with setup to min A for safe d/c home          Problem: Dressing     Dates: Start: 04/18/17       Goal: STG-Within one week, patient will dress LB     Dates: Start: 04/26/17      Description: Pt's daughter will independently dress pt with min A with good v/cs for WB precautions          Problem: Functional Transfers     Dates: Start: 04/26/17       Goal: STG-Within one week, patient will transfer to toilet     Dates: Start: 04/26/17      Description: Pt's daughter will complete toilet transfer with BSC over toilet with CGA with good v/cs for hand placement during txs.         Goal: STG-Within one week, patient will transfer to tub/shower     Dates: Start: 04/26/17      Description: Pt will complete tub bench transfer with CGA and min v/cs for WB precautions          Problem: OT Long Term Goals     Dates: Start: 04/18/17       Goal: LTG-By discharge, patient will complete basic self care tasks     Dates: Start: 04/18/17      Description: 1) Individualized Goal:  MIn A per daughter    2) Interventions:  OT Self Care/ADL, OT Cognitive Skill Dev, OT Community Reintegration, OT Neuro Re-Ed/Balance, OT Therapeutic Activity, OT Evaluation and OT Therapeutic Exercise        Goal: LTG-By discharge, patient will perform bathroom transfers     Dates: Start: 04/18/17      Description: 1) Individualized Goal:  SUpervision maintaining WB precautions    2) Interventions:  OT  Self Care/ADL, OT Cognitive Skill Dev, OT Community Reintegration, OT Neuro Re-Ed/Balance, OT Therapeutic Activity, OT Evaluation and OT Therapeutic Exercise              Section completed by:  Willow Jama MS,OTR/L

## 2017-04-26 NOTE — PROGRESS NOTES
"Rehab Progress Note     Interval Events (Subjective)  Patient seen and examined today. Patient participating well with physical therapy. Denies any pain  Ros: Last bowel movement 4/23/17    Objective:  VITAL SIGNS: /80 mmHg  Pulse 70  Temp(Src) 36.7 °C (98.1 °F)  Resp 18  Ht 1.575 m (5' 2\")  Wt 56.1 kg (123 lb 10.9 oz)  BMI 22.62 kg/m2  SpO2 98%  Heart regular rate and rhythm   lungs clear to auscultation  Abdominal exam bowel sounds ×4      No results found for this or any previous visit (from the past 72 hour(s)).    Current Facility-Administered Medications   Medication Frequency   • trazodone (DESYREL) tablet 50 mg QHS   • sulfamethoxazole-trimethoprim (BACTRIM DS) 800-160 MG tablet 0.5 Tab Q12HRS   • amlodipine (NORVASC) tablet 5 mg Q DAY   • FORMULATION R 0.25-3-14-71.9 % ointment PRN   • ondansetron (ZOFRAN ODT) dispertab 4 mg Q4HRS PRN   • senna-docusate (PERICOLACE or SENOKOT S) 8.6-50 MG per tablet 2 Tab BID    And   • polyethylene glycol/lytes (MIRALAX) PACKET 1 Packet QDAY PRN    And   • magnesium hydroxide (MILK OF MAGNESIA) suspension 30 mL QDAY PRN    And   • bisacodyl (DULCOLAX) suppository 10 mg QDAY PRN   • acetaminophen (TYLENOL) suppository 650 mg Q4HRS PRN   • amiodarone (CORDARONE) tablet 200 mg DAILY   • aspirin EC (ECOTRIN) tablet 81 mg DAILY   • calcium carbonate (OS-DIANNA 500) tablet 500 mg TID WITH MEALS   • enoxaparin (LOVENOX) inj 40 mg DAILY   • multivitamin (THERAGRAN) tablet 1 Tab DAILY   • omeprazole (PRILOSEC) capsule 20 mg DAILY   • tramadol (ULTRAM) 50 MG tablet 100 mg Q6HRS PRN   • tramadol (ULTRAM) 50 MG tablet 50 mg Q4HRS PRN   • vitamin D (cholecalciferol) tablet 1,000 Units DAILY   • zinc sulfate (ZINCATE) capsule 220 mg DAILY       Orders Placed This Encounter   Procedures   • DIET ORDER     Standing Status: Standing      Number of Occurrences: 1      Standing Expiration Date:      Order Specific Question:  Diet:     Answer:  Regular [1]       Assessment:  Active " Hospital Problems    Diagnosis   • Hip fracture ([facility])   left femoral fracture Closed reduction percutaneous screw fixation with  ttwb left lower extremity. 4-26 x-ray no new fractures    syncope: Consult Dr. Miranda  Status post fall 4/25/2017 with negative x-ray and no injury  Early dementia with some encephalopathy during hospitalization, likely  secondary to medications with improvement during hospitalization.  Atrial fibrillation.  Gastroesophageal reflux disease.  Hypertension  bladder program  bowel program  UTI: Treated with Bactrim    IMPAIRMENTS:    Mobility  Self-care  IADLs    PLAN:    Discussion and Recommendations:    1. The patient requires an acute inpatient rehabilitation program with a coordinated program of care at an intensity and frequency not available at a lower level of care. This recommendation is substantiated by the patient's medical physicians who recommend that the patient's intervention and assessment of medical issues needs to be done at an acute level of care for patient's safety and maximum outcome.    2. A coordinated program of care will be supplied by an interdisciplinary team of physical therapy, occupational therapy, rehab physician, rehab nursing, and, if needed, speech therapy and rehab psychology. Rehab team presents a patient-specific rehabilitation and education program concentrating on prevention of future problems related to accessibility, mobility, skin, bowel, bladder, sexuality, and psychosocial and medical/surgical problems.    3. Need for Rehabilitation Physician: The rehab physician will be evaluating the patient on a multi-weekly basis to help coordinate the program of care. The rehab physician communicates between medical physicians, therapists, and nurses to maximize the patient's potential outcome. Specific areas in which the rehab physician will be providing daily assessment include the following:    A. Assessing the patient's heart rate and blood pressure  response (vitals monitoring) to activity and making adjustments in medications or conservative measures as needed.    B. The rehab physician will be assessing the frequency at which the program can be increased to allow the patient to reach optimal functional outcome.    C. The rehab physician will also provide assessments in daily skin care, especially in light of patient's impairments in mobility.    D. The rehab physician will provide special expertise in understanding how to work with functional impairment and recommend appropriate interventions, compensatory techniques, and education that will facilitate the patient's outcome.    4. Rehab R.N.    The rehab RN will be working with patient to carry over in room mobility and activities of daily living when the patient is not in 3 hours of skilled therapy. Rehab nursing will be working in conjunction with rehab physician to address all the medical issues above and continue to assess laboratory work and discuss abnormalities with the treating physicians, assess vitals, and response to activity, and discuss and report abnormalities with the rehab physician. Rehab RN will also continue daily skin care, supervise bladder/bowel program, instruct in medication administration, and ensure patient safety.     MEDICAL DECISION MAKING and INTERDISCIPLINARY PLAN OF CARE:    REHABILITATION ISSUES/ADVERSE POTENTIAL::  1. Left femoral neck fracture Patient demonstrates functional deficits in strength, balance, coordination, and ADL's. Patient is admitted to [facility] for comprehensive rehabilitation therapy as described below.   Rehabilitation nursing monitors bowel and bladder control, educates on medication administration, co-morbidities and monitors patient safety.    Therapies to treat at intensity and frequency of (may change after completion of evaluation by all therapeutic disciplines):       PT:  Physical therapy to address mobility, transfer, gait  training and evaluation for adaptive equipment needs 1.5hour/day at least 5 days/week for the duration of the ELOS (see below)       OT:  Occupational therapy to address ADLs, self-care, home management training, functional mobility/transfers and assistive device evaluation, and community re-intergration 1.5hour/day at least 5 days/week for the duration of the ELOS (see below).        ST/Dysphagia:  Speech therapy to address speech, language,and cognitive deficits as well as swallowing difficulties with retraining/dysphagia management and community re-integration with comprehension, expression, cognitive training 0 hour/day at least 5 days/week for the duration of the ELOS (see below).     2.  Neurostimulants: None at this time but continue to assess daily for need to initiate should status change.    3.  DVT prophylaxis:  Patient is on Lovenox for anticoagulation upon transfer. Encourage OOB. Monitor daily for signs and symptoms of DVT including but not limited to swelling and pain to prevent the development of DVT that may interfere with therapies.    4.  GI prophylaxis:  On prilosec to prevent gastritis/dyspepsia which may interfere with therapies.    5.  Pain: No issues with pain currently / Controlled with Ultram    6.  Nutrition/Dysphagia: Dietician monitors nutrient intake, recommend supplements prn and provide nutrition education to pt/family to promote optimal nutrition for wound healing/recovery.     7.  Bladder/bowel:  Start bowel and bladder program as described below, to prevent constipation, urinary retention (which may lead to UTI), and urinary incontinence (which will impact upon pt's functional independence).    - TV Q3h while awake with post void bladder scans, I&O cath for PVRs >400  - up to commode after meal     8.  Skin/dermal ulcer prophylaxis: Monitor for new skin conditions with q.2 h. turns as required to prevent the development of skin breakdown.     9.  Cognition/Behavior:  Psychologist  Dr. Schafer provides adjustment counseling to illness and psychosocial barriers that may be potential barriers to rehabilitation.     10. Respiratory therapy: RT performs O2 management prn, breathing retraining, pulmonary hygeine and bronchospasm management prn to optimize participation in therapies.      MEDICAL CO-MORBIDITIES/ADVERSE POTENTIAL AFFECTING FUNCTION:        GOALS/EXPECTED LEVEL OF FUNCTION BASED ON CURRENT MEDICAL AND FUNCTIONAL STATUS (may change based on patient's medical status and rate of impairment recovery):     Transfers: Supervised to modified independent     Mobility/Gait: Supervised to modified independent     ADL's: Supervised to modified independent     Cognition: Independent    DISPOSITION: home with assistance     ELOS: 14 days    Medical Decision Making and Plan:  Nursing to assist patient with fluid intake patient to be reminded every couple of hours to take into 200 mL of fluids while awake    I attended and led team conference 4-  Nursing:uti on bactrim, confusion, continent of bowel and bladder, incision intact, fluids 500cc, meals % , daughter brought in food  PT: limited by ttwb, bed mob sba, transfers with fww cga and lots of cues, gait with 2 step pattern but difficult will work on // bars.   OT: sup seated grooming, mod I eating, cga bathing stance, impaired balance and weight bearing, thoroughness, upper body dressing bra sweater seated in wc min, lower body min, with max verbal cues for strategies, cga toileting with poor adherance to ttwb, poor carryover  ST: max problem solving memory and executive function, continuum for day program  Case management:lives with daughter and grandchildren , neurology consult after discharge, 24 hour supervision and assist  Discharge date: 4-    X-ray reviewed in no acute fracture  Total time:  >25 minutes.  I spent greater than 50% of the time for patient care and coordination on this date, including unit/floor time, and  face-to-face time with the patient as per assessment and plan above.    Kylah Marvin D.O.

## 2017-04-26 NOTE — CARE PLAN
Problem: Dressing  Goal: STG-Within one week, patient will retrieve clothing  1) Individualized Goal: With CGA mainaining WB precautions  2) Interventions: OT Self Care/ADL, OT Cognitive Skill Dev, OT Community Reintegration, OT Neuro Re-Ed/Balance, OT Therapeutic Activity, OT Evaluation and OT Therapeutic Exercise   Outcome: DISCHARGED-GOAL NOT MET Date Met:  04/26/17  Pt is not ambulating far enough to retrieve clothing and not appropriate at this time.

## 2017-04-26 NOTE — CARE PLAN
Problem: Safety  Goal: Will remain free from falls  Outcome: PROGRESSING AS EXPECTED  Patient is a fall risk and has had 2 falls. Fall and safety precautions in place, non skid shoes on, call light within reach, hourly rounding or more often, bed and chair alarms in use. Time voiding patient q 2-3 hours or more often.    Problem: Venous Thromboembolism (VTW)/Deep Vein Thrombosis (DVT) Prevention:  Goal: Patient will participate in Venous Thrombosis (VTE)/Deep Vein Thrombosis (DVT)Prevention Measures  Outcome: PROGRESSING AS EXPECTED  Pt is up in wheel chair and participates in therapies, and up to dinning room for all meals. Lovenox given per the MAR to prevent clots.

## 2017-04-26 NOTE — CARE PLAN
Problem: Safety  Goal: Will remain free from falls  Intervention: Implement fall precautions  Pt is confused and impulsive.Bed in low position, non skid socks/shoes on when out of bed.Alarms in place for safety.Call light and things within reach.Will continue to monitor and assess needs and safety.      Problem: Pain Management  Goal: Pain level will decrease to patient’s comfort goal  Intervention: Follow pain managment plan developed in collaboration with patient and Interdisciplinary Team  Medicated per request for pain with relief.Repositioned with pillows for comfort.Will continue to monitor and assess pain level and medicate as needed.      Problem: Urinary Elimination:  Goal: Ability to reestablish a normal urinary elimination pattern will improve  Intervention: Assist patient to sit on commode or toilet for voiding  Assisted to the bathroom, continent of bladder.Will continue to monitor and assess level of continence.

## 2017-04-27 LAB
ALBUMIN SERPL BCP-MCNC: 3.5 G/DL (ref 3.2–4.9)
ALBUMIN/GLOB SERPL: 1.2 G/DL
ALP SERPL-CCNC: 84 U/L (ref 30–99)
ALT SERPL-CCNC: 17 U/L (ref 2–50)
ANION GAP SERPL CALC-SCNC: 6 MMOL/L (ref 0–11.9)
AST SERPL-CCNC: 18 U/L (ref 12–45)
BASOPHILS # BLD AUTO: 0.5 % (ref 0–1.8)
BASOPHILS # BLD: 0.04 K/UL (ref 0–0.12)
BILIRUB SERPL-MCNC: 0.5 MG/DL (ref 0.1–1.5)
BUN SERPL-MCNC: 33 MG/DL (ref 8–22)
CALCIUM SERPL-MCNC: 9 MG/DL (ref 8.5–10.5)
CHLORIDE SERPL-SCNC: 103 MMOL/L (ref 96–112)
CO2 SERPL-SCNC: 25 MMOL/L (ref 20–33)
CORTIS SERPL-MCNC: 16.4 UG/DL (ref 0–23)
CREAT SERPL-MCNC: 1.2 MG/DL (ref 0.5–1.4)
EOSINOPHIL # BLD AUTO: 0.05 K/UL (ref 0–0.51)
EOSINOPHIL NFR BLD: 0.7 % (ref 0–6.9)
ERYTHROCYTE [DISTWIDTH] IN BLOOD BY AUTOMATED COUNT: 49 FL (ref 35.9–50)
ERYTHROCYTE [SEDIMENTATION RATE] IN BLOOD BY WESTERGREN METHOD: 25 MM/HOUR (ref 0–30)
GFR SERPL CREATININE-BSD FRML MDRD: 43 ML/MIN/1.73 M 2
GLOBULIN SER CALC-MCNC: 3 G/DL (ref 1.9–3.5)
GLUCOSE SERPL-MCNC: 106 MG/DL (ref 65–99)
HCT VFR BLD AUTO: 36.2 % (ref 37–47)
HGB BLD-MCNC: 11.9 G/DL (ref 12–16)
IMM GRANULOCYTES # BLD AUTO: 0.05 K/UL (ref 0–0.11)
IMM GRANULOCYTES NFR BLD AUTO: 0.7 % (ref 0–0.9)
LYMPHOCYTES # BLD AUTO: 1.64 K/UL (ref 1–4.8)
LYMPHOCYTES NFR BLD: 21.8 % (ref 22–41)
MCH RBC QN AUTO: 30.3 PG (ref 27–33)
MCHC RBC AUTO-ENTMCNC: 32.9 G/DL (ref 33.6–35)
MCV RBC AUTO: 92.1 FL (ref 81.4–97.8)
MONOCYTES # BLD AUTO: 0.97 K/UL (ref 0–0.85)
MONOCYTES NFR BLD AUTO: 12.9 % (ref 0–13.4)
NEUTROPHILS # BLD AUTO: 4.76 K/UL (ref 2–7.15)
NEUTROPHILS NFR BLD: 63.4 % (ref 44–72)
NRBC # BLD AUTO: 0 K/UL
NRBC BLD AUTO-RTO: 0 /100 WBC
PLATELET # BLD AUTO: 345 K/UL (ref 164–446)
PMV BLD AUTO: 10.7 FL (ref 9–12.9)
POTASSIUM SERPL-SCNC: 3.9 MMOL/L (ref 3.6–5.5)
PROT SERPL-MCNC: 6.5 G/DL (ref 6–8.2)
RBC # BLD AUTO: 3.93 M/UL (ref 4.2–5.4)
SODIUM SERPL-SCNC: 134 MMOL/L (ref 135–145)
SODIUM UR-SCNC: 11 MMOL/L
TSH SERPL DL<=0.005 MIU/L-ACNC: 1.6 UIU/ML (ref 0.3–3.7)
WBC # BLD AUTO: 7.5 K/UL (ref 4.8–10.8)

## 2017-04-27 PROCEDURE — 700105 HCHG RX REV CODE 258: Performed by: HOSPITALIST

## 2017-04-27 PROCEDURE — 36415 COLL VENOUS BLD VENIPUNCTURE: CPT

## 2017-04-27 PROCEDURE — A9270 NON-COVERED ITEM OR SERVICE: HCPCS | Performed by: HOSPITALIST

## 2017-04-27 PROCEDURE — 82024 ASSAY OF ACTH: CPT

## 2017-04-27 PROCEDURE — 770010 HCHG ROOM/CARE - REHAB SEMI PRIVAT*

## 2017-04-27 PROCEDURE — 99233 SBSQ HOSP IP/OBS HIGH 50: CPT | Performed by: PHYSICAL MEDICINE & REHABILITATION

## 2017-04-27 PROCEDURE — 97530 THERAPEUTIC ACTIVITIES: CPT

## 2017-04-27 PROCEDURE — 99233 SBSQ HOSP IP/OBS HIGH 50: CPT | Performed by: HOSPITALIST

## 2017-04-27 PROCEDURE — 700102 HCHG RX REV CODE 250 W/ 637 OVERRIDE(OP): Performed by: PHYSICAL MEDICINE & REHABILITATION

## 2017-04-27 PROCEDURE — 97116 GAIT TRAINING THERAPY: CPT

## 2017-04-27 PROCEDURE — 86235 NUCLEAR ANTIGEN ANTIBODY: CPT | Mod: 91

## 2017-04-27 PROCEDURE — 80053 COMPREHEN METABOLIC PANEL: CPT

## 2017-04-27 PROCEDURE — 85025 COMPLETE CBC W/AUTO DIFF WBC: CPT

## 2017-04-27 PROCEDURE — 97532 HCHG COGNITIVE SKILL DEV. 15 MIN: CPT

## 2017-04-27 PROCEDURE — 84300 ASSAY OF URINE SODIUM: CPT

## 2017-04-27 PROCEDURE — 84443 ASSAY THYROID STIM HORMONE: CPT

## 2017-04-27 PROCEDURE — 85652 RBC SED RATE AUTOMATED: CPT

## 2017-04-27 PROCEDURE — 82533 TOTAL CORTISOL: CPT

## 2017-04-27 PROCEDURE — 97110 THERAPEUTIC EXERCISES: CPT

## 2017-04-27 PROCEDURE — 700111 HCHG RX REV CODE 636 W/ 250 OVERRIDE (IP): Performed by: PHYSICAL MEDICINE & REHABILITATION

## 2017-04-27 PROCEDURE — 97535 SELF CARE MNGMENT TRAINING: CPT

## 2017-04-27 PROCEDURE — 700102 HCHG RX REV CODE 250 W/ 637 OVERRIDE(OP): Performed by: HOSPITALIST

## 2017-04-27 PROCEDURE — 86038 ANTINUCLEAR ANTIBODIES: CPT

## 2017-04-27 PROCEDURE — A9270 NON-COVERED ITEM OR SERVICE: HCPCS | Performed by: PHYSICAL MEDICINE & REHABILITATION

## 2017-04-27 RX ORDER — SODIUM CHLORIDE 9 MG/ML
INJECTION, SOLUTION INTRAVENOUS CONTINUOUS
Status: ACTIVE | OUTPATIENT
Start: 2017-04-27 | End: 2017-04-27

## 2017-04-27 RX ORDER — MIDODRINE HYDROCHLORIDE 2.5 MG/1
10 TABLET ORAL EVERY MORNING
Status: DISCONTINUED | OUTPATIENT
Start: 2017-04-28 | End: 2017-04-28

## 2017-04-27 RX ORDER — FLUDROCORTISONE ACETATE 0.1 MG/1
0.1 TABLET ORAL 2 TIMES DAILY
Status: DISCONTINUED | OUTPATIENT
Start: 2017-04-27 | End: 2017-05-02

## 2017-04-27 RX ORDER — SODIUM CHLORIDE 1 G/1
1 TABLET ORAL EVERY MORNING
Status: DISCONTINUED | OUTPATIENT
Start: 2017-04-27 | End: 2017-04-28

## 2017-04-27 RX ADMIN — AMLODIPINE BESYLATE 5 MG: 5 TABLET ORAL at 17:53

## 2017-04-27 RX ADMIN — STANDARDIZED SENNA CONCENTRATE AND DOCUSATE SODIUM 2 TABLET: 8.6; 5 TABLET, FILM COATED ORAL at 21:19

## 2017-04-27 RX ADMIN — ENOXAPARIN SODIUM 40 MG: 100 INJECTION SUBCUTANEOUS at 08:18

## 2017-04-27 RX ADMIN — TRAZODONE HYDROCHLORIDE 50 MG: 50 TABLET ORAL at 21:20

## 2017-04-27 RX ADMIN — MICONAZOLE NITRATE 1 APPLICATOR: 20 CREAM VAGINAL at 21:19

## 2017-04-27 RX ADMIN — THERA TABS 1 TABLET: TAB at 08:18

## 2017-04-27 RX ADMIN — ASPIRIN 81 MG: 81 TABLET, COATED ORAL at 08:18

## 2017-04-27 RX ADMIN — Medication 500 MG: at 11:58

## 2017-04-27 RX ADMIN — AMIODARONE HYDROCHLORIDE 200 MG: 200 TABLET ORAL at 08:18

## 2017-04-27 RX ADMIN — MIDODRINE HYDROCHLORIDE 7.5 MG: 2.5 TABLET ORAL at 08:19

## 2017-04-27 RX ADMIN — FLUDROCORTISONE ACETATE 0.1 MG: 0.1 TABLET ORAL at 11:57

## 2017-04-27 RX ADMIN — OMEPRAZOLE 20 MG: 20 CAPSULE, DELAYED RELEASE ORAL at 08:18

## 2017-04-27 RX ADMIN — TRAMADOL HYDROCHLORIDE 100 MG: 50 TABLET, FILM COATED ORAL at 22:17

## 2017-04-27 RX ADMIN — STANDARDIZED SENNA CONCENTRATE AND DOCUSATE SODIUM 2 TABLET: 8.6; 5 TABLET, FILM COATED ORAL at 08:18

## 2017-04-27 RX ADMIN — SODIUM CHLORIDE TAB 1 GM 1 G: 1 TAB at 11:30

## 2017-04-27 RX ADMIN — Medication 220 MG: at 08:18

## 2017-04-27 RX ADMIN — SODIUM CHLORIDE: 9 INJECTION, SOLUTION INTRAVENOUS at 12:49

## 2017-04-27 RX ADMIN — Medication 500 MG: at 08:18

## 2017-04-27 RX ADMIN — FLUDROCORTISONE ACETATE 0.1 MG: 0.1 TABLET ORAL at 22:17

## 2017-04-27 RX ADMIN — CHOLECALCIFEROL TAB 25 MCG (1000 UNIT) 1000 UNITS: 25 TAB at 08:18

## 2017-04-27 RX ADMIN — Medication 500 MG: at 17:53

## 2017-04-27 ASSESSMENT — PAIN SCALES - GENERAL
PAINLEVEL_OUTOF10: 0
PAINLEVEL_OUTOF10: 2
PAINLEVEL_OUTOF10: 0
PAINLEVEL_OUTOF10: 7

## 2017-04-27 ASSESSMENT — ENCOUNTER SYMPTOMS
HEMOPTYSIS: 0
VOMITING: 0
CHILLS: 0
HEARTBURN: 0
COUGH: 0
PALPITATIONS: 0
HEADACHES: 1
SPUTUM PRODUCTION: 0
NAUSEA: 0
DIZZINESS: 1
FEVER: 0

## 2017-04-27 ASSESSMENT — GAIT ASSESSMENTS
DEVIATION: STEP TO;DECREASED BASE OF SUPPORT;DECREASED HEEL STRIKE;DECREASED TOE OFF
DISTANCE (FEET): 60
ASSISTIVE DEVICE: FRONT WHEEL WALKER
GAIT LEVEL OF ASSIST: MINIMAL ASSIST

## 2017-04-27 NOTE — PROGRESS NOTES
"Rehab Progress Note     Interval Events (Subjective)  Patient seen and examined today. Patient participating well with physical therapy. Denies any pain  Ros: Last bowel movement 4/23/17    Objective:  VITAL SIGNS: /74 mmHg  Pulse 69  Temp(Src) 36.8 °C (98.2 °F)  Resp 18  Ht 1.575 m (5' 2\")  Wt 56.1 kg (123 lb 10.9 oz)  BMI 22.62 kg/m2  SpO2 97%  Heart regular rate and rhythm   lungs clear to auscultation  Abdominal exam bowel sounds ×4      Recent Results (from the past 72 hour(s))   TSH WITH REFLEX TO FT4    Collection Time: 04/27/17  6:08 AM   Result Value Ref Range    TSH 1.600 0.300 - 3.700 uIU/mL   CORTISOL    Collection Time: 04/27/17  6:08 AM   Result Value Ref Range    Cortisol 16.4 0.0 - 23.0 ug/dL   COMP METABOLIC PANEL    Collection Time: 04/27/17  6:08 AM   Result Value Ref Range    Sodium 134 (L) 135 - 145 mmol/L    Potassium 3.9 3.6 - 5.5 mmol/L    Chloride 103 96 - 112 mmol/L    Co2 25 20 - 33 mmol/L    Anion Gap 6.0 0.0 - 11.9    Glucose 106 (H) 65 - 99 mg/dL    Bun 33 (H) 8 - 22 mg/dL    Creatinine 1.20 0.50 - 1.40 mg/dL    Calcium 9.0 8.5 - 10.5 mg/dL    AST(SGOT) 18 12 - 45 U/L    ALT(SGPT) 17 2 - 50 U/L    Alkaline Phosphatase 84 30 - 99 U/L    Total Bilirubin 0.5 0.1 - 1.5 mg/dL    Albumin 3.5 3.2 - 4.9 g/dL    Total Protein 6.5 6.0 - 8.2 g/dL    Globulin 3.0 1.9 - 3.5 g/dL    A-G Ratio 1.2 g/dL   CBC WITH DIFFERENTIAL    Collection Time: 04/27/17  6:08 AM   Result Value Ref Range    WBC 7.5 4.8 - 10.8 K/uL    RBC 3.93 (L) 4.20 - 5.40 M/uL    Hemoglobin 11.9 (L) 12.0 - 16.0 g/dL    Hematocrit 36.2 (L) 37.0 - 47.0 %    MCV 92.1 81.4 - 97.8 fL    MCH 30.3 27.0 - 33.0 pg    MCHC 32.9 (L) 33.6 - 35.0 g/dL    RDW 49.0 35.9 - 50.0 fL    Platelet Count 345 164 - 446 K/uL    MPV 10.7 9.0 - 12.9 fL    Neutrophils-Polys 63.40 44.00 - 72.00 %    Lymphocytes 21.80 (L) 22.00 - 41.00 %    Monocytes 12.90 0.00 - 13.40 %    Eosinophils 0.70 0.00 - 6.90 %    Basophils 0.50 0.00 - 1.80 %    Immature " Granulocytes 0.70 0.00 - 0.90 %    Nucleated RBC 0.00 /100 WBC    Neutrophils (Absolute) 4.76 2.00 - 7.15 K/uL    Lymphs (Absolute) 1.64 1.00 - 4.80 K/uL    Monos (Absolute) 0.97 (H) 0.00 - 0.85 K/uL    Eos (Absolute) 0.05 0.00 - 0.51 K/uL    Baso (Absolute) 0.04 0.00 - 0.12 K/uL    Immature Granulocytes (abs) 0.05 0.00 - 0.11 K/uL    NRBC (Absolute) 0.00 K/uL   WESTERGREN SED RATE    Collection Time: 04/27/17  6:08 AM   Result Value Ref Range    Sed Rate Westergren 25 0 - 30 mm/hour   ESTIMATED GFR    Collection Time: 04/27/17  6:08 AM   Result Value Ref Range    GFR If  52 (A) >60 mL/min/1.73 m 2    GFR If Non  43 (A) >60 mL/min/1.73 m 2       Current Facility-Administered Medications   Medication Frequency   • miconazole (MONISTAT) 2 % vaginal cream 1 Applicator Q EVENING   • midodrine (PROAMATINE) tablet 7.5 mg QAM   • amlodipine (NORVASC) tablet 5 mg Q EVENING   • trazodone (DESYREL) tablet 50 mg QHS   • FORMULATION R 0.25-3-14-71.9 % ointment PRN   • ondansetron (ZOFRAN ODT) dispertab 4 mg Q4HRS PRN   • senna-docusate (PERICOLACE or SENOKOT S) 8.6-50 MG per tablet 2 Tab BID    And   • polyethylene glycol/lytes (MIRALAX) PACKET 1 Packet QDAY PRN    And   • magnesium hydroxide (MILK OF MAGNESIA) suspension 30 mL QDAY PRN    And   • bisacodyl (DULCOLAX) suppository 10 mg QDAY PRN   • acetaminophen (TYLENOL) suppository 650 mg Q4HRS PRN   • amiodarone (CORDARONE) tablet 200 mg DAILY   • aspirin EC (ECOTRIN) tablet 81 mg DAILY   • calcium carbonate (OS-DIANNA 500) tablet 500 mg TID WITH MEALS   • enoxaparin (LOVENOX) inj 40 mg DAILY   • multivitamin (THERAGRAN) tablet 1 Tab DAILY   • omeprazole (PRILOSEC) capsule 20 mg DAILY   • tramadol (ULTRAM) 50 MG tablet 100 mg Q6HRS PRN   • tramadol (ULTRAM) 50 MG tablet 50 mg Q4HRS PRN   • vitamin D (cholecalciferol) tablet 1,000 Units DAILY   • zinc sulfate (ZINCATE) capsule 220 mg DAILY       Orders Placed This Encounter   Procedures   • DIET  ORDER     Standing Status: Standing      Number of Occurrences: 1      Standing Expiration Date:      Order Specific Question:  Diet:     Answer:  Regular [1]       Assessment:  Active Hospital Problems    Diagnosis   • Hip fracture (CMS-HCC)   left femoral fracture Closed reduction percutaneous screw fixation with  ttwb left lower extremity. 4-26 x-ray no new fractures    syncope: Consult Dr. Miranda  Status post fall 4/25/2017 with negative x-ray and no injury  Early dementia with some encephalopathy during hospitalization, likely  secondary to medications with improvement during hospitalization.  Atrial fibrillation.  Gastroesophageal reflux disease.  Hypertension  bladder program  bowel program  UTI: Treated with Bactrim    IMPAIRMENTS:    Mobility  Self-care  IADLs    PLAN:    Discussion and Recommendations:    1. The patient requires an acute inpatient rehabilitation program with a coordinated program of care at an intensity and frequency not available at a lower level of care. This recommendation is substantiated by the patient's medical physicians who recommend that the patient's intervention and assessment of medical issues needs to be done at an acute level of care for patient's safety and maximum outcome.    2. A coordinated program of care will be supplied by an interdisciplinary team of physical therapy, occupational therapy, rehab physician, rehab nursing, and, if needed, speech therapy and rehab psychology. Rehab team presents a patient-specific rehabilitation and education program concentrating on prevention of future problems related to accessibility, mobility, skin, bowel, bladder, sexuality, and psychosocial and medical/surgical problems.    3. Need for Rehabilitation Physician: The rehab physician will be evaluating the patient on a multi-weekly basis to help coordinate the program of care. The rehab physician communicates between medical physicians, therapists, and nurses to maximize the patient's  potential outcome. Specific areas in which the rehab physician will be providing daily assessment include the following:    A. Assessing the patient's heart rate and blood pressure response (vitals monitoring) to activity and making adjustments in medications or conservative measures as needed.    B. The rehab physician will be assessing the frequency at which the program can be increased to allow the patient to reach optimal functional outcome.    C. The rehab physician will also provide assessments in daily skin care, especially in light of patient's impairments in mobility.    D. The rehab physician will provide special expertise in understanding how to work with functional impairment and recommend appropriate interventions, compensatory techniques, and education that will facilitate the patient's outcome.    4. Rehab R.N.    The rehab RN will be working with patient to carry over in room mobility and activities of daily living when the patient is not in 3 hours of skilled therapy. Rehab nursing will be working in conjunction with rehab physician to address all the medical issues above and continue to assess laboratory work and discuss abnormalities with the treating physicians, assess vitals, and response to activity, and discuss and report abnormalities with the rehab physician. Rehab RN will also continue daily skin care, supervise bladder/bowel program, instruct in medication administration, and ensure patient safety.     MEDICAL DECISION MAKING and INTERDISCIPLINARY PLAN OF CARE:    REHABILITATION ISSUES/ADVERSE POTENTIAL::  1. Left femoral neck fracture Patient demonstrates functional deficits in strength, balance, coordination, and ADL's. Patient is admitted to [facility] for comprehensive rehabilitation therapy as described below.   Rehabilitation nursing monitors bowel and bladder control, educates on medication administration, co-morbidities and monitors patient safety.    Therapies  to treat at intensity and frequency of (may change after completion of evaluation by all therapeutic disciplines):       PT:  Physical therapy to address mobility, transfer, gait training and evaluation for adaptive equipment needs 1hour/day at least 5 days/week for the duration of the ELOS (see below)       OT:  Occupational therapy to address ADLs, self-care, home management training, functional mobility/transfers and assistive device evaluation, and community re-intergration 2 hour/day at least 5 days/week for the duration of the ELOS (see below).        ST/Dysphagia:  Speech therapy to address speech, language,and cognitive deficits as well as swallowing difficulties with retraining/dysphagia management and community re-integration with comprehension, expression, cognitive training 1/2 hour/day at least 5 days/week for the duration of the ELOS (see below).     2.  Neurostimulants: None at this time but continue to assess daily for need to initiate should status change.    3.  DVT prophylaxis:  Patient is on Lovenox for anticoagulation upon transfer. Encourage OOB. Monitor daily for signs and symptoms of DVT including but not limited to swelling and pain to prevent the development of DVT that may interfere with therapies.    4.  GI prophylaxis:  On prilosec to prevent gastritis/dyspepsia which may interfere with therapies.    5.  Pain: No issues with pain currently / Controlled with Ultram    6.  Nutrition/Dysphagia: Dietician monitors nutrient intake, recommend supplements prn and provide nutrition education to pt/family to promote optimal nutrition for wound healing/recovery.     7.  Bladder/bowel:  Start bowel and bladder program as described below, to prevent constipation, urinary retention (which may lead to UTI), and urinary incontinence (which will impact upon pt's functional independence).    - TV Q3h while awake with post void bladder scans, I&O cath for PVRs >400  - up to commode after meal     8.   Skin/dermal ulcer prophylaxis: Monitor for new skin conditions with q.2 h. turns as required to prevent the development of skin breakdown.     9.  Cognition/Behavior:  Psychologist Dr. Schafer provides adjustment counseling to illness and psychosocial barriers that may be potential barriers to rehabilitation.     10. Respiratory therapy: RT performs O2 management prn, breathing retraining, pulmonary hygeine and bronchospasm management prn to optimize participation in therapies.      MEDICAL CO-MORBIDITIES/ADVERSE POTENTIAL AFFECTING FUNCTION:        GOALS/EXPECTED LEVEL OF FUNCTION BASED ON CURRENT MEDICAL AND FUNCTIONAL STATUS (may change based on patient's medical status and rate of impairment recovery):     Transfers: Supervised to modified independent     Mobility/Gait: Supervised to modified independent     ADL's: Supervised to modified independent     Cognition: Independent    DISPOSITION: home with assistance     ELOS: 14 days    Medical Decision Making and Plan:  Nursing to assist patient with fluid intake patient to be reminded every couple of hours to take into 200 mL of fluids while awake    I attended and led team conference 4-  Nursing:uti on bactrim, confusion, continent of bowel and bladder, incision intact, fluids 500cc, meals % , daughter brought in food  PT: limited by ttwb, bed mob sba, transfers with fww cga and lots of cues, gait with 2 step pattern but difficult will work on // bars.   OT: sup seated grooming, mod I eating, cga bathing stance, impaired balance and weight bearing, thoroughness, upper body dressing bra sweater seated in wc min, lower body min, with max verbal cues for strategies, cga toileting with poor adherance to ttwb, poor carryover  ST: max problem solving memory and executive function, continuum for day program  Case management:lives with daughter and grandchildren , neurology consult after discharge, 24 hour supervision and assist  Discharge date:  4-    X-ray reviewed in no acute fracture  I attended and led team conference 4-  Nursing:regular diet, eating %, fluids 940cc, denies pain, continent of bowel and bladder, iv for fluids  PT: able to keep ttwb status this week, transfers sup to sit back to bed sup, walker transfers chair cga, cues for sequencing, gait 40 ft fww ttwb cga  OT: family training, bathing min, cga stance, grooming seated setup, upper body dressing setup, verbal cues for orientation, lower body dressing min for left socks and cues for strategies, toileting sba to cga, transfers cga, gait belt with fww  ST: min to sup comprehension, sup expression, mod simple problem solving, max memory, safety sequencing needs lots of cues  Case management:lives with daughter, needs manual wc with cushion and fww for transfers  Discharge date: authorized through Monday, extend for blood pressure issues, 5-3-2017    Total time:  >35 minutes.  I spent greater than 50% of the time for patient care and coordination on this date, including unit/floor time, and face-to-face time with the patient as per assessment and plan above.    Kylah Marvin D.O.

## 2017-04-27 NOTE — REHAB-PT IDT TEAM NOTE
Physical Therapy  Mobility  Bed mobility: Standby assist supine to/from sit  Bed /Chair/Wheelchair Transfer Initial:  3 - Moderate Assistance  Bed /Chair/Wheelchair Transfer Current:  4 - Minimal Assistance/contact guard assist    Bed/Chair/Wheelchair Transfer Description:  Front wheel walker, Supervision for safety, Verbal cueing, Set-up of equipment  Walk Initial:  1 - Total Assistance  Walk Current:  Contact guard assist and verbal cues for TTWB   Walk Description:   Contact guard assist, fww, 40 feet, ttwb precaution)  Wheelchair Initial:  5E - Household Exception  Wheelchair Current:  2 - Max Assistance   Wheelchair Description:   (x120' in manual wc with max cues for technique, and one instance of physical assist to complete turn)  Stairs Initial:  0 - Not tested,medical condition  Stairs Current: 0 - Not tested,unsafe activity due to TTWB   Stairs Description:    Patient/Family Training/Education: In progress  DME/DC Recommendations:  Fww, manual wheelchair and cushion  Strengths:  Independent PLOF, Making steady progress towards goals, Manages pain appropriately, Motivated for self care and independence, Pleasant and cooperative, Supportive family and Willingly participates in therapeutic activities  Barriers:   Impulsive and Other: TTWB LLE, impaired memory and safety awareness, fall risk, orthostatic hypotension  # of short term goals set= 4  # of short term goals met=1        Physical Therapy Problems           Problem: Balance     Dates: Start: 04/18/17       Goal: STG-Within one week, patient will maintain dynamic standing     Dates: Start: 04/18/17      Description: 1) Individualized goal: Maintain dynamic standing balance fww sba for transfers and gait one week    2) Interventions:  PT Gait Training, PT Therapeutic Exercises, PT Neuro Re-Ed/Balance and PT Therapeutic Activity        Note: Goal Note filed on 04/27/17 1146 by Lico Rodriguez M.S.P.T.    Goal: STG-Within one week, patient will maintain  dynamic standing  Outcome: NOT MET  Patient fell when trying to transfer alone            Problem: Mobility     Dates: Start: 04/18/17       Goal: STG-Within one week, patient will     Dates: Start: 04/18/17      Description: 1) Individualized goal: Gait fww sba 125 ft one week    2) Interventions:  PT Gait Training, PT Therapeutic Exercises, PT Therapeutic Activity and PT Evaluation        Note: Goal Note filed on 04/27/17 1146 by DANITZA TelloS.P.T.    Goal: STG-Within one week, patient will  Outcome: NOT MET  40 feet 2 fww cga, ttwb            Problem: Mobility Transfers     Dates: Start: 04/18/17       Goal: STG-Within one week, patient will transfer bed to chair     Dates: Start: 04/20/17      Description: 1) Individualized goal: Transfer bed to/from chair fww spv ttwb one week    2) Interventions:  PT Gait Training, PT Therapeutic Exercises and PT Therapeutic Activity        Note: Goal Note filed on 04/27/17 1146 by DANITZA TelloS.P.T.    Goal: STG-Within one week, patient will transfer bed to chair  Outcome: NOT MET  sba and intermittent cga            Problem: PT-Long Term Goals     Dates: Start: 04/18/17       Goal: LTG-By discharge, patient will ambulate     Dates: Start: 04/18/17      Description: 1) Individualized goal: Gait fww household 150 ft, community 300 ft modified independent at discharge    2) Interventions:  PT Gait Training, PT Therapeutic Exercises, PT Neuro Re-Ed/Balance, PT Therapeutic Activity and PT Evaluation            Goal: LTG-By discharge, patient will transfer one surface to another     Dates: Start: 04/18/17      Description: 1) Individualized goal: Transfer one surface to another fww modified independent at discharge    2) Interventions:  PT Gait Training, PT Therapeutic Exercises, PT Neuro Re-Ed/Balance, PT Therapeutic Activity and PT Evaluation            Goal: LTG-By discharge, patient will ambulate up/down 4-6 stairs     Dates: Start: 04/18/17      Description: 1)  Individualized goal: Up/down ramp or 2 stairs with railings supervision at discharge    2) Interventions:  PT Gait Training, PT Therapeutic Exercises, PT Neuro Re-Ed/Balance, PT Therapeutic Activity and PT Evaluation            Goal: LTG-By discharge, patient will transfer in/out of a car     Dates: Start: 04/18/17      Description: 1) Individualized goal: Car transfer fww supervision at discharge    2) Interventions:  PT Gait Training, PT Therapeutic Exercises, PT Neuro Re-Ed/Balance, PT Therapeutic Activity and PT Evaluation                    Section completed by:  Lico Rodriguez M.S.PPareshTParesh

## 2017-04-27 NOTE — REHAB-NURSING IDT TEAM NOTE
Nursing  Nursing  Diet/Nutrition:  Regular  Medication Administration:  Whole with Liquid Wash  % consumed at meals in last 24 hours:  Consumed 75%-100% of meals per documentation.  Breakfast:  95%   Lunch:  100%  Dinner:  75%   Snack schedule:  None  Supplement:  Boost Plus  Appetite:  Good  Fluid Intake/Output in past 24 hours: In: 940 [P.O.:940]  Out: -   Admit Weight:  Weight: 60 kg (132 lb 4.4 oz)  Weight Last 7 Days   [56.1 kg (123 lb 10.9 oz)] 56.1 kg (123 lb 10.9 oz) (04/23 0500)    Pain Issues:    Location:  --  --         Severity:  Moderate   Scheduled pain medications:  None     PRN pain medications used in last 24 hours:  None   Non Pharmacologic Interventions:  emotional support, repositioned and rest  Effectiveness of pain management plan:  good=patient states acceptable comfort after interventions    Bowel:    Bowel Assist Initial Score:  2 - Max Assistance  Bowel Assist Current Score:  2 - Max Assistance  Bowl Accidents in last 7 days:  1  Last bowel movement: 04/26/17  Stool: Medium     Usual bowel pattern:  every other day  Scheduled bowel medications:  senna-docusate (PERICOLACE or SENOKOT S)   PRN bowel medications used in last 24 hours:  None  Nursing Interventions:  Increased time, Scheduled medication, Supervision, Positioning on commode/toilet, Brief  Effectiveness of bowel program:   fair=sometimes needs prn bowel meds for constipation  Bladder:    Bladder Assist Initial Score:  2 - Max Assistance  Bladder Assist Current Score:  3 - Moderate Assistance  Bladder Accidents in last 7 days:  0  Medications affecting bladder:  None    Time void schedule/voiding pattern:  Time void every 3 hours during the day and every 4 hours at night  Interventions:  Increased time, Supervision, Brief    Wound:      Patient Lines/Drains/Airways Status    Active Current Wounds     Name: Placement date: Placement time: Site: Days:    Surgical Incision  Incision Left Lateral Hip 04/17/17  2100    9                    Interventions: Monitor and assess  Effectiveness of intervention:  wound is unchanged     Sleep/wake cycle:   Average hours slept:  Sleeps 3-4 hours without waking  Sleep medication usage:  Other Trazodone 50 mg    Patient/Family Training/Education:  Bladder Management/Training, Bowel Management/Training, Diet/Nutrition, Fall Prevention, General Self Care, Medication Management, Pain Management, Respiratory Hygiene, Safety and Wound Care    Strengths: Supportive family and Manages pain appropriately   Barriers:   Confused, Fatigue, Generalized weakness and Impulsive            Nursing Problems           Problem: Bowel/Gastric:     Goal: Normal bowel function is maintained or improved         Goal: Will not experience complications related to bowel motility           Problem: Communication     Goal: The ability to communicate needs accurately and effectively will improve           Problem: Discharge Barriers/Planning     Goal: Patient's continuum of care needs will be met           Problem: Infection     Goal: Will remain free from infection           Problem: Knowledge Deficit     Goal: Knowledge of disease process/condition, treatment plan, diagnostic tests, and medications will improve         Goal: Knowledge of the prescribed therapeutic regimen will improve           Problem: Mobility     Goal: Risk for activity intolerance will decrease           Problem: Pain Management     Goal: Pain level will decrease to patient's comfort goal           Problem: Safety     Goal: Will remain free from injury         Goal: Will remain free from falls           Problem: Urinary Elimination:     Goal: Ability to reestablish a normal urinary elimination pattern will improve           Problem: Venous Thromboembolism (VTW)/Deep Vein Thrombosis (DVT) Prevention:     Goal: Patient will participate in Venous Thrombosis (VTE)/Deep Vein Thrombosis (DVT)Prevention Measures     Flowsheet: Taken at 04/19/17 1509    Pharmacologic  Prophylaxis Used LMWH: Enoxaparin(Lovenox) by Nancy Fernando R.N.              Long Term Goals:   At discharge patient will be able to function safely at home and in the community with support.    Section completed by:  Bijal Glass R.N.

## 2017-04-27 NOTE — CARE PLAN
Problem: Safety  Goal: Will remain free from falls  Intervention: Implement fall precautions  Pt remains impulsive and restless.Bed in low position, non skid socks/shoes on when out of bed.Alarms in place for safety.Call light and things within reach.Will continue to monitor and assess needs and safety.      Problem: Infection  Goal: Will remain free from infection  Intervention: Assess signs and symptoms of infection  Monistat cream applied to vaginal area as ordered for itching.Will continue to monitor and assess.      Problem: Pain Management  Goal: Pain level will decrease to patient’s comfort goal  Pt denies any pain and no sign of acute distress noted.Repositioned with pillows for comfort.Will continue to monitor and assess pain level and medicate as needed.

## 2017-04-27 NOTE — REHAB-COLLABORATIVE ONGOING IDT TEAM NOTE
Weekly Interdisciplinary Team Conference Note    Weekly Interdisciplinary Team Conference # 2  Date:  4/27/2017    Clinicians present and reporting at team conference include the following:   MD: Kylah Marvin DO   RN:  Na Zambrano RN   PT:   Lico Rodriguez, PT, MPT, MEd  OT:  Willow Jama, MS, OTR/L   ST:  Aj Ragsdale, MS, CCC-SLP  CM:  Kylah August, LSW, LUIS  REC:  Not Applicable  RT:  Not Applicable  RPh:  Eliu Castillo [facility]  Other:   None  All reporting clinicians have a working knowledge of this patient's plan of care.    Targeted DC Date:  5/3/2017     Medical    Patient Active Problem List    Diagnosis Date Noted   • Hip fracture (CMS-HCC) 04/17/2017   • Acute encephalopathy 04/11/2017   • Mood disorder (CMS-HCC) 04/10/2017   • Femoral neck fracture (CMS-HCC) 04/09/2017   • Syncope 04/09/2017   • Essential hypertension 03/30/2017   • Atrial fibrillation (CMS-HCC) 03/30/2017   • Insomnia 03/30/2017   • Gastroesophageal reflux disease without esophagitis 03/30/2017     Results     Procedure Component Value Ref Range Date/Time    WESTERGREN SED RATE [[id number]] Collected:  04/27/17 0608    Order Status:  Completed Lab Status:  Final result Updated:  04/27/17 0834    Specimen Information:  Blood      Sed Rate Westergren 25 0 - 30 mm/hour     CORTISOL [[id number]] Collected:  04/27/17 0608    Order Status:  Completed Lab Status:  Final result Updated:  04/27/17 0807    Specimen Information:  Blood      Cortisol 16.4 0.0 - 23.0 ug/dL      Comment: Male                   Female  Age                 ug/dL                   ug/dL  5th day              0.6 - 19.8             0.6 - 19.8  2 - 12 month         2.4 - 22.9             2.4 - 22.9  2 - 13 year          2.5 - 22.9             2.5 - 22.9  14 - 15 year         2.5 - 22.9             2.4 - 28.6  16 - 18 year         2.4 - 28.6             2.4 - 28.6  >18years:  AM Cortisol (0800 hours):  6.0-23.0 ug/dL  PM Cortisol (2000 hours):  0.0-9.0 ug/dL  ACTH  Stimulation Test: 30-60 minutes post 250 mcg cosyntropin  I.V. A rise of >20 mcg/dL rules out primary adrenal insuffic-  iency but some patients with primary adrenal insufficiency  may have a rise in cortisol less than 20 mcg/dL.         TSH WITH REFLEX TO FT4 [[id number]] Collected:  04/27/17 0608    Order Status:  Completed Lab Status:  Final result Updated:  04/27/17 0730     TSH 1.600 0.300 - 3.700 uIU/mL     ESTIMATED GFR [[id number]]  (Abnormal) Collected:  04/27/17 0608    Order Status:  Completed Lab Status:  Final result Updated:  04/27/17 0657     GFR If  52 (A) >60 mL/min/1.73 m 2      GFR If Non  43 (A) >60 mL/min/1.73 m 2     COMP METABOLIC PANEL [[id number]]  (Abnormal) Collected:  04/27/17 0608    Order Status:  Completed Lab Status:  Final result Updated:  04/27/17 0657    Specimen Information:  Blood      Sodium 134 (L) 135 - 145 mmol/L      Potassium 3.9 3.6 - 5.5 mmol/L      Chloride 103 96 - 112 mmol/L      Co2 25 20 - 33 mmol/L      Anion Gap 6.0 0.0 - 11.9       Glucose 106 (H) 65 - 99 mg/dL      Bun 33 (H) 8 - 22 mg/dL      Creatinine 1.20 0.50 - 1.40 mg/dL      Calcium 9.0 8.5 - 10.5 mg/dL      AST(SGOT) 18 12 - 45 U/L      ALT(SGPT) 17 2 - 50 U/L      Alkaline Phosphatase 84 30 - 99 U/L      Total Bilirubin 0.5 0.1 - 1.5 mg/dL      Albumin 3.5 3.2 - 4.9 g/dL      Total Protein 6.5 6.0 - 8.2 g/dL      Globulin 3.0 1.9 - 3.5 g/dL      A-G Ratio 1.2 g/dL     CBC WITH DIFFERENTIAL [[id number]]  (Abnormal) Collected:  04/27/17 0608    Order Status:  Completed Lab Status:  Final result Updated:  04/27/17 0644    Specimen Information:  Blood      WBC 7.5 4.8 - 10.8 K/uL      RBC 3.93 (L) 4.20 - 5.40 M/uL      Hemoglobin 11.9 (L) 12.0 - 16.0 g/dL      Hematocrit 36.2 (L) 37.0 - 47.0 %      MCV 92.1 81.4 - 97.8 fL      MCH 30.3 27.0 - 33.0 pg      MCHC 32.9 (L) 33.6 - 35.0 g/dL      RDW 49.0 35.9 - 50.0 fL      Platelet Count 345 164 - 446 K/uL      MPV 10.7 9.0 - 12.9 fL       Neutrophils-Polys 63.40 44.00 - 72.00 %      Lymphocytes 21.80 (L) 22.00 - 41.00 %      Monocytes 12.90 0.00 - 13.40 %      Eosinophils 0.70 0.00 - 6.90 %      Basophils 0.50 0.00 - 1.80 %      Immature Granulocytes 0.70 0.00 - 0.90 %      Nucleated RBC 0.00 /100 WBC      Neutrophils (Absolute) 4.76 2.00 - 7.15 K/uL      Comment: Includes immature neutrophils, if present.        Lymphs (Absolute) 1.64 1.00 - 4.80 K/uL      Monos (Absolute) 0.97 (H) 0.00 - 0.85 K/uL      Eos (Absolute) 0.05 0.00 - 0.51 K/uL      Baso (Absolute) 0.04 0.00 - 0.12 K/uL      Immature Granulocytes (abs) 0.05 0.00 - 0.11 K/uL      NRBC (Absolute) 0.00 K/uL     SSA 52 AND 60 (RO)(CICI) AB, IGG [[id number]] Collected:  04/27/17 0608    Order Status:  Completed Lab Status:  In process Updated:  04/27/17 0631    SSB(LA)(CICI)IGG AB [[id number]] Collected:  04/27/17 0608    Order Status:  Completed Lab Status:  In process Updated:  04/27/17 0631    ANTI-NUCLEAR ANTIBODY SERUM [[id number]] Collected:  04/27/17 0608    Order Status:  Completed Lab Status:  In process Updated:  04/27/17 0631    Specimen Information:  Blood     ACTH [[id number]] Collected:  04/27/17 0608    Order Status:  Completed Lab Status:  In process Updated:  04/27/17 0631    Specimen Information:  Blood         Nursing  Diet/Nutrition:  Regular  Medication Administration:  Whole with Liquid Wash  % consumed at meals in last 24 hours:  Consumed 75%-100% of meals per documentation.  Breakfast:  95%   Lunch:  100%  Dinner:  75%   Snack schedule:  None  Supplement:  Boost Plus  Appetite:  Good  Fluid Intake/Output in past 24 hours: In: 940 [P.O.:940]  Out: -   Admit Weight:  Weight: 60 kg (132 lb 4.4 oz)  Weight Last 7 Days   [56.1 kg (123 lb 10.9 oz)] 56.1 kg (123 lb 10.9 oz) (04/23 0500)    Pain Issues:    Location:  --  --         Severity:  Moderate   Scheduled pain medications:  None     PRN pain medications used in last 24 hours:  None   Non Pharmacologic Interventions:   emotional support, repositioned and rest  Effectiveness of pain management plan:  good=patient states acceptable comfort after interventions    Bowel:    Bowel Assist Initial Score:  2 - Max Assistance  Bowel Assist Current Score:  2 - Max Assistance  Bowl Accidents in last 7 days:  1  Last bowel movement: 04/26/17  Stool: Medium     Usual bowel pattern:  every other day  Scheduled bowel medications:  senna-docusate (PERICOLACE or SENOKOT S)   PRN bowel medications used in last 24 hours:  None  Nursing Interventions:  Increased time, Scheduled medication, Supervision, Positioning on commode/toilet, Brief  Effectiveness of bowel program:   fair=sometimes needs prn bowel meds for constipation  Bladder:    Bladder Assist Initial Score:  2 - Max Assistance  Bladder Assist Current Score:  3 - Moderate Assistance  Bladder Accidents in last 7 days:  0  Medications affecting bladder:  None    Time void schedule/voiding pattern:  Time void every 3 hours during the day and every 4 hours at night  Interventions:  Increased time, Supervision, Brief    Wound:      Patient Lines/Drains/Airways Status    Active Current Wounds     Name: Placement date: Placement time: Site: Days:    Surgical Incision  Incision Left Lateral Hip 04/17/17  2100    9                   Interventions: Monitor and assess  Effectiveness of intervention:  wound is unchanged     Sleep/wake cycle:   Average hours slept:  Sleeps 3-4 hours without waking  Sleep medication usage:  Other Trazodone 50 mg    Patient/Family Training/Education:  Bladder Management/Training, Bowel Management/Training, Diet/Nutrition, Fall Prevention, General Self Care, Medication Management, Pain Management, Respiratory Hygiene, Safety and Wound Care    Strengths: Supportive family and Manages pain appropriately   Barriers:   Confused, Fatigue, Generalized weakness and Impulsive            Nursing Problems           Problem: Bowel/Gastric:     Goal: Normal bowel function is maintained  or improved         Goal: Will not experience complications related to bowel motility           Problem: Communication     Goal: The ability to communicate needs accurately and effectively will improve           Problem: Discharge Barriers/Planning     Goal: Patient's continuum of care needs will be met           Problem: Infection     Goal: Will remain free from infection           Problem: Knowledge Deficit     Goal: Knowledge of disease process/condition, treatment plan, diagnostic tests, and medications will improve         Goal: Knowledge of the prescribed therapeutic regimen will improve           Problem: Mobility     Goal: Risk for activity intolerance will decrease           Problem: Pain Management     Goal: Pain level will decrease to patient's comfort goal           Problem: Safety     Goal: Will remain free from injury         Goal: Will remain free from falls           Problem: Urinary Elimination:     Goal: Ability to reestablish a normal urinary elimination pattern will improve           Problem: Venous Thromboembolism (VTW)/Deep Vein Thrombosis (DVT) Prevention:     Goal: Patient will participate in Venous Thrombosis (VTE)/Deep Vein Thrombosis (DVT)Prevention Measures     Flowsheet: Taken at 04/19/17 6359    Pharmacologic Prophylaxis Used LMWH: Enoxaparin(Lovenox) by Nancy Fernando R.N.              Long Term Goals:   At discharge patient will be able to function safely at home and in the community with support.    Section completed by:  Bijal Glass R.N.           Mobility  Bed mobility: Standby assist supine to/from sit  Bed /Chair/Wheelchair Transfer Initial:  3 - Moderate Assistance  Bed /Chair/Wheelchair Transfer Current:  4 - Minimal Assistance/contact guard assist    Bed/Chair/Wheelchair Transfer Description:  Front wheel walker, Supervision for safety, Verbal cueing, Set-up of equipment  Walk Initial:  1 - Total Assistance  Walk Current:  Contact guard assist and verbal cues  for TTWB   Walk Description:   Contact guard assist, fww, 40 feet, ttwb precaution)  Wheelchair Initial:  5E - Household Exception  Wheelchair Current:  2 - Max Assistance   Wheelchair Description:   (x120' in manual wc with max cues for technique, and one instance of physical assist to complete turn)  Stairs Initial:  0 - Not tested,medical condition  Stairs Current: 0 - Not tested,unsafe activity due to TTWB   Stairs Description:    Patient/Family Training/Education: In progress  DME/DC Recommendations:  Fww, manual wheelchair and cushion  Strengths:  Independent PLOF, Making steady progress towards goals, Manages pain appropriately, Motivated for self care and independence, Pleasant and cooperative, Supportive family and Willingly participates in therapeutic activities  Barriers:   Impulsive and Other: TTWB LLE, impaired memory and safety awareness, fall risk, orthostatic hypotension  # of short term goals set= 4  # of short term goals met=1        Physical Therapy Problems           Problem: Balance     Dates: Start: 04/18/17       Goal: STG-Within one week, patient will maintain dynamic standing     Dates: Start: 04/18/17      Description: 1) Individualized goal: Maintain dynamic standing balance fww sba for transfers and gait one week    2) Interventions:  PT Gait Training, PT Therapeutic Exercises, PT Neuro Re-Ed/Balance and PT Therapeutic Activity        Note: Goal Note filed on 04/27/17 1146 by Lico Rodriguez, M.S.P.T.    Goal: STG-Within one week, patient will maintain dynamic standing  Outcome: NOT MET  Patient fell when trying to transfer alone            Problem: Mobility     Dates: Start: 04/18/17       Goal: STG-Within one week, patient will     Dates: Start: 04/18/17      Description: 1) Individualized goal: Gait fww sba 125 ft one week    2) Interventions:  PT Gait Training, PT Therapeutic Exercises, PT Therapeutic Activity and PT Evaluation        Note: Goal Note filed on 04/27/17 1146 by Lico  DANITZA RodriguezS.P.T.    Goal: STG-Within one week, patient will  Outcome: NOT MET  40 feet 2 fww cga, ttwb            Problem: Mobility Transfers     Dates: Start: 04/18/17       Goal: STG-Within one week, patient will transfer bed to chair     Dates: Start: 04/20/17      Description: 1) Individualized goal: Transfer bed to/from chair fww spv ttwb one week    2) Interventions:  PT Gait Training, PT Therapeutic Exercises and PT Therapeutic Activity        Note: Goal Note filed on 04/27/17 1146 by DANITZA TelloS.P.T.    Goal: STG-Within one week, patient will transfer bed to chair  Outcome: NOT MET  sba and intermittent cga            Problem: PT-Long Term Goals     Dates: Start: 04/18/17       Goal: LTG-By discharge, patient will ambulate     Dates: Start: 04/18/17      Description: 1) Individualized goal: Gait fww household 150 ft, community 300 ft modified independent at discharge    2) Interventions:  PT Gait Training, PT Therapeutic Exercises, PT Neuro Re-Ed/Balance, PT Therapeutic Activity and PT Evaluation            Goal: LTG-By discharge, patient will transfer one surface to another     Dates: Start: 04/18/17      Description: 1) Individualized goal: Transfer one surface to another fww modified independent at discharge    2) Interventions:  PT Gait Training, PT Therapeutic Exercises, PT Neuro Re-Ed/Balance, PT Therapeutic Activity and PT Evaluation            Goal: LTG-By discharge, patient will ambulate up/down 4-6 stairs     Dates: Start: 04/18/17      Description: 1) Individualized goal: Up/down ramp or 2 stairs with railings supervision at discharge    2) Interventions:  PT Gait Training, PT Therapeutic Exercises, PT Neuro Re-Ed/Balance, PT Therapeutic Activity and PT Evaluation            Goal: LTG-By discharge, patient will transfer in/out of a car     Dates: Start: 04/18/17      Description: 1) Individualized goal: Car transfer fww supervision at discharge    2) Interventions:  PT Gait Training, PT  Therapeutic Exercises, PT Neuro Re-Ed/Balance, PT Therapeutic Activity and PT Evaluation                    Section completed by:  DANITZA TelloS.P.TParesh    Activities of Daily Living  Eating Initial:  6 - Modified Independent  Eating Current:  6 - Modified Independent   Eating Description:     Grooming Initial:  5 - Standby Prompting/Supervision or Set-up  Grooming Current:  5 - Standby Prompting/Supervision or Set-up   Grooming Description:  Supervision for safety  Bathing Initial:  0 - Not tested, patient refused  Bathing Current:  4 - Minimal Assistance   Bathing Description:  Grab bar, Tub bench, Supervision for safety, Verbal cueing (min A for LLE, CGA in stance with GB for balance)  Upper Body Dressing Initial:  5 - Standby Prompting/Supervision or Set-up  Upper Body Dressing Current:  5 - Standby Prompting/Supervision or Set-up   Upper Body Dressing Description:   (set-up v/cs for orientation of shirt)  Lower Body Dressing Initial:  1 - Total Assistance  Lower Body Dressing Current:  4 - Minimal Assistance   Lower Body Dressing Description:  4 - Minimal Assistance  Toileting Initial:  2 - Max Assistance  Toileting Current:  5 - Standby Prompting/Supervision or Set-up   Toileting Description:  Grab bar, Supervision for safety, Verbal cueing, Set-up of equipment  Toilet Transfer Initial:  2 - Max Assistance  Toilet Transfer Current:  4 - Minimal Assistance   Toilet Transfer Description:  4 - Minimal Assistance  Tub / Shower Transfer Initial:  0 - Not tested, patient refused  Tub / Shower Transfer Current:  4 - Minimal Assistance   Tub / Shower Transfer Description:  Grab bar, Supervision for safety, Verbal cueing (CGA using GB squat pivot.  Pt could be SBA however CGA this date for demonstration with gait belt for pt's daughter for FT)  IADL:  N/A   Family Training/Education:  Ongoing, daughter apprehensive 2/2 to recent fall   DME/DC Recommendations:  Intermittent caregiver assist for family, 24 hour SPV,  Home health initially, [facility], BSC, tub transfer bench, manual w/c with seat belt, FWW     Strengths:  Pleasant and cooperative, Supportive family and Willingly participates in therapeutic activities  Barriers:  Decreased endurance, Dementia, Generalized weakness, Orthostatic hypotension, Impulsive, Limited mobility, Poor balance and Poor carryover of learning     # of short term goals set= 1     # of short term goals met= 0- d/c current goal and added 4 for family training.           Occupational Therapy Goals           Problem: Bathing     Dates: Start: 04/18/17       Goal: STG-Within one week, patient will     Dates: Start: 04/26/17      Description: Pt's daughter will indep bathe pt with setup to min A for safe d/c home          Problem: Dressing     Dates: Start: 04/18/17       Goal: STG-Within one week, patient will dress LB     Dates: Start: 04/26/17      Description: Pt's daughter will independently dress pt with min A with good v/cs for WB precautions          Problem: Functional Transfers     Dates: Start: 04/26/17       Goal: STG-Within one week, patient will transfer to toilet     Dates: Start: 04/26/17      Description: Pt's daughter will complete toilet transfer with BSC over toilet with CGA with good v/cs for hand placement during txs.         Goal: STG-Within one week, patient will transfer to tub/shower     Dates: Start: 04/26/17      Description: Pt will complete tub bench transfer with CGA and min v/cs for WB precautions          Problem: OT Long Term Goals     Dates: Start: 04/18/17       Goal: LTG-By discharge, patient will complete basic self care tasks     Dates: Start: 04/18/17      Description: 1) Individualized Goal:  MIn A per daughter    2) Interventions:  OT Self Care/ADL, OT Cognitive Skill Dev, OT Community Reintegration, OT Neuro Re-Ed/Balance, OT Therapeutic Activity, OT Evaluation and OT Therapeutic Exercise        Goal: LTG-By discharge, patient will perform bathroom transfers      Dates: Start: 04/18/17      Description: 1) Individualized Goal:  SUpervision maintaining WB precautions    2) Interventions:  OT Self Care/ADL, OT Cognitive Skill Dev, OT Community Reintegration, OT Neuro Re-Ed/Balance, OT Therapeutic Activity, OT Evaluation and OT Therapeutic Exercise              Section completed by:  Willow Jama, MS,OTR/L    Cognitive Linquistic Functions  Comprehension Initial:  3 - Moderate Assistance  Comprehension Current:  4 - Minimal Assistance   Comprehension Description:  Verbal cues  Expression Initial:  5 - Stand-by Prompting/Supervision or Set-up  Expression Current:  5 - Stand-by Prompting/Supervision or Set-up   Expression Description:  Verbal cueing  Social Interaction Initial:  5 - Stand-by Prompting/Supervision or Set-up  Social Interaction Current:  5 - Stand-by Prompting/Supervision or Set-up   Social Interaction Description:  Increased time  Problem Solving Initial:  3 - Moderate Assistance  Problem Solving Current:  3 - Moderate Assistance   Problem Solving Description:  Verbal cueing, Increased time  Memory Initial:  3 - Moderate Assistance  Memory Current:  2 - Max Assistance   Memory Description:  Verbal cueing, Therapy schedule  Executive Functioning / Organization Initial:  Severe (2)  Executive Functioning / Organization Current:  Severe (2)   Executive Functioning Desciption:  Verbal cues  Swallowing  Swallowing Status:  Regular textures and thin liquids, not following for dysphagia management    Orders Placed This Encounter   Procedures   • DIET ORDER     Standing Status: Standing      Number of Occurrences: 1      Standing Expiration Date:      Order Specific Question:  Diet:     Answer:  Regular [1]     Behavior Modification Plan  Allow for rest time, Keep instructions simple/concrete, Set clear goals, Redirect to task/topic and Analyze tasks (break down into smaller steps)  Assistive Technology  Memory aides: and Low tech: Calendar, planner, schedule,  alarms/timers, pill organizer, post-it notes, lists  Family Training/Education:  To be completed   DC Recommendations:   24 hour spv   Strengths:  Motivated for self care and independence, Pleasant and cooperative, Supportive family and Willingly participates in therapeutic activities  Barriers:  Confused, Dementia and Poor carryover of learning  # of short term goals set=2  # of short term goals met=0        Speech Therapy Problems           Problem: Memory STGs     Dates: Start: 04/20/17       Goal: STG-Within one week, patient will     Dates: Start: 04/20/17      Description: 1) Individualized goal:  With recall safety sequences with 60% acc or mod A.    2) Interventions: SLP Speech Language Treatment, SLP Self Care / ADL Training  and SLP Cognitive Skill Development        Note: Goal Note filed on 04/26/17 1710 by Aj Ragsdale MS,CCC-SLP    Goal: STG-Within one week, patient will  Outcome: NOT MET  Max A required for pt to recall safety sequences             Problem: Problem Solving STGs     Dates: Start: 04/20/17       Goal: STG-Within one week, patient will solve basic problems     Dates: Start: 04/20/17      Description: 1) Individualized goal:  With 60%with mod A.    2) Interventions:  SLP Speech Language Treatment, SLP Self Care / ADL Training  and SLP Cognitive Skill Development        Note: Goal Note filed on 04/26/17 1710 by Aj Ragsdale MS,CCC-SLP    Goal: STG-Within one week, patient will solve basic problems  Outcome: NOT MET  Pt requires mod-max A to solve basic problems             Problem: Speech/Swallowing LTGs     Dates: Start: 04/20/17       Goal: LTG-By discharge, patient will solve basic problems     Dates: Start: 04/20/17      Description: 1) Individualized goal:  With 70% acc with min to mod A.    2) Interventions:  SLP Speech Language Treatment and SLP Self Care / ADL Training     2) Interventions:  SLP Speech Language Treatment, SLP Self Care / ADL Training  and SLP Cognitive Skill  Development            Goal: LTG-By discharge, patient will     Dates: Start: 04/20/17                 Section completed by:  Aj Ragsdale MS,CCC-SLP          REHAB-Pharmacy IDT Team Note by Rafael Medina RPH at 4/26/2017  2:59 PM  Version 1 of 1    Author:  Rafael Medina RPH Service:  (none) Author Type:  Pharmacist    Filed:  4/26/2017  3:01 PM Note Time:  4/26/2017  2:59 PM Status:  Signed    :  Rafael Medina RPH (Pharmacist)           Pharmacy  Pharmacy  Antibiotics/Antifungals/Antivirals:  Medication:      Active Orders     None        Route:         None  Stop Date:  N/A  Reason:   Antihypertensives/Cardiac:  Medication:    Orders (72h ago through future)    Start     Ordered    04/19/17 0915  amlodipine (NORVASC) tablet 5 mg   Q DAY      04/19/17 0858    04/18/17 0900  amiodarone (CORDARONE) tablet 200 mg   DAILY      04/17/17 1724        Patient Vitals for the past 24 hrs:   BP Pulse Orthostatics   04/26/17 1400 104/68 mmHg 70 -   04/26/17 0831 (!) 80/26 mmHg - -   04/26/17 0727 154/80 mmHg 70 -   04/26/17 0540 150/70 mmHg - -   04/25/17 2020 155/80 mmHg - -   04/25/17 1933 (!) 172/84 mmHg 70 -   04/25/17 1612 116/72 mmHg 68 Standing   04/25/17 1610 154/82 mmHg 82 Sitting   04/25/17 1605 146/78 mmHg 70 Supine       Anticoagulation:  Medication:  lovenox  INR:      Other key medications:       A review of the medication list reveals no issues at this time. Patient is currently on an antihypertensive. Recommend home blood pressure monitoring/recording if antihypertensive regimen continues.    Section completed by:  Rafael Medina RPH           DC Planning          DC destination/dispostion:  Return to her one story home in [place] with 2 entry steps, where she lives with her daughter, Sun and 3 grandchildren.  Dtr is not working and able to provide 24 hour care @ home.     Referrals: Anticipate home health services initially, and then out pt therapy once pt returns to the  [facility].      DC Needs: DME - FWW,  ongoing family training for daughter; follow up visits with PCP, Dr. Mcghee and ortho, Dr. Leblanc    Barriers to discharge:  Decreased mobility due to hip fracture, WB status; pt fell earlier this week-no injuries; cognitive impairments      Strengths:  Supportive family, cooperative with therapy program, pleasant, cooperative              Section completed by:  DILIA LoweW, CCM     Summary:  Recommends:  Tub transfer bench; 3:1 commode; wc w/ cushion, fww for transfers; family training ongoing, orthostatic blood pressures. Referral made to  [facility] Disability Services for personal care assistance at home.      Physician Summary  Kylah Marvin DO participated and led team conference discussion.

## 2017-04-27 NOTE — PROGRESS NOTES
"Hospital Medicine Interval Note  Date of Service:  4/27/2017    Chief Complaint  PATIENT HAS ORTHOSTATIC HYPOTENSION.    Interval Problem Update  EPISODE OF HYPOTENSION    Consultants/Specialty  INTERNAL MEDICINE    Disposition  PER PRIMARY REHAB TEAM.      Review of Systems   Constitutional: Negative for fever, chills and malaise/fatigue.   Respiratory: Negative for cough, hemoptysis and sputum production.    Cardiovascular: Negative for chest pain, palpitations and leg swelling.   Gastrointestinal: Negative for heartburn, nausea and vomiting.   Genitourinary: Negative for dysuria.        PROBABLY THE MOST CONCENTRATED  URINE SAMPLE IN [facility] THAT I HAVE EVER SEEN  (ie: dry)   Skin: Negative for rash.   Neurological: Positive for dizziness and headaches.     4/27/17-- 4/27/17--- THE PATIENT'S MIDODRINE WILL NOT WORK IF THE \"TANK\" IS COMPLETELY EMPTY.  WE WILL GIVE 2 LITER BOLUS OF NORMAL SALINE.  THIS WILL HELP.  HARD TO IMMAGINE SHE WONT HAVE ORTHOSTATIC HYPOTENSION UNTIL VOLUME STATUS CORRECTED.   SO HOLD HEAVY LABOR.  UNTIL FLUID IS IN.  ADDING FLORINEF BID.  INCREASING MIDODRINE TOMORROW IN AM TO 10.   ENCOURAGE INCREASED PO INTAKE.  ENCORAGE SALTY FOOD INTAKE,.    ADDING SOME SODIUM TABS QAM.                     Physical Exam Laboratory/Imaging   Filed Vitals:    04/27/17 0700 04/27/17 0926 04/27/17 1020 04/27/17 1025   BP: 132/74 104/36 122/71 128/77   Pulse: 69 64 69 69   Temp: 36.8 °C (98.2 °F)      Resp: 18  18 18   Height:       Weight:       SpO2:         Physical Exam   Constitutional: She appears well-nourished. No distress.   HENT:   Head: Normocephalic and atraumatic.   Mouth/Throat: No oropharyngeal exudate.   CLAMMY SKIN, SLIGHT SWEAT.   Cardiovascular: Normal rate and regular rhythm.    EXACTLY 70 WHERE PACER SET.   Pulmonary/Chest: Effort normal and breath sounds normal.   Abdominal: She exhibits no distension.   Musculoskeletal: She exhibits no edema or tenderness.   Neurological: She is alert. No " "cranial nerve deficit. Coordination abnormal.   PLEASANTLY DEMENTED.   Skin: Skin is warm and dry. No erythema.   Psychiatric: She has a normal mood and affect. Her behavior is normal.    Lab Results   Component Value Date/Time    WBC 7.5 04/27/2017 06:08 AM    HEMOGLOBIN 11.9* 04/27/2017 06:08 AM    HEMATOCRIT 36.2* 04/27/2017 06:08 AM    PLATELET COUNT 345 04/27/2017 06:08 AM     Lab Results   Component Value Date/Time    SODIUM 134* 04/27/2017 06:08 AM    POTASSIUM 3.9 04/27/2017 06:08 AM    CHLORIDE 103 04/27/2017 06:08 AM    CO2 25 04/27/2017 06:08 AM    GLUCOSE 106* 04/27/2017 06:08 AM    BUN 33* 04/27/2017 06:08 AM    CREATININE 1.20 04/27/2017 06:08 AM      Assessment/Plan    No new assessment & plan notes have been filed under this hospital service since the last note was generated.  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  SEVERE DEHYDRATION    PLAN: 4/27/17--- THE PATIENT'S MIDODRINE WILL NOT WORK IF THE \"TANK\" IS COMPLETELY EMPTY.  WE WILL GIVE 2 LITER BOLUS OF NORMAL SALINE.  THIS WILL HELP.  HARD TO IMMAGINE SHE WONT HAVE ORTHOSTATIC HYPOTENSION UNTIL VOLUME STATUS CORRECTED.   SO HOLD HEAVY LABOR.  UNTIL FLUID IS IN.  ADDING FLORINEF BID.  INCREASING MIDODRINE TOMORROW IN AM TO 10.   ENCOURAGE INCREASED PO INTAKE.  ENCORAGE SALTY FOOD INTAKE,.    ADDING SOME SODIUM TABS QAM.   Labs reviewed, Medications reviewed and Radiology images reviewed  Alvarado catheter: No Alvarado                           "

## 2017-04-27 NOTE — PROGRESS NOTES
"FULL MEDICAL CONSULT PENDING.  IN SHORT,  NICE ELDERLY [place] LADY PRESENTS WITH MULTIPLE FALLS.  THOUGH TO HAVE TACHY-MARIE SYNDROME AND A PERMANENT PACER PUT IN AND SET AT 70.  PATIENT IS STILL HAVING PAROXYSMS OF LIGHT HEADED NESS AND SYNCOPE.    THIS TIME SHE FELL AND BROKE HER HIP/PROXIMAL FEMUR.     THE PATIENT IS DEMENTED.   LOOKING AT HER SCANS SHE HAS NEXT TO NO CALCIUM IN HER MAJOR VESSELS.   IT IS LIKELY ALZHEIMERS DEMENTIA OR ANOTHER RARER TYPE OF DEMENTING ILLNESS.  IT IS NOT MULTI INFARCT DEMENTIA.  PLAN:  ADD MIDODRINE AT PROBLEM TIME,  IE IN THE MORNING.  SUPINE HYPERTENSION IS NOTED LATER IN DAY.   ALSO ADDING SOME QAM FLORINEF.  A BATTERY OF \"ZEBRA\" TYPE LABS FOR RARER CAUSES OF AUTONOMIC INSUFICIENCY AND HYPOTENTION ORDERED FOR AM.  "

## 2017-04-27 NOTE — REHAB-CM IDT TEAM NOTE
Case Management   DC Planning          DC destination/dispostion:  Return to her one story home in [place] with 2 entry steps, where she lives with her daughter, Sun and 3 grandchildren.  Dtr is not working and able to provide 24 hour care @ home.     Referrals: Anticipate home health services initially, and then out pt therapy once pt returns to the [facility].      DC Needs: DME - FWW,  ongoing family training for daughter; follow up visits with PCP, Dr. Mcghee and ortho, Dr. Leblanc    Barriers to discharge:  Decreased mobility due to hip fracture, WB status; pt fell earlier this week-no injuries; cognitive impairments      Strengths:  Supportive family, cooperative with therapy program, pleasant, cooperative              Section completed by:  RONNI Lowe, CCM

## 2017-04-27 NOTE — CARE PLAN
Problem: Problem Solving STGs  Goal: STG-Within one week, patient will solve basic problems  1) Individualized goal: With 60%with mod A.  2) Interventions: SLP Speech Language Treatment, SLP Self Care / ADL Training and SLP Cognitive Skill Development      Outcome: NOT MET  Pt requires mod-max A to solve basic problems     Problem: Memory STGs  Goal: STG-Within one week, patient will  1) Individualized goal: With recall safety sequences with 60% acc or mod A.  2) Interventions: SLP Speech Language Treatment, SLP Self Care / ADL Training and SLP Cognitive Skill Development      Outcome: NOT MET  Max A required for pt to recall safety sequences

## 2017-04-27 NOTE — CARE PLAN
Problem: Balance  Goal: STG-Within one week, patient will maintain dynamic standing  1) Individualized goal: Maintain dynamic standing balance fww sba for transfers and gait one week  2) Interventions: PT Gait Training, PT Therapeutic Exercises, PT Neuro Re-Ed/Balance and PT Therapeutic Activity  Outcome: NOT MET  Patient fell when trying to transfer alone  Goal: STG-Within one week, patient will  1) Individualized goal: Maintain TTWB LLE fww during transfers and gait one week  2) Interventions: PT Gait Training, PT Therapeutic Exercises, PT Neuro Re-Ed/Balance, PT Therapeutic Activity and PT Evaluation  Outcome: MET Date Met:  04/27/17    Problem: Mobility  Goal: STG-Within one week, patient will  1) Individualized goal: Gait fww sba 125 ft one week  2) Interventions: PT Gait Training, PT Therapeutic Exercises, PT Therapeutic Activity and PT Evaluation  Outcome: NOT MET  40 feet ×2 fww cga, ttwb    Problem: Mobility Transfers  Goal: STG-Within one week, patient will transfer bed to chair  1) Individualized goal: Transfer bed to/from chair fww spv ttwb one week  2) Interventions: PT Gait Training, PT Therapeutic Exercises and PT Therapeutic Activity  Outcome: NOT MET  sba and intermittent cga

## 2017-04-27 NOTE — REHAB-SLP IDT TEAM NOTE
Speech Therapy  Cognitive Linquistic Functions  Comprehension Initial:  3 - Moderate Assistance  Comprehension Current:  4 - Minimal Assistance   Comprehension Description:  Verbal cues  Expression Initial:  5 - Stand-by Prompting/Supervision or Set-up  Expression Current:  5 - Stand-by Prompting/Supervision or Set-up   Expression Description:  Verbal cueing  Social Interaction Initial:  5 - Stand-by Prompting/Supervision or Set-up  Social Interaction Current:  5 - Stand-by Prompting/Supervision or Set-up   Social Interaction Description:  Increased time  Problem Solving Initial:  3 - Moderate Assistance  Problem Solving Current:  3 - Moderate Assistance   Problem Solving Description:  Verbal cueing, Increased time  Memory Initial:  3 - Moderate Assistance  Memory Current:  2 - Max Assistance   Memory Description:  Verbal cueing, Therapy schedule  Executive Functioning / Organization Initial:  Severe (2)  Executive Functioning / Organization Current:  Severe (2)   Executive Functioning Desciption:  Verbal cues  Swallowing  Swallowing Status:  Regular textures and thin liquids, not following for dysphagia management    Orders Placed This Encounter   Procedures   • DIET ORDER     Standing Status: Standing      Number of Occurrences: 1      Standing Expiration Date:      Order Specific Question:  Diet:     Answer:  Regular [1]     Behavior Modification Plan  Allow for rest time, Keep instructions simple/concrete, Set clear goals, Redirect to task/topic and Analyze tasks (break down into smaller steps)  Assistive Technology  Memory aides: and Low tech: Calendar, planner, schedule, alarms/timers, pill organizer, post-it notes, lists  Family Training/Education:  To be completed   DC Recommendations:   24 hour spv   Strengths:  Motivated for self care and independence, Pleasant and cooperative, Supportive family and Willingly participates in therapeutic activities  Barriers:  Confused, Dementia and Poor carryover of  learning  # of short term goals set=2  # of short term goals met=0        Speech Therapy Problems           Problem: Memory STGs     Dates: Start: 04/20/17       Goal: STG-Within one week, patient will     Dates: Start: 04/20/17      Description: 1) Individualized goal:  With recall safety sequences with 60% acc or mod A.    2) Interventions: SLP Speech Language Treatment, SLP Self Care / ADL Training  and SLP Cognitive Skill Development        Note: Goal Note filed on 04/26/17 1710 by Aj Ragsdale MS,CCC-SLP    Goal: STG-Within one week, patient will  Outcome: NOT MET  Max A required for pt to recall safety sequences             Problem: Problem Solving STGs     Dates: Start: 04/20/17       Goal: STG-Within one week, patient will solve basic problems     Dates: Start: 04/20/17      Description: 1) Individualized goal:  With 60%with mod A.    2) Interventions:  SLP Speech Language Treatment, SLP Self Care / ADL Training  and SLP Cognitive Skill Development        Note: Goal Note filed on 04/26/17 1710 by Aj Ragsdale MS,CCC-SLP    Goal: STG-Within one week, patient will solve basic problems  Outcome: NOT MET  Pt requires mod-max A to solve basic problems             Problem: Speech/Swallowing LTGs     Dates: Start: 04/20/17       Goal: LTG-By discharge, patient will solve basic problems     Dates: Start: 04/20/17      Description: 1) Individualized goal:  With 70% acc with min to mod A.    2) Interventions:  SLP Speech Language Treatment and SLP Self Care / ADL Training     2) Interventions:  SLP Speech Language Treatment, SLP Self Care / ADL Training  and SLP Cognitive Skill Development            Goal: LTG-By discharge, patient will     Dates: Start: 04/20/17                 Section completed by:  Aj Ragsdale MS,CCC-SLP

## 2017-04-28 LAB
ACTH PLAS-MCNC: 19 PG/ML (ref 6–58)
ALBUMIN SERPL BCP-MCNC: 3.3 G/DL (ref 3.2–4.9)
ALBUMIN/GLOB SERPL: 1.2 G/DL
ALP SERPL-CCNC: 78 U/L (ref 30–99)
ALT SERPL-CCNC: 17 U/L (ref 2–50)
ANION GAP SERPL CALC-SCNC: 7 MMOL/L (ref 0–11.9)
AST SERPL-CCNC: 19 U/L (ref 12–45)
BASOPHILS # BLD AUTO: 0.5 % (ref 0–1.8)
BASOPHILS # BLD: 0.03 K/UL (ref 0–0.12)
BILIRUB SERPL-MCNC: 0.4 MG/DL (ref 0.1–1.5)
BNP SERPL-MCNC: 291 PG/ML (ref 0–100)
BUN SERPL-MCNC: 29 MG/DL (ref 8–22)
CALCIUM SERPL-MCNC: 8.2 MG/DL (ref 8.5–10.5)
CHLORIDE SERPL-SCNC: 109 MMOL/L (ref 96–112)
CO2 SERPL-SCNC: 22 MMOL/L (ref 20–33)
CREAT SERPL-MCNC: 0.99 MG/DL (ref 0.5–1.4)
EOSINOPHIL # BLD AUTO: 0.05 K/UL (ref 0–0.51)
EOSINOPHIL NFR BLD: 0.8 % (ref 0–6.9)
ERYTHROCYTE [DISTWIDTH] IN BLOOD BY AUTOMATED COUNT: 50.4 FL (ref 35.9–50)
GFR SERPL CREATININE-BSD FRML MDRD: 54 ML/MIN/1.73 M 2
GLOBULIN SER CALC-MCNC: 2.8 G/DL (ref 1.9–3.5)
GLUCOSE SERPL-MCNC: 94 MG/DL (ref 65–99)
HCT VFR BLD AUTO: 35.2 % (ref 37–47)
HGB BLD-MCNC: 11.5 G/DL (ref 12–16)
IMM GRANULOCYTES # BLD AUTO: 0.04 K/UL (ref 0–0.11)
IMM GRANULOCYTES NFR BLD AUTO: 0.6 % (ref 0–0.9)
LYMPHOCYTES # BLD AUTO: 1.82 K/UL (ref 1–4.8)
LYMPHOCYTES NFR BLD: 28.2 % (ref 22–41)
MCH RBC QN AUTO: 30.8 PG (ref 27–33)
MCHC RBC AUTO-ENTMCNC: 32.7 G/DL (ref 33.6–35)
MCV RBC AUTO: 94.4 FL (ref 81.4–97.8)
MONOCYTES # BLD AUTO: 1.03 K/UL (ref 0–0.85)
MONOCYTES NFR BLD AUTO: 16 % (ref 0–13.4)
NEUTROPHILS # BLD AUTO: 3.48 K/UL (ref 2–7.15)
NEUTROPHILS NFR BLD: 53.9 % (ref 44–72)
NRBC # BLD AUTO: 0 K/UL
NRBC BLD AUTO-RTO: 0 /100 WBC
PLATELET # BLD AUTO: 315 K/UL (ref 164–446)
PMV BLD AUTO: 10.7 FL (ref 9–12.9)
POTASSIUM SERPL-SCNC: 3.6 MMOL/L (ref 3.6–5.5)
PROT SERPL-MCNC: 6.1 G/DL (ref 6–8.2)
RBC # BLD AUTO: 3.73 M/UL (ref 4.2–5.4)
SODIUM SERPL-SCNC: 138 MMOL/L (ref 135–145)
TROPONIN I SERPL-MCNC: 0.04 NG/ML (ref 0–0.04)
WBC # BLD AUTO: 6.5 K/UL (ref 4.8–10.8)

## 2017-04-28 PROCEDURE — 97532 HCHG COGNITIVE SKILL DEV. 15 MIN: CPT

## 2017-04-28 PROCEDURE — 85025 COMPLETE CBC W/AUTO DIFF WBC: CPT

## 2017-04-28 PROCEDURE — 99232 SBSQ HOSP IP/OBS MODERATE 35: CPT | Performed by: PHYSICAL MEDICINE & REHABILITATION

## 2017-04-28 PROCEDURE — A9270 NON-COVERED ITEM OR SERVICE: HCPCS | Performed by: HOSPITALIST

## 2017-04-28 PROCEDURE — 700105 HCHG RX REV CODE 258: Performed by: HOSPITALIST

## 2017-04-28 PROCEDURE — 84484 ASSAY OF TROPONIN QUANT: CPT

## 2017-04-28 PROCEDURE — 36415 COLL VENOUS BLD VENIPUNCTURE: CPT

## 2017-04-28 PROCEDURE — 97535 SELF CARE MNGMENT TRAINING: CPT

## 2017-04-28 PROCEDURE — 770010 HCHG ROOM/CARE - REHAB SEMI PRIVAT*

## 2017-04-28 PROCEDURE — A9270 NON-COVERED ITEM OR SERVICE: HCPCS | Performed by: PHYSICAL MEDICINE & REHABILITATION

## 2017-04-28 PROCEDURE — 97110 THERAPEUTIC EXERCISES: CPT

## 2017-04-28 PROCEDURE — 80053 COMPREHEN METABOLIC PANEL: CPT

## 2017-04-28 PROCEDURE — 700111 HCHG RX REV CODE 636 W/ 250 OVERRIDE (IP): Performed by: PHYSICAL MEDICINE & REHABILITATION

## 2017-04-28 PROCEDURE — 700102 HCHG RX REV CODE 250 W/ 637 OVERRIDE(OP): Performed by: PHYSICAL MEDICINE & REHABILITATION

## 2017-04-28 PROCEDURE — 97530 THERAPEUTIC ACTIVITIES: CPT

## 2017-04-28 PROCEDURE — 99233 SBSQ HOSP IP/OBS HIGH 50: CPT | Performed by: HOSPITALIST

## 2017-04-28 PROCEDURE — 700102 HCHG RX REV CODE 250 W/ 637 OVERRIDE(OP): Performed by: HOSPITALIST

## 2017-04-28 PROCEDURE — 83880 ASSAY OF NATRIURETIC PEPTIDE: CPT

## 2017-04-28 RX ORDER — SODIUM CHLORIDE 9 MG/ML
INJECTION, SOLUTION INTRAVENOUS CONTINUOUS
Status: ACTIVE | OUTPATIENT
Start: 2017-04-28 | End: 2017-04-29

## 2017-04-28 RX ORDER — SODIUM CHLORIDE 1 G/1
1 TABLET ORAL
Status: DISCONTINUED | OUTPATIENT
Start: 2017-04-29 | End: 2017-05-01

## 2017-04-28 RX ORDER — MIDODRINE HYDROCHLORIDE 2.5 MG/1
10 TABLET ORAL
Status: DISCONTINUED | OUTPATIENT
Start: 2017-04-29 | End: 2017-05-01

## 2017-04-28 RX ORDER — AMLODIPINE BESYLATE 5 MG/1
2.5 TABLET ORAL EVERY EVENING
Status: DISCONTINUED | OUTPATIENT
Start: 2017-04-29 | End: 2017-05-05 | Stop reason: HOSPADM

## 2017-04-28 RX ADMIN — TRAZODONE HYDROCHLORIDE 50 MG: 50 TABLET ORAL at 20:36

## 2017-04-28 RX ADMIN — STANDARDIZED SENNA CONCENTRATE AND DOCUSATE SODIUM 2 TABLET: 8.6; 5 TABLET, FILM COATED ORAL at 20:36

## 2017-04-28 RX ADMIN — ASPIRIN 81 MG: 81 TABLET, COATED ORAL at 10:12

## 2017-04-28 RX ADMIN — AMIODARONE HYDROCHLORIDE 200 MG: 200 TABLET ORAL at 10:11

## 2017-04-28 RX ADMIN — SODIUM CHLORIDE: 9 INJECTION, SOLUTION INTRAVENOUS at 23:28

## 2017-04-28 RX ADMIN — Medication 220 MG: at 10:12

## 2017-04-28 RX ADMIN — AMLODIPINE BESYLATE 5 MG: 5 TABLET ORAL at 18:10

## 2017-04-28 RX ADMIN — Medication 500 MG: at 11:37

## 2017-04-28 RX ADMIN — CHOLECALCIFEROL TAB 25 MCG (1000 UNIT) 1000 UNITS: 25 TAB at 10:09

## 2017-04-28 RX ADMIN — Medication 500 MG: at 10:11

## 2017-04-28 RX ADMIN — Medication 500 MG: at 18:10

## 2017-04-28 RX ADMIN — STANDARDIZED SENNA CONCENTRATE AND DOCUSATE SODIUM 2 TABLET: 8.6; 5 TABLET, FILM COATED ORAL at 10:10

## 2017-04-28 RX ADMIN — SODIUM CHLORIDE TAB 1 GM 1 G: 1 TAB at 09:00

## 2017-04-28 RX ADMIN — MICONAZOLE NITRATE 1 APPLICATOR: 20 CREAM VAGINAL at 20:36

## 2017-04-28 RX ADMIN — OMEPRAZOLE 20 MG: 20 CAPSULE, DELAYED RELEASE ORAL at 10:09

## 2017-04-28 RX ADMIN — THERA TABS 1 TABLET: TAB at 10:13

## 2017-04-28 RX ADMIN — ENOXAPARIN SODIUM 40 MG: 100 INJECTION SUBCUTANEOUS at 10:20

## 2017-04-28 RX ADMIN — FLUDROCORTISONE ACETATE 0.1 MG: 0.1 TABLET ORAL at 22:57

## 2017-04-28 RX ADMIN — FLUDROCORTISONE ACETATE 0.1 MG: 0.1 TABLET ORAL at 11:37

## 2017-04-28 RX ADMIN — MIDODRINE HYDROCHLORIDE 10 MG: 2.5 TABLET ORAL at 10:13

## 2017-04-28 ASSESSMENT — ENCOUNTER SYMPTOMS
NAUSEA: 0
FEVER: 0
CHILLS: 0
HEARTBURN: 0
COUGH: 0
HEADACHES: 1
HEMOPTYSIS: 0
DIZZINESS: 1
SPUTUM PRODUCTION: 0
PALPITATIONS: 0
VOMITING: 0

## 2017-04-28 ASSESSMENT — PAIN SCALES - GENERAL
PAINLEVEL_OUTOF10: 0
PAINLEVEL_OUTOF10: 0

## 2017-04-28 NOTE — DISCHARGE PLANNING
Hospitalists following  Tc to pt's dtr to provide update from IDT, but not available.   Left detailed message on her voice mail to include dc date of 5/3 with home health and dme wheelchair; fww, tub t ranfe rbench and 3:1 commode.     Plan Dc on 5/3 Wednesday

## 2017-04-28 NOTE — CARE PLAN
Problem: Safety  Goal: Will remain free from injury  Outcome: PROGRESSING AS EXPECTED  Patient on the nurses station on the chair for safety. Patient given activity to do.    Problem: Mobility  Goal: Risk for activity intolerance will decrease  Outcome: PROGRESSING AS EXPECTED  Patient was able to transfer with toe touch weight bearing on the left leg. Able to ambulate with walker

## 2017-04-28 NOTE — PROGRESS NOTES
"Rehab Progress Note     Interval Events (Subjective)  Patient seen and examined today. Patient resting in bed denies any complaints  Ros: Last bowel movement 4/27/17    Objective:  VITAL SIGNS: /70 mmHg  Pulse 70  Temp(Src) 36.8 °C (98.2 °F)  Resp 16  Ht 1.575 m (5' 2\")  Wt 56.1 kg (123 lb 10.9 oz)  BMI 22.62 kg/m2  SpO2 97%  Heart regular rate and rhythm   lungs clear to auscultation  Abdominal exam bowel sounds ×4      Recent Results (from the past 72 hour(s))   TSH WITH REFLEX TO FT4    Collection Time: 04/27/17  6:08 AM   Result Value Ref Range    TSH 1.600 0.300 - 3.700 uIU/mL   CORTISOL    Collection Time: 04/27/17  6:08 AM   Result Value Ref Range    Cortisol 16.4 0.0 - 23.0 ug/dL   COMP METABOLIC PANEL    Collection Time: 04/27/17  6:08 AM   Result Value Ref Range    Sodium 134 (L) 135 - 145 mmol/L    Potassium 3.9 3.6 - 5.5 mmol/L    Chloride 103 96 - 112 mmol/L    Co2 25 20 - 33 mmol/L    Anion Gap 6.0 0.0 - 11.9    Glucose 106 (H) 65 - 99 mg/dL    Bun 33 (H) 8 - 22 mg/dL    Creatinine 1.20 0.50 - 1.40 mg/dL    Calcium 9.0 8.5 - 10.5 mg/dL    AST(SGOT) 18 12 - 45 U/L    ALT(SGPT) 17 2 - 50 U/L    Alkaline Phosphatase 84 30 - 99 U/L    Total Bilirubin 0.5 0.1 - 1.5 mg/dL    Albumin 3.5 3.2 - 4.9 g/dL    Total Protein 6.5 6.0 - 8.2 g/dL    Globulin 3.0 1.9 - 3.5 g/dL    A-G Ratio 1.2 g/dL   CBC WITH DIFFERENTIAL    Collection Time: 04/27/17  6:08 AM   Result Value Ref Range    WBC 7.5 4.8 - 10.8 K/uL    RBC 3.93 (L) 4.20 - 5.40 M/uL    Hemoglobin 11.9 (L) 12.0 - 16.0 g/dL    Hematocrit 36.2 (L) 37.0 - 47.0 %    MCV 92.1 81.4 - 97.8 fL    MCH 30.3 27.0 - 33.0 pg    MCHC 32.9 (L) 33.6 - 35.0 g/dL    RDW 49.0 35.9 - 50.0 fL    Platelet Count 345 164 - 446 K/uL    MPV 10.7 9.0 - 12.9 fL    Neutrophils-Polys 63.40 44.00 - 72.00 %    Lymphocytes 21.80 (L) 22.00 - 41.00 %    Monocytes 12.90 0.00 - 13.40 %    Eosinophils 0.70 0.00 - 6.90 %    Basophils 0.50 0.00 - 1.80 %    Immature Granulocytes 0.70 " 0.00 - 0.90 %    Nucleated RBC 0.00 /100 WBC    Neutrophils (Absolute) 4.76 2.00 - 7.15 K/uL    Lymphs (Absolute) 1.64 1.00 - 4.80 K/uL    Monos (Absolute) 0.97 (H) 0.00 - 0.85 K/uL    Eos (Absolute) 0.05 0.00 - 0.51 K/uL    Baso (Absolute) 0.04 0.00 - 0.12 K/uL    Immature Granulocytes (abs) 0.05 0.00 - 0.11 K/uL    NRBC (Absolute) 0.00 K/uL   WESTERGREN SED RATE    Collection Time: 04/27/17  6:08 AM   Result Value Ref Range    Sed Rate Westergren 25 0 - 30 mm/hour   ESTIMATED GFR    Collection Time: 04/27/17  6:08 AM   Result Value Ref Range    GFR If  52 (A) >60 mL/min/1.73 m 2    GFR If Non  43 (A) >60 mL/min/1.73 m 2   URINE SODIUM RANDOM    Collection Time: 04/27/17  5:45 PM   Result Value Ref Range    Sodium, Urine -per volume 11 mmol/L   COMP METABOLIC PANEL    Collection Time: 04/28/17  5:40 AM   Result Value Ref Range    Sodium 138 135 - 145 mmol/L    Potassium 3.6 3.6 - 5.5 mmol/L    Chloride 109 96 - 112 mmol/L    Co2 22 20 - 33 mmol/L    Anion Gap 7.0 0.0 - 11.9    Glucose 94 65 - 99 mg/dL    Bun 29 (H) 8 - 22 mg/dL    Creatinine 0.99 0.50 - 1.40 mg/dL    Calcium 8.2 (L) 8.5 - 10.5 mg/dL    AST(SGOT) 19 12 - 45 U/L    ALT(SGPT) 17 2 - 50 U/L    Alkaline Phosphatase 78 30 - 99 U/L    Total Bilirubin 0.4 0.1 - 1.5 mg/dL    Albumin 3.3 3.2 - 4.9 g/dL    Total Protein 6.1 6.0 - 8.2 g/dL    Globulin 2.8 1.9 - 3.5 g/dL    A-G Ratio 1.2 g/dL   CBC WITH DIFFERENTIAL    Collection Time: 04/28/17  5:40 AM   Result Value Ref Range    WBC 6.5 4.8 - 10.8 K/uL    RBC 3.73 (L) 4.20 - 5.40 M/uL    Hemoglobin 11.5 (L) 12.0 - 16.0 g/dL    Hematocrit 35.2 (L) 37.0 - 47.0 %    MCV 94.4 81.4 - 97.8 fL    MCH 30.8 27.0 - 33.0 pg    MCHC 32.7 (L) 33.6 - 35.0 g/dL    RDW 50.4 (H) 35.9 - 50.0 fL    Platelet Count 315 164 - 446 K/uL    MPV 10.7 9.0 - 12.9 fL    Neutrophils-Polys 53.90 44.00 - 72.00 %    Lymphocytes 28.20 22.00 - 41.00 %    Monocytes 16.00 (H) 0.00 - 13.40 %    Eosinophils 0.80  0.00 - 6.90 %    Basophils 0.50 0.00 - 1.80 %    Immature Granulocytes 0.60 0.00 - 0.90 %    Nucleated RBC 0.00 /100 WBC    Neutrophils (Absolute) 3.48 2.00 - 7.15 K/uL    Lymphs (Absolute) 1.82 1.00 - 4.80 K/uL    Monos (Absolute) 1.03 (H) 0.00 - 0.85 K/uL    Eos (Absolute) 0.05 0.00 - 0.51 K/uL    Baso (Absolute) 0.03 0.00 - 0.12 K/uL    Immature Granulocytes (abs) 0.04 0.00 - 0.11 K/uL    NRBC (Absolute) 0.00 K/uL   TROPONINS - REHAB ONLY    Collection Time: 04/28/17  5:40 AM   Result Value Ref Range    Troponin I 0.04 0.00 - 0.04 ng/mL   BTYPE NATRIURETIC PEPTIDE    Collection Time: 04/28/17  5:40 AM   Result Value Ref Range    B Natriuretic Peptide 291 (H) 0 - 100 pg/mL   ESTIMATED GFR    Collection Time: 04/28/17  5:40 AM   Result Value Ref Range    GFR If African American >60 >60 mL/min/1.73 m 2    GFR If Non African American 54 (A) >60 mL/min/1.73 m 2       Current Facility-Administered Medications   Medication Frequency   • midodrine (PROAMATINE) tablet 10 mg QAM   • fludrocortisone (FLORINEF) tablet 0.1 mg BID   • sodium chloride (SALT) tablet 1 g QAM   • miconazole (MONISTAT) 2 % vaginal cream 1 Applicator Q EVENING   • amlodipine (NORVASC) tablet 5 mg Q EVENING   • trazodone (DESYREL) tablet 50 mg QHS   • FORMULATION R 0.25-3-14-71.9 % ointment PRN   • ondansetron (ZOFRAN ODT) dispertab 4 mg Q4HRS PRN   • senna-docusate (PERICOLACE or SENOKOT S) 8.6-50 MG per tablet 2 Tab BID    And   • polyethylene glycol/lytes (MIRALAX) PACKET 1 Packet QDAY PRN    And   • magnesium hydroxide (MILK OF MAGNESIA) suspension 30 mL QDAY PRN    And   • bisacodyl (DULCOLAX) suppository 10 mg QDAY PRN   • acetaminophen (TYLENOL) suppository 650 mg Q4HRS PRN   • amiodarone (CORDARONE) tablet 200 mg DAILY   • aspirin EC (ECOTRIN) tablet 81 mg DAILY   • calcium carbonate (OS-DIANNA 500) tablet 500 mg TID WITH MEALS   • enoxaparin (LOVENOX) inj 40 mg DAILY   • multivitamin (THERAGRAN) tablet 1 Tab DAILY   • omeprazole (PRILOSEC)  capsule 20 mg DAILY   • tramadol (ULTRAM) 50 MG tablet 100 mg Q6HRS PRN   • tramadol (ULTRAM) 50 MG tablet 50 mg Q4HRS PRN   • vitamin D (cholecalciferol) tablet 1,000 Units DAILY   • zinc sulfate (ZINCATE) capsule 220 mg DAILY       Orders Placed This Encounter   Procedures   • DIET ORDER     Standing Status: Standing      Number of Occurrences: 1      Standing Expiration Date:      Order Specific Question:  Diet:     Answer:  Regular [1]       Assessment:  Active Hospital Problems    Diagnosis   • Hip fracture (CMS-HCC)   left femoral fracture Closed reduction percutaneous screw fixation with  ttwb left lower extremity. 4-26 x-ray no new fractures    syncope: Consult Dr. Miranda  Status post fall 4/25/2017 with negative x-ray and no injury  Early dementia with some encephalopathy during hospitalization, likely  secondary to medications with improvement during hospitalization.  Atrial fibrillation.  Gastroesophageal reflux disease.  Hypertension  bladder program  bowel program  UTI: Treated with Bactrim    IMPAIRMENTS:    Mobility  Self-care  IADLs    PLAN:    Discussion and Recommendations:    1. The patient requires an acute inpatient rehabilitation program with a coordinated program of care at an intensity and frequency not available at a lower level of care. This recommendation is substantiated by the patient's medical physicians who recommend that the patient's intervention and assessment of medical issues needs to be done at an acute level of care for patient's safety and maximum outcome.    2. A coordinated program of care will be supplied by an interdisciplinary team of physical therapy, occupational therapy, rehab physician, rehab nursing, and, if needed, speech therapy and rehab psychology. Rehab team presents a patient-specific rehabilitation and education program concentrating on prevention of future problems related to accessibility, mobility, skin, bowel, bladder, sexuality, and psychosocial and  medical/surgical problems.    3. Need for Rehabilitation Physician: The rehab physician will be evaluating the patient on a multi-weekly basis to help coordinate the program of care. The rehab physician communicates between medical physicians, therapists, and nurses to maximize the patient's potential outcome. Specific areas in which the rehab physician will be providing daily assessment include the following:    A. Assessing the patient's heart rate and blood pressure response (vitals monitoring) to activity and making adjustments in medications or conservative measures as needed.    B. The rehab physician will be assessing the frequency at which the program can be increased to allow the patient to reach optimal functional outcome.    C. The rehab physician will also provide assessments in daily skin care, especially in light of patient's impairments in mobility.    D. The rehab physician will provide special expertise in understanding how to work with functional impairment and recommend appropriate interventions, compensatory techniques, and education that will facilitate the patient's outcome.    4. Rehab R.N.    The rehab RN will be working with patient to carry over in room mobility and activities of daily living when the patient is not in 3 hours of skilled therapy. Rehab nursing will be working in conjunction with rehab physician to address all the medical issues above and continue to assess laboratory work and discuss abnormalities with the treating physicians, assess vitals, and response to activity, and discuss and report abnormalities with the rehab physician. Rehab RN will also continue daily skin care, supervise bladder/bowel program, instruct in medication administration, and ensure patient safety.     MEDICAL DECISION MAKING and INTERDISCIPLINARY PLAN OF CARE:    REHABILITATION ISSUES/ADVERSE POTENTIAL::  1. Left femoral neck fracture Patient demonstrates functional deficits in strength, balance,  coordination, and ADL's. Patient is admitted to [facility] for comprehensive rehabilitation therapy as described below.   Rehabilitation nursing monitors bowel and bladder control, educates on medication administration, co-morbidities and monitors patient safety.    Therapies to treat at intensity and frequency of (may change after completion of evaluation by all therapeutic disciplines):       PT:  Physical therapy to address mobility, transfer, gait training and evaluation for adaptive equipment needs 1hour/day at least 5 days/week for the duration of the ELOS (see below)       OT:  Occupational therapy to address ADLs, self-care, home management training, functional mobility/transfers and assistive device evaluation, and community re-intergration 2 hour/day at least 5 days/week for the duration of the ELOS (see below).        ST/Dysphagia:  Speech therapy to address speech, language,and cognitive deficits as well as swallowing difficulties with retraining/dysphagia management and community re-integration with comprehension, expression, cognitive training 1/2 hour/day at least 5 days/week for the duration of the ELOS (see below).     2.  Neurostimulants: None at this time but continue to assess daily for need to initiate should status change.    3.  DVT prophylaxis:  Patient is on Lovenox for anticoagulation upon transfer. Encourage OOB. Monitor daily for signs and symptoms of DVT including but not limited to swelling and pain to prevent the development of DVT that may interfere with therapies.    4.  GI prophylaxis:  On prilosec to prevent gastritis/dyspepsia which may interfere with therapies.    5.  Pain: No issues with pain currently / Controlled with Ultram    6.  Nutrition/Dysphagia: Dietician monitors nutrient intake, recommend supplements prn and provide nutrition education to pt/family to promote optimal nutrition for wound healing/recovery.     7.  Bladder/bowel:  Start bowel and  bladder program as described below, to prevent constipation, urinary retention (which may lead to UTI), and urinary incontinence (which will impact upon pt's functional independence).    - TV Q3h while awake with post void bladder scans, I&O cath for PVRs >400  - up to commode after meal     8.  Skin/dermal ulcer prophylaxis: Monitor for new skin conditions with q.2 h. turns as required to prevent the development of skin breakdown.     9.  Cognition/Behavior:  Psychologist Dr. Schafer provides adjustment counseling to illness and psychosocial barriers that may be potential barriers to rehabilitation.     10. Respiratory therapy: RT performs O2 management prn, breathing retraining, pulmonary hygeine and bronchospasm management prn to optimize participation in therapies.      MEDICAL CO-MORBIDITIES/ADVERSE POTENTIAL AFFECTING FUNCTION:        GOALS/EXPECTED LEVEL OF FUNCTION BASED ON CURRENT MEDICAL AND FUNCTIONAL STATUS (may change based on patient's medical status and rate of impairment recovery):     Transfers: Supervised to modified independent     Mobility/Gait: Supervised to modified independent     ADL's: Supervised to modified independent     Cognition: Independent    DISPOSITION: home with assistance     ELOS: 14 days    Medical Decision Making and Plan:  Nursing to assist patient with fluid intake patient to be reminded every couple of hours to take into 200 mL of fluids while awake    I attended and led team conference 4-  Nursing:uti on bactrim, confusion, continent of bowel and bladder, incision intact, fluids 500cc, meals % , daughter brought in food  PT: limited by ttwb, bed mob sba, transfers with fww cga and lots of cues, gait with 2 step pattern but difficult will work on // bars.   OT: sup seated grooming, mod I eating, cga bathing stance, impaired balance and weight bearing, thoroughness, upper body dressing bra sweater seated in wc min, lower body min, with max verbal cues for  strategies, cga toileting with poor adherance to ttwb, poor carryover  ST: max problem solving memory and executive function, continuum for day program  Case management:lives with daughter and grandchildren , neurology consult after discharge, 24 hour supervision and assist  Discharge date: 4-    X-ray reviewed in no acute fracture  I attended and led team conference 4-  Nursing:regular diet, eating %, fluids 940cc, denies pain, continent of bowel and bladder, iv for fluids  PT: able to keep ttwb status this week, transfers sup to sit back to bed sup, walker transfers chair cga, cues for sequencing, gait 40 ft fww ttwb cga  OT: family training, bathing min, cga stance, grooming seated setup, upper body dressing setup, verbal cues for orientation, lower body dressing min for left socks and cues for strategies, toileting sba to cga, transfers cga, gait belt with fww  ST: min to sup comprehension, sup expression, mod simple problem solving, max memory, safety sequencing needs lots of cues  Case management:lives with daughter, needs manual wc with cushion and fww for transfers  Discharge date: authorized through Monday, extend for blood pressure issues, 5-3-2017    Dr. blackmon assessing patient and assisting with medical management  Total time:  >25 minutes.  I spent greater than 50% of the time for patient care and coordination on this date, including unit/floor time, and face-to-face time with the patient as per assessment and plan above.    Kylah Marvin D.O.

## 2017-04-28 NOTE — PROGRESS NOTES
.Received patient on chair . alert and orientedx3. no SOB. no chest pain. no significant changes in patient CMS.patient still have IVF running at 250 cc/ hr

## 2017-04-28 NOTE — CONSULTS
DATE OF SERVICE:  04/26/2017    REQUESTING PHYSICIAN:  Dr. Kylah Marvin.    INDICATIONS:  Hypotension.    CHIEF COMPLAINT:  Once again is lightheadedness and hypotension with paroxysms   of hypertension.    HISTORY OF PRESENT ILLNESS:  This is a very pleasant 81-year-old    female from [facility] who [facility] after a lightheaded   episode fall, which resulted in a dynamic hip screw placed by orthopedics,   looks like on 04/10/2017.  The patient suffered a left femoral neck fracture   with valgus impaction and Dr. Leblanc performed open reduction and internal   fixation with a left femoral neck fracture involving hardware.  The patient's   debility and hypotension in addition to the hip fracture was the deciding   factor on bringing her to [facility] for continued physical   therapy and occupational therapy.  The patient really has been fairly healthy   throughout her life.  She does have a history of moderate-to-severe dementia,   probably of the Alzheimer's type.  She has had multiple episodes of syncope.    She had a history of atrial fibrillation and tachybrady syndrome.  A permanent   single chamber pacemaker was placed presumably for the bradyarrhythmias.  The   patient continued to have lightheadedness and they cranked up the rate from   60-70, I believe.  With only modest success, the patient has had several falls   over the last couple of months according to the patient's daughter who is an   excellent historian.  The patient speaks excellent English as well.  The   patient primarily has the lightheaded episodes in the morning.  There is   definitely hypotension and lightheadedness, which has curtailed her therapy.    The daughter reports approximately 4 falls in the last 6 months.  The   pacemaker did not cure the problem.  Medicine was consulted to help us with   this hypotension and figure out why this continues to happen.  No history of   diabetes.  There  is no formal diagnosis of an autonomic insufficiency, though   no doubt this is the part of the problem.  Several studies were ordered   including a cortisol level and TSH.  Nothing was particularly abnormal what I   did notice was that the patient's urine was profoundly dry.  Her urine sodium   was 11, pretty much the maximum human can concentrate urine.  There was no   obvious bleeding into the fracture site.  The hemoglobin has stayed fairly   stable here.  This process actually has been going on for it sounds like quite   a few years without any significant resolution with the pacemaker.    PAST MEDICAL HISTORY:  Once again multiple falls due to hypotension and   syncope.  The patient had a staph aureus bacteremia about 5 years ago, I   believe, which resulted in removal of the pacemaker and the pacemaker leads   along with IV antibiotics, underlying rhythm is atrial fibrillation.    Bradycardia is requiring a permanent pacemaker, which was set at 70;   Alzheimer's dementia.    PAST SURGICAL HISTORY:  Remote tonsillectomy, WALT, unilateral oophorectomy,   the above-mentioned hip fracture and open reduction and internal fixation,   multiple urinary tract infections over the years.  Normal left ventricular   systolic function with 70% EF, normal diastolic function as well.  The patient   is only toe touch weightbearing with the left proximal femur fracture, which   was impacted at this time.    [facility]    SOCIAL HISTORY:  The patient has 5 children, ranging from 54-60.  She does   have a daughter that visits everyday.  No smoking.  Her   20 years   ago.  Normally, she actually walks with a shuffling gait according to the   patient's daughter.  No significant tobacco or alcohol use.  No drug use.    FAMILY HISTORY:  Mother  of complications of dementia, presumably   Alzheimer's at 62.  Father  of MI at 75.    REVIEW OF SYSTEMS:   Complete 14-point review of systems was checked and found   to be negative per AMA/CMS guidelines.    ALLERGIES:  No known drug allergies.    PHYSICAL EXAMINATION:  VITAL SIGNS:  Blood pressure ranged from 110//58.  Unfortunately, they   changed the computer system and I have no other vitals to go on.  Room air   sats were 97%.  Heart rate was exactly 70, O2 sats on room air is 95%.  GENERAL:  This is a pleasant elderly white female, currently in no apparent   distress.  She can carry on a normal conversation though she is disoriented   and unable to tell me the year or location.  HEENT:  Pupils were equal, round and reactive.  Oral mucosa was very dry.  NECK:  Supple.  Neck veins were flat.  HEART:  Regular rate and rhythm.  No obvious systolic murmurs were   auscultated.  LUNGS:  Clear bilaterally.  Good air movement.  ABDOMEN:  Soft, nontender, and nondistended.  Midline laparotomy scar.  EXTREMITIES:  Slight tenderness to the left hip region, but no evidence of   hematoma or fluctuance, snappy upper extremity pulses, lower extremities were   warm, motor function is grossly normal.    ASSESSMENT:  1.  Orthostatic hypotension.  2.  Tachybrady syndrome resulting in a permanent pacemaker placement for   presumably bradyarrhythmias.  3.  Normal sinus rhythm at this time, currently on amiodarone.  4.  Paroxysms of hypertension and consequently supine hypotension and   orthostasis at times.    PLAN:  It is clear that the cardiac issue is not the problem.  The patient is   able to maintain a heart rate of 70.  There appears to be good capture.  I   think that is what their initial thinking was by putting in the pacemaker   given the previous falls would alleviate the problem, it has not.  The patient's volume status is extremely low.  Her   urine sodium was markedly low at 11, very low urine sodium would be less than   20.  We ended up giving the patient 2 liters of IV fluid.  The patient has   been started on  midodrine 10 mg q.a.m.  I did check a cortisol and TSH, both   were normal as described above.  The patient does have hypertension lying down   flat, we are going to give her Norvasc in the evening hours when she is   predominantly supine and follow the blood pressures thereafter.  Midodrine   will not work unless the intravascular volume is repleted, which he hopefully   have done.  I have also added Florinef 0.1 mg p.o. b.i.d. to help us volume   expand this patient.  She clearly has some degree of hypotension from   dehydration why I am not sure, but she certainly needs more fluids and salt   intake.  I have added a salt tablets as well in the morning.  We will continue   to follow the patient closely.  Of note, the date of formal consultation was   04/26/2017.       ____________________________________     MD FRANCESCO RAHMAN / MONSE    DD:  04/28/2017 09:45:01  DT:  04/28/2017 11:22:20    D#:  [id number]  Job#:  [id number]

## 2017-04-28 NOTE — CARE PLAN
Problem: Safety  Goal: Will remain free from injury  Outcome: PROGRESSING SLOWER THAN EXPECTED  Patient is impulsive and have poor safety awareness. Hourly round done. Remains free from injury. Chair alarm and bed alarm in place. Will continue to monitor.     Problem: Pain Management  Goal: Pain level will decrease to patient’s comfort goal  Outcome: PROGRESSING AS EXPECTED  Patient denied any pain or discomfort during shift.

## 2017-04-29 LAB
ALBUMIN SERPL BCP-MCNC: 3.3 G/DL (ref 3.2–4.9)
ALBUMIN/GLOB SERPL: 1.1 G/DL
ALP SERPL-CCNC: 85 U/L (ref 30–99)
ALT SERPL-CCNC: 16 U/L (ref 2–50)
ANION GAP SERPL CALC-SCNC: 10 MMOL/L (ref 0–11.9)
AST SERPL-CCNC: 18 U/L (ref 12–45)
BASOPHILS # BLD AUTO: 0.4 % (ref 0–1.8)
BASOPHILS # BLD: 0.03 K/UL (ref 0–0.12)
BILIRUB SERPL-MCNC: 0.4 MG/DL (ref 0.1–1.5)
BUN SERPL-MCNC: 26 MG/DL (ref 8–22)
CALCIUM SERPL-MCNC: 8.3 MG/DL (ref 8.5–10.5)
CHLORIDE SERPL-SCNC: 105 MMOL/L (ref 96–112)
CO2 SERPL-SCNC: 22 MMOL/L (ref 20–33)
CREAT SERPL-MCNC: 0.88 MG/DL (ref 0.5–1.4)
EOSINOPHIL # BLD AUTO: 0.05 K/UL (ref 0–0.51)
EOSINOPHIL NFR BLD: 0.7 % (ref 0–6.9)
ERYTHROCYTE [DISTWIDTH] IN BLOOD BY AUTOMATED COUNT: 50.1 FL (ref 35.9–50)
GFR SERPL CREATININE-BSD FRML MDRD: >60 ML/MIN/1.73 M 2
GLOBULIN SER CALC-MCNC: 2.9 G/DL (ref 1.9–3.5)
GLUCOSE SERPL-MCNC: 85 MG/DL (ref 65–99)
HCT VFR BLD AUTO: 34.3 % (ref 37–47)
HGB BLD-MCNC: 11.3 G/DL (ref 12–16)
IMM GRANULOCYTES # BLD AUTO: 0.04 K/UL (ref 0–0.11)
IMM GRANULOCYTES NFR BLD AUTO: 0.6 % (ref 0–0.9)
LYMPHOCYTES # BLD AUTO: 1.77 K/UL (ref 1–4.8)
LYMPHOCYTES NFR BLD: 24.4 % (ref 22–41)
MCH RBC QN AUTO: 30.9 PG (ref 27–33)
MCHC RBC AUTO-ENTMCNC: 32.9 G/DL (ref 33.6–35)
MCV RBC AUTO: 93.7 FL (ref 81.4–97.8)
MONOCYTES # BLD AUTO: 0.77 K/UL (ref 0–0.85)
MONOCYTES NFR BLD AUTO: 10.6 % (ref 0–13.4)
NEUTROPHILS # BLD AUTO: 4.59 K/UL (ref 2–7.15)
NEUTROPHILS NFR BLD: 63.3 % (ref 44–72)
NRBC # BLD AUTO: 0 K/UL
NRBC BLD AUTO-RTO: 0 /100 WBC
NUCLEAR IGG SER QL IA: NORMAL
PLATELET # BLD AUTO: 320 K/UL (ref 164–446)
PMV BLD AUTO: 10.8 FL (ref 9–12.9)
POTASSIUM SERPL-SCNC: 3.2 MMOL/L (ref 3.6–5.5)
PROT SERPL-MCNC: 6.2 G/DL (ref 6–8.2)
RBC # BLD AUTO: 3.66 M/UL (ref 4.2–5.4)
SODIUM SERPL-SCNC: 137 MMOL/L (ref 135–145)
SODIUM UR-SCNC: 121 MMOL/L
WBC # BLD AUTO: 7.3 K/UL (ref 4.8–10.8)

## 2017-04-29 PROCEDURE — 770010 HCHG ROOM/CARE - REHAB SEMI PRIVAT*

## 2017-04-29 PROCEDURE — 700102 HCHG RX REV CODE 250 W/ 637 OVERRIDE(OP): Performed by: PHYSICAL MEDICINE & REHABILITATION

## 2017-04-29 PROCEDURE — A9270 NON-COVERED ITEM OR SERVICE: HCPCS | Performed by: PHYSICAL MEDICINE & REHABILITATION

## 2017-04-29 PROCEDURE — 36415 COLL VENOUS BLD VENIPUNCTURE: CPT

## 2017-04-29 PROCEDURE — 85025 COMPLETE CBC W/AUTO DIFF WBC: CPT

## 2017-04-29 PROCEDURE — 700102 HCHG RX REV CODE 250 W/ 637 OVERRIDE(OP): Performed by: HOSPITALIST

## 2017-04-29 PROCEDURE — 80053 COMPREHEN METABOLIC PANEL: CPT

## 2017-04-29 PROCEDURE — 84300 ASSAY OF URINE SODIUM: CPT

## 2017-04-29 PROCEDURE — 99233 SBSQ HOSP IP/OBS HIGH 50: CPT | Performed by: HOSPITALIST

## 2017-04-29 PROCEDURE — A9270 NON-COVERED ITEM OR SERVICE: HCPCS | Performed by: HOSPITALIST

## 2017-04-29 PROCEDURE — 700111 HCHG RX REV CODE 636 W/ 250 OVERRIDE (IP): Performed by: PHYSICAL MEDICINE & REHABILITATION

## 2017-04-29 RX ORDER — POTASSIUM CHLORIDE 20 MEQ/1
20 TABLET, EXTENDED RELEASE ORAL DAILY
Status: DISCONTINUED | OUTPATIENT
Start: 2017-04-29 | End: 2017-05-01

## 2017-04-29 RX ADMIN — AMLODIPINE BESYLATE 2.5 MG: 5 TABLET ORAL at 17:29

## 2017-04-29 RX ADMIN — FLUDROCORTISONE ACETATE 0.1 MG: 0.1 TABLET ORAL at 22:47

## 2017-04-29 RX ADMIN — POTASSIUM CHLORIDE 20 MEQ: 1500 TABLET, EXTENDED RELEASE ORAL at 11:55

## 2017-04-29 RX ADMIN — AMIODARONE HYDROCHLORIDE 200 MG: 200 TABLET ORAL at 08:34

## 2017-04-29 RX ADMIN — OMEPRAZOLE 20 MG: 20 CAPSULE, DELAYED RELEASE ORAL at 08:34

## 2017-04-29 RX ADMIN — SODIUM CHLORIDE TAB 1 GM 1 G: 1 TAB at 11:30

## 2017-04-29 RX ADMIN — MIDODRINE HYDROCHLORIDE 10 MG: 2.5 TABLET ORAL at 11:55

## 2017-04-29 RX ADMIN — STANDARDIZED SENNA CONCENTRATE AND DOCUSATE SODIUM 2 TABLET: 8.6; 5 TABLET, FILM COATED ORAL at 08:36

## 2017-04-29 RX ADMIN — THERA TABS 1 TABLET: TAB at 08:34

## 2017-04-29 RX ADMIN — TRAMADOL HYDROCHLORIDE 50 MG: 50 TABLET, FILM COATED ORAL at 12:19

## 2017-04-29 RX ADMIN — Medication 500 MG: at 17:29

## 2017-04-29 RX ADMIN — Medication 500 MG: at 08:34

## 2017-04-29 RX ADMIN — TRAZODONE HYDROCHLORIDE 50 MG: 50 TABLET ORAL at 21:52

## 2017-04-29 RX ADMIN — CHOLECALCIFEROL TAB 25 MCG (1000 UNIT) 1000 UNITS: 25 TAB at 08:34

## 2017-04-29 RX ADMIN — SODIUM CHLORIDE TAB 1 GM 1 G: 1 TAB at 17:30

## 2017-04-29 RX ADMIN — SODIUM CHLORIDE TAB 1 GM 1 G: 1 TAB at 08:00

## 2017-04-29 RX ADMIN — MIDODRINE HYDROCHLORIDE 10 MG: 2.5 TABLET ORAL at 17:32

## 2017-04-29 RX ADMIN — ASPIRIN 81 MG: 81 TABLET, COATED ORAL at 08:36

## 2017-04-29 RX ADMIN — MIDODRINE HYDROCHLORIDE 10 MG: 2.5 TABLET ORAL at 08:34

## 2017-04-29 RX ADMIN — STANDARDIZED SENNA CONCENTRATE AND DOCUSATE SODIUM 2 TABLET: 8.6; 5 TABLET, FILM COATED ORAL at 21:52

## 2017-04-29 RX ADMIN — ENOXAPARIN SODIUM 40 MG: 100 INJECTION SUBCUTANEOUS at 08:33

## 2017-04-29 RX ADMIN — Medication 220 MG: at 08:34

## 2017-04-29 RX ADMIN — Medication 500 MG: at 11:55

## 2017-04-29 RX ADMIN — FLUDROCORTISONE ACETATE 0.1 MG: 0.1 TABLET ORAL at 11:55

## 2017-04-29 ASSESSMENT — ENCOUNTER SYMPTOMS
CHILLS: 0
COUGH: 0
SPUTUM PRODUCTION: 0
HEMOPTYSIS: 0
VOMITING: 0
FEVER: 0
DIZZINESS: 1
PALPITATIONS: 0
HEARTBURN: 0
NAUSEA: 0
HEADACHES: 1

## 2017-04-29 ASSESSMENT — PAIN SCALES - GENERAL
PAINLEVEL_OUTOF10: 0
PAINLEVEL_OUTOF10: 0

## 2017-04-29 NOTE — PROGRESS NOTES
"Hospital Medicine Interval Note  Date of Service:  4/28/2017    Chief Complaint  PATIENT HAS ORTHOSTATIC HYPOTENSION.    Interval Problem Update  EPISODE OF HYPOTENSION    Consultants/Specialty  INTERNAL MEDICINE    Disposition  PER PRIMARY REHAB TEAM.      Review of Systems   Constitutional: Negative for fever, chills and malaise/fatigue.   Respiratory: Negative for cough, hemoptysis and sputum production.    Cardiovascular: Negative for chest pain, palpitations and leg swelling.   Gastrointestinal: Negative for heartburn, nausea and vomiting.   Genitourinary: Negative for dysuria.        PROBABLY THE MOST CONCENTRATED  URINE SAMPLE IN [facility] THAT I HAVE EVER SEEN  (ie: dry)   Skin: Negative for rash.   Neurological: Positive for dizziness and headaches.     4/27/17-- 4/27/17--- THE PATIENT'S MIDODRINE WILL NOT WORK IF THE \"TANK\" IS COMPLETELY EMPTY.  WE WILL GIVE 2 LITER BOLUS OF NORMAL SALINE.  THIS WILL HELP.  HARD TO IMMAGINE SHE WONT HAVE ORTHOSTATIC HYPOTENSION UNTIL VOLUME STATUS CORRECTED.   SO HOLD HEAVY LABOR.  UNTIL FLUID IS IN.  ADDING FLORINEF BID.  INCREASING MIDODRINE TOMORROW IN AM TO 10.   ENCOURAGE INCREASED PO INTAKE.  ENCORAGE SALTY FOOD INTAKE,.    ADDING SOME SODIUM TABS QAM.    4/28/17--PATIENT CONTINUES TO HAVE PAROXYSMS OF HYPOTENSION WITH EXCERSIZE.  NO   REAL HYPERTENSION.    SHE GOT CLAMMY AND SYSTOLIC WAS LOW UNTIL SHE LAID DOWN.   NO HIGH BP EPISODES.   I AM GIVING HER ANOTHER LITER OF NS.   SHE ALSO WILL  HAVE TID  MIDODRINE QAC.   INCREASING SALT TABS TO TID.  DECREASING NORVASC TO 2.5 MG QD.  CASE D/W PATIENT AND DAUGHTER.                     Physical Exam Laboratory/Imaging   Filed Vitals:    04/28/17 1359 04/28/17 1408 04/28/17 1810 04/28/17 1944   BP: 128/47 122/72 125/80 134/70   Pulse: 70 70  70   Temp:  36.6 °C (97.9 °F)  36.3 °C (97.3 °F)   Resp:  16  16   Height:       Weight:       SpO2:  98%  98%     Physical Exam   Constitutional: She appears well-nourished. No distress. " "  HENT:   Head: Normocephalic and atraumatic.   Mouth/Throat: No oropharyngeal exudate.   CLAMMY SKIN, SLIGHT SWEAT.   Cardiovascular: Normal rate and regular rhythm.    EXACTLY 70 WHERE PACER SET.   Pulmonary/Chest: Effort normal and breath sounds normal.   Abdominal: She exhibits no distension.   Musculoskeletal: She exhibits no edema or tenderness.   Neurological: She is alert. No cranial nerve deficit. Coordination abnormal.   PLEASANTLY DEMENTED.   Skin: Skin is warm and dry. No erythema.   Psychiatric: She has a normal mood and affect. Her behavior is normal.    Lab Results   Component Value Date/Time    WBC 6.5 04/28/2017 05:40 AM    HEMOGLOBIN 11.5* 04/28/2017 05:40 AM    HEMATOCRIT 35.2* 04/28/2017 05:40 AM    PLATELET COUNT 315 04/28/2017 05:40 AM     Lab Results   Component Value Date/Time    SODIUM 138 04/28/2017 05:40 AM    POTASSIUM 3.6 04/28/2017 05:40 AM    CHLORIDE 109 04/28/2017 05:40 AM    CO2 22 04/28/2017 05:40 AM    GLUCOSE 94 04/28/2017 05:40 AM    BUN 29* 04/28/2017 05:40 AM    CREATININE 0.99 04/28/2017 05:40 AM      Assessment/Plan    No new assessment & plan notes have been filed under this hospital service since the last note was generated.  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  SEVERE DEHYDRATION    PLAN: 4/27/17--- THE PATIENT'S MIDODRINE WILL NOT WORK IF THE \"TANK\" IS COMPLETELY EMPTY.  WE WILL GIVE 2 LITER BOLUS OF NORMAL SALINE.  THIS WILL HELP.  HARD TO IMMAGINE SHE WONT HAVE ORTHOSTATIC HYPOTENSION UNTIL VOLUME STATUS CORRECTED.   SO HOLD HEAVY LABOR.  UNTIL FLUID IS IN.  ADDING FLORINEF BID.  INCREASING MIDODRINE TOMORROW IN AM TO 10.   ENCOURAGE INCREASED PO INTAKE.  ENCORAGE SALTY FOOD INTAKE,.    ADDING SOME SODIUM TABS QAM.   Labs reviewed, Medications reviewed and Radiology images reviewed  Alvarado catheter: No " Alvarado

## 2017-04-29 NOTE — CARE PLAN
Problem: Safety  Goal: Will remain free from injury  Outcome: PROGRESSING AS EXPECTED  Reviewed fall and safety precautions with patient throughout shift. Patient requires frequent reminders to lock wheelchair before transfers. Pt using call light consistently and appropriately and has not attempted self transfer so far this shift. Patient assisted with transfers to maintain toe touch weight bearing on LLE.  Able to verbalize needs. Call light and belongings within reach, bed alarm in use, bed in lowest position, treaded socks in use, and hourly rounding in place.        Problem: Pain Management  Goal: Pain level will decrease to patient’s comfort goal  Outcome: PROGRESSING AS EXPECTED  Patient reports some pain with movement of her left leg this shift, but she denies the need for pain interventions. Will continue to monitor for pain.

## 2017-04-29 NOTE — CARE PLAN
Problem: Safety  Goal: Will remain free from injury  Outcome: PROGRESSING AS EXPECTED  Patient uses call light consistently and appropriately this shift.  Waits for assistance when needed and does not attempt self transfer.  Able to verbalize needs.      Problem: Pain Management  Goal: Pain level will decrease to patient’s comfort goal  Outcome: PROGRESSING AS EXPECTED  Patient able to verbalize pain level and verbalize an acceptable level of pain. Tramadol given for hip pain, see MAR.

## 2017-04-29 NOTE — PROGRESS NOTES
Received shift report and assumed care of patient.  Patient sleeping, calm and stable, currently positioned in bed for comfort and safety; call light within reach. No s/s of pain or discomfort at this time.  Will continue to monitor.

## 2017-04-29 NOTE — PROGRESS NOTES
"Hospital Medicine Interval Note  Date of Service:  4/29/2017    Chief Complaint  PATIENT HAS ORTHOSTATIC HYPOTENSION.    Interval Problem Update  EPISODE OF HYPOTENSION    Consultants/Specialty  INTERNAL MEDICINE    Disposition  PER PRIMARY REHAB TEAM.      Review of Systems   Constitutional: Negative for fever, chills and malaise/fatigue.   Respiratory: Negative for cough, hemoptysis and sputum production.    Cardiovascular: Negative for chest pain, palpitations and leg swelling.   Gastrointestinal: Negative for heartburn, nausea and vomiting.   Genitourinary: Negative for dysuria.        PROBABLY THE MOST CONCENTRATED  URINE SAMPLE IN [facility] THAT I HAVE EVER SEEN  (ie: dry)   Skin: Negative for rash.   Neurological: Positive for dizziness and headaches.     4/27/17-- 4/27/17--- THE PATIENT'S MIDODRINE WILL NOT WORK IF THE \"TANK\" IS COMPLETELY EMPTY.  WE WILL GIVE 2 LITER BOLUS OF NORMAL SALINE.  THIS WILL HELP.  HARD TO IMMAGINE SHE WONT HAVE ORTHOSTATIC HYPOTENSION UNTIL VOLUME STATUS CORRECTED.   SO HOLD HEAVY LABOR.  UNTIL FLUID IS IN.  ADDING FLORINEF BID.  INCREASING MIDODRINE TOMORROW IN AM TO 10.   ENCOURAGE INCREASED PO INTAKE.  ENCORAGE SALTY FOOD INTAKE,.    ADDING SOME SODIUM TABS QAM.    4/28/17--PATIENT CONTINUES TO HAVE PAROXYSMS OF HYPOTENSION WITH EXCERSIZE.  NO   REAL HYPERTENSION.    SHE GOT CLAMMY AND SYSTOLIC WAS LOW UNTIL SHE LAID DOWN.   NO HIGH BP EPISODES.   I AM GIVING HER ANOTHER LITER OF NS.   SHE ALSO WILL  HAVE TID  MIDODRINE QAC.   INCREASING SALT TABS TO TID.  DECREASING NORVASC TO 2.5 MG QD.  CASE D/W PATIENT AND DAUGHTER.    4/29/17--MAJOR ADJUSTEMENTS MADE YESTERDAY.  LETS SEE THROUGHOUT THE DAY WHETHER THIS WILL HELP HER ORTHOSTATIC HYPOTENSION.   I CAN TOLLERATE A MODERATE DEGREE OF SUPINE HYPERTENSION.   THIS IS DEFINITELY AUTONOMIC INSUFFICIENCY OF UNCERTAIN ETIOLOGY.  IT IS OBVIOUSLY NOT A CARDIOGENIC PROBLEM.  BP LOOKS OK.   I NEED TO OBSERVE PATIENT TODAY DURING THERAPY TO " MAKE MY FINAL ASSESMENTS ON TREATMENT PLAN.   SHE HAS MINIMAL IF ANY CORONARY OR VASCUALR DISEASE.  THE LOW K+ IS DUE TO FLORINEF.   REPLEATE.                     Physical Exam Laboratory/Imaging   Filed Vitals:    04/29/17 0723 04/29/17 0726 04/29/17 0728 04/29/17 0834   BP: 147/73 143/75 126/62 135/64   Pulse: 70 69 81 62   Temp: 36.7 °C (98 °F)      Resp: 16      Height:       Weight:       SpO2: 96%        Physical Exam   Constitutional: She appears well-nourished. No distress.   HENT:   Head: Normocephalic and atraumatic.   Mouth/Throat: No oropharyngeal exudate.   CLAMMY SKIN, SLIGHT SWEAT.   Cardiovascular: Normal rate and regular rhythm.    EXACTLY 70 WHERE PACER SET.   Pulmonary/Chest: Effort normal and breath sounds normal.   Abdominal: She exhibits no distension.   Musculoskeletal: She exhibits no edema or tenderness.   Neurological: She is alert. No cranial nerve deficit. Coordination abnormal.   PLEASANTLY DEMENTED.   Skin: Skin is warm and dry. No erythema.   Psychiatric: She has a normal mood and affect. Her behavior is normal.    Lab Results   Component Value Date/Time    WBC 7.3 04/29/2017 05:54 AM    HEMOGLOBIN 11.3* 04/29/2017 05:54 AM    HEMATOCRIT 34.3* 04/29/2017 05:54 AM    PLATELET COUNT 320 04/29/2017 05:54 AM     Lab Results   Component Value Date/Time    SODIUM 137 04/29/2017 05:54 AM    POTASSIUM 3.2* 04/29/2017 05:54 AM    CHLORIDE 105 04/29/2017 05:54 AM    CO2 22 04/29/2017 05:54 AM    GLUCOSE 85 04/29/2017 05:54 AM    BUN 26* 04/29/2017 05:54 AM    CREATININE 0.88 04/29/2017 05:54 AM      Assessment/Plan    No new assessment & plan notes have been filed under this hospital service since the last note was generated.  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  SEVERE DEHYDRATION    PLAN: 4/27/17--- THE PATIENT'S MIDODRINE WILL  "NOT WORK IF THE \"TANK\" IS COMPLETELY EMPTY.  WE WILL GIVE 2 LITER BOLUS OF NORMAL SALINE.  THIS WILL HELP.  HARD TO IMMAGINE SHE WONT HAVE ORTHOSTATIC HYPOTENSION UNTIL VOLUME STATUS CORRECTED.   SO HOLD HEAVY LABOR.  UNTIL FLUID IS IN.  ADDING FLORINEF BID.  INCREASING MIDODRINE TOMORROW IN AM TO 10.   ENCOURAGE INCREASED PO INTAKE.  ENCORAGE SALTY FOOD INTAKE,.    ADDING SOME SODIUM TABS QAM.       Labs reviewed, Medications reviewed and Radiology images reviewed  Alvarado catheter: No Alvarado                           "

## 2017-04-30 LAB
ALBUMIN SERPL BCP-MCNC: 3.5 G/DL (ref 3.2–4.9)
ALBUMIN/GLOB SERPL: 1.3 G/DL
ALP SERPL-CCNC: 89 U/L (ref 30–99)
ALT SERPL-CCNC: 18 U/L (ref 2–50)
ANION GAP SERPL CALC-SCNC: 10 MMOL/L (ref 0–11.9)
AST SERPL-CCNC: 21 U/L (ref 12–45)
BASOPHILS # BLD AUTO: 0.9 % (ref 0–1.8)
BASOPHILS # BLD: 0.05 K/UL (ref 0–0.12)
BILIRUB SERPL-MCNC: 0.5 MG/DL (ref 0.1–1.5)
BUN SERPL-MCNC: 25 MG/DL (ref 8–22)
CALCIUM SERPL-MCNC: 8.6 MG/DL (ref 8.5–10.5)
CHLORIDE SERPL-SCNC: 105 MMOL/L (ref 96–112)
CO2 SERPL-SCNC: 24 MMOL/L (ref 20–33)
CREAT SERPL-MCNC: 0.88 MG/DL (ref 0.5–1.4)
EOSINOPHIL # BLD AUTO: 0.05 K/UL (ref 0–0.51)
EOSINOPHIL NFR BLD: 0.9 % (ref 0–6.9)
ERYTHROCYTE [DISTWIDTH] IN BLOOD BY AUTOMATED COUNT: 50.5 FL (ref 35.9–50)
ERYTHROCYTE [SEDIMENTATION RATE] IN BLOOD BY WESTERGREN METHOD: 29 MM/HOUR (ref 0–30)
GFR SERPL CREATININE-BSD FRML MDRD: >60 ML/MIN/1.73 M 2
GLOBULIN SER CALC-MCNC: 2.8 G/DL (ref 1.9–3.5)
GLUCOSE SERPL-MCNC: 99 MG/DL (ref 65–99)
HCT VFR BLD AUTO: 37.7 % (ref 37–47)
HGB BLD-MCNC: 12.2 G/DL (ref 12–16)
IMM GRANULOCYTES # BLD AUTO: 0.04 K/UL (ref 0–0.11)
IMM GRANULOCYTES NFR BLD AUTO: 0.7 % (ref 0–0.9)
LYMPHOCYTES # BLD AUTO: 1.8 K/UL (ref 1–4.8)
LYMPHOCYTES NFR BLD: 31 % (ref 22–41)
MAGNESIUM SERPL-MCNC: 1.8 MG/DL (ref 1.5–2.5)
MCH RBC QN AUTO: 30.4 PG (ref 27–33)
MCHC RBC AUTO-ENTMCNC: 32.4 G/DL (ref 33.6–35)
MCV RBC AUTO: 94 FL (ref 81.4–97.8)
MONOCYTES # BLD AUTO: 0.78 K/UL (ref 0–0.85)
MONOCYTES NFR BLD AUTO: 13.4 % (ref 0–13.4)
NEUTROPHILS # BLD AUTO: 3.08 K/UL (ref 2–7.15)
NEUTROPHILS NFR BLD: 53.1 % (ref 44–72)
NRBC # BLD AUTO: 0 K/UL
NRBC BLD AUTO-RTO: 0 /100 WBC
PLATELET # BLD AUTO: 314 K/UL (ref 164–446)
PMV BLD AUTO: 10.6 FL (ref 9–12.9)
POTASSIUM SERPL-SCNC: 3 MMOL/L (ref 3.6–5.5)
PROT SERPL-MCNC: 6.3 G/DL (ref 6–8.2)
RBC # BLD AUTO: 4.01 M/UL (ref 4.2–5.4)
SODIUM SERPL-SCNC: 139 MMOL/L (ref 135–145)
WBC # BLD AUTO: 5.8 K/UL (ref 4.8–10.8)

## 2017-04-30 PROCEDURE — 700102 HCHG RX REV CODE 250 W/ 637 OVERRIDE(OP): Performed by: HOSPITALIST

## 2017-04-30 PROCEDURE — 770010 HCHG ROOM/CARE - REHAB SEMI PRIVAT*

## 2017-04-30 PROCEDURE — 80053 COMPREHEN METABOLIC PANEL: CPT

## 2017-04-30 PROCEDURE — 99233 SBSQ HOSP IP/OBS HIGH 50: CPT | Performed by: HOSPITALIST

## 2017-04-30 PROCEDURE — 700102 HCHG RX REV CODE 250 W/ 637 OVERRIDE(OP): Performed by: PHYSICAL MEDICINE & REHABILITATION

## 2017-04-30 PROCEDURE — 700111 HCHG RX REV CODE 636 W/ 250 OVERRIDE (IP): Performed by: PHYSICAL MEDICINE & REHABILITATION

## 2017-04-30 PROCEDURE — 85652 RBC SED RATE AUTOMATED: CPT

## 2017-04-30 PROCEDURE — 36415 COLL VENOUS BLD VENIPUNCTURE: CPT

## 2017-04-30 PROCEDURE — A9270 NON-COVERED ITEM OR SERVICE: HCPCS | Performed by: HOSPITALIST

## 2017-04-30 PROCEDURE — 83735 ASSAY OF MAGNESIUM: CPT

## 2017-04-30 PROCEDURE — A9270 NON-COVERED ITEM OR SERVICE: HCPCS | Performed by: PHYSICAL MEDICINE & REHABILITATION

## 2017-04-30 PROCEDURE — 85025 COMPLETE CBC W/AUTO DIFF WBC: CPT

## 2017-04-30 RX ADMIN — MIDODRINE HYDROCHLORIDE 10 MG: 2.5 TABLET ORAL at 11:30

## 2017-04-30 RX ADMIN — POTASSIUM CHLORIDE 20 MEQ: 1500 TABLET, EXTENDED RELEASE ORAL at 08:21

## 2017-04-30 RX ADMIN — CHOLECALCIFEROL TAB 25 MCG (1000 UNIT) 1000 UNITS: 25 TAB at 08:21

## 2017-04-30 RX ADMIN — OMEPRAZOLE 20 MG: 20 CAPSULE, DELAYED RELEASE ORAL at 08:22

## 2017-04-30 RX ADMIN — FLUDROCORTISONE ACETATE 0.1 MG: 0.1 TABLET ORAL at 12:16

## 2017-04-30 RX ADMIN — SODIUM CHLORIDE TAB 1 GM 1 G: 1 TAB at 17:30

## 2017-04-30 RX ADMIN — Medication 500 MG: at 12:17

## 2017-04-30 RX ADMIN — Medication 500 MG: at 17:40

## 2017-04-30 RX ADMIN — MIDODRINE HYDROCHLORIDE 10 MG: 2.5 TABLET ORAL at 17:40

## 2017-04-30 RX ADMIN — SODIUM CHLORIDE TAB 1 GM 1 G: 1 TAB at 08:00

## 2017-04-30 RX ADMIN — FLUDROCORTISONE ACETATE 0.1 MG: 0.1 TABLET ORAL at 21:00

## 2017-04-30 RX ADMIN — ASPIRIN 81 MG: 81 TABLET, COATED ORAL at 08:20

## 2017-04-30 RX ADMIN — STANDARDIZED SENNA CONCENTRATE AND DOCUSATE SODIUM 2 TABLET: 8.6; 5 TABLET, FILM COATED ORAL at 20:27

## 2017-04-30 RX ADMIN — MIDODRINE HYDROCHLORIDE 10 MG: 2.5 TABLET ORAL at 08:20

## 2017-04-30 RX ADMIN — ENOXAPARIN SODIUM 40 MG: 100 INJECTION SUBCUTANEOUS at 08:22

## 2017-04-30 RX ADMIN — Medication 220 MG: at 08:21

## 2017-04-30 RX ADMIN — AMIODARONE HYDROCHLORIDE 200 MG: 200 TABLET ORAL at 08:20

## 2017-04-30 RX ADMIN — AMLODIPINE BESYLATE 2.5 MG: 5 TABLET ORAL at 17:41

## 2017-04-30 RX ADMIN — Medication 500 MG: at 08:20

## 2017-04-30 RX ADMIN — MAGNESIUM HYDROXIDE 30 ML: 400 SUSPENSION ORAL at 18:22

## 2017-04-30 RX ADMIN — TRAZODONE HYDROCHLORIDE 50 MG: 50 TABLET ORAL at 20:27

## 2017-04-30 RX ADMIN — SODIUM CHLORIDE TAB 1 GM 1 G: 1 TAB at 11:30

## 2017-04-30 RX ADMIN — STANDARDIZED SENNA CONCENTRATE AND DOCUSATE SODIUM 2 TABLET: 8.6; 5 TABLET, FILM COATED ORAL at 08:19

## 2017-04-30 RX ADMIN — POLYETHYLENE GLYCOL 3350 1 PACKET: 17 POWDER, FOR SOLUTION ORAL at 05:36

## 2017-04-30 RX ADMIN — THERA TABS 1 TABLET: TAB at 08:21

## 2017-04-30 ASSESSMENT — PAIN SCALES - GENERAL
PAINLEVEL_OUTOF10: 6
PAINLEVEL_OUTOF10: 0
PAINLEVEL_OUTOF10: 0

## 2017-04-30 NOTE — CARE PLAN
Problem: Safety  Goal: Will remain free from injury  Outcome: PROGRESSING AS EXPECTED  Reviewed fall and safety precautions with patient. Pt has history of impulsiveness, but she used her call light consistently and appropriately. and did not attempted self transfer this shift. Patient still requires reminders to lock the breaks on her wheel chair before each transfer. Able to verbalize needs. Call light and belongings within reach, bed alarm in use, bed in lowest position, treaded socks in use, and hourly rounding in place.        Problem: Bowel/Gastric:  Goal: Normal bowel function is maintained or improved  Patient's last BM was 4/27. Scheduled senna-docusate administered at night time med pass. PRN Miralax administered this morning.   Bowel sounds are present in all four quadrants. Abdomen is soft and non-tender. Patient denies, pain, nausea, or discomfort in her abdomen. Patient reports passing flatus.

## 2017-05-01 ENCOUNTER — HOME HEALTH ADMISSION (OUTPATIENT)
Dept: HOME HEALTH SERVICES | Facility: HOME HEALTHCARE | Age: 82
End: 2017-05-01
Payer: MEDICAID

## 2017-05-01 LAB
AMORPH CRY #/AREA URNS HPF: PRESENT /HPF
APPEARANCE UR: ABNORMAL
BACTERIA #/AREA URNS HPF: ABNORMAL /HPF
BILIRUB UR QL STRIP.AUTO: NEGATIVE
COLOR UR: ABNORMAL
ENA SS-B IGG SER IA-ACNC: 0 AU/ML (ref 0–40)
GLUCOSE UR STRIP.AUTO-MCNC: NEGATIVE MG/DL
KETONES UR STRIP.AUTO-MCNC: ABNORMAL MG/DL
LEUKOCYTE ESTERASE UR QL STRIP.AUTO: ABNORMAL
MICRO URNS: ABNORMAL
MUCOUS THREADS #/AREA URNS HPF: ABNORMAL /HPF
NITRITE UR QL STRIP.AUTO: NEGATIVE
PH UR STRIP.AUTO: 6 [PH]
PROT UR QL STRIP: NEGATIVE MG/DL
RBC UR QL AUTO: NEGATIVE
SP GR UR STRIP.AUTO: 1.03
SSA52 R0ENA AB IGG Q0420: 6 AU/ML (ref 0–40)
SSA60 R0ENA AB IGG Q0419: 2 AU/ML (ref 0–40)
WBC #/AREA URNS HPF: ABNORMAL /HPF

## 2017-05-01 PROCEDURE — 700102 HCHG RX REV CODE 250 W/ 637 OVERRIDE(OP): Performed by: PHYSICAL MEDICINE & REHABILITATION

## 2017-05-01 PROCEDURE — 97116 GAIT TRAINING THERAPY: CPT

## 2017-05-01 PROCEDURE — 99233 SBSQ HOSP IP/OBS HIGH 50: CPT | Performed by: HOSPITALIST

## 2017-05-01 PROCEDURE — 97530 THERAPEUTIC ACTIVITIES: CPT

## 2017-05-01 PROCEDURE — 87086 URINE CULTURE/COLONY COUNT: CPT

## 2017-05-01 PROCEDURE — A9270 NON-COVERED ITEM OR SERVICE: HCPCS | Performed by: PHYSICAL MEDICINE & REHABILITATION

## 2017-05-01 PROCEDURE — 770010 HCHG ROOM/CARE - REHAB SEMI PRIVAT*

## 2017-05-01 PROCEDURE — 97110 THERAPEUTIC EXERCISES: CPT

## 2017-05-01 PROCEDURE — 81001 URINALYSIS AUTO W/SCOPE: CPT

## 2017-05-01 PROCEDURE — 700102 HCHG RX REV CODE 250 W/ 637 OVERRIDE(OP): Performed by: HOSPITALIST

## 2017-05-01 PROCEDURE — 99232 SBSQ HOSP IP/OBS MODERATE 35: CPT | Performed by: PHYSICAL MEDICINE & REHABILITATION

## 2017-05-01 PROCEDURE — 97532 HCHG COGNITIVE SKILL DEV. 15 MIN: CPT

## 2017-05-01 PROCEDURE — 700111 HCHG RX REV CODE 636 W/ 250 OVERRIDE (IP): Performed by: PHYSICAL MEDICINE & REHABILITATION

## 2017-05-01 PROCEDURE — A9270 NON-COVERED ITEM OR SERVICE: HCPCS | Performed by: HOSPITALIST

## 2017-05-01 RX ORDER — OMEPRAZOLE 20 MG/1
20 CAPSULE, DELAYED RELEASE ORAL 2 TIMES DAILY
Qty: 60 CAP | Refills: 3 | Status: SHIPPED | OUTPATIENT
Start: 2017-05-01 | End: 2017-05-05

## 2017-05-01 RX ORDER — OXYBUTYNIN CHLORIDE 5 MG/1
5 TABLET ORAL
Status: DISCONTINUED | OUTPATIENT
Start: 2017-05-01 | End: 2017-05-05 | Stop reason: HOSPADM

## 2017-05-01 RX ORDER — SODIUM CHLORIDE 1 G/1
2 TABLET ORAL
Status: DISCONTINUED | OUTPATIENT
Start: 2017-05-01 | End: 2017-05-01

## 2017-05-01 RX ORDER — POTASSIUM CHLORIDE 20 MEQ/1
40 TABLET, EXTENDED RELEASE ORAL DAILY
Status: DISCONTINUED | OUTPATIENT
Start: 2017-05-01 | End: 2017-05-05 | Stop reason: HOSPADM

## 2017-05-01 RX ORDER — MIDODRINE HYDROCHLORIDE 2.5 MG/1
15 TABLET ORAL
Status: DISCONTINUED | OUTPATIENT
Start: 2017-05-01 | End: 2017-05-03

## 2017-05-01 RX ORDER — SODIUM CHLORIDE 1 G/1
2 TABLET ORAL 4 TIMES DAILY
Status: DISCONTINUED | OUTPATIENT
Start: 2017-05-01 | End: 2017-05-02

## 2017-05-01 RX ADMIN — Medication 220 MG: at 08:42

## 2017-05-01 RX ADMIN — MIDODRINE HYDROCHLORIDE 10 MG: 2.5 TABLET ORAL at 08:41

## 2017-05-01 RX ADMIN — AMIODARONE HYDROCHLORIDE 200 MG: 200 TABLET ORAL at 08:42

## 2017-05-01 RX ADMIN — Medication 500 MG: at 17:52

## 2017-05-01 RX ADMIN — ASPIRIN 81 MG: 81 TABLET, COATED ORAL at 08:42

## 2017-05-01 RX ADMIN — SODIUM CHLORIDE TAB 1 GM 1 G: 1 TAB at 08:00

## 2017-05-01 RX ADMIN — Medication 500 MG: at 11:29

## 2017-05-01 RX ADMIN — AMLODIPINE BESYLATE 2.5 MG: 5 TABLET ORAL at 17:53

## 2017-05-01 RX ADMIN — MICONAZOLE NITRATE 1 APPLICATOR: 20 CREAM VAGINAL at 20:56

## 2017-05-01 RX ADMIN — THERA TABS 1 TABLET: TAB at 08:42

## 2017-05-01 RX ADMIN — FLUDROCORTISONE ACETATE 0.1 MG: 0.1 TABLET ORAL at 11:29

## 2017-05-01 RX ADMIN — MIDODRINE HYDROCHLORIDE 15 MG: 2.5 TABLET ORAL at 17:52

## 2017-05-01 RX ADMIN — FLUDROCORTISONE ACETATE 0.1 MG: 0.1 TABLET ORAL at 23:15

## 2017-05-01 RX ADMIN — CHOLECALCIFEROL TAB 25 MCG (1000 UNIT) 1000 UNITS: 25 TAB at 08:55

## 2017-05-01 RX ADMIN — OXYBUTYNIN CHLORIDE 5 MG: 5 TABLET ORAL at 20:49

## 2017-05-01 RX ADMIN — POTASSIUM CHLORIDE 40 MEQ: 1500 TABLET, EXTENDED RELEASE ORAL at 08:42

## 2017-05-01 RX ADMIN — SODIUM CHLORIDE TAB 1 GM 2 G: 1 TAB at 17:53

## 2017-05-01 RX ADMIN — SODIUM CHLORIDE TAB 1 GM 2 G: 1 TAB at 20:49

## 2017-05-01 RX ADMIN — Medication 500 MG: at 08:42

## 2017-05-01 RX ADMIN — ENOXAPARIN SODIUM 40 MG: 100 INJECTION SUBCUTANEOUS at 08:41

## 2017-05-01 RX ADMIN — STANDARDIZED SENNA CONCENTRATE AND DOCUSATE SODIUM 2 TABLET: 8.6; 5 TABLET, FILM COATED ORAL at 08:55

## 2017-05-01 RX ADMIN — STANDARDIZED SENNA CONCENTRATE AND DOCUSATE SODIUM 2 TABLET: 8.6; 5 TABLET, FILM COATED ORAL at 20:49

## 2017-05-01 RX ADMIN — OMEPRAZOLE 20 MG: 20 CAPSULE, DELAYED RELEASE ORAL at 08:42

## 2017-05-01 RX ADMIN — TRAZODONE HYDROCHLORIDE 50 MG: 50 TABLET ORAL at 20:49

## 2017-05-01 RX ADMIN — MIDODRINE HYDROCHLORIDE 10 MG: 2.5 TABLET ORAL at 11:29

## 2017-05-01 ASSESSMENT — ENCOUNTER SYMPTOMS
HEARTBURN: 0
FEVER: 0
PALPITATIONS: 0
VOMITING: 0
NAUSEA: 0
SPUTUM PRODUCTION: 0
HEADACHES: 1
CHILLS: 0
DIZZINESS: 1
HEMOPTYSIS: 0
COUGH: 0

## 2017-05-01 ASSESSMENT — GAIT ASSESSMENTS
ASSISTIVE DEVICE: FRONT WHEEL WALKER
DISTANCE (FEET): 60
GAIT LEVEL OF ASSIST: CONTACT GUARD ASSIST

## 2017-05-01 ASSESSMENT — PAIN SCALES - GENERAL: PAINLEVEL_OUTOF10: 0

## 2017-05-01 NOTE — DISCHARGE PLANNING
Case Mangement.   Discussed with Dr. Marvin.   Additional days 7 days requested through Medicaid.   Met with dtr. Who is concerned about pt's medical condition  Dr. Miranda following.   Dc was planned for 5/3, but appears this date will be extended.

## 2017-05-01 NOTE — PROGRESS NOTES
"Hospital Medicine Interval Note  Date of Service:  5/1/2017    Chief Complaint  PATIENT HAS ORTHOSTATIC HYPOTENSION.    Interval Problem Update  EPISODE OF HYPOTENSION    Consultants/Specialty  INTERNAL MEDICINE    Disposition  PER PRIMARY REHAB TEAM.      Review of Systems   Constitutional: Negative for fever, chills and malaise/fatigue.   Respiratory: Negative for cough, hemoptysis and sputum production.    Cardiovascular: Negative for chest pain, palpitations and leg swelling.   Gastrointestinal: Negative for heartburn, nausea and vomiting.   Genitourinary: Positive for dysuria.        PROBABLY THE MOST CONCENTRATED  URINE SAMPLE IN [facility] THAT I HAVE EVER SEEN  (ie: dry)   Skin: Negative for rash.   Neurological: Positive for dizziness and headaches.     4/27/17-- 4/27/17--- THE PATIENT'S MIDODRINE WILL NOT WORK IF THE \"TANK\" IS COMPLETELY EMPTY.  WE WILL GIVE 2 LITER BOLUS OF NORMAL SALINE.  THIS WILL HELP.  HARD TO IMMAGINE SHE WONT HAVE ORTHOSTATIC HYPOTENSION UNTIL VOLUME STATUS CORRECTED.   SO HOLD HEAVY LABOR.  UNTIL FLUID IS IN.  ADDING FLORINEF BID.  INCREASING MIDODRINE TOMORROW IN AM TO 10.   ENCOURAGE INCREASED PO INTAKE.  ENCORAGE SALTY FOOD INTAKE,.    ADDING SOME SODIUM TABS QAM.    4/28/17--PATIENT CONTINUES TO HAVE PAROXYSMS OF HYPOTENSION WITH EXCERSIZE.  NO   REAL HYPERTENSION.    SHE GOT CLAMMY AND SYSTOLIC WAS LOW UNTIL SHE LAID DOWN.   NO HIGH BP EPISODES.   I AM GIVING HER ANOTHER LITER OF NS.   SHE ALSO WILL  HAVE TID  MIDODRINE QAC.   INCREASING SALT TABS TO TID.  DECREASING NORVASC TO 2.5 MG QD.  CASE D/W PATIENT AND DAUGHTER.    4/29/17--MAJOR ADJUSTEMENTS MADE YESTERDAY.  LETS SEE THROUGHOUT THE DAY WHETHER THIS WILL HELP HER ORTHOSTATIC HYPOTENSION.   I CAN TOLLERATE A MODERATE DEGREE OF SUPINE HYPERTENSION.   THIS IS DEFINITELY AUTONOMIC INSUFFICIENCY OF UNCERTAIN ETIOLOGY.  IT IS OBVIOUSLY NOT A CARDIOGENIC PROBLEM.  BP LOOKS OK.   I NEED TO OBSERVE PATIENT TODAY DURING THERAPY TO MAKE " MY FINAL ASSESMENTS ON TREATMENT PLAN.   SHE HAS MINIMAL IF ANY CORONARY OR VASCUALR DISEASE.  THE LOW K+ IS DUE TO FLORINEF.   REPLEATE.    4/30/17--NO PT THIS WEEKEND.  WE SHALL SEE IF THE MED CHANGES FOR AUTONOMIC INUFFICIENY DO THE JOB ON Monday WITH PT/OT.  BP LOOKS BETTER.  PATIENT LOOKS THE SAME.    5/1/17--PATIENT HAD BETTER BP TODAY,  SOME INCREASED URINARY FREQUENCY AND DYSURIA.  CHECKING A UA.  STILL SOME HYPOTENSION NOTED BY PT.   RAISING THE MIDODRINE TO 15 TID AND INCREASING THE SALT TABS TO 2 GRAMS QID.   RX UTI IF PRESENT.  SHE JUST MAY BE MOBILIZING THE FLUIDS I GAVE HER DUE TO A URINE SODIUM OF 11.  TRYING A LITTLE OF DITROPAN SO PATIENT IS NOT WAKING UP 8 TIMES A NIGHT, (usual MO at home).                   Physical Exam Laboratory/Imaging   Filed Vitals:    05/01/17 0846 05/01/17 0959 05/01/17 1004 05/01/17 1414   BP: 144/70 110/60 110/60 118/65   Pulse: 70   70   Temp: 36.3 °C (97.4 °F)   36.8 °C (98.2 °F)   Resp: 19   18   Height:       Weight:       SpO2: 95%   96%     Physical Exam   Constitutional: She appears well-nourished. No distress.   HENT:   Head: Normocephalic and atraumatic.   Mouth/Throat: No oropharyngeal exudate.   CLAMMY SKIN, SLIGHT SWEAT.   Cardiovascular: Normal rate and regular rhythm.    EXACTLY 70 WHERE PACER SET.   Pulmonary/Chest: Effort normal and breath sounds normal.   Abdominal: She exhibits no distension.   Musculoskeletal: She exhibits no edema or tenderness.   Neurological: She is alert. No cranial nerve deficit. Coordination abnormal.   PLEASANTLY DEMENTED.   Skin: Skin is warm and dry. No erythema.   Psychiatric: She has a normal mood and affect. Her behavior is normal.    Lab Results   Component Value Date/Time    WBC 5.8 04/30/2017 06:01 AM    HEMOGLOBIN 12.2 04/30/2017 06:01 AM    HEMATOCRIT 37.7 04/30/2017 06:01 AM    PLATELET COUNT 314 04/30/2017 06:01 AM     Lab Results   Component Value Date/Time    SODIUM 139 04/30/2017 06:01 AM    POTASSIUM 3.0*  "04/30/2017 06:01 AM    CHLORIDE 105 04/30/2017 06:01 AM    CO2 24 04/30/2017 06:01 AM    GLUCOSE 99 04/30/2017 06:01 AM    BUN 25* 04/30/2017 06:01 AM    CREATININE 0.88 04/30/2017 06:01 AM      Assessment/Plan    No new assessment & plan notes have been filed under this hospital service since the last note was generated.  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  SEVERE DEHYDRATION    PLAN: 4/27/17--- THE PATIENT'S MIDODRINE WILL NOT WORK IF THE \"TANK\" IS COMPLETELY EMPTY.  WE WILL GIVE 2 LITER BOLUS OF NORMAL SALINE.  THIS WILL HELP.  HARD TO IMMAGINE SHE WONT HAVE ORTHOSTATIC HYPOTENSION UNTIL VOLUME STATUS CORRECTED.   SO HOLD HEAVY LABOR.  UNTIL FLUID IS IN.  ADDING FLORINEF BID.  INCREASING MIDODRINE TOMORROW IN AM TO 10.   ENCOURAGE INCREASED PO INTAKE.  ENCORAGE SALTY FOOD INTAKE,.    ADDING SOME SODIUM TABS QAM.       Labs reviewed, Medications reviewed and Radiology images reviewed  Alvarado catheter: No Alvarado                           "

## 2017-05-01 NOTE — CARE PLAN
Problem: Safety  Goal: Will remain free from injury  Pt needs to be reminded about using call light and waiting for staff for assistance. Family was at bedside for most of shift today and very helpful with cueing and reorienting pt.     Problem: Bowel/Gastric:  Goal: Normal bowel function is maintained or improved  Pt has passed large amounts of gas throughout shift but has been unable to have a BM. Pt received miralax at 055 this morning and has requested to take milk of magnesia after dinner.

## 2017-05-01 NOTE — PROGRESS NOTES
"Rehab Progress Note     Interval Events (Subjective)  Patient seen and examined today. Patient adjusting to the rehab program today    Objective:  VITAL SIGNS: /60 mmHg  Pulse 70  Temp(Src) 36.3 °C (97.4 °F)  Resp 19  Ht 1.575 m (5' 2\")  Wt 60.7 kg (133 lb 13.1 oz)  BMI 24.47 kg/m2  SpO2 95%  Heart regular rate and rhythm  Lungs clear auscultation  Abdominal exam bowel sounds ×4    Recent Results (from the past 72 hour(s))   URINE SODIUM RANDOM    Collection Time: 04/29/17  2:25 AM   Result Value Ref Range    Sodium, Urine -per volume 121 mmol/L   COMP METABOLIC PANEL    Collection Time: 04/29/17  5:54 AM   Result Value Ref Range    Sodium 137 135 - 145 mmol/L    Potassium 3.2 (L) 3.6 - 5.5 mmol/L    Chloride 105 96 - 112 mmol/L    Co2 22 20 - 33 mmol/L    Anion Gap 10.0 0.0 - 11.9    Glucose 85 65 - 99 mg/dL    Bun 26 (H) 8 - 22 mg/dL    Creatinine 0.88 0.50 - 1.40 mg/dL    Calcium 8.3 (L) 8.5 - 10.5 mg/dL    AST(SGOT) 18 12 - 45 U/L    ALT(SGPT) 16 2 - 50 U/L    Alkaline Phosphatase 85 30 - 99 U/L    Total Bilirubin 0.4 0.1 - 1.5 mg/dL    Albumin 3.3 3.2 - 4.9 g/dL    Total Protein 6.2 6.0 - 8.2 g/dL    Globulin 2.9 1.9 - 3.5 g/dL    A-G Ratio 1.1 g/dL   CBC WITH DIFFERENTIAL    Collection Time: 04/29/17  5:54 AM   Result Value Ref Range    WBC 7.3 4.8 - 10.8 K/uL    RBC 3.66 (L) 4.20 - 5.40 M/uL    Hemoglobin 11.3 (L) 12.0 - 16.0 g/dL    Hematocrit 34.3 (L) 37.0 - 47.0 %    MCV 93.7 81.4 - 97.8 fL    MCH 30.9 27.0 - 33.0 pg    MCHC 32.9 (L) 33.6 - 35.0 g/dL    RDW 50.1 (H) 35.9 - 50.0 fL    Platelet Count 320 164 - 446 K/uL    MPV 10.8 9.0 - 12.9 fL    Neutrophils-Polys 63.30 44.00 - 72.00 %    Lymphocytes 24.40 22.00 - 41.00 %    Monocytes 10.60 0.00 - 13.40 %    Eosinophils 0.70 0.00 - 6.90 %    Basophils 0.40 0.00 - 1.80 %    Immature Granulocytes 0.60 0.00 - 0.90 %    Nucleated RBC 0.00 /100 WBC    Neutrophils (Absolute) 4.59 2.00 - 7.15 K/uL    Lymphs (Absolute) 1.77 1.00 - 4.80 K/uL    Monos " (Absolute) 0.77 0.00 - 0.85 K/uL    Eos (Absolute) 0.05 0.00 - 0.51 K/uL    Baso (Absolute) 0.03 0.00 - 0.12 K/uL    Immature Granulocytes (abs) 0.04 0.00 - 0.11 K/uL    NRBC (Absolute) 0.00 K/uL   ESTIMATED GFR    Collection Time: 04/29/17  5:54 AM   Result Value Ref Range    GFR If African American >60 >60 mL/min/1.73 m 2    GFR If Non African American >60 >60 mL/min/1.73 m 2   CBC WITH DIFFERENTIAL    Collection Time: 04/30/17  6:01 AM   Result Value Ref Range    WBC 5.8 4.8 - 10.8 K/uL    RBC 4.01 (L) 4.20 - 5.40 M/uL    Hemoglobin 12.2 12.0 - 16.0 g/dL    Hematocrit 37.7 37.0 - 47.0 %    MCV 94.0 81.4 - 97.8 fL    MCH 30.4 27.0 - 33.0 pg    MCHC 32.4 (L) 33.6 - 35.0 g/dL    RDW 50.5 (H) 35.9 - 50.0 fL    Platelet Count 314 164 - 446 K/uL    MPV 10.6 9.0 - 12.9 fL    Neutrophils-Polys 53.10 44.00 - 72.00 %    Lymphocytes 31.00 22.00 - 41.00 %    Monocytes 13.40 0.00 - 13.40 %    Eosinophils 0.90 0.00 - 6.90 %    Basophils 0.90 0.00 - 1.80 %    Immature Granulocytes 0.70 0.00 - 0.90 %    Nucleated RBC 0.00 /100 WBC    Neutrophils (Absolute) 3.08 2.00 - 7.15 K/uL    Lymphs (Absolute) 1.80 1.00 - 4.80 K/uL    Monos (Absolute) 0.78 0.00 - 0.85 K/uL    Eos (Absolute) 0.05 0.00 - 0.51 K/uL    Baso (Absolute) 0.05 0.00 - 0.12 K/uL    Immature Granulocytes (abs) 0.04 0.00 - 0.11 K/uL    NRBC (Absolute) 0.00 K/uL   MAGNESIUM    Collection Time: 04/30/17  6:01 AM   Result Value Ref Range    Magnesium 1.8 1.5 - 2.5 mg/dL   COMP METABOLIC PANEL    Collection Time: 04/30/17  6:01 AM   Result Value Ref Range    Sodium 139 135 - 145 mmol/L    Potassium 3.0 (L) 3.6 - 5.5 mmol/L    Chloride 105 96 - 112 mmol/L    Co2 24 20 - 33 mmol/L    Anion Gap 10.0 0.0 - 11.9    Glucose 99 65 - 99 mg/dL    Bun 25 (H) 8 - 22 mg/dL    Creatinine 0.88 0.50 - 1.40 mg/dL    Calcium 8.6 8.5 - 10.5 mg/dL    AST(SGOT) 21 12 - 45 U/L    ALT(SGPT) 18 2 - 50 U/L    Alkaline Phosphatase 89 30 - 99 U/L    Total Bilirubin 0.5 0.1 - 1.5 mg/dL    Albumin  3.5 3.2 - 4.9 g/dL    Total Protein 6.3 6.0 - 8.2 g/dL    Globulin 2.8 1.9 - 3.5 g/dL    A-G Ratio 1.3 g/dL   WESTERGREN SED RATE    Collection Time: 04/30/17  6:01 AM   Result Value Ref Range    Sed Rate Wallaceergren 29 0 - 30 mm/hour   ESTIMATED GFR    Collection Time: 04/30/17  6:01 AM   Result Value Ref Range    GFR If African American >60 >60 mL/min/1.73 m 2    GFR If Non African American >60 >60 mL/min/1.73 m 2       Current Facility-Administered Medications   Medication Frequency   • potassium chloride SA (Kdur) tablet 40 mEq DAILY   • sodium chloride (SALT) tablet 2 g TID WITH MEALS   • miconazole (MONISTAT) 2 % vaginal cream 1 Applicator Q EVENING   • midodrine (PROAMATINE) tablet 10 mg TID WITH MEALS   • amlodipine (NORVASC) tablet 2.5 mg Q EVENING   • fludrocortisone (FLORINEF) tablet 0.1 mg BID   • trazodone (DESYREL) tablet 50 mg QHS   • FORMULATION R 0.25-3-14-71.9 % ointment PRN   • ondansetron (ZOFRAN ODT) dispertab 4 mg Q4HRS PRN   • senna-docusate (PERICOLACE or SENOKOT S) 8.6-50 MG per tablet 2 Tab BID    And   • polyethylene glycol/lytes (MIRALAX) PACKET 1 Packet QDAY PRN    And   • magnesium hydroxide (MILK OF MAGNESIA) suspension 30 mL QDAY PRN    And   • bisacodyl (DULCOLAX) suppository 10 mg QDAY PRN   • acetaminophen (TYLENOL) suppository 650 mg Q4HRS PRN   • amiodarone (CORDARONE) tablet 200 mg DAILY   • aspirin EC (ECOTRIN) tablet 81 mg DAILY   • calcium carbonate (OS-DIANNA 500) tablet 500 mg TID WITH MEALS   • enoxaparin (LOVENOX) inj 40 mg DAILY   • multivitamin (THERAGRAN) tablet 1 Tab DAILY   • omeprazole (PRILOSEC) capsule 20 mg DAILY   • tramadol (ULTRAM) 50 MG tablet 100 mg Q6HRS PRN   • tramadol (ULTRAM) 50 MG tablet 50 mg Q4HRS PRN   • vitamin D (cholecalciferol) tablet 1,000 Units DAILY   • zinc sulfate (ZINCATE) capsule 220 mg DAILY       Orders Placed This Encounter   Procedures   • DIET ORDER     Standing Status: Standing      Number of Occurrences: 1      Standing Expiration  Date:      Order Specific Question:  Diet:     Answer:  Regular [1]       Assessment:  Active Hospital Problems    Diagnosis   • Hip fracture (CMS-HCC)     left femoral fracture Closed reduction percutaneous screw fixation with  ttwb left lower extremity    syncope  Early dementia with some encephalopathy during hospitalization, likely  secondary to medications with improvement during hospitalization.  Atrial fibrillation.  Gastroesophageal reflux disease.  Hypertension  Initiate bladder program  Initiate bowel program  Check admit labs  IMPAIRMENTS:    Mobility  Self-care  IADLs        PLAN:    Discussion and Recommendations:    1. The patient requires an acute inpatient rehabilitation program with a coordinated program of care at an intensity and frequency not available at a lower level of care. This recommendation is substantiated by the patient's medical physicians who recommend that the patient's intervention and assessment of medical issues needs to be done at an acute level of care for patient's safety and maximum outcome.    2. A coordinated program of care will be supplied by an interdisciplinary team of physical therapy, occupational therapy, rehab physician, rehab nursing, and, if needed, speech therapy and rehab psychology. Rehab team presents a patient-specific rehabilitation and education program concentrating on prevention of future problems related to accessibility, mobility, skin, bowel, bladder, sexuality, and psychosocial and medical/surgical problems.    3. Need for Rehabilitation Physician: The rehab physician will be evaluating the patient on a multi-weekly basis to help coordinate the program of care. The rehab physician communicates between medical physicians, therapists, and nurses to maximize the patient's potential outcome. Specific areas in which the rehab physician will be providing daily assessment include the following:    A. Assessing the patient's heart rate and blood pressure  response (vitals monitoring) to activity and making adjustments in medications or conservative measures as needed.    B. The rehab physician will be assessing the frequency at which the program can be increased to allow the patient to reach optimal functional outcome.    C. The rehab physician will also provide assessments in daily skin care, especially in light of patient's impairments in mobility.    D. The rehab physician will provide special expertise in understanding how to work with functional impairment and recommend appropriate interventions, compensatory techniques, and education that will facilitate the patient's outcome.    4. Rehab R.N.    The rehab RN will be working with patient to carry over in room mobility and activities of daily living when the patient is not in 3 hours of skilled therapy. Rehab nursing will be working in conjunction with rehab physician to address all the medical issues above and continue to assess laboratory work and discuss abnormalities with the treating physicians, assess vitals, and response to activity, and discuss and report abnormalities with the rehab physician. Rehab RN will also continue daily skin care, supervise bladder/bowel program, instruct in medication administration, and ensure patient safety.     MEDICAL DECISION MAKING and INTERDISCIPLINARY PLAN OF CARE:    REHABILITATION ISSUES/ADVERSE POTENTIAL::  1. Left femoral neck fracture Patient demonstrates functional deficits in strength, balance, coordination, and ADL's. Patient is admitted to [facility] for comprehensive rehabilitation therapy as described below.   Rehabilitation nursing monitors bowel and bladder control, educates on medication administration, co-morbidities and monitors patient safety.    Therapies to treat at intensity and frequency of (may change after completion of evaluation by all therapeutic disciplines):       PT:  Physical therapy to address mobility, transfer, gait  training and evaluation for adaptive equipment needs 1.5hour/day at least 5 days/week for the duration of the ELOS (see below)       OT:  Occupational therapy to address ADLs, self-care, home management training, functional mobility/transfers and assistive device evaluation, and community re-intergration 1.5hour/day at least 5 days/week for the duration of the ELOS (see below).        ST/Dysphagia:  Speech therapy to address speech, language,and cognitive deficits as well as swallowing difficulties with retraining/dysphagia management and community re-integration with comprehension, expression, cognitive training 0 hour/day at least 5 days/week for the duration of the ELOS (see below).     2.  Neurostimulants: None at this time but continue to assess daily for need to initiate should status change.    3.  DVT prophylaxis:  Patient is on Lovenox for anticoagulation upon transfer. Encourage OOB. Monitor daily for signs and symptoms of DVT including but not limited to swelling and pain to prevent the development of DVT that may interfere with therapies.    4.  GI prophylaxis:  On prilosec to prevent gastritis/dyspepsia which may interfere with therapies.    5.  Pain: No issues with pain currently / Controlled with Ultram    6.  Nutrition/Dysphagia: Dietician monitors nutrient intake, recommend supplements prn and provide nutrition education to pt/family to promote optimal nutrition for wound healing/recovery.     7.  Bladder/bowel:  Start bowel and bladder program as described below, to prevent constipation, urinary retention (which may lead to UTI), and urinary incontinence (which will impact upon pt's functional independence).    - TV Q3h while awake with post void bladder scans, I&O cath for PVRs >400  - up to commode after meal     8.  Skin/dermal ulcer prophylaxis: Monitor for new skin conditions with q.2 h. turns as required to prevent the development of skin breakdown.     9.  Cognition/Behavior:  Psychologist  Dr. Schafer provides adjustment counseling to illness and psychosocial barriers that may be potential barriers to rehabilitation.     10. Respiratory therapy: RT performs O2 management prn, breathing retraining, pulmonary hygeine and bronchospasm management prn to optimize participation in therapies.      MEDICAL CO-MORBIDITIES/ADVERSE POTENTIAL AFFECTING FUNCTION:        GOALS/EXPECTED LEVEL OF FUNCTION BASED ON CURRENT MEDICAL AND FUNCTIONAL STATUS (may change based on patient's medical status and rate of impairment recovery):     Transfers: Supervised to modified independent     Mobility/Gait: Supervised to modified independent     ADL's: Supervised to modified independent     Cognition: Independent    DISPOSITION: home with assistance     ELOS: 14 days                      Medical Decision Making and Plan:  Please refer to H&P. Saw the patient a 2nd time today Continue comprehensive rehab    Total time:  >25minutes.  I spent greater than 50% of the time for patient care and coordination on this date, including unit/floor time, and face-to-face time with the patient as per assessment and plan above.    Kylah Marvin D.O.

## 2017-05-01 NOTE — CARE PLAN
Problem: Safety  Goal: Will remain free from injury  Outcome: PROGRESSING AS EXPECTED  Pt. With bed alarm on for safety, hourly rounding continue. Assessed the need for toilet. 3 side rails up.    Problem: Pain Management  Goal: Pain level will decrease to patient’s comfort goal  Outcome: PROGRESSING AS EXPECTED  Pt. Resting comfortably at this time, repositioned for comfort. Sleeping medication given. Pain is controlled at this time. Continue monitor condition.

## 2017-05-01 NOTE — PROGRESS NOTES
"Hospital Medicine Interval Note  Date of Service:  5/1/2017    Chief Complaint  PATIENT HAS ORTHOSTATIC HYPOTENSION.    Interval Problem Update  EPISODE OF HYPOTENSION    Consultants/Specialty  INTERNAL MEDICINE    Disposition  PER PRIMARY REHAB TEAM.      Review of Systems   Constitutional: Negative for fever, chills and malaise/fatigue.   Respiratory: Negative for cough, hemoptysis and sputum production.    Cardiovascular: Negative for chest pain, palpitations and leg swelling.   Gastrointestinal: Negative for heartburn, nausea and vomiting.   Genitourinary: Negative for dysuria.        PROBABLY THE MOST CONCENTRATED  URINE SAMPLE IN [facility] THAT I HAVE EVER SEEN  (ie: dry)   Skin: Negative for rash.   Neurological: Positive for dizziness and headaches.     4/27/17-- 4/27/17--- THE PATIENT'S MIDODRINE WILL NOT WORK IF THE \"TANK\" IS COMPLETELY EMPTY.  WE WILL GIVE 2 LITER BOLUS OF NORMAL SALINE.  THIS WILL HELP.  HARD TO IMMAGINE SHE WONT HAVE ORTHOSTATIC HYPOTENSION UNTIL VOLUME STATUS CORRECTED.   SO HOLD HEAVY LABOR.  UNTIL FLUID IS IN.  ADDING FLORINEF BID.  INCREASING MIDODRINE TOMORROW IN AM TO 10.   ENCOURAGE INCREASED PO INTAKE.  ENCORAGE SALTY FOOD INTAKE,.    ADDING SOME SODIUM TABS QAM.    4/28/17--PATIENT CONTINUES TO HAVE PAROXYSMS OF HYPOTENSION WITH EXCERSIZE.  NO   REAL HYPERTENSION.    SHE GOT CLAMMY AND SYSTOLIC WAS LOW UNTIL SHE LAID DOWN.   NO HIGH BP EPISODES.   I AM GIVING HER ANOTHER LITER OF NS.   SHE ALSO WILL  HAVE TID  MIDODRINE QAC.   INCREASING SALT TABS TO TID.  DECREASING NORVASC TO 2.5 MG QD.  CASE D/W PATIENT AND DAUGHTER.    4/29/17--MAJOR ADJUSTEMENTS MADE YESTERDAY.  LETS SEE THROUGHOUT THE DAY WHETHER THIS WILL HELP HER ORTHOSTATIC HYPOTENSION.   I CAN TOLLERATE A MODERATE DEGREE OF SUPINE HYPERTENSION.   THIS IS DEFINITELY AUTONOMIC INSUFFICIENCY OF UNCERTAIN ETIOLOGY.  IT IS OBVIOUSLY NOT A CARDIOGENIC PROBLEM.  BP LOOKS OK.   I NEED TO OBSERVE PATIENT TODAY DURING THERAPY TO MAKE " MY FINAL ASSESMENTS ON TREATMENT PLAN.   SHE HAS MINIMAL IF ANY CORONARY OR VASCUALR DISEASE.  THE LOW K+ IS DUE TO FLORINEF.   REPLEATE.    4/30/17--NO PT THIS WEEKEND.  WE SHALL SEE IF THE MED CHANGES FOR AUTONOMIC INUFFICIKIAN DO THE JOB ON Monday WITH PT/OT.  BP LOOKS BETTER.  PATIENT LOOKS THE [employer]SCAR.                   Physical Exam Laboratory/Imaging   Filed Vitals:    04/30/17 0855 04/30/17 0858 04/30/17 0901 04/30/17 1920   BP: 121/78 137/68 149/75 138/74   Pulse:    71   Temp:    36.7 °C (98.1 °F)   Resp:    16   Height:       Weight:       SpO2:    95%     Physical Exam   Constitutional: She appears well-nourished. No distress.   HENT:   Head: Normocephalic and atraumatic.   Mouth/Throat: No oropharyngeal exudate.   CLAMMY SKIN, SLIGHT SWEAT.   Cardiovascular: Normal rate and regular rhythm.    EXACTLY 70 WHERE PACER SET.   Pulmonary/Chest: Effort normal and breath sounds normal.   Abdominal: She exhibits no distension.   Musculoskeletal: She exhibits no edema or tenderness.   Neurological: She is alert. No cranial nerve deficit. Coordination abnormal.   PLEASANTLY DEMENTED.   Skin: Skin is warm and dry. No erythema.   Psychiatric: She has a normal mood and affect. Her behavior is normal.    Lab Results   Component Value Date/Time    WBC 5.8 04/30/2017 06:01 AM    HEMOGLOBIN 12.2 04/30/2017 06:01 AM    HEMATOCRIT 37.7 04/30/2017 06:01 AM    PLATELET COUNT 314 04/30/2017 06:01 AM     Lab Results   Component Value Date/Time    SODIUM 139 04/30/2017 06:01 AM    POTASSIUM 3.0* 04/30/2017 06:01 AM    CHLORIDE 105 04/30/2017 06:01 AM    CO2 24 04/30/2017 06:01 AM    GLUCOSE 99 04/30/2017 06:01 AM    BUN 25* 04/30/2017 06:01 AM    CREATININE 0.88 04/30/2017 06:01 AM      Assessment/Plan    No new assessment & plan notes have been filed under this hospital service since the last note was generated.  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  SEVERE DEHYDRATION    PLAN: 4/27/17--- THE PATIENT'S MIDODRINE WILL NOT WORK IF THE \"TANK\" IS COMPLETELY EMPTY.  WE WILL GIVE 2 LITER BOLUS OF NORMAL SALINE.  THIS WILL HELP.  HARD TO IMMAGINE SHE WONT HAVE ORTHOSTATIC HYPOTENSION UNTIL VOLUME STATUS CORRECTED.   SO HOLD HEAVY LABOR.  UNTIL FLUID IS IN.  ADDING FLORINEF BID.  INCREASING MIDODRINE TOMORROW IN AM TO 10.   ENCOURAGE INCREASED PO INTAKE.  ENCORAGE SALTY FOOD INTAKE,.    ADDING SOME SODIUM TABS QAM.       Labs reviewed, Medications reviewed and Radiology images reviewed  Alvarado catheter: No Alvarado                           "

## 2017-05-01 NOTE — DISCHARGE PLANNING
· TC from pt's dtr who is concerned about pt's dc planned for Wed 5/3.  Plan to meet with her this am.   · Tc to Marian BOONE @ [facility]   Home and Community Based Waiver [phone].

## 2017-05-01 NOTE — PROGRESS NOTES
Pt C/O dizziness during PT session. Pt brought to back to room. Manual /60. Asymptomatic at this time. Pt family requesting to talk to Dr. Miranda and ELVIS Montero. Will notify both.

## 2017-05-01 NOTE — PROGRESS NOTES
"Rehab Progress Note     Interval Events (Subjective)  Patient seen and examined today. Patient coloring with colored pencils and without complaints.   Ros: Last bowel movement 5/1/17    Objective:  VITAL SIGNS: /65 mmHg  Pulse 70  Temp(Src) 36.8 °C (98.2 °F)  Resp 18  Ht 1.575 m (5' 2\")  Wt 60.7 kg (133 lb 13.1 oz)  BMI 24.47 kg/m2  SpO2 96%  Heart regular rate and rhythm   lungs clear to auscultation  Abdominal exam bowel sounds ×4      Recent Results (from the past 72 hour(s))   URINE SODIUM RANDOM    Collection Time: 04/29/17  2:25 AM   Result Value Ref Range    Sodium, Urine -per volume 121 mmol/L   COMP METABOLIC PANEL    Collection Time: 04/29/17  5:54 AM   Result Value Ref Range    Sodium 137 135 - 145 mmol/L    Potassium 3.2 (L) 3.6 - 5.5 mmol/L    Chloride 105 96 - 112 mmol/L    Co2 22 20 - 33 mmol/L    Anion Gap 10.0 0.0 - 11.9    Glucose 85 65 - 99 mg/dL    Bun 26 (H) 8 - 22 mg/dL    Creatinine 0.88 0.50 - 1.40 mg/dL    Calcium 8.3 (L) 8.5 - 10.5 mg/dL    AST(SGOT) 18 12 - 45 U/L    ALT(SGPT) 16 2 - 50 U/L    Alkaline Phosphatase 85 30 - 99 U/L    Total Bilirubin 0.4 0.1 - 1.5 mg/dL    Albumin 3.3 3.2 - 4.9 g/dL    Total Protein 6.2 6.0 - 8.2 g/dL    Globulin 2.9 1.9 - 3.5 g/dL    A-G Ratio 1.1 g/dL   CBC WITH DIFFERENTIAL    Collection Time: 04/29/17  5:54 AM   Result Value Ref Range    WBC 7.3 4.8 - 10.8 K/uL    RBC 3.66 (L) 4.20 - 5.40 M/uL    Hemoglobin 11.3 (L) 12.0 - 16.0 g/dL    Hematocrit 34.3 (L) 37.0 - 47.0 %    MCV 93.7 81.4 - 97.8 fL    MCH 30.9 27.0 - 33.0 pg    MCHC 32.9 (L) 33.6 - 35.0 g/dL    RDW 50.1 (H) 35.9 - 50.0 fL    Platelet Count 320 164 - 446 K/uL    MPV 10.8 9.0 - 12.9 fL    Neutrophils-Polys 63.30 44.00 - 72.00 %    Lymphocytes 24.40 22.00 - 41.00 %    Monocytes 10.60 0.00 - 13.40 %    Eosinophils 0.70 0.00 - 6.90 %    Basophils 0.40 0.00 - 1.80 %    Immature Granulocytes 0.60 0.00 - 0.90 %    Nucleated RBC 0.00 /100 WBC    Neutrophils (Absolute) 4.59 2.00 - 7.15 " K/uL    Lymphs (Absolute) 1.77 1.00 - 4.80 K/uL    Monos (Absolute) 0.77 0.00 - 0.85 K/uL    Eos (Absolute) 0.05 0.00 - 0.51 K/uL    Baso (Absolute) 0.03 0.00 - 0.12 K/uL    Immature Granulocytes (abs) 0.04 0.00 - 0.11 K/uL    NRBC (Absolute) 0.00 K/uL   ESTIMATED GFR    Collection Time: 04/29/17  5:54 AM   Result Value Ref Range    GFR If African American >60 >60 mL/min/1.73 m 2    GFR If Non African American >60 >60 mL/min/1.73 m 2   CBC WITH DIFFERENTIAL    Collection Time: 04/30/17  6:01 AM   Result Value Ref Range    WBC 5.8 4.8 - 10.8 K/uL    RBC 4.01 (L) 4.20 - 5.40 M/uL    Hemoglobin 12.2 12.0 - 16.0 g/dL    Hematocrit 37.7 37.0 - 47.0 %    MCV 94.0 81.4 - 97.8 fL    MCH 30.4 27.0 - 33.0 pg    MCHC 32.4 (L) 33.6 - 35.0 g/dL    RDW 50.5 (H) 35.9 - 50.0 fL    Platelet Count 314 164 - 446 K/uL    MPV 10.6 9.0 - 12.9 fL    Neutrophils-Polys 53.10 44.00 - 72.00 %    Lymphocytes 31.00 22.00 - 41.00 %    Monocytes 13.40 0.00 - 13.40 %    Eosinophils 0.90 0.00 - 6.90 %    Basophils 0.90 0.00 - 1.80 %    Immature Granulocytes 0.70 0.00 - 0.90 %    Nucleated RBC 0.00 /100 WBC    Neutrophils (Absolute) 3.08 2.00 - 7.15 K/uL    Lymphs (Absolute) 1.80 1.00 - 4.80 K/uL    Monos (Absolute) 0.78 0.00 - 0.85 K/uL    Eos (Absolute) 0.05 0.00 - 0.51 K/uL    Baso (Absolute) 0.05 0.00 - 0.12 K/uL    Immature Granulocytes (abs) 0.04 0.00 - 0.11 K/uL    NRBC (Absolute) 0.00 K/uL   MAGNESIUM    Collection Time: 04/30/17  6:01 AM   Result Value Ref Range    Magnesium 1.8 1.5 - 2.5 mg/dL   COMP METABOLIC PANEL    Collection Time: 04/30/17  6:01 AM   Result Value Ref Range    Sodium 139 135 - 145 mmol/L    Potassium 3.0 (L) 3.6 - 5.5 mmol/L    Chloride 105 96 - 112 mmol/L    Co2 24 20 - 33 mmol/L    Anion Gap 10.0 0.0 - 11.9    Glucose 99 65 - 99 mg/dL    Bun 25 (H) 8 - 22 mg/dL    Creatinine 0.88 0.50 - 1.40 mg/dL    Calcium 8.6 8.5 - 10.5 mg/dL    AST(SGOT) 21 12 - 45 U/L    ALT(SGPT) 18 2 - 50 U/L    Alkaline Phosphatase 89 30 - 99  U/L    Total Bilirubin 0.5 0.1 - 1.5 mg/dL    Albumin 3.5 3.2 - 4.9 g/dL    Total Protein 6.3 6.0 - 8.2 g/dL    Globulin 2.8 1.9 - 3.5 g/dL    A-G Ratio 1.3 g/dL   WESTERGREN SED RATE    Collection Time: 04/30/17  6:01 AM   Result Value Ref Range    Sed Rate Westergren 29 0 - 30 mm/hour   ESTIMATED GFR    Collection Time: 04/30/17  6:01 AM   Result Value Ref Range    GFR If African American >60 >60 mL/min/1.73 m 2    GFR If Non African American >60 >60 mL/min/1.73 m 2       Current Facility-Administered Medications   Medication Frequency   • potassium chloride SA (Kdur) tablet 40 mEq DAILY   • miconazole (MONISTAT) 2 % vaginal cream 1 Applicator Q EVENING   • oxybutynin (DITROPAN) tablet 5 mg QHS   • midodrine (PROAMATINE) tablet 15 mg TID WITH MEALS   • sodium chloride (SALT) tablet 2 g 4X/DAY   • amlodipine (NORVASC) tablet 2.5 mg Q EVENING   • fludrocortisone (FLORINEF) tablet 0.1 mg BID   • trazodone (DESYREL) tablet 50 mg QHS   • FORMULATION R 0.25-3-14-71.9 % ointment PRN   • ondansetron (ZOFRAN ODT) dispertab 4 mg Q4HRS PRN   • senna-docusate (PERICOLACE or SENOKOT S) 8.6-50 MG per tablet 2 Tab BID    And   • polyethylene glycol/lytes (MIRALAX) PACKET 1 Packet QDAY PRN    And   • magnesium hydroxide (MILK OF MAGNESIA) suspension 30 mL QDAY PRN    And   • bisacodyl (DULCOLAX) suppository 10 mg QDAY PRN   • acetaminophen (TYLENOL) suppository 650 mg Q4HRS PRN   • amiodarone (CORDARONE) tablet 200 mg DAILY   • aspirin EC (ECOTRIN) tablet 81 mg DAILY   • calcium carbonate (OS-DIANNA 500) tablet 500 mg TID WITH MEALS   • enoxaparin (LOVENOX) inj 40 mg DAILY   • multivitamin (THERAGRAN) tablet 1 Tab DAILY   • omeprazole (PRILOSEC) capsule 20 mg DAILY   • tramadol (ULTRAM) 50 MG tablet 100 mg Q6HRS PRN   • tramadol (ULTRAM) 50 MG tablet 50 mg Q4HRS PRN   • vitamin D (cholecalciferol) tablet 1,000 Units DAILY   • zinc sulfate (ZINCATE) capsule 220 mg DAILY       Orders Placed This Encounter   Procedures   • DIET ORDER      Standing Status: Standing      Number of Occurrences: 1      Standing Expiration Date:      Order Specific Question:  Diet:     Answer:  Regular [1]       Assessment:  Active Hospital Problems    Diagnosis   • Hip fracture (CMS-HCC)   left femoral fracture Closed reduction percutaneous screw fixation with  ttwb left lower extremity. 4-26 x-ray no new fractures    syncope: Consult Dr. Miranda  Status post fall 4/25/2017 with negative x-ray and no injury  Early dementia with some encephalopathy during hospitalization, likely  secondary to medications with improvement during hospitalization.  Atrial fibrillation.  Gastroesophageal reflux disease.  Hypertension  bladder program  bowel program  UTI: Treated with Bactrim,    IMPAIRMENTS:    Mobility  Self-care  IADLs    PLAN:    Discussion and Recommendations:    1. The patient requires an acute inpatient rehabilitation program with a coordinated program of care at an intensity and frequency not available at a lower level of care. This recommendation is substantiated by the patient's medical physicians who recommend that the patient's intervention and assessment of medical issues needs to be done at an acute level of care for patient's safety and maximum outcome.    2. A coordinated program of care will be supplied by an interdisciplinary team of physical therapy, occupational therapy, rehab physician, rehab nursing, and, if needed, speech therapy and rehab psychology. Rehab team presents a patient-specific rehabilitation and education program concentrating on prevention of future problems related to accessibility, mobility, skin, bowel, bladder, sexuality, and psychosocial and medical/surgical problems.    3. Need for Rehabilitation Physician: The rehab physician will be evaluating the patient on a multi-weekly basis to help coordinate the program of care. The rehab physician communicates between medical physicians, therapists, and nurses to maximize the patient's  potential outcome. Specific areas in which the rehab physician will be providing daily assessment include the following:    A. Assessing the patient's heart rate and blood pressure response (vitals monitoring) to activity and making adjustments in medications or conservative measures as needed.    B. The rehab physician will be assessing the frequency at which the program can be increased to allow the patient to reach optimal functional outcome.    C. The rehab physician will also provide assessments in daily skin care, especially in light of patient's impairments in mobility.    D. The rehab physician will provide special expertise in understanding how to work with functional impairment and recommend appropriate interventions, compensatory techniques, and education that will facilitate the patient's outcome.    4. Rehab R.N.    The rehab RN will be working with patient to carry over in room mobility and activities of daily living when the patient is not in 3 hours of skilled therapy. Rehab nursing will be working in conjunction with rehab physician to address all the medical issues above and continue to assess laboratory work and discuss abnormalities with the treating physicians, assess vitals, and response to activity, and discuss and report abnormalities with the rehab physician. Rehab RN will also continue daily skin care, supervise bladder/bowel program, instruct in medication administration, and ensure patient safety.     MEDICAL DECISION MAKING and INTERDISCIPLINARY PLAN OF CARE:    REHABILITATION ISSUES/ADVERSE POTENTIAL::  1. Left femoral neck fracture Patient demonstrates functional deficits in strength, balance, coordination, and ADL's. Patient is admitted to [facility] for comprehensive rehabilitation therapy as described below.   Rehabilitation nursing monitors bowel and bladder control, educates on medication administration, co-morbidities and monitors patient safety.    Therapies  to treat at intensity and frequency of (may change after completion of evaluation by all therapeutic disciplines):       PT:  Physical therapy to address mobility, transfer, gait training and evaluation for adaptive equipment needs 1hour/day at least 5 days/week for the duration of the ELOS (see below)       OT:  Occupational therapy to address ADLs, self-care, home management training, functional mobility/transfers and assistive device evaluation, and community re-intergration 2 hour/day at least 5 days/week for the duration of the ELOS (see below).        ST/Dysphagia:  Speech therapy to address speech, language,and cognitive deficits as well as swallowing difficulties with retraining/dysphagia management and community re-integration with comprehension, expression, cognitive training 1/2 hour/day at least 5 days/week for the duration of the ELOS (see below).     2.  Neurostimulants: None at this time but continue to assess daily for need to initiate should status change.    3.  DVT prophylaxis:  Patient is on Lovenox for anticoagulation upon transfer. Encourage OOB. Monitor daily for signs and symptoms of DVT including but not limited to swelling and pain to prevent the development of DVT that may interfere with therapies.    4.  GI prophylaxis:  On prilosec to prevent gastritis/dyspepsia which may interfere with therapies.    5.  Pain: No issues with pain currently / Controlled with Ultram    6.  Nutrition/Dysphagia: Dietician monitors nutrient intake, recommend supplements prn and provide nutrition education to pt/family to promote optimal nutrition for wound healing/recovery.     7.  Bladder/bowel:  Start bowel and bladder program as described below, to prevent constipation, urinary retention (which may lead to UTI), and urinary incontinence (which will impact upon pt's functional independence).    - TV Q3h while awake with post void bladder scans, I&O cath for PVRs >400  - up to commode after meal     8.   Skin/dermal ulcer prophylaxis: Monitor for new skin conditions with q.2 h. turns as required to prevent the development of skin breakdown.     9.  Cognition/Behavior:  Psychologist Dr. Schafer provides adjustment counseling to illness and psychosocial barriers that may be potential barriers to rehabilitation.     10. Respiratory therapy: RT performs O2 management prn, breathing retraining, pulmonary hygeine and bronchospasm management prn to optimize participation in therapies.      MEDICAL CO-MORBIDITIES/ADVERSE POTENTIAL AFFECTING FUNCTION:        GOALS/EXPECTED LEVEL OF FUNCTION BASED ON CURRENT MEDICAL AND FUNCTIONAL STATUS (may change based on patient's medical status and rate of impairment recovery):     Transfers: Supervised to modified independent     Mobility/Gait: Supervised to modified independent     ADL's: Supervised to modified independent     Cognition: Independent    DISPOSITION: home with assistance     ELOS: 14 days    Medical Decision Making and Plan:  Nursing to assist patient with fluid intake patient to be reminded every couple of hours to take into 200 mL of fluids while awake    I attended and led team conference 4-  Nursing:uti on bactrim, confusion, continent of bowel and bladder, incision intact, fluids 500cc, meals % , daughter brought in food  PT: limited by ttwb, bed mob sba, transfers with fww cga and lots of cues, gait with 2 step pattern but difficult will work on // bars.   OT: sup seated grooming, mod I eating, cga bathing stance, impaired balance and weight bearing, thoroughness, upper body dressing bra sweater seated in wc min, lower body min, with max verbal cues for strategies, cga toileting with poor adherance to ttwb, poor carryover  ST: max problem solving memory and executive function, continuum for day program  Case management:lives with daughter and grandchildren , neurology consult after discharge, 24 hour supervision and assist  Discharge date:  4-    X-ray reviewed in no acute fracture  I attended and led team conference 4-  Nursing:regular diet, eating %, fluids 940cc, denies pain, continent of bowel and bladder, iv for fluids  PT: able to keep ttwb status this week, transfers sup to sit back to bed sup, walker transfers chair cga, cues for sequencing, gait 40 ft fww ttwb cga  OT: family training, bathing min, cga stance, grooming seated setup, upper body dressing setup, verbal cues for orientation, lower body dressing min for left socks and cues for strategies, toileting sba to cga, transfers cga, gait belt with fww  ST: min to sup comprehension, sup expression, mod simple problem solving, max memory, safety sequencing needs lots of cues  Case management:lives with daughter, needs manual wc with cushion and fww for transfers  Discharge date: authorized through Monday, extend for blood pressure issues, 5-3-2017    Dr. blackmon assessing patient and assisting with medical management  Total time:  >25 minutes.  I spent greater than 50% of the time for patient care and coordination on this date, including unit/floor time, and face-to-face time with the patient as per assessment and plan above.    Kylah Marvin D.O.

## 2017-05-02 LAB
ALBUMIN SERPL BCP-MCNC: 3.3 G/DL (ref 3.2–4.9)
ALBUMIN/GLOB SERPL: 1.1 G/DL
ALP SERPL-CCNC: 88 U/L (ref 30–99)
ALT SERPL-CCNC: 20 U/L (ref 2–50)
ANION GAP SERPL CALC-SCNC: 10 MMOL/L (ref 0–11.9)
AST SERPL-CCNC: 21 U/L (ref 12–45)
BASOPHILS # BLD AUTO: 0.6 % (ref 0–1.8)
BASOPHILS # BLD: 0.04 K/UL (ref 0–0.12)
BILIRUB SERPL-MCNC: 0.4 MG/DL (ref 0.1–1.5)
BUN SERPL-MCNC: 28 MG/DL (ref 8–22)
CALCIUM SERPL-MCNC: 8.8 MG/DL (ref 8.5–10.5)
CHLORIDE SERPL-SCNC: 106 MMOL/L (ref 96–112)
CO2 SERPL-SCNC: 23 MMOL/L (ref 20–33)
CREAT SERPL-MCNC: 0.99 MG/DL (ref 0.5–1.4)
EOSINOPHIL # BLD AUTO: 0.04 K/UL (ref 0–0.51)
EOSINOPHIL NFR BLD: 0.6 % (ref 0–6.9)
ERYTHROCYTE [DISTWIDTH] IN BLOOD BY AUTOMATED COUNT: 49.1 FL (ref 35.9–50)
GFR SERPL CREATININE-BSD FRML MDRD: 54 ML/MIN/1.73 M 2
GLOBULIN SER CALC-MCNC: 3.1 G/DL (ref 1.9–3.5)
GLUCOSE SERPL-MCNC: 96 MG/DL (ref 65–99)
HCT VFR BLD AUTO: 33.7 % (ref 37–47)
HGB BLD-MCNC: 11.2 G/DL (ref 12–16)
IMM GRANULOCYTES # BLD AUTO: 0.04 K/UL (ref 0–0.11)
IMM GRANULOCYTES NFR BLD AUTO: 0.6 % (ref 0–0.9)
LYMPHOCYTES # BLD AUTO: 2.11 K/UL (ref 1–4.8)
LYMPHOCYTES NFR BLD: 32.3 % (ref 22–41)
MCH RBC QN AUTO: 30.9 PG (ref 27–33)
MCHC RBC AUTO-ENTMCNC: 33.2 G/DL (ref 33.6–35)
MCV RBC AUTO: 93.1 FL (ref 81.4–97.8)
MONOCYTES # BLD AUTO: 0.84 K/UL (ref 0–0.85)
MONOCYTES NFR BLD AUTO: 12.8 % (ref 0–13.4)
NEUTROPHILS # BLD AUTO: 3.47 K/UL (ref 2–7.15)
NEUTROPHILS NFR BLD: 53.1 % (ref 44–72)
NRBC # BLD AUTO: 0 K/UL
NRBC BLD AUTO-RTO: 0 /100 WBC
PLATELET # BLD AUTO: 299 K/UL (ref 164–446)
PMV BLD AUTO: 10.5 FL (ref 9–12.9)
POTASSIUM SERPL-SCNC: 3.5 MMOL/L (ref 3.6–5.5)
PROT SERPL-MCNC: 6.4 G/DL (ref 6–8.2)
RBC # BLD AUTO: 3.62 M/UL (ref 4.2–5.4)
SODIUM SERPL-SCNC: 139 MMOL/L (ref 135–145)
WBC # BLD AUTO: 6.5 K/UL (ref 4.8–10.8)

## 2017-05-02 PROCEDURE — 770010 HCHG ROOM/CARE - REHAB SEMI PRIVAT*

## 2017-05-02 PROCEDURE — 97110 THERAPEUTIC EXERCISES: CPT

## 2017-05-02 PROCEDURE — 80053 COMPREHEN METABOLIC PANEL: CPT

## 2017-05-02 PROCEDURE — A9270 NON-COVERED ITEM OR SERVICE: HCPCS | Performed by: HOSPITALIST

## 2017-05-02 PROCEDURE — 97535 SELF CARE MNGMENT TRAINING: CPT

## 2017-05-02 PROCEDURE — 36415 COLL VENOUS BLD VENIPUNCTURE: CPT

## 2017-05-02 PROCEDURE — 97116 GAIT TRAINING THERAPY: CPT

## 2017-05-02 PROCEDURE — 85025 COMPLETE CBC W/AUTO DIFF WBC: CPT

## 2017-05-02 PROCEDURE — A9270 NON-COVERED ITEM OR SERVICE: HCPCS | Performed by: PHYSICAL MEDICINE & REHABILITATION

## 2017-05-02 PROCEDURE — 700111 HCHG RX REV CODE 636 W/ 250 OVERRIDE (IP): Performed by: PHYSICAL MEDICINE & REHABILITATION

## 2017-05-02 PROCEDURE — 700102 HCHG RX REV CODE 250 W/ 637 OVERRIDE(OP): Performed by: HOSPITALIST

## 2017-05-02 PROCEDURE — 700102 HCHG RX REV CODE 250 W/ 637 OVERRIDE(OP): Performed by: PHYSICAL MEDICINE & REHABILITATION

## 2017-05-02 PROCEDURE — 97532 HCHG COGNITIVE SKILL DEV. 15 MIN: CPT

## 2017-05-02 PROCEDURE — 99232 SBSQ HOSP IP/OBS MODERATE 35: CPT | Performed by: HOSPITALIST

## 2017-05-02 RX ORDER — SODIUM CHLORIDE 1 G/1
2 TABLET ORAL
Status: DISCONTINUED | OUTPATIENT
Start: 2017-05-03 | End: 2017-05-03

## 2017-05-02 RX ORDER — FLUDROCORTISONE ACETATE 0.1 MG/1
0.1 TABLET ORAL EVERY MORNING
Status: DISCONTINUED | OUTPATIENT
Start: 2017-05-03 | End: 2017-05-05 | Stop reason: HOSPADM

## 2017-05-02 RX ADMIN — STANDARDIZED SENNA CONCENTRATE AND DOCUSATE SODIUM 2 TABLET: 8.6; 5 TABLET, FILM COATED ORAL at 08:36

## 2017-05-02 RX ADMIN — SODIUM CHLORIDE TAB 1 GM 2 G: 1 TAB at 13:00

## 2017-05-02 RX ADMIN — SODIUM CHLORIDE TAB 1 GM 2 G: 1 TAB at 08:36

## 2017-05-02 RX ADMIN — Medication 500 MG: at 17:25

## 2017-05-02 RX ADMIN — ENOXAPARIN SODIUM 40 MG: 100 INJECTION SUBCUTANEOUS at 08:34

## 2017-05-02 RX ADMIN — FLUDROCORTISONE ACETATE 0.1 MG: 0.1 TABLET ORAL at 11:17

## 2017-05-02 RX ADMIN — MIDODRINE HYDROCHLORIDE 15 MG: 2.5 TABLET ORAL at 11:16

## 2017-05-02 RX ADMIN — CHOLECALCIFEROL TAB 25 MCG (1000 UNIT) 1000 UNITS: 25 TAB at 08:36

## 2017-05-02 RX ADMIN — POTASSIUM CHLORIDE 40 MEQ: 1500 TABLET, EXTENDED RELEASE ORAL at 08:36

## 2017-05-02 RX ADMIN — TRAZODONE HYDROCHLORIDE 50 MG: 50 TABLET ORAL at 20:55

## 2017-05-02 RX ADMIN — MIDODRINE HYDROCHLORIDE 15 MG: 2.5 TABLET ORAL at 17:25

## 2017-05-02 RX ADMIN — MICONAZOLE NITRATE 1 APPLICATOR: 20 CREAM VAGINAL at 20:57

## 2017-05-02 RX ADMIN — ASPIRIN 81 MG: 81 TABLET, COATED ORAL at 08:35

## 2017-05-02 RX ADMIN — STANDARDIZED SENNA CONCENTRATE AND DOCUSATE SODIUM 2 TABLET: 8.6; 5 TABLET, FILM COATED ORAL at 20:55

## 2017-05-02 RX ADMIN — MIDODRINE HYDROCHLORIDE 15 MG: 2.5 TABLET ORAL at 08:35

## 2017-05-02 RX ADMIN — OXYBUTYNIN CHLORIDE 5 MG: 5 TABLET ORAL at 20:55

## 2017-05-02 RX ADMIN — THERA TABS 1 TABLET: TAB at 08:35

## 2017-05-02 RX ADMIN — Medication 500 MG: at 08:35

## 2017-05-02 RX ADMIN — OMEPRAZOLE 20 MG: 20 CAPSULE, DELAYED RELEASE ORAL at 08:35

## 2017-05-02 RX ADMIN — AMLODIPINE BESYLATE 2.5 MG: 5 TABLET ORAL at 17:24

## 2017-05-02 RX ADMIN — Medication 500 MG: at 11:17

## 2017-05-02 RX ADMIN — Medication 220 MG: at 08:36

## 2017-05-02 RX ADMIN — AMIODARONE HYDROCHLORIDE 200 MG: 200 TABLET ORAL at 08:35

## 2017-05-02 ASSESSMENT — ENCOUNTER SYMPTOMS
NAUSEA: 0
SHORTNESS OF BREATH: 0
FEVER: 0
DIARRHEA: 0
VOMITING: 0
NERVOUS/ANXIOUS: 0
ABDOMINAL PAIN: 0
CHILLS: 0

## 2017-05-02 ASSESSMENT — GAIT ASSESSMENTS
GAIT LEVEL OF ASSIST: CONTACT GUARD ASSIST
ASSISTIVE DEVICE: FRONT WHEEL WALKER
DISTANCE (FEET): 60
DEVIATION: STEP TO;BRADYKINETIC;DECREASED HEEL STRIKE;DECREASED TOE OFF

## 2017-05-02 ASSESSMENT — PAIN SCALES - GENERAL
PAINLEVEL_OUTOF10: 0
PAINLEVEL_OUTOF10: 0

## 2017-05-02 NOTE — CARE PLAN
Problem: Infection  Goal: Will remain free from infection  Pt has positive UA. Culture pending.

## 2017-05-02 NOTE — PROGRESS NOTES
Hospital Medicine Progress Note, Adult, Complex               Author: Obed Pierre Date & Time created: 5/2/2017  1:00 PM     Interval History:  No significant events or changes since last visit  Patient denies SOB, cough, or diarrhea  Patient slept ok last night  Continue managing hypertension, orthostatic hypotension      Review of Systems:  Review of Systems   Constitutional: Negative for fever and chills.   Respiratory: Negative for shortness of breath.    Cardiovascular: Negative for chest pain.   Gastrointestinal: Negative for nausea, vomiting, abdominal pain and diarrhea.   Psychiatric/Behavioral: The patient is not nervous/anxious.        Physical Exam:  Physical Exam   Constitutional: She is oriented to person, place, and time. She appears well-nourished.   HENT:   Head: Atraumatic.   Eyes: Conjunctivae are normal. Pupils are equal, round, and reactive to light.   Neck: Normal range of motion. Neck supple.   Cardiovascular: Normal rate, regular rhythm, S1 normal and S2 normal.    No murmur heard.  Pulmonary/Chest: Effort normal. She has no wheezes. She has no rales.   Abdominal: Soft. She exhibits no distension. There is no tenderness. Hernia confirmed negative in the right inguinal area and confirmed negative in the left inguinal area.   Musculoskeletal: She exhibits no edema.   Neurological: She is alert and oriented to person, place, and time. No sensory deficit.   Skin: Skin is warm and dry. No rash noted. No cyanosis.   Psychiatric: She has a normal mood and affect. Her behavior is normal.   Nursing note and vitals reviewed.      Labs:        Invalid input(s): [id number]      Recent Labs      04/30/17   0601  05/02/17   0548   SODIUM  139  139   POTASSIUM  3.0*  3.5*   CHLORIDE  105  106   CO2  24  23   BUN  25*  28*   CREATININE  0.88  0.99   MAGNESIUM  1.8   --    CALCIUM  8.6  8.8     Recent Labs      04/30/17   0601  05/02/17   0548   ALTSGPT  18  20   ASTSGOT  21  21   ALKPHOSPHAT  89  88    TBILIRUBIN  0.5  0.4   GLUCOSE  99  96     Recent Labs      17   0601  17   0548   RBC  4.01*  3.62*   HEMOGLOBIN  12.2  11.2*   HEMATOCRIT  37.7  33.7*   PLATELETCT  314  299     Recent Labs      17   0601  17   0548   WBC  5.8  6.5   NEUTSPOLYS  53.10  53.10   LYMPHOCYTES  31.00  32.30   MONOCYTES  13.40  12.80   EOSINOPHILS  0.90  0.60   BASOPHILS  0.90  0.60   ASTSGOT  21  21   ALTSGPT  18  20   ALKPHOSPHAT  89  88   TBILIRUBIN  0.5  0.4           Hemodynamics:  Temp (24hrs), Av.8 °C (98.2 °F), Min:36.4 °C (97.5 °F), Max:37.1 °C (98.8 °F)  Temperature: 37.1 °C (98.8 °F)  Pulse  Av.1  Min: 56  Max: 86   Blood Pressure : 141/80 mmHg     Respiratory:    Respiration: 18, Pulse Oximetry: 98 %     Work Of Breathing / Effort: Mild  RUL Breath Sounds: Clear, RML Breath Sounds: Clear, RLL Breath Sounds: Clear, MARILOU Breath Sounds: Clear, LLL Breath Sounds: Clear  Fluids:    Intake/Output Summary (Last 24 hours) at 17 1300  Last data filed at 17 1249   Gross per 24 hour   Intake   1280 ml   Output      0 ml   Net   1280 ml        GI/Nutrition:  Orders Placed This Encounter   Procedures   • DIET ORDER     Standing Status: Standing      Number of Occurrences: 1      Standing Expiration Date:      Order Specific Question:  Diet:     Answer:  Regular [1]     Medical Decision Making, by Problem:  Active Hospital Problems    Diagnosis   • Hip fracture (CMS-HCC) [S72.009A]     *  S/P Left Hip Surgery  2nd to fall, 2nd to lightheadedness  Has hx of falls and      *  Orthostatic Hypotension  TSH: ok  Cortisol: ok  S/P IVF's x a few runs  On Florinef: 0.1 mg bid --> will decrease to qam (/2)  On Midodrine: 15 mg tid  On salt tabs: 2g qid --> will decrease to 2g tid (/2)    *  Hypertension  BP ok   On Norvasc: 2.5 mg daily  Note: also on Florinef & Midodrine  Cont to monitor    *  Hypo-K+  K+: 3.0 --> 3.5 ()  2nd to Florinef  On KCL: 40 meq daily   Monitor    *  Azotemia  Bun: 25 -->  28 (5/2)  Encouraging fluid (water/juice) intake  Monitor    *  Tachybrady Syndrome: has permanent pacemaker placement  *  Dementia      Labs reviewed and Medications reviewed        DVT Prophylaxis: Enoxaparin (Lovenox)

## 2017-05-02 NOTE — CARE PLAN
Problem: Safety  Goal: Will remain free from injury  Pt forgetful and impulsive. Placed pt in front of nursing station w chair alarm in place.

## 2017-05-02 NOTE — DISCHARGE PLANNING
· I met with pt's dtr this am.   She indicates PT states she needs a ramp to acces her home.  She inquired about Ortho follow appt.  Appt is scheduled for 5/16.   Dtr is wondering if pt may need to transition to a skilled facility until her weight bearing   status is upgraded. If skilled is recommended, dtr prefers [facility]. Dtr thought they accept Medicaid. Confirmed with [facility], they do not accept Medicaid.    · Confirmation from [facility] that dtr Sun is the paid caregiver through the Medicaid Program.        · Reviewed Medicaid/HP webswite-decision re:continued authorization is pending this am.  Will continue to follow.

## 2017-05-02 NOTE — CARE PLAN
Problem: Safety  Goal: Will remain free from falls  Intervention: Implement fall precautions  Pt is impulsive,restless and no safety awareness.Bed in low position, non skid socks/shoes on when out of bed.Alarms in place for safety.Call light and things within reach.Will continue to monitor and assess needs and safety.      Problem: Pain Management  Goal: Pain level will decrease to patient’s comfort goal  Pt denies any pain and no sign of acute distress noted.Repositioned with pillows for comfort.Will continue to monitor and assess pain level and medicate as needed.    Problem: Urinary Elimination:  Goal: Ability to reestablish a normal urinary elimination pattern will improve  Intervention: Assist patient to sit on commode or toilet for voiding  Assisted to the bathroom, continent of bladder.Will continue to monitor and assess level of continence.

## 2017-05-02 NOTE — PROGRESS NOTES
Received shift report and assumed care of patient.  Patient asleep, calm and stable, currently positioned in bed for comfort and safety; call light within reach.  No S&S of pain or discomfort at this time.  Will continue to monitor.

## 2017-05-03 LAB
BACTERIA UR CULT: NORMAL
SIGNIFICANT IND 70042: NORMAL
SITE SITE: NORMAL
SOURCE SOURCE: NORMAL

## 2017-05-03 PROCEDURE — 700102 HCHG RX REV CODE 250 W/ 637 OVERRIDE(OP): Performed by: HOSPITALIST

## 2017-05-03 PROCEDURE — 97535 SELF CARE MNGMENT TRAINING: CPT

## 2017-05-03 PROCEDURE — A9270 NON-COVERED ITEM OR SERVICE: HCPCS | Performed by: PHYSICAL MEDICINE & REHABILITATION

## 2017-05-03 PROCEDURE — 770010 HCHG ROOM/CARE - REHAB SEMI PRIVAT*

## 2017-05-03 PROCEDURE — 99232 SBSQ HOSP IP/OBS MODERATE 35: CPT | Performed by: HOSPITALIST

## 2017-05-03 PROCEDURE — 700102 HCHG RX REV CODE 250 W/ 637 OVERRIDE(OP): Performed by: PHYSICAL MEDICINE & REHABILITATION

## 2017-05-03 PROCEDURE — 97116 GAIT TRAINING THERAPY: CPT

## 2017-05-03 PROCEDURE — A9270 NON-COVERED ITEM OR SERVICE: HCPCS | Performed by: HOSPITALIST

## 2017-05-03 PROCEDURE — 700111 HCHG RX REV CODE 636 W/ 250 OVERRIDE (IP): Performed by: PHYSICAL MEDICINE & REHABILITATION

## 2017-05-03 PROCEDURE — 97532 HCHG COGNITIVE SKILL DEV. 15 MIN: CPT

## 2017-05-03 PROCEDURE — 97110 THERAPEUTIC EXERCISES: CPT

## 2017-05-03 RX ORDER — MIDODRINE HYDROCHLORIDE 2.5 MG/1
15 TABLET ORAL
Status: DISCONTINUED | OUTPATIENT
Start: 2017-05-03 | End: 2017-05-04

## 2017-05-03 RX ORDER — MIDODRINE HYDROCHLORIDE 2.5 MG/1
10 TABLET ORAL
Status: DISCONTINUED | OUTPATIENT
Start: 2017-05-03 | End: 2017-05-03

## 2017-05-03 RX ADMIN — Medication 220 MG: at 08:34

## 2017-05-03 RX ADMIN — SODIUM CHLORIDE TAB 1 GM 2 G: 1 TAB at 08:34

## 2017-05-03 RX ADMIN — STANDARDIZED SENNA CONCENTRATE AND DOCUSATE SODIUM 2 TABLET: 8.6; 5 TABLET, FILM COATED ORAL at 08:34

## 2017-05-03 RX ADMIN — AMLODIPINE BESYLATE 2.5 MG: 5 TABLET ORAL at 17:39

## 2017-05-03 RX ADMIN — THERA TABS 1 TABLET: TAB at 08:34

## 2017-05-03 RX ADMIN — MIDODRINE HYDROCHLORIDE 15 MG: 2.5 TABLET ORAL at 17:39

## 2017-05-03 RX ADMIN — MICONAZOLE NITRATE 1 APPLICATOR: 20 CREAM VAGINAL at 20:16

## 2017-05-03 RX ADMIN — FLUDROCORTISONE ACETATE 0.1 MG: 0.1 TABLET ORAL at 08:35

## 2017-05-03 RX ADMIN — Medication 500 MG: at 08:33

## 2017-05-03 RX ADMIN — ASPIRIN 81 MG: 81 TABLET, COATED ORAL at 08:34

## 2017-05-03 RX ADMIN — MIDODRINE HYDROCHLORIDE 15 MG: 2.5 TABLET ORAL at 08:33

## 2017-05-03 RX ADMIN — POTASSIUM CHLORIDE 40 MEQ: 1500 TABLET, EXTENDED RELEASE ORAL at 08:34

## 2017-05-03 RX ADMIN — Medication 500 MG: at 11:38

## 2017-05-03 RX ADMIN — ENOXAPARIN SODIUM 40 MG: 100 INJECTION SUBCUTANEOUS at 08:34

## 2017-05-03 RX ADMIN — OXYBUTYNIN CHLORIDE 5 MG: 5 TABLET ORAL at 20:13

## 2017-05-03 RX ADMIN — MIDODRINE HYDROCHLORIDE 10 MG: 2.5 TABLET ORAL at 11:38

## 2017-05-03 RX ADMIN — AMIODARONE HYDROCHLORIDE 200 MG: 200 TABLET ORAL at 08:34

## 2017-05-03 RX ADMIN — STANDARDIZED SENNA CONCENTRATE AND DOCUSATE SODIUM 2 TABLET: 8.6; 5 TABLET, FILM COATED ORAL at 20:13

## 2017-05-03 RX ADMIN — CHOLECALCIFEROL TAB 25 MCG (1000 UNIT) 1000 UNITS: 25 TAB at 08:34

## 2017-05-03 RX ADMIN — TRAZODONE HYDROCHLORIDE 50 MG: 50 TABLET ORAL at 20:13

## 2017-05-03 RX ADMIN — Medication 500 MG: at 17:39

## 2017-05-03 RX ADMIN — OMEPRAZOLE 20 MG: 20 CAPSULE, DELAYED RELEASE ORAL at 08:34

## 2017-05-03 ASSESSMENT — ENCOUNTER SYMPTOMS
HALLUCINATIONS: 0
VOMITING: 0
SHORTNESS OF BREATH: 0
DIZZINESS: 0
PALPITATIONS: 0
BLURRED VISION: 0
FEVER: 0
HEADACHES: 0
NAUSEA: 0

## 2017-05-03 ASSESSMENT — GAIT ASSESSMENTS
ASSISTIVE DEVICE: FRONT WHEEL WALKER
DISTANCE (FEET): 65
GAIT LEVEL OF ASSIST: CONTACT GUARD ASSIST

## 2017-05-03 ASSESSMENT — PAIN SCALES - GENERAL
PAINLEVEL_OUTOF10: 0
PAINLEVEL_OUTOF10: 0

## 2017-05-03 NOTE — DISCHARGE PLANNING
Tc to pt's dtr, Sun informing her [facility] DOES NOT accept Medicaid.    Dtr prefers to have her mother transition to a skilled level of care, and then return home with her.    Choice for skilled is [facility] or [facility].   PASSAR done today (#[id number]) and Level of Care wll be done after PASAR is completed.    Chest xray ray current within 30 days.   IDT planned for tomorrow.

## 2017-05-03 NOTE — REHAB-PT IDT TEAM NOTE
Physical Therapy  Mobility  Bed mobility: Supervision supine to/from sit  Bed /Chair/Wheelchair Transfer Initial:  3 - Moderate Assistance  Bed /Chair/Wheelchair Transfer Current:  5 - Standby Prompting/Supervision or Set-up, FWW   Bed/Chair/Wheelchair Transfer Description:  Supervision for safety, Verbal cueing, Set-up of equipment  Walk Initial:  1 - Total Assistance  Walk Current:  2 - Max Assistance   Walk Description:  Extra time, Walker, Verbal cueing, Supervision for safety, Safety concerns sba/cga 65 ft  Wheelchair Initial:  5E - Household Exception  Wheelchair Current:  2 - Max Assistance   Wheelchair Description:   (x120' in manual wc with max cues for technique, and one instance of physical assist to complete turn)  Stairs Initial:  0 - Not tested,medical condition  Stairs Current: 0 - Not tested,unsafe activity   Stairs Description:    Patient/Family Training/Education: In progress  DME/DC Recommendations:  Fww, manual wheelchair 16 inches wide with standard cushion  Strengths:  Independent PLOF, Making steady progress towards goals, Manages pain appropriately, Motivated for self care and independence, Pleasant and cooperative, Supportive family and Willingly participates in therapeutic activities  Barriers:   Other: TTWB LLE, impaired tolerance for activity, hypotension, impaired memory, fall risk  # of short term goals set= 3  # of short term goals met=2        Physical Therapy Problems           Problem: Mobility     Dates: Start: 04/18/17       Goal: STG-Within one week, patient will     Dates: Start: 04/18/17      Description: 1) Individualized goal: Gait fww sba 125 ft one week    2) Interventions:  PT Gait Training, PT Therapeutic Exercises, PT Therapeutic Activity and PT Evaluation        Note: Goal Note filed on 05/03/17 8375 by Lico Rodriguez M.S.P.T.    Goal: STG-Within one week, patient will  Outcome: NOT MET  65 feet fww sba limited by ttwb and intermittent dizziness            Problem:  PT-Long Term Goals     Dates: Start: 04/18/17       Goal: LTG-By discharge, patient will ambulate     Dates: Start: 04/18/17      Description: 1) Individualized goal: Gait fww household 150 ft, community 300 ft modified independent at discharge    2) Interventions:  PT Gait Training, PT Therapeutic Exercises, PT Neuro Re-Ed/Balance, PT Therapeutic Activity and PT Evaluation            Goal: LTG-By discharge, patient will transfer one surface to another     Dates: Start: 04/18/17      Description: 1) Individualized goal: Transfer one surface to another fww modified independent at discharge    2) Interventions:  PT Gait Training, PT Therapeutic Exercises, PT Neuro Re-Ed/Balance, PT Therapeutic Activity and PT Evaluation            Goal: LTG-By discharge, patient will ambulate up/down 4-6 stairs     Dates: Start: 04/18/17      Description: 1) Individualized goal: Up/down ramp or 2 stairs with railings supervision at discharge    2) Interventions:  PT Gait Training, PT Therapeutic Exercises, PT Neuro Re-Ed/Balance, PT Therapeutic Activity and PT Evaluation            Goal: LTG-By discharge, patient will transfer in/out of a car     Dates: Start: 04/18/17      Description: 1) Individualized goal: Car transfer fww supervision at discharge    2) Interventions:  PT Gait Training, PT Therapeutic Exercises, PT Neuro Re-Ed/Balance, PT Therapeutic Activity and PT Evaluation                    Section completed by:  Lico Rodriguez M.S.P.TParesh

## 2017-05-03 NOTE — PROGRESS NOTES
Hospital Medicine Progress Note, Adult, Complex               Author: Obed Pierre Date & Time created: 5/3/2017  9:24 AM     Interval History:  No significant events or changes since last visit  Patient denies SOB, cough, or diarrhea  Patient slept ok last night  Continue managing hypertension, orthostatic hypotension      Review of Systems:  Review of Systems   Constitutional: Negative for fever.   Eyes: Negative for blurred vision.   Respiratory: Negative for shortness of breath.    Cardiovascular: Negative for palpitations.   Gastrointestinal: Negative for nausea and vomiting.   Neurological: Negative for dizziness and headaches.   Psychiatric/Behavioral: Negative for hallucinations.       Physical Exam:  Physical Exam   Constitutional: She is oriented to person, place, and time.   HENT:   Mouth/Throat: Oropharynx is clear and moist.   Eyes: No scleral icterus.   Cardiovascular: Normal rate, regular rhythm, S1 normal and S2 normal.    No murmur heard.  Pulmonary/Chest: Effort normal and breath sounds normal. No stridor.   Abdominal: Soft. She exhibits no distension. There is no tenderness. Hernia confirmed negative in the right inguinal area and confirmed negative in the left inguinal area.   Musculoskeletal: She exhibits no edema.   Neurological: She is alert and oriented to person, place, and time. No sensory deficit.   Skin: Skin is warm and dry. No rash noted. She is not diaphoretic. No cyanosis.   Psychiatric: She has a normal mood and affect. Her behavior is normal.   Nursing note and vitals reviewed.      Labs:        Invalid input(s): [id number]      Recent Labs      05/02/17   0548   SODIUM  139   POTASSIUM  3.5*   CHLORIDE  106   CO2  23   BUN  28*   CREATININE  0.99   CALCIUM  8.8     Recent Labs      05/02/17   0548   ALTSGPT  20   ASTSGOT  21   ALKPHOSPHAT  88   TBILIRUBIN  0.4   GLUCOSE  96     Recent Labs      05/02/17 0548   RBC  3.62*   HEMOGLOBIN  11.2*   HEMATOCRIT  33.7*   PLATELETCT   299     Recent Labs      17   0548   WBC  6.5   NEUTSPOLYS  53.10   LYMPHOCYTES  32.30   MONOCYTES  12.80   EOSINOPHILS  0.60   BASOPHILS  0.60   ASTSGOT  21   ALTSGPT  20   ALKPHOSPHAT  88   TBILIRUBIN  0.4           Hemodynamics:  Temp (24hrs), Av.6 °C (97.8 °F), Min:36.5 °C (97.7 °F), Max:36.6 °C (97.9 °F)  Temperature: 36.6 °C (97.9 °F)  Pulse  Av.1  Min: 56  Max: 86   Blood Pressure : 146/78 mmHg     Respiratory:    Respiration: 18, Pulse Oximetry: 95 %     Work Of Breathing / Effort: Mild  RUL Breath Sounds: Clear, RML Breath Sounds: Clear, RLL Breath Sounds: Clear, MARILOU Breath Sounds: Clear, LLL Breath Sounds: Clear  Fluids:    Intake/Output Summary (Last 24 hours) at 17 0924  Last data filed at 17   Gross per 24 hour   Intake   1320 ml   Output      0 ml   Net   1320 ml        GI/Nutrition:  Orders Placed This Encounter   Procedures   • DIET ORDER     Standing Status: Standing      Number of Occurrences: 1      Standing Expiration Date:      Order Specific Question:  Diet:     Answer:  Regular [1]     Medical Decision Making, by Problem:  Active Hospital Problems    Diagnosis   • Hip fracture (CMS-HCC) [S72.009A]     *  S/P Left Hip Surgery  2nd to fall, 2nd to lightheadedness  Has hx of falls and      *  Orthostatic Hypotension  TSH: ok  Cortisol: ok  S/P IVF's x a few runs  On Florinef: 0.1 mg bid --> 0.1 mg qam ()  On Midodrine: 15 mg tid   On salt tabs: 2g qid --> 2g tid () --> will d/c (5/3)    *  Hypertension  BP ok   On Norvasc: 2.5 mg daily  Note: also on Florinef & Midodrine  Cont to monitor    *  Hypo-K+  K+: 3.0 --> 3.5 ()  2nd to Florinef  On KCL: 40 meq daily   Monitor    *  Azotemia  Bun: 25 --> 28 ()  Encouraging fluid (water/juice) intake  Monitor    *  Tachybrady Syndrome: has permanent pacemaker placement  *  Dementia      Labs reviewed and Medications reviewed        DVT Prophylaxis: Enoxaparin (Lovenox)

## 2017-05-03 NOTE — CARE PLAN
Problem: Safety  Goal: Will remain free from injury  Pt forgetful and impulsive. Placed pt in front of nursing station w chair alarm in place, with color book/crayons when not in therapy or with family.

## 2017-05-03 NOTE — CARE PLAN
Problem: Bathing  Goal: STG-Within one week, patient will  Pt’s daughter will indep bathe pt with setup to min A for safe d/c home   Outcome: NOT MET  Pt's daughter req min A to bathe pt, for cues and for balance     Problem: Dressing  Goal: STG-Within one week, patient will dress LB  Pt’s daughter will independently dress pt with min A with good v/cs for WB precautions   Outcome: NOT MET  Pt's daughter req min A to dress pt 2/2 balance. Pt's daughter limits pt's ability to self dress.      Problem: Functional Transfers  Goal: STG-Within one week, patient will transfer to toilet  Pt’s daughter will complete toilet transfer with BSC over toilet with CGA with good v/cs for hand placement during txs.   Outcome: MET Date Met:  05/03/17  Pt's daughter completes toilet transfers indep with good hand placement and v/cs  Goal: STG-Within one week, patient will transfer to tub/shower  Pt will complete tub bench transfer with CGA and min v/cs for WB precautions   Outcome: NOT MET  Pt's daughter continues to req assist with tub/transfer bench transfers

## 2017-05-03 NOTE — CARE PLAN
Problem: Balance  Goal: STG-Within one week, patient will maintain dynamic standing  1) Individualized goal: Maintain dynamic standing balance fww sba for transfers and gait one week  2) Interventions: PT Gait Training, PT Therapeutic Exercises, PT Neuro Re-Ed/Balance and PT Therapeutic Activity  Outcome: MET Date Met:  05/03/17    Problem: Mobility  Goal: STG-Within one week, patient will  1) Individualized goal: Gait fww sba 125 ft one week  2) Interventions: PT Gait Training, PT Therapeutic Exercises, PT Therapeutic Activity and PT Evaluation  Outcome: NOT MET  65 feet fww sba limited by ttwb and intermittent dizziness    Problem: Mobility Transfers  Goal: STG-Within one week, patient will transfer bed to chair  1) Individualized goal: Transfer bed to/from chair fww spv ttwb one week  2) Interventions: PT Gait Training, PT Therapeutic Exercises and PT Therapeutic Activity  Outcome: MET Date Met:  05/03/17

## 2017-05-03 NOTE — CARE PLAN
Problem: Safety  Goal: Will remain free from falls  Intervention: Implement fall precautions  Pt is impulsive and anxious at the start of shift.Placed by nursing station with her coloring book.Alarms in place for safety.Will continue to monitor and assess needs and safety.      Problem: Pain Management  Goal: Pain level will decrease to patient’s comfort goal  Pt denies any pain or discomfort.Repositioned with pillows for comfort.Will continue to monitor and assess pain level and medicate as needed.    Problem: Urinary Elimination:  Goal: Ability to reestablish a normal urinary elimination pattern will improve  Intervention: Assist patient to sit on commode or toilet for voiding  Assisted to the bathroom, continent of bladder.Will continue to monitor and assess.

## 2017-05-03 NOTE — REHAB-PHARMACY IDT TEAM NOTE
Pharmacy  Pharmacy  Antibiotics/Antifungals/Antivirals:  Medication:      Active Orders     None        Route:         None  Stop Date:  N/A  Reason:   Antihypertensives/Cardiac:  Medication:    Orders (72h ago through future)    Start     Ordered    05/03/17 1730  midodrine (PROAMATINE) tablet 15 mg   3 TIMES DAILY WITH MEALS     Comments:  IF BP IS HIGH WHILE SUPINE, THEN RAISE HEAD OF BED TO AT LEAST 40 DEGREES.    05/03/17 1231    05/03/17 1130  midodrine (PROAMATINE) tablet 10 mg   3 TIMES DAILY WITH MEALS,   Status:  Discontinued     Comments:  IF BP IS HIGH WHILE SUPINE, THEN RAISE HEAD OF BED TO AT LEAST 40 DEGREES.    05/03/17 0931    05/01/17 1730  midodrine (PROAMATINE) tablet 15 mg   3 TIMES DAILY WITH MEALS,   Status:  Discontinued     Comments:  IF BP IS HIGH WHILE SUPINE, THEN RAISE HEAD OF BED TO AT LEAST 40 DEGREES.    05/01/17 1525    04/29/17 1630  amlodipine (NORVASC) tablet 2.5 mg   EVERY EVENING      04/28/17 2211    04/29/17 0800  midodrine (PROAMATINE) tablet 10 mg   3 TIMES DAILY WITH MEALS,   Status:  Discontinued     Comments:  IF BP IS HIGH WHILE SUPINE, THEN RAISE HEAD OF BED TO AT LEAST 40 DEGREES.    04/28/17 2211    04/18/17 0900  amiodarone (CORDARONE) tablet 200 mg   DAILY      04/17/17 1724        Patient Vitals for the past 24 hrs:   BP Pulse   05/03/17 1515 144/78 mmHg 70   05/03/17 1016 110/68 mmHg -   05/03/17 0922 (!) 84/47 mmHg -   05/03/17 0919 105/56 mmHg -   05/03/17 0700 146/78 mmHg 70   05/02/17 1925 129/82 mmHg 70   05/02/17 1724 122/68 mmHg -       Anticoagulation:  Medication:  lovenox  INR:      Other key medications:        A review of the medication list reveals no issues at this time. Patient is currently on antihypertensive(s). Recommend home blood pressure monitoring/recording if antihypertensive(s) regimen(s) continue.    Section completed by:  [facility], located within [facility]

## 2017-05-03 NOTE — REHAB-OT IDT TEAM NOTE
Occupational Therapy  Activities of Daily Living  Eating Initial:  6 - Modified Independent  Eating Current:  6 - Modified Independent   Eating Description:  Increased time  Grooming Initial:  5 - Standby Prompting/Supervision or Set-up  Grooming Current:  5 - Standby Prompting/Supervision or Set-up   Grooming Description:   (set-up seated)  Bathing Initial:  0 - Not tested, patient refused  Bathing Current:  4 - Minimal Assistance   Bathing Description:  Grab bar, Tub bench, Long handled bath tool, Supervision for safety, Verbal cueing (v/cs for safety, CGA in stance to wash buttocks, v/cs for sequencing and thoroughness, LHBS to wash LLE.  Daughter present, however req intermittent assist with bathing for cues and CGA)  Upper Body Dressing Initial:  5 - Standby Prompting/Supervision or Set-up  Upper Body Dressing Current:  5 - Standby Prompting/Supervision or Set-up   Upper Body Dressing Description:  Supervision for safety, Verbal cueing, Set-up of equipment  Lower Body Dressing Initial:  1 - Total Assistance  Lower Body Dressing Current:  4 - Minimal Assistance   Lower Body Dressing Description:  4 - Minimal Assistance  Toileting Initial:  2 - Max Assistance  Toileting Current:  4 - Minimal Assistance   Toileting Description:  Grab bar, Supervision for safety, Verbal cueing (using FWW and GB for satbility, CGA in stance)  Toilet Transfer Initial:  2 - Max Assistance  Toilet Transfer Current:  4 - Minimal Assistance   Toilet Transfer Description:  4 - Minimal Assistance  Tub / Shower Transfer Initial:  0 - Not tested, patient refused  Tub / Shower Transfer Current:  4 - Minimal Assistance   Tub / Shower Transfer Description:  Grab bar (CGA using FWW onto tub transfer bench, verbal cues for seq to transfer onto tub transfer bench)  IADL:  N/A   Family Training/Education:  Ongoing.  Pt on set schedule with daughter present at all therapies for training.  Pt's daughter continues to req assist with BADLs with pt 2/2  impaired balance and decreased cognition.  Daughter is willing and motivated to help, however assists more than is necessary for dressing.   DME/DC Recommendations:  SNF, FWW, BSC over toilet, tub/transfer bench, continued OT      Strengths:  Pleasant and cooperative, Supportive family and Willingly participates in therapeutic activities  Barriers:  Confused, Dementia, Generalized weakness, Poor balance and Other: TTWB LLE, impulsive, poor safety awareness     # of short term goals set= 4    # of short term goals met= 1          Occupational Therapy Goals           Problem: Bathing     Dates: Start: 04/18/17       Goal: STG-Within one week, patient will     Dates: Start: 04/26/17      Description: Pt's daughter will indep bathe pt with setup to min A for safe d/c home    Note: Goal Note filed on 05/03/17 1101 by Willow Jama MS,OTR/L    Goal: STG-Within one week, patient will  Outcome: NOT MET  Pt's daughter req min A to bathe pt, for cues and for balance             Problem: Dressing     Dates: Start: 04/18/17       Goal: STG-Within one week, patient will dress LB     Dates: Start: 04/26/17      Description: Pt's daughter will independently dress pt with min A with good v/cs for WB precautions    Note: Goal Note filed on 05/03/17 1101 by Willow Jama, MS,OTR/L    Goal: STG-Within one week, patient will dress LB  Outcome: NOT MET  Pt's daughter req min A to dress pt 2/2 balance. Pt's daughter limits pt's   ability to self dress.              Problem: Functional Transfers     Dates: Start: 04/26/17       Goal: STG-Within one week, patient will transfer to tub/shower     Dates: Start: 04/26/17      Description: Pt will complete tub bench transfer with CGA and min v/cs for WB precautions    Note: Goal Note filed on 05/03/17 1101 by Willow Jama MS,OTR/L    Goal: STG-Within one week, patient will transfer to tub/shower  Outcome: NOT MET  Pt's daughter continues to req assist with tub/transfer bench  transfers            Problem: OT Long Term Goals     Dates: Start: 04/18/17       Goal: LTG-By discharge, patient will complete basic self care tasks     Dates: Start: 04/18/17      Description: 1) Individualized Goal:  MIn A per daughter    2) Interventions:  OT Self Care/ADL, OT Cognitive Skill Dev, OT Community Reintegration, OT Neuro Re-Ed/Balance, OT Therapeutic Activity, OT Evaluation and OT Therapeutic Exercise        Goal: LTG-By discharge, patient will perform bathroom transfers     Dates: Start: 04/18/17      Description: 1) Individualized Goal:  SUpervision maintaining WB precautions    2) Interventions:  OT Self Care/ADL, OT Cognitive Skill Dev, OT Community Reintegration, OT Neuro Re-Ed/Balance, OT Therapeutic Activity, OT Evaluation and OT Therapeutic Exercise              Section completed by:  Willow Jama MS,OTR/L

## 2017-05-03 NOTE — PROGRESS NOTES
"Rehab Progress Note     Interval Events (Subjective)  Patient seen and examined today. In her room notes the therapists and Dr. Marvin appreciated. Medical management of Dr. Dr. Patel also appreciated to do with low blood pressure patient's Florinef has been decrease to a milligram in the a.m. manages 15 3 times a day in the salt tabs of having decrease to 2 mg 3 times a day patient's had some IV fluids given as well potassium has come up to 3.5 we believe that the Florinef is accounting for some hyperkalemia    Objective:  VITAL SIGNS: /82 mmHg  Pulse 70  Temp(Src) 36.5 °C (97.7 °F)  Resp 16  Ht 1.575 m (5' 2\")  Wt 60.7 kg (133 lb 13.1 oz)  BMI 24.47 kg/m2  SpO2 95%  Cardiac: regular rate and rhythm, nl S1/S2    Lungs: clear to auscultation bilaterally.    Abdomen: soft; NT, ND, BS present.    Extremities: minimal edema noted  Bilaterally  Incision: Clean and dry        Recent Results (from the past 72 hour(s))   CBC WITH DIFFERENTIAL    Collection Time: 04/30/17  6:01 AM   Result Value Ref Range    WBC 5.8 4.8 - 10.8 K/uL    RBC 4.01 (L) 4.20 - 5.40 M/uL    Hemoglobin 12.2 12.0 - 16.0 g/dL    Hematocrit 37.7 37.0 - 47.0 %    MCV 94.0 81.4 - 97.8 fL    MCH 30.4 27.0 - 33.0 pg    MCHC 32.4 (L) 33.6 - 35.0 g/dL    RDW 50.5 (H) 35.9 - 50.0 fL    Platelet Count 314 164 - 446 K/uL    MPV 10.6 9.0 - 12.9 fL    Neutrophils-Polys 53.10 44.00 - 72.00 %    Lymphocytes 31.00 22.00 - 41.00 %    Monocytes 13.40 0.00 - 13.40 %    Eosinophils 0.90 0.00 - 6.90 %    Basophils 0.90 0.00 - 1.80 %    Immature Granulocytes 0.70 0.00 - 0.90 %    Nucleated RBC 0.00 /100 WBC    Neutrophils (Absolute) 3.08 2.00 - 7.15 K/uL    Lymphs (Absolute) 1.80 1.00 - 4.80 K/uL    Monos (Absolute) 0.78 0.00 - 0.85 K/uL    Eos (Absolute) 0.05 0.00 - 0.51 K/uL    Baso (Absolute) 0.05 0.00 - 0.12 K/uL    Immature Granulocytes (abs) 0.04 0.00 - 0.11 K/uL    NRBC (Absolute) 0.00 K/uL   MAGNESIUM    Collection Time: 04/30/17  6:01 AM   Result " Value Ref Range    Magnesium 1.8 1.5 - 2.5 mg/dL   COMP METABOLIC PANEL    Collection Time: 04/30/17  6:01 AM   Result Value Ref Range    Sodium 139 135 - 145 mmol/L    Potassium 3.0 (L) 3.6 - 5.5 mmol/L    Chloride 105 96 - 112 mmol/L    Co2 24 20 - 33 mmol/L    Anion Gap 10.0 0.0 - 11.9    Glucose 99 65 - 99 mg/dL    Bun 25 (H) 8 - 22 mg/dL    Creatinine 0.88 0.50 - 1.40 mg/dL    Calcium 8.6 8.5 - 10.5 mg/dL    AST(SGOT) 21 12 - 45 U/L    ALT(SGPT) 18 2 - 50 U/L    Alkaline Phosphatase 89 30 - 99 U/L    Total Bilirubin 0.5 0.1 - 1.5 mg/dL    Albumin 3.5 3.2 - 4.9 g/dL    Total Protein 6.3 6.0 - 8.2 g/dL    Globulin 2.8 1.9 - 3.5 g/dL    A-G Ratio 1.3 g/dL   WESTERGREN SED RATE    Collection Time: 04/30/17  6:01 AM   Result Value Ref Range    Sed Rate Westergren 29 0 - 30 mm/hour   ESTIMATED GFR    Collection Time: 04/30/17  6:01 AM   Result Value Ref Range    GFR If African American >60 >60 mL/min/1.73 m 2    GFR If Non African American >60 >60 mL/min/1.73 m 2   URINALYSIS    Collection Time: 05/01/17 12:30 PM   Result Value Ref Range    Micro Urine Req Microscopic     Color Dk. Yellow     Character Cloudy (A)     Specific Gravity 1.027 <1.035    Ph 6.0 5.0-8.0    Glucose Negative Negative mg/dL    Ketones Trace (A) Negative mg/dL    Protein Negative Negative mg/dL    Bilirubin Negative Negative    Nitrite Negative Negative    Leukocyte Esterase Small (A) Negative    Occult Blood Negative Negative   URINE CULTURE-EXISTING-LESS THAN 48 HOURS    Collection Time: 05/01/17 12:30 PM   Result Value Ref Range    Significant Indicator NEG     Source UR     Site URINE, CLEAN CATCH     Urine Culture Mixed skin hina <10,000 cfu/mL    URINE MICROSCOPIC (W/UA)    Collection Time: 05/01/17 12:30 PM   Result Value Ref Range    WBC 2-5 /hpf    Bacteria Few (A) None /hpf    Mucous Threads Few /hpf    Amorphous Crystal Present /hpf   COMP METABOLIC PANEL    Collection Time: 05/02/17  5:48 AM   Result Value Ref Range    Sodium 139  135 - 145 mmol/L    Potassium 3.5 (L) 3.6 - 5.5 mmol/L    Chloride 106 96 - 112 mmol/L    Co2 23 20 - 33 mmol/L    Anion Gap 10.0 0.0 - 11.9    Glucose 96 65 - 99 mg/dL    Bun 28 (H) 8 - 22 mg/dL    Creatinine 0.99 0.50 - 1.40 mg/dL    Calcium 8.8 8.5 - 10.5 mg/dL    AST(SGOT) 21 12 - 45 U/L    ALT(SGPT) 20 2 - 50 U/L    Alkaline Phosphatase 88 30 - 99 U/L    Total Bilirubin 0.4 0.1 - 1.5 mg/dL    Albumin 3.3 3.2 - 4.9 g/dL    Total Protein 6.4 6.0 - 8.2 g/dL    Globulin 3.1 1.9 - 3.5 g/dL    A-G Ratio 1.1 g/dL   CBC WITH DIFFERENTIAL    Collection Time: 05/02/17  5:48 AM   Result Value Ref Range    WBC 6.5 4.8 - 10.8 K/uL    RBC 3.62 (L) 4.20 - 5.40 M/uL    Hemoglobin 11.2 (L) 12.0 - 16.0 g/dL    Hematocrit 33.7 (L) 37.0 - 47.0 %    MCV 93.1 81.4 - 97.8 fL    MCH 30.9 27.0 - 33.0 pg    MCHC 33.2 (L) 33.6 - 35.0 g/dL    RDW 49.1 35.9 - 50.0 fL    Platelet Count 299 164 - 446 K/uL    MPV 10.5 9.0 - 12.9 fL    Neutrophils-Polys 53.10 44.00 - 72.00 %    Lymphocytes 32.30 22.00 - 41.00 %    Monocytes 12.80 0.00 - 13.40 %    Eosinophils 0.60 0.00 - 6.90 %    Basophils 0.60 0.00 - 1.80 %    Immature Granulocytes 0.60 0.00 - 0.90 %    Nucleated RBC 0.00 /100 WBC    Neutrophils (Absolute) 3.47 2.00 - 7.15 K/uL    Lymphs (Absolute) 2.11 1.00 - 4.80 K/uL    Monos (Absolute) 0.84 0.00 - 0.85 K/uL    Eos (Absolute) 0.04 0.00 - 0.51 K/uL    Baso (Absolute) 0.04 0.00 - 0.12 K/uL    Immature Granulocytes (abs) 0.04 0.00 - 0.11 K/uL    NRBC (Absolute) 0.00 K/uL   ESTIMATED GFR    Collection Time: 05/02/17  5:48 AM   Result Value Ref Range    GFR If African American >60 >60 mL/min/1.73 m 2    GFR If Non African American 54 (A) >60 mL/min/1.73 m 2       Current Facility-Administered Medications   Medication Frequency   • [START ON 5/3/2017] fludrocortisone (FLORINEF) tablet 0.1 mg QAM   • [START ON 5/3/2017] sodium chloride (SALT) tablet 2 g TID WITH MEALS   • potassium chloride SA (Kdur) tablet 40 mEq DAILY   • miconazole (MONISTAT)  2 % vaginal cream 1 Applicator Q EVENING   • oxybutynin (DITROPAN) tablet 5 mg QHS   • midodrine (PROAMATINE) tablet 15 mg TID WITH MEALS   • amlodipine (NORVASC) tablet 2.5 mg Q EVENING   • trazodone (DESYREL) tablet 50 mg QHS   • FORMULATION R 0.25-3-14-71.9 % ointment PRN   • ondansetron (ZOFRAN ODT) dispertab 4 mg Q4HRS PRN   • senna-docusate (PERICOLACE or SENOKOT S) 8.6-50 MG per tablet 2 Tab BID    And   • polyethylene glycol/lytes (MIRALAX) PACKET 1 Packet QDAY PRN    And   • magnesium hydroxide (MILK OF MAGNESIA) suspension 30 mL QDAY PRN    And   • bisacodyl (DULCOLAX) suppository 10 mg QDAY PRN   • acetaminophen (TYLENOL) suppository 650 mg Q4HRS PRN   • amiodarone (CORDARONE) tablet 200 mg DAILY   • aspirin EC (ECOTRIN) tablet 81 mg DAILY   • calcium carbonate (OS-DIANNA 500) tablet 500 mg TID WITH MEALS   • enoxaparin (LOVENOX) inj 40 mg DAILY   • multivitamin (THERAGRAN) tablet 1 Tab DAILY   • omeprazole (PRILOSEC) capsule 20 mg DAILY   • tramadol (ULTRAM) 50 MG tablet 100 mg Q6HRS PRN   • tramadol (ULTRAM) 50 MG tablet 50 mg Q4HRS PRN   • vitamin D (cholecalciferol) tablet 1,000 Units DAILY   • zinc sulfate (ZINCATE) capsule 220 mg DAILY       Orders Placed This Encounter   Procedures   • DIET ORDER     Standing Status: Standing      Number of Occurrences: 1      Standing Expiration Date:      Order Specific Question:  Diet:     Answer:  Regular [1]       Assessment:  Active Hospital Problems    Diagnosis   • Hip fracture (CMS-HCC)   left femoral fracture Closed reduction percutaneous screw fixation with  ttwb left lower extremity. 4-26 x-ray no new fractures    syncope: Consult Dr. Miranda  Status post fall 4/25/2017 with negative x-ray and no injury  Early dementia with some encephalopathy during hospitalization, likely  secondary to medications with improvement during hospitalization.  Atrial fibrillation.  Gastroesophageal reflux disease.  Hypertension  bladder program  bowel program  UTI: Treated with  Bactrim,    IMPAIRMENTS:    Mobility  Self-care  IADLs    PLAN:    Discussion and Recommendations:    1. The patient requires an acute inpatient rehabilitation program with a coordinated program of care at an intensity and frequency not available at a lower level of care. This recommendation is substantiated by the patient's medical physicians who recommend that the patient's intervention and assessment of medical issues needs to be done at an acute level of care for patient's safety and maximum outcome.    2. A coordinated program of care will be supplied by an interdisciplinary team of physical therapy, occupational therapy, rehab physician, rehab nursing, and, if needed, speech therapy and rehab psychology. Rehab team presents a patient-specific rehabilitation and education program concentrating on prevention of future problems related to accessibility, mobility, skin, bowel, bladder, sexuality, and psychosocial and medical/surgical problems.    3. Need for Rehabilitation Physician: The rehab physician will be evaluating the patient on a multi-weekly basis to help coordinate the program of care. The rehab physician communicates between medical physicians, therapists, and nurses to maximize the patient's potential outcome. Specific areas in which the rehab physician will be providing daily assessment include the following:    A. Assessing the patient's heart rate and blood pressure response (vitals monitoring) to activity and making adjustments in medications or conservative measures as needed.    B. The rehab physician will be assessing the frequency at which the program can be increased to allow the patient to reach optimal functional outcome.    C. The rehab physician will also provide assessments in daily skin care, especially in light of patient's impairments in mobility.    D. The rehab physician will provide special expertise in understanding how to work with functional impairment and recommend appropriate  interventions, compensatory techniques, and education that will facilitate the patient's outcome.    4. Rehab R.N.    The rehab RN will be working with patient to carry over in room mobility and activities of daily living when the patient is not in 3 hours of skilled therapy. Rehab nursing will be working in conjunction with rehab physician to address all the medical issues above and continue to assess laboratory work and discuss abnormalities with the treating physicians, assess vitals, and response to activity, and discuss and report abnormalities with the rehab physician. Rehab RN will also continue daily skin care, supervise bladder/bowel program, instruct in medication administration, and ensure patient safety.     MEDICAL DECISION MAKING and INTERDISCIPLINARY PLAN OF CARE:    REHABILITATION ISSUES/ADVERSE POTENTIAL::  1. Left femoral neck fracture Patient demonstrates functional deficits in strength, balance, coordination, and ADL's. Patient is admitted to [facility] for comprehensive rehabilitation therapy as described below.   Rehabilitation nursing monitors bowel and bladder control, educates on medication administration, co-morbidities and monitors patient safety.    Therapies to treat at intensity and frequency of (may change after completion of evaluation by all therapeutic disciplines):       PT:  Physical therapy to address mobility, transfer, gait training and evaluation for adaptive equipment needs 1hour/day at least 5 days/week for the duration of the ELOS (see below)       OT:  Occupational therapy to address ADLs, self-care, home management training, functional mobility/transfers and assistive device evaluation, and community re-intergration 2 hour/day at least 5 days/week for the duration of the ELOS (see below).        ST/Dysphagia:  Speech therapy to address speech, language,and cognitive deficits as well as swallowing difficulties with retraining/dysphagia management and  community re-integration with comprehension, expression, cognitive training 1/2 hour/day at least 5 days/week for the duration of the ELOS (see below).     2.  Neurostimulants: None at this time but continue to assess daily for need to initiate should status change.    3.  DVT prophylaxis:  Patient is on Lovenox for anticoagulation upon transfer. Encourage OOB. Monitor daily for signs and symptoms of DVT including but not limited to swelling and pain to prevent the development of DVT that may interfere with therapies.    4.  GI prophylaxis:  On prilosec to prevent gastritis/dyspepsia which may interfere with therapies.    5.  Pain: No issues with pain currently / Controlled with Ultram    6.  Nutrition/Dysphagia: Dietician monitors nutrient intake, recommend supplements prn and provide nutrition education to pt/family to promote optimal nutrition for wound healing/recovery.     7.  Bladder/bowel:  Start bowel and bladder program as described below, to prevent constipation, urinary retention (which may lead to UTI), and urinary incontinence (which will impact upon pt's functional independence).    - TV Q3h while awake with post void bladder scans, I&O cath for PVRs >400  - up to commode after meal     8.  Skin/dermal ulcer prophylaxis: Monitor for new skin conditions with q.2 h. turns as required to prevent the development of skin breakdown.     9.  Cognition/Behavior:  Psychologist Dr. Schafer provides adjustment counseling to illness and psychosocial barriers that may be potential barriers to rehabilitation.     10. Respiratory therapy: RT performs O2 management prn, breathing retraining, pulmonary hygeine and bronchospasm management prn to optimize participation in therapies.      MEDICAL CO-MORBIDITIES/ADVERSE POTENTIAL AFFECTING FUNCTION:        GOALS/EXPECTED LEVEL OF FUNCTION BASED ON CURRENT MEDICAL AND FUNCTIONAL STATUS (may change based on patient's medical status and rate of impairment recovery):      Transfers: Supervised to modified independent     Mobility/Gait: Supervised to modified independent     ADL's: Supervised to modified independent     Cognition: Independent    DISPOSITION: home with assistance     ELOS: 14 days    Medical Decision Making and Plan:  Nursing to assist patient with fluid intake patient to be reminded every couple of hours to take into 200 mL of fluids while awake    Dr. Marvin attended and led team conference 4-  Nursing:uti on bactrim, confusion, continent of bowel and bladder, incision intact, fluids 500cc, meals % , daughter brought in food  PT: limited by ttwb, bed mob sba, transfers with fww cga and lots of cues, gait with 2 step pattern but difficult will work on // bars.   OT: sup seated grooming, mod I eating, cga bathing stance, impaired balance and weight bearing, thoroughness, upper body dressing bra sweater seated in wc min, lower body min, with max verbal cues for strategies, cga toileting with poor adherance to ttwb, poor carryover  ST: max problem solving memory and executive function, continuum for day program  Case management:lives with daughter and grandchildren , neurology consult after discharge, 24 hour supervision and assist  Discharge date: 4-    X-ray reviewed in no acute fracture  Dr. Marvin attended and led team conference 4-  Nursing:regular diet, eating %, fluids 940cc, denies pain, continent of bowel and bladder, iv for fluids  PT: able to keep ttwb status this week, transfers sup to sit back to bed sup, walker transfers chair cga, cues for sequencing, gait 40 ft fww ttwb cga  OT: family training, bathing min, cga stance, grooming seated setup, upper body dressing setup, verbal cues for orientation, lower body dressing min for left socks and cues for strategies, toileting sba to cga, transfers cga, gait belt with fww  ST: min to sup comprehension, sup expression, mod simple problem solving, max memory, safety sequencing  needs lots of cues  Case management:lives with daughter, needs manual wc with cushion and fww for transfers  Discharge date: authorized through Monday, extend for blood pressure issues, 5-3-2017    Dr. Pierre assessing patient and assisting with medical management    Continue PT, OT and SLP  Per  patient's daughter wants the patient ever transition to skilled facility prior to   Returning home  We will seek placed in a skilled facility   For team conference 5/4/2017  Discharge after skilled facility located in the lower except for patient with her current insurance    Total time:  >25 minutes.  I spent greater than 50% of the time for patient care and coordination on this date, including unit/floor time, and face-to-face time with the patient as per assessment and plan above.    Devon Reynolds M.D.

## 2017-05-03 NOTE — CARE PLAN
Problem: Infection  Goal: Will remain free from infection  Intervention: Assess signs and symptoms of infection  Urine culture neg. Patient remains free from s/s infection; afebrile.  Will continue to monitor.

## 2017-05-04 PROCEDURE — 97116 GAIT TRAINING THERAPY: CPT

## 2017-05-04 PROCEDURE — 97535 SELF CARE MNGMENT TRAINING: CPT

## 2017-05-04 PROCEDURE — A9270 NON-COVERED ITEM OR SERVICE: HCPCS | Performed by: PHYSICAL MEDICINE & REHABILITATION

## 2017-05-04 PROCEDURE — A9270 NON-COVERED ITEM OR SERVICE: HCPCS | Performed by: HOSPITALIST

## 2017-05-04 PROCEDURE — 700102 HCHG RX REV CODE 250 W/ 637 OVERRIDE(OP): Performed by: HOSPITALIST

## 2017-05-04 PROCEDURE — 97532 HCHG COGNITIVE SKILL DEV. 15 MIN: CPT

## 2017-05-04 PROCEDURE — 99232 SBSQ HOSP IP/OBS MODERATE 35: CPT | Performed by: HOSPITALIST

## 2017-05-04 PROCEDURE — 700111 HCHG RX REV CODE 636 W/ 250 OVERRIDE (IP): Performed by: PHYSICAL MEDICINE & REHABILITATION

## 2017-05-04 PROCEDURE — 770010 HCHG ROOM/CARE - REHAB SEMI PRIVAT*

## 2017-05-04 PROCEDURE — 700102 HCHG RX REV CODE 250 W/ 637 OVERRIDE(OP): Performed by: PHYSICAL MEDICINE & REHABILITATION

## 2017-05-04 PROCEDURE — 97110 THERAPEUTIC EXERCISES: CPT

## 2017-05-04 RX ORDER — MIDODRINE HYDROCHLORIDE 2.5 MG/1
10 TABLET ORAL
Status: DISCONTINUED | OUTPATIENT
Start: 2017-05-04 | End: 2017-05-05 | Stop reason: HOSPADM

## 2017-05-04 RX ADMIN — MIDODRINE HYDROCHLORIDE 10 MG: 2.5 TABLET ORAL at 17:23

## 2017-05-04 RX ADMIN — Medication 500 MG: at 17:21

## 2017-05-04 RX ADMIN — OMEPRAZOLE 20 MG: 20 CAPSULE, DELAYED RELEASE ORAL at 08:25

## 2017-05-04 RX ADMIN — MICONAZOLE NITRATE 1 APPLICATOR: 20 CREAM VAGINAL at 21:25

## 2017-05-04 RX ADMIN — STANDARDIZED SENNA CONCENTRATE AND DOCUSATE SODIUM 2 TABLET: 8.6; 5 TABLET, FILM COATED ORAL at 21:16

## 2017-05-04 RX ADMIN — AMLODIPINE BESYLATE 2.5 MG: 5 TABLET ORAL at 17:21

## 2017-05-04 RX ADMIN — MIDODRINE HYDROCHLORIDE 10 MG: 2.5 TABLET ORAL at 12:06

## 2017-05-04 RX ADMIN — STANDARDIZED SENNA CONCENTRATE AND DOCUSATE SODIUM 2 TABLET: 8.6; 5 TABLET, FILM COATED ORAL at 08:25

## 2017-05-04 RX ADMIN — POTASSIUM CHLORIDE 40 MEQ: 1500 TABLET, EXTENDED RELEASE ORAL at 08:25

## 2017-05-04 RX ADMIN — ASPIRIN 81 MG: 81 TABLET, COATED ORAL at 08:26

## 2017-05-04 RX ADMIN — TRAZODONE HYDROCHLORIDE 50 MG: 50 TABLET ORAL at 21:16

## 2017-05-04 RX ADMIN — AMIODARONE HYDROCHLORIDE 200 MG: 200 TABLET ORAL at 08:27

## 2017-05-04 RX ADMIN — FLUDROCORTISONE ACETATE 0.1 MG: 0.1 TABLET ORAL at 08:26

## 2017-05-04 RX ADMIN — Medication 500 MG: at 12:05

## 2017-05-04 RX ADMIN — OXYBUTYNIN CHLORIDE 5 MG: 5 TABLET ORAL at 21:16

## 2017-05-04 RX ADMIN — THERA TABS 1 TABLET: TAB at 08:25

## 2017-05-04 RX ADMIN — Medication 220 MG: at 08:25

## 2017-05-04 RX ADMIN — Medication 500 MG: at 08:26

## 2017-05-04 RX ADMIN — ENOXAPARIN SODIUM 40 MG: 100 INJECTION SUBCUTANEOUS at 08:24

## 2017-05-04 RX ADMIN — CHOLECALCIFEROL TAB 25 MCG (1000 UNIT) 1000 UNITS: 25 TAB at 08:25

## 2017-05-04 ASSESSMENT — PAIN SCALES - GENERAL: PAINLEVEL_OUTOF10: 0

## 2017-05-04 ASSESSMENT — GAIT ASSESSMENTS
ASSISTIVE DEVICE: FRONT WHEEL WALKER
DEVIATION: STEP TO;DECREASED BASE OF SUPPORT;BRADYKINETIC;DECREASED HEEL STRIKE;DECREASED TOE OFF
ASSISTIVE DEVICE: FRONT WHEEL WALKER
GAIT LEVEL OF ASSIST: CONTACT GUARD ASSIST
DISTANCE (FEET): 65
DISTANCE (FEET): 125
DEVIATION: STEP TO;DECREASED BASE OF SUPPORT;BRADYKINETIC;DECREASED HEEL STRIKE;DECREASED TOE OFF
GAIT LEVEL OF ASSIST: CONTACT GUARD ASSIST

## 2017-05-04 ASSESSMENT — ENCOUNTER SYMPTOMS
COUGH: 0
FEVER: 0
DIARRHEA: 0
BLURRED VISION: 0
DIZZINESS: 0
NERVOUS/ANXIOUS: 0

## 2017-05-04 ASSESSMENT — ACTIVITIES OF DAILY LIVING (ADL)
TOILETING_LEVEL_OF_ASSIST: REQUIRES PHYSICAL ASSIST WITH TOILETING
TOILET_TRANSFER_LEVEL_OF_ASSIST: REQUIRES PHYSICAL ASSIST WITH TOILET TRANSFER
SHOWER_TRANSFER_LEVEL_OF_ASSIST: REQUIRES PHYSICAL ASSIST WITH SHOWER TRANSFER

## 2017-05-04 NOTE — REHAB-SLP IDT TEAM NOTE
Speech Therapy  Cognitive Linquistic Functions  Comprehension Initial:  3 - Moderate Assistance  Comprehension Current:  4 - Minimal Assistance   Comprehension Description:  Verbal cues  Expression Initial:  5 - Stand-by Prompting/Supervision or Set-up  Expression Current:  5 - Stand-by Prompting/Supervision or Set-up   Expression Description:  Verbal cueing  Social Interaction Initial:  5 - Stand-by Prompting/Supervision or Set-up  Social Interaction Current:  5 - Stand-by Prompting/Supervision or Set-up   Social Interaction Description:  Increased time, Verbal cues  Problem Solving Initial:  3 - Moderate Assistance  Problem Solving Current:  3 - Moderate Assistance   Problem Solving Description:  Verbal cueing, Increased time  Memory Initial:  3 - Moderate Assistance  Memory Current:  2 - Max Assistance   Memory Description:  Verbal cueing, Therapy schedule  Executive Functioning / Organization Initial:  Severe (2)  Executive Functioning / Organization Current:  Severe (2)   Executive Functioning Desciption:  Verbal cues   Swallowing  Swallowing Status:  Regular textures and thin liquids, not following for dysphagia management    Orders Placed This Encounter   Procedures   • DIET ORDER     Standing Status: Standing      Number of Occurrences: 1      Standing Expiration Date:      Order Specific Question:  Diet:     Answer:  Regular [1]     Behavior Modification Plan  Keep instructions simple/concrete, Redirect to task/topic and Analyze tasks (break down into smaller steps)  Assistive Technology  Memory aides: and Low tech: Calendar, planner, schedule, alarms/timers, pill organizer, post-it notes, lists  Family Training/Education:  Ongoing with pt's daughter   DC Recommendations:   24 hour spv   Strengths:  Pleasant and cooperative, Supportive family and Willingly participates in therapeutic activities  Barriers:  Confused, Dementia and Poor carryover of learning  # of short term goals set=2  # of short term goals  met=1        Speech Therapy Problems           Problem: Memory STGs     Dates: Start: 04/20/17       Goal: STG-Within one week, patient will     Dates: Start: 04/20/17      Description: 1) Individualized goal:  With recall safety sequences with 60% acc or mod A.    2) Interventions: SLP Speech Language Treatment, SLP Self Care / ADL Training  and SLP Cognitive Skill Development        Note: Goal Note filed on 05/03/17 1803 by Aj Ragsdale MS,CCC-SLP    Goal: STG-Within one week, patient will  Outcome: NOT MET  Mod-max A required for pt to recall safety sequences             Problem: Problem Solving STGs     Dates: Start: 04/20/17       Goal: STG-Within one week, patient will     Dates: Start: 05/03/17      Description: 1) Individualized goal:   With 70% accuracy and min A.    2) Interventions:  SLP Speech Language Treatment, SLP Self Care / ADL Training  and SLP Cognitive Skill Development              Problem: Speech/Swallowing LTGs     Dates: Start: 04/20/17       Goal: LTG-By discharge, patient will solve basic problems     Dates: Start: 04/20/17      Description: 1) Individualized goal:  With 70% acc with min to mod A.    2) Interventions:  SLP Speech Language Treatment and SLP Self Care / ADL Training     2) Interventions:  SLP Speech Language Treatment, SLP Self Care / ADL Training  and SLP Cognitive Skill Development            Goal: LTG-By discharge, patient will     Dates: Start: 04/20/17                 Section completed by:  Aj Ragsdale MS,CCC-SLP

## 2017-05-04 NOTE — REHAB-NURSING IDT TEAM NOTE
Nursing  Nursing  Diet/Nutrition:  Regular  Medication Administration:  Whole with Liquid Wash  % consumed at meals in last 24 hours:  Consumed 75%-100% of meals per documentation.  Breakfast:  100%   Lunch:  75%  Dinner:  85%   Snack schedule:  None  Supplement:  Boost Plus  Appetite:  Good  Fluid Intake/Output in past 24 hours: In: 1280 [P.O.:1280]  Out: -   Admit Weight:  Weight: 60 kg (132 lb 4.4 oz)  Weight Last 7 Days   [60.7 kg (133 lb 13.1 oz)-62.5 kg (137 lb 12.6 oz)] 60.7 kg (133 lb 13.1 oz) (05/01 0535)    Pain Issues:    Location: Left hip         Severity:  Mild   Scheduled pain medications:  None     PRN pain medications used in last 24 hours:  None   Non Pharmacologic Interventions:  emotional support, repositioned and rest  Effectiveness of pain management plan:  good=patient states acceptable comfort after interventions    Bowel:    Bowel Assist Initial Score:  2 - Max Assistance  Bowel Assist Current Score:  2 - Max Assistance  Bowl Accidents in last 7 days:  1  Last bowel movement: 05/03/17  Stool: Medium     Usual bowel pattern:  every other day  Scheduled bowel medications:  senna-docusate (PERICOLACE or SENOKOT S)   PRN bowel medications used in last 24 hours:  None  Nursing Interventions:  Increased time, Scheduled medication, Supervision, Verbal cueing, Positioning on commode/toilet  Effectiveness of bowel program:   fair=sometimes needs prn bowel meds for constipation  Bladder:    Bladder Assist Initial Score:  2 - Max Assistance  Bladder Assist Current Score:  7 - Independent  Bladder Accidents in last 7 days:  0  Medications affecting bladder:  Ditropan    Interventions:  Increased time, Supervision, Brief  Effectiveness of bladder training:  Good=regular, predictable, emptying of bladder, patient initiates time voiding    Wound:      Patient Lines/Drains/Airways Status    Active Current Wounds     Name: Placement date: Placement time: Site: Days:    Surgical Incision  Incision Left  Lateral Hip 04/17/17  2100    16                   Interventions: Monitor and assess  Effectiveness of intervention:  wound is unchanged     Sleep/wake cycle:   Average hours slept:  Sleeps less than 2 hours without waking  Sleep medication usage:  Other Trazodone 50 mg    Patient/Family Training/Education:  Bladder Management/Training, Bowel Management/Training, Diet/Nutrition, Fall Prevention, General Self Care, Medication Management, Pain Management, Respiratory Hygiene, Safety and Wound Care    Strengths: Supportive family and Manages pain appropriately   Barriers:   Confused, Fatigue, Generalized weakness and Impulsive            Nursing Problems           Problem: Bowel/Gastric:     Goal: Normal bowel function is maintained or improved         Goal: Will not experience complications related to bowel motility           Problem: Communication     Goal: The ability to communicate needs accurately and effectively will improve           Problem: Discharge Barriers/Planning     Goal: Patient's continuum of care needs will be met           Problem: Infection     Goal: Will remain free from infection           Problem: Knowledge Deficit     Goal: Knowledge of disease process/condition, treatment plan, diagnostic tests, and medications will improve         Goal: Knowledge of the prescribed therapeutic regimen will improve           Problem: Mobility     Goal: Risk for activity intolerance will decrease           Problem: Pain Management     Goal: Pain level will decrease to patient's comfort goal           Problem: Safety     Goal: Will remain free from injury         Goal: Will remain free from falls           Problem: Urinary Elimination:     Goal: Ability to reestablish a normal urinary elimination pattern will improve           Problem: Venous Thromboembolism (VTW)/Deep Vein Thrombosis (DVT) Prevention:     Goal: Patient will participate in Venous Thrombosis (VTE)/Deep Vein Thrombosis (DVT)Prevention Measures      Flowsheet: Taken at 04/19/17 8377    Pharmacologic Prophylaxis Used LMWH: Enoxaparin(Lovenox) by Nancy Fernando R.N.              Long Term Goals:   At discharge patient will be able to function safely at home and in the community with support.    Section completed by:  Bijal Glass R.N.

## 2017-05-04 NOTE — CARE PLAN
Problem: Speech/Swallowing LTGs  Goal: LTG-By discharge, patient will solve basic problems  1) Individualized goal: With 70% acc with min to mod A.  2) Interventions: SLP Speech Language Treatment and SLP Self Care / ADL Training   2) Interventions: SLP Speech Language Treatment, SLP Self Care / ADL Training and SLP Cognitive Skill Development        Outcome: MET Date Met:  05/04/17  Goal: LTG-By discharge, patient will  Outcome: DISCHARGED-GOAL NOT MET Date Met:  05/04/17    Problem: Problem Solving STGs  Goal: STG-Within one week, patient will  1) Individualized goal: With 70% accuracy and min A.  2) Interventions: SLP Speech Language Treatment, SLP Self Care / ADL Training and SLP Cognitive Skill Development    Outcome: DISCHARGED-GOAL NOT MET Date Met:  05/04/17    Problem: Memory STGs  Goal: STG-Within one week, patient will  1) Individualized goal: With recall safety sequences with 60% acc or mod A.  2) Interventions: SLP Speech Language Treatment, SLP Self Care / ADL Training and SLP Cognitive Skill Development      Outcome: MET Date Met:  05/04/17

## 2017-05-04 NOTE — REHAB-CM IDT TEAM NOTE
Case Management   DC Planning  DC destination/dispostion:  Dtr prefers transition to skilled care; [facility] has bed available for tomorrow 5/5  @ 11 w/  as accepting MD.     Referrals: [facility] and [facility]     DC Needs:  Continued therapy in skilled setting.     Barriers to discharge:   Poor safety awareness; needs supervision/sba with mobility and adl's.     Strengths: pleasant, cooperative, supportive     Section completed by:  RONNI Lowe, [facility]

## 2017-05-04 NOTE — DISCHARGE PLANNING
[facility] does not have any beds available.   [facility] may have an opening - referral in review.  Met with dtr, Sun and her brother,Tadeo informing them of above.

## 2017-05-04 NOTE — CARE PLAN
Problem: Problem Solving STGs  Goal: STG-Within one week, patient will solve basic problems  1) Individualized goal: With 60%with mod A.  2) Interventions: SLP Speech Language Treatment, SLP Self Care / ADL Training and SLP Cognitive Skill Development      Outcome: MET Date Met:  05/03/17

## 2017-05-04 NOTE — PROGRESS NOTES
Hospital Medicine Progress Note, Adult, Complex               Author: Obed Pierre Date & Time created: 5/4/2017  10:59 AM     Interval History:  No significant events or changes since last visit  Patient denies SOB, cough, or diarrhea  Patient slept ok last night  Continue managing hypertension, orthostatic hypotension      Review of Systems:  Review of Systems   Constitutional: Negative for fever.   Eyes: Negative for blurred vision.   Respiratory: Negative for cough.    Cardiovascular: Negative for chest pain.   Gastrointestinal: Negative for diarrhea.   Musculoskeletal: Negative for joint pain.   Neurological: Negative for dizziness.   Psychiatric/Behavioral: The patient is not nervous/anxious.        Physical Exam:  Physical Exam   Constitutional: She is oriented to person, place, and time. No distress.   HENT:   Mouth/Throat: No oropharyngeal exudate.   Eyes: EOM are normal.   Neck: No JVD present. No tracheal deviation present.   Cardiovascular: Normal rate, regular rhythm, S1 normal and S2 normal.    No murmur heard.  Pulmonary/Chest: Effort normal and breath sounds normal.   Abdominal: Soft. Bowel sounds are normal. Hernia confirmed negative in the right inguinal area and confirmed negative in the left inguinal area.   Musculoskeletal: She exhibits no edema.   Neurological: She is alert and oriented to person, place, and time. No sensory deficit.   Skin: Skin is warm and dry. No rash noted. No cyanosis.   Psychiatric: She has a normal mood and affect. Her behavior is normal.   Nursing note and vitals reviewed.      Labs:        Invalid input(s): [id number]      Recent Labs      05/02/17   0548   SODIUM  139   POTASSIUM  3.5*   CHLORIDE  106   CO2  23   BUN  28*   CREATININE  0.99   CALCIUM  8.8     Recent Labs      05/02/17 0548   ALTSGPT  20   ASTSGOT  21   ALKPHOSPHAT  88   TBILIRUBIN  0.4   GLUCOSE  96     Recent Labs      05/02/17 0548   RBC  3.62*   HEMOGLOBIN  11.2*   HEMATOCRIT  33.7*    PLATELETCT  299     Recent Labs      17   0548   WBC  6.5   NEUTSPOLYS  53.10   LYMPHOCYTES  32.30   MONOCYTES  12.80   EOSINOPHILS  0.60   BASOPHILS  0.60   ASTSGOT  21   ALTSGPT  20   ALKPHOSPHAT  88   TBILIRUBIN  0.4           Hemodynamics:  Temp (24hrs), Av.1 °C (98.8 °F), Min:36.8 °C (98.2 °F), Max:37.4 °C (99.4 °F)  Temperature: 36.8 °C (98.2 °F)  Pulse  Av.1  Min: 56  Max: 86   Blood Pressure : 150/79 mmHg     Respiratory:    Respiration: 18, Pulse Oximetry: 96 %     Work Of Breathing / Effort: Mild  RUL Breath Sounds: Clear, RML Breath Sounds: Clear, RLL Breath Sounds: Clear, MARILOU Breath Sounds: Clear, LLL Breath Sounds: Clear  Fluids:    Intake/Output Summary (Last 24 hours) at 17 1059  Last data filed at 17 0930   Gross per 24 hour   Intake   1080 ml   Output      0 ml   Net   1080 ml     Weight: 61.2 kg (134 lb 14.7 oz)  GI/Nutrition:  Orders Placed This Encounter   Procedures   • DIET ORDER     Standing Status: Standing      Number of Occurrences: 1      Standing Expiration Date:      Order Specific Question:  Diet:     Answer:  Regular [1]     Medical Decision Making, by Problem:  Active Hospital Problems    Diagnosis   • Hip fracture (CMS-HCC) [S72.009A]     *  S/P Left Hip Surgery  2nd to fall, 2nd to lightheadedness  Has hx of falls and      *  Orthostatic Hypotension  SBP was 150 today and Midodrine was held ()  TSH: ok  Cortisol: ok  S/P IVF's x a few runs  Off salt tabs (5/3)  On Florinef: 0.1 mg bid --> 0.1 mg qam ()  On Midodrine: 15 mg tid --> will decrease to 10 mg tid ()    *  Hypertension  BP ok   On Norvasc: 2.5 mg daily  Note: also on Florinef & Midodrine  Cont to monitor    *  Hypo-K+  K+: 3.0 --> 3.5 ()  2nd to Florinef  On KCL: 40 meq daily   Monitor    *  Azotemia  Bun: 25 --> 28 ()  Encouraging fluid (water/juice) intake  Monitor    *  Tachybrady Syndrome: has permanent pacemaker placement  *  Dementia      Labs reviewed and Medications  reviewed        DVT Prophylaxis: Enoxaparin (Lovenox)

## 2017-05-04 NOTE — DISCHARGE PLANNING
Per Ananda at [facility], referral accepted.  Dr. Rodriguez accepting physician.  Ananda will arrange for [facility] to pick patient up tomorrow, 5/5/17 at 1100.  CM notified.

## 2017-05-04 NOTE — CARE PLAN
Problem: Problem Solving STGs  Goal: STG-Within one week, patient will solve basic problems  1) Individualized goal: With 60%with mod A.  2) Interventions: SLP Speech Language Treatment, SLP Self Care / ADL Training and SLP Cognitive Skill Development      Outcome: NOT MET  Pt is able to solve basic problems given mod A to achieve 60% accuracy     Problem: Memory STGs  Goal: STG-Within one week, patient will  1) Individualized goal: With recall safety sequences with 60% acc or mod A.  2) Interventions: SLP Speech Language Treatment, SLP Self Care / ADL Training and SLP Cognitive Skill Development      Outcome: NOT MET  Mod-max A required for pt to recall safety sequences

## 2017-05-04 NOTE — CARE PLAN
Problem: Safety  Goal: Will remain free from falls  Intervention: Implement fall precautions  Pt remains impulsive, no safety awareness.Alarms in place for safety.Bed in low position, non skid socks/shoes on when out of bed.Call light and things within reach.Will continue to monitor and assess needs and safety.      Problem: Pain Management  Goal: Pain level will decrease to patient’s comfort goal  Pt denies any pain and no sign of acute distress noted.Repositioned with pillows for comfort.Will continue to monitor and assess pain level and medicate as needed.    Problem: Urinary Elimination:  Goal: Ability to reestablish a normal urinary elimination pattern will improve  Pt remains continent of bladder.Will continue to monitor and assess.

## 2017-05-04 NOTE — CARE PLAN
Problem: Safety  Goal: Will remain free from falls  Outcome: PROGRESSING AS EXPECTED  Patient uses call light consistently and appropriately this shift.  Waits for assistance when needed and does not attempt self transfer.  Able to verbalize needs.      Problem: Venous Thromboembolism (VTW)/Deep Vein Thrombosis (DVT) Prevention:  Goal: Patient will participate in Venous Thrombosis (VTE)/Deep Vein Thrombosis (DVT)Prevention Measures  Outcome: PROGRESSING AS EXPECTED  Pt is up and walking, participates in therapies, and up to dinning room for all meals.

## 2017-05-04 NOTE — PROGRESS NOTES
"Rehab Progress Note     Interval Events (Subjective)  Patient was seen in her room. She has no complaints she was discussed at team conference so severe with the patient    Nursing        Doing better    BP better and day verbalizing were holding the Midrin   Return of bowel and bladder  Hip incision looks good      PT    Finally made progress     Met 2 of 3 goals  Supervised for bed mobility  Must use walker toe touch weight bearing with walker CG  Now able to ambi-hop 125 feet with CG    Limited safety awareness    OT    Mod I for eating provision for grooming grooming min asssit bathing  SUP UE min asssit lb dressing  Toileting is minimal assist and  CG to min asssit for functional transfers transfers  Used walker grab bar and shower bench  Daughter involved with family training  Walekr bedside over toielt cvhair and shower in bench    Wheelchair    Min assist com Supervised for expression  SI supervised  Daughter wants transition to Skilled facility    Mod asssit problems  Max for problems solving close to baseline    [facility] can take her tomorrow per family request    Tentative discahrge tomorrow     5/11/2017      Objective:  VITAL SIGNS: /79 mmHg  Pulse 71  Temp(Src) 36.8 °C (98.2 °F)  Resp 18  Ht 1.575 m (5' 2\")  Wt 61.2 kg (134 lb 14.7 oz)  BMI 24.67 kg/m2  SpO2 96%    Cardiac: regular rate and rhythm, nl S1/S2    Lungs: clear to auscultation bilaterally.    Abdomen: soft; NT, ND, BS present.    Extremities: minimal edema noted  Bilaterally  Incision: Clean and dry    Recent Results (from the past 72 hour(s))   COMP METABOLIC PANEL    Collection Time: 05/02/17  5:48 AM   Result Value Ref Range    Sodium 139 135 - 145 mmol/L    Potassium 3.5 (L) 3.6 - 5.5 mmol/L    Chloride 106 96 - 112 mmol/L    Co2 23 20 - 33 mmol/L    Anion Gap 10.0 0.0 - 11.9    Glucose 96 65 - 99 mg/dL    Bun 28 (H) 8 - 22 mg/dL    Creatinine 0.99 0.50 - 1.40 mg/dL    Calcium 8.8 8.5 - 10.5 mg/dL    AST(SGOT) 21 12 - 45 " U/L    ALT(SGPT) 20 2 - 50 U/L    Alkaline Phosphatase 88 30 - 99 U/L    Total Bilirubin 0.4 0.1 - 1.5 mg/dL    Albumin 3.3 3.2 - 4.9 g/dL    Total Protein 6.4 6.0 - 8.2 g/dL    Globulin 3.1 1.9 - 3.5 g/dL    A-G Ratio 1.1 g/dL   CBC WITH DIFFERENTIAL    Collection Time: 05/02/17  5:48 AM   Result Value Ref Range    WBC 6.5 4.8 - 10.8 K/uL    RBC 3.62 (L) 4.20 - 5.40 M/uL    Hemoglobin 11.2 (L) 12.0 - 16.0 g/dL    Hematocrit 33.7 (L) 37.0 - 47.0 %    MCV 93.1 81.4 - 97.8 fL    MCH 30.9 27.0 - 33.0 pg    MCHC 33.2 (L) 33.6 - 35.0 g/dL    RDW 49.1 35.9 - 50.0 fL    Platelet Count 299 164 - 446 K/uL    MPV 10.5 9.0 - 12.9 fL    Neutrophils-Polys 53.10 44.00 - 72.00 %    Lymphocytes 32.30 22.00 - 41.00 %    Monocytes 12.80 0.00 - 13.40 %    Eosinophils 0.60 0.00 - 6.90 %    Basophils 0.60 0.00 - 1.80 %    Immature Granulocytes 0.60 0.00 - 0.90 %    Nucleated RBC 0.00 /100 WBC    Neutrophils (Absolute) 3.47 2.00 - 7.15 K/uL    Lymphs (Absolute) 2.11 1.00 - 4.80 K/uL    Monos (Absolute) 0.84 0.00 - 0.85 K/uL    Eos (Absolute) 0.04 0.00 - 0.51 K/uL    Baso (Absolute) 0.04 0.00 - 0.12 K/uL    Immature Granulocytes (abs) 0.04 0.00 - 0.11 K/uL    NRBC (Absolute) 0.00 K/uL   ESTIMATED GFR    Collection Time: 05/02/17  5:48 AM   Result Value Ref Range    GFR If African American >60 >60 mL/min/1.73 m 2    GFR If Non African American 54 (A) >60 mL/min/1.73 m 2       Current Facility-Administered Medications   Medication Frequency   • midodrine (PROAMATINE) tablet 10 mg TID WITH MEALS   • fludrocortisone (FLORINEF) tablet 0.1 mg QAM   • potassium chloride SA (Kdur) tablet 40 mEq DAILY   • miconazole (MONISTAT) 2 % vaginal cream 1 Applicator Q EVENING   • oxybutynin (DITROPAN) tablet 5 mg QHS   • amlodipine (NORVASC) tablet 2.5 mg Q EVENING   • trazodone (DESYREL) tablet 50 mg QHS   • FORMULATION R 0.25-3-14-71.9 % ointment PRN   • ondansetron (ZOFRAN ODT) dispertab 4 mg Q4HRS PRN   • senna-docusate (PERICOLACE or SENOKOT S) 8.6-50  MG per tablet 2 Tab BID    And   • polyethylene glycol/lytes (MIRALAX) PACKET 1 Packet QDAY PRN    And   • magnesium hydroxide (MILK OF MAGNESIA) suspension 30 mL QDAY PRN    And   • bisacodyl (DULCOLAX) suppository 10 mg QDAY PRN   • acetaminophen (TYLENOL) suppository 650 mg Q4HRS PRN   • amiodarone (CORDARONE) tablet 200 mg DAILY   • aspirin EC (ECOTRIN) tablet 81 mg DAILY   • calcium carbonate (OS-DIANNA 500) tablet 500 mg TID WITH MEALS   • enoxaparin (LOVENOX) inj 40 mg DAILY   • multivitamin (THERAGRAN) tablet 1 Tab DAILY   • omeprazole (PRILOSEC) capsule 20 mg DAILY   • tramadol (ULTRAM) 50 MG tablet 100 mg Q6HRS PRN   • tramadol (ULTRAM) 50 MG tablet 50 mg Q4HRS PRN   • vitamin D (cholecalciferol) tablet 1,000 Units DAILY   • zinc sulfate (ZINCATE) capsule 220 mg DAILY       Orders Placed This Encounter   Procedures   • DIET ORDER     Standing Status: Standing      Number of Occurrences: 1      Standing Expiration Date:      Order Specific Question:  Diet:     Answer:  Regular [1]       Assessment:  Active Hospital Problems    Diagnosis   • Hip fracture (CMS-HCC)       Medical Decision Making and Plan:  Patient is making progress however family feels uncomfortable taking the patient home in the way they wanted to go to a skilled facility for a short period of time at least    Plan is to discharge the patient to a skilled facility tomorrow  Follow-up therapy at that location    Total time:  > 35minutes.  I spent greater than 50% of the time for patient care and coordination on this date, including unit/floor time, and face-to-face time with the patient as per assessment and plan above.    Devon Reynolds M.D.

## 2017-05-04 NOTE — DISCHARGE PLANNING
Summary:  Tc to patient's dtr providing update from IDT and advised her [facility] is able to accept pt.  She is in agreement     Case Management  Discharge Instructions for Marleen Mix  Discharge Date:  Friday 5/5/2017 @ 11:00am    Discharge Location [facility]/ ID time 11:00am.   Dr. Rodriguez is accepting MD           Other: Therapy has recommended a Front wheeled walker, 3:1 commode, and a wheelchair at her current status.  This may change after her stay at [facility].  Representative from [facility] will order any dme at time of dc.      Patient was attending the [facility] 5 days a week,  and has services through [facility].            Follow-up With Why Contact Info   Dr. Harinder Leblanc, Ortho 5/16/20176 Ortho-Tuesday @ 10:45am.  [address]  [address]  [place] [address]  [phone]     Dr. Mcghee-  Primary Care.  Please follow up with your primary care once you are discharged from [facility].  [address], [address]  [place] [id number] [phone]

## 2017-05-04 NOTE — DISCHARGE PLANNING
Referrals sent on 5/1/17 to [facility] and [facility].  Referrals canceled.  SNF Referrals sent to [facility] and [facility].  Per faxed respnse from [facility], referral declined.  No Medicaid beds.  Per Ralph at [facility], referral being reviewed.  She will call me back.

## 2017-05-04 NOTE — REHAB-COLLABORATIVE ONGOING IDT TEAM NOTE
Weekly Interdisciplinary Team Conference Note    Weekly Interdisciplinary Team Conference # 3  Date:  5/4/2017    Clinicians present and reporting at team conference include the following:   MD: Devon Reynolds MD   RN:  Nancy Fernando RN   PT:   Lico Rodriguez, PT, MPT, MEd  OT:  Mark Pisano, MS, OTR/L   ST:  Aj Ragsdale, MS, CCC-SLP  CM:  Kylah August, RONNI, LUIS  REC:  Not Applicable  RT:  Not Applicable  RPh:  Eliu Castillo [facility]  Other:   None  All reporting clinicians have a working knowledge of this patient's plan of care.    Targeted DC Date:  5/5/2017.      Medical    Patient Active Problem List    Diagnosis Date Noted   • Hip fracture (CMS-HCC) 04/17/2017   • Acute encephalopathy 04/11/2017   • Mood disorder (CMS-HCC) 04/10/2017   • Femoral neck fracture (CMS-HCC) 04/09/2017   • Syncope 04/09/2017   • Essential hypertension 03/30/2017   • Atrial fibrillation (CMS-HCC) 03/30/2017   • Insomnia 03/30/2017   • Gastroesophageal reflux disease without esophagitis 03/30/2017     Results     ** No results found for the last 24 hours. **        Nursing  Diet/Nutrition:  Regular  Medication Administration:  Whole with Liquid Wash  % consumed at meals in last 24 hours:  Consumed 75%-100% of meals per documentation.  Breakfast:  100%   Lunch:  75%  Dinner:  85%   Snack schedule:  None  Supplement:  Boost Plus  Appetite:  Good  Fluid Intake/Output in past 24 hours: In: 1280 [P.O.:1280]  Out: -   Admit Weight:  Weight: 60 kg (132 lb 4.4 oz)  Weight Last 7 Days   [60.7 kg (133 lb 13.1 oz)-62.5 kg (137 lb 12.6 oz)] 60.7 kg (133 lb 13.1 oz) (05/01 0535)    Pain Issues:    Location: Left hip         Severity:  Mild   Scheduled pain medications:  None     PRN pain medications used in last 24 hours:  None   Non Pharmacologic Interventions:  emotional support, repositioned and rest  Effectiveness of pain management plan:  good=patient states acceptable comfort after interventions    Bowel:    Bowel Assist Initial Score:  2 -  Max Assistance  Bowel Assist Current Score:  2 - Max Assistance  Bowl Accidents in last 7 days:  1  Last bowel movement: 05/03/17  Stool: Medium     Usual bowel pattern:  every other day  Scheduled bowel medications:  senna-docusate (PERICOLACE or SENOKOT S)   PRN bowel medications used in last 24 hours:  None  Nursing Interventions:  Increased time, Scheduled medication, Supervision, Verbal cueing, Positioning on commode/toilet  Effectiveness of bowel program:   fair=sometimes needs prn bowel meds for constipation  Bladder:    Bladder Assist Initial Score:  2 - Max Assistance  Bladder Assist Current Score:  7 - Independent  Bladder Accidents in last 7 days:  0  Medications affecting bladder:  Ditropan    Interventions:  Increased time, Supervision, Brief  Effectiveness of bladder training:  Good=regular, predictable, emptying of bladder, patient initiates time voiding    Wound:      Patient Lines/Drains/Airways Status    Active Current Wounds     Name: Placement date: Placement time: Site: Days:    Surgical Incision  Incision Left Lateral Hip 04/17/17  2100    16                   Interventions: Monitor and assess  Effectiveness of intervention:  wound is unchanged     Sleep/wake cycle:   Average hours slept:  Sleeps less than 2 hours without waking  Sleep medication usage:  Other Trazodone 50 mg    Patient/Family Training/Education:  Bladder Management/Training, Bowel Management/Training, Diet/Nutrition, Fall Prevention, General Self Care, Medication Management, Pain Management, Respiratory Hygiene, Safety and Wound Care    Strengths: Supportive family and Manages pain appropriately   Barriers:   Confused, Fatigue, Generalized weakness and Impulsive            Nursing Problems           Problem: Bowel/Gastric:     Goal: Normal bowel function is maintained or improved         Goal: Will not experience complications related to bowel motility           Problem: Communication     Goal: The ability to communicate needs  accurately and effectively will improve           Problem: Discharge Barriers/Planning     Goal: Patient's continuum of care needs will be met           Problem: Infection     Goal: Will remain free from infection           Problem: Knowledge Deficit     Goal: Knowledge of disease process/condition, treatment plan, diagnostic tests, and medications will improve         Goal: Knowledge of the prescribed therapeutic regimen will improve           Problem: Mobility     Goal: Risk for activity intolerance will decrease           Problem: Pain Management     Goal: Pain level will decrease to patient's comfort goal           Problem: Safety     Goal: Will remain free from injury         Goal: Will remain free from falls           Problem: Urinary Elimination:     Goal: Ability to reestablish a normal urinary elimination pattern will improve           Problem: Venous Thromboembolism (VTW)/Deep Vein Thrombosis (DVT) Prevention:     Goal: Patient will participate in Venous Thrombosis (VTE)/Deep Vein Thrombosis (DVT)Prevention Measures     Flowsheet: Taken at 04/19/17 1566    Pharmacologic Prophylaxis Used LMWH: Enoxaparin(Lovenox) by Nancy Fernando R.N.              Long Term Goals:   At discharge patient will be able to function safely at home and in the community with support.    Section completed by:  Bijal Glass R.N.           Mobility  Bed mobility: Supervision supine to/from sit  Bed /Chair/Wheelchair Transfer Initial:  3 - Moderate Assistance  Bed /Chair/Wheelchair Transfer Current:  5 - Standby Prompting/Supervision or Set-up, FWW   Bed/Chair/Wheelchair Transfer Description:  Supervision for safety, Verbal cueing, Set-up of equipment  Walk Initial:  1 - Total Assistance  Walk Current:  2 - Max Assistance   Walk Description:  Extra time, Walker, Verbal cueing, Supervision for safety, Safety concerns sba/cga 65 ft  Wheelchair Initial:  5E - Household Exception  Wheelchair Current:  2 - Max  Assistance   Wheelchair Description:   (x120' in manual wc with max cues for technique, and one instance of physical assist to complete turn)  Stairs Initial:  0 - Not tested,medical condition  Stairs Current: 0 - Not tested,unsafe activity   Stairs Description:    Patient/Family Training/Education: In progress  DME/DC Recommendations:  Fww, manual wheelchair 16 inches wide with standard cushion  Strengths:  Independent PLOF, Making steady progress towards goals, Manages pain appropriately, Motivated for self care and independence, Pleasant and cooperative, Supportive family and Willingly participates in therapeutic activities  Barriers:   Other: TTWB LLE, impaired tolerance for activity, hypotension, impaired memory, fall risk  # of short term goals set= 3  # of short term goals met=2        Physical Therapy Problems           Problem: Mobility     Dates: Start: 04/18/17       Goal: STG-Within one week, patient will     Dates: Start: 04/18/17      Description: 1) Individualized goal: Gait fww sba 125 ft one week    2) Interventions:  PT Gait Training, PT Therapeutic Exercises, PT Therapeutic Activity and PT Evaluation        Note: Goal Note filed on 05/03/17 3325 by Lico Rodriguez, M.S.P.T.    Goal: STG-Within one week, patient will  Outcome: NOT MET  65 feet fww sba limited by ttwb and intermittent dizziness            Problem: PT-Long Term Goals     Dates: Start: 04/18/17       Goal: LTG-By discharge, patient will ambulate     Dates: Start: 04/18/17      Description: 1) Individualized goal: Gait fww household 150 ft, community 300 ft modified independent at discharge    2) Interventions:  PT Gait Training, PT Therapeutic Exercises, PT Neuro Re-Ed/Balance, PT Therapeutic Activity and PT Evaluation            Goal: LTG-By discharge, patient will transfer one surface to another     Dates: Start: 04/18/17      Description: 1) Individualized goal: Transfer one surface to another fww modified independent at discharge     2) Interventions:  PT Gait Training, PT Therapeutic Exercises, PT Neuro Re-Ed/Balance, PT Therapeutic Activity and PT Evaluation            Goal: LTG-By discharge, patient will ambulate up/down 4-6 stairs     Dates: Start: 04/18/17      Description: 1) Individualized goal: Up/down ramp or 2 stairs with railings supervision at discharge    2) Interventions:  PT Gait Training, PT Therapeutic Exercises, PT Neuro Re-Ed/Balance, PT Therapeutic Activity and PT Evaluation            Goal: LTG-By discharge, patient will transfer in/out of a car     Dates: Start: 04/18/17      Description: 1) Individualized goal: Car transfer fww supervision at discharge    2) Interventions:  PT Gait Training, PT Therapeutic Exercises, PT Neuro Re-Ed/Balance, PT Therapeutic Activity and PT Evaluation                    Section completed by:  DANITZA TelloS.P.T.    Activities of Daily Living  Eating Initial:  6 - Modified Independent  Eating Current:  6 - Modified Independent   Eating Description:  Increased time  Grooming Initial:  5 - Standby Prompting/Supervision or Set-up  Grooming Current:  5 - Standby Prompting/Supervision or Set-up   Grooming Description:   (set-up seated)  Bathing Initial:  0 - Not tested, patient refused  Bathing Current:  4 - Minimal Assistance   Bathing Description:  Grab bar, Tub bench, Long handled bath tool, Supervision for safety, Verbal cueing (v/cs for safety, CGA in stance to wash buttocks, v/cs for sequencing and thoroughness, LHBS to wash LLE.  Daughter present, however req intermittent assist with bathing for cues and CGA)  Upper Body Dressing Initial:  5 - Standby Prompting/Supervision or Set-up  Upper Body Dressing Current:  5 - Standby Prompting/Supervision or Set-up   Upper Body Dressing Description:  Supervision for safety, Verbal cueing, Set-up of equipment  Lower Body Dressing Initial:  1 - Total Assistance  Lower Body Dressing Current:  4 - Minimal Assistance   Lower Body Dressing  Description:  4 - Minimal Assistance  Toileting Initial:  2 - Max Assistance  Toileting Current:  4 - Minimal Assistance   Toileting Description:  Grab bar, Supervision for safety, Verbal cueing (using FWW and GB for satbility, CGA in stance)  Toilet Transfer Initial:  2 - Max Assistance  Toilet Transfer Current:  4 - Minimal Assistance   Toilet Transfer Description:  4 - Minimal Assistance  Tub / Shower Transfer Initial:  0 - Not tested, patient refused  Tub / Shower Transfer Current:  4 - Minimal Assistance   Tub / Shower Transfer Description:  Grab bar (CGA using FWW onto tub transfer bench, verbal cues for seq to transfer onto tub transfer bench)  IADL:  N/A   Family Training/Education:  Ongoing.  Pt on set schedule with daughter present at all therapies for training.  Pt's daughter continues to req assist with BADLs with pt 2/2 impaired balance and decreased cognition.  Daughter is willing and motivated to help, however assists more than is necessary for dressing.   DME/DC Recommendations:  SNF, FWW, BSC over toilet, tub/transfer bench, continued OT      Strengths:  Pleasant and cooperative, Supportive family and Willingly participates in therapeutic activities  Barriers:  Confused, Dementia, Generalized weakness, Poor balance and Other: TTWB LLE, impulsive, poor safety awareness     # of short term goals set= 4    # of short term goals met= 1          Occupational Therapy Goals           Problem: Bathing     Dates: Start: 04/18/17       Goal: STG-Within one week, patient will     Dates: Start: 04/26/17      Description: Pt's daughter will indep bathe pt with setup to min A for safe d/c home    Note: Goal Note filed on 05/03/17 1101 by Willow Jama, MS,OTR/L    Goal: STG-Within one week, patient will  Outcome: NOT MET  Pt's daughter req min A to bathe pt, for cues and for balance             Problem: Dressing     Dates: Start: 04/18/17       Goal: STG-Within one week, patient will dress LB     Dates: Start:  04/26/17      Description: Pt's daughter will independently dress pt with min A with good v/cs for WB precautions    Note: Goal Note filed on 05/03/17 1101 by Willow Jama, MS,OTR/L    Goal: STG-Within one week, patient will dress LB  Outcome: NOT MET  Pt's daughter req min A to dress pt 2/2 balance. Pt's daughter limits pt's   ability to self dress.              Problem: Functional Transfers     Dates: Start: 04/26/17       Goal: STG-Within one week, patient will transfer to tub/shower     Dates: Start: 04/26/17      Description: Pt will complete tub bench transfer with CGA and min v/cs for WB precautions    Note: Goal Note filed on 05/03/17 1101 by Willow Jama, MS,OTR/L    Goal: STG-Within one week, patient will transfer to tub/shower  Outcome: NOT MET  Pt's daughter continues to req assist with tub/transfer bench transfers            Problem: OT Long Term Goals     Dates: Start: 04/18/17       Goal: LTG-By discharge, patient will complete basic self care tasks     Dates: Start: 04/18/17      Description: 1) Individualized Goal:  MIn A per daughter    2) Interventions:  OT Self Care/ADL, OT Cognitive Skill Dev, OT Community Reintegration, OT Neuro Re-Ed/Balance, OT Therapeutic Activity, OT Evaluation and OT Therapeutic Exercise        Goal: LTG-By discharge, patient will perform bathroom transfers     Dates: Start: 04/18/17      Description: 1) Individualized Goal:  SUpervision maintaining WB precautions    2) Interventions:  OT Self Care/ADL, OT Cognitive Skill Dev, OT Community Reintegration, OT Neuro Re-Ed/Balance, OT Therapeutic Activity, OT Evaluation and OT Therapeutic Exercise              Section completed by:  Willow Jama, MS,OTR/L    Cognitive Linquistic Functions  Comprehension Initial:  3 - Moderate Assistance  Comprehension Current:  4 - Minimal Assistance   Comprehension Description:  Verbal cues  Expression Initial:  5 - Stand-by Prompting/Supervision or Set-up  Expression Current:  5  - Stand-by Prompting/Supervision or Set-up   Expression Description:  Verbal cueing  Social Interaction Initial:  5 - Stand-by Prompting/Supervision or Set-up  Social Interaction Current:  5 - Stand-by Prompting/Supervision or Set-up   Social Interaction Description:  Increased time, Verbal cues  Problem Solving Initial:  3 - Moderate Assistance  Problem Solving Current:  3 - Moderate Assistance   Problem Solving Description:  Verbal cueing, Increased time  Memory Initial:  3 - Moderate Assistance  Memory Current:  2 - Max Assistance   Memory Description:  Verbal cueing, Therapy schedule  Executive Functioning / Organization Initial:  Severe (2)  Executive Functioning / Organization Current:  Severe (2)   Executive Functioning Desciption:  Verbal cues   Swallowing  Swallowing Status:  Regular textures and thin liquids, not following for dysphagia management    Orders Placed This Encounter   Procedures   • DIET ORDER     Standing Status: Standing      Number of Occurrences: 1      Standing Expiration Date:      Order Specific Question:  Diet:     Answer:  Regular [1]     Behavior Modification Plan  Keep instructions simple/concrete, Redirect to task/topic and Analyze tasks (break down into smaller steps)  Assistive Technology  Memory aides: and Low tech: Calendar, planner, schedule, alarms/timers, pill organizer, post-it notes, lists  Family Training/Education:  Ongoing with pt's daughter   DC Recommendations:   24 hour spv   Strengths:  Pleasant and cooperative, Supportive family and Willingly participates in therapeutic activities  Barriers:  Confused, Dementia and Poor carryover of learning  # of short term goals set=2  # of short term goals met=1        Speech Therapy Problems           Problem: Memory STGs     Dates: Start: 04/20/17       Goal: STG-Within one week, patient will     Dates: Start: 04/20/17      Description: 1) Individualized goal:  With recall safety sequences with 60% acc or mod A.    2)  Interventions: SLP Speech Language Treatment, SLP Self Care / ADL Training  and SLP Cognitive Skill Development        Note: Goal Note filed on 05/03/17 1803 by Aj Ragsdale MS,CCC-SLP    Goal: STG-Within one week, patient will  Outcome: NOT MET  Mod-max A required for pt to recall safety sequences             Problem: Problem Solving STGs     Dates: Start: 04/20/17       Goal: STG-Within one week, patient will     Dates: Start: 05/03/17      Description: 1) Individualized goal:   With 70% accuracy and min A.    2) Interventions:  SLP Speech Language Treatment, SLP Self Care / ADL Training  and SLP Cognitive Skill Development              Problem: Speech/Swallowing LTGs     Dates: Start: 04/20/17       Goal: LTG-By discharge, patient will solve basic problems     Dates: Start: 04/20/17      Description: 1) Individualized goal:  With 70% acc with min to mod A.    2) Interventions:  SLP Speech Language Treatment and SLP Self Care / ADL Training     2) Interventions:  SLP Speech Language Treatment, SLP Self Care / ADL Training  and SLP Cognitive Skill Development            Goal: LTG-By discharge, patient will     Dates: Start: 04/20/17                 Section completed by:  Aj Ragsdale MS,CCC-SLP          REHAB-Pharmacy IDT Team Note by Rafael Medina RPH at 5/3/2017  4:18 PM  Version 1 of 1    Author:  Rafael Medina RPH Service:  (none) Author Type:  Pharmacist    Filed:  5/3/2017  4:20 PM Note Time:  5/3/2017  4:18 PM Status:  Signed    :  Rafael Medina RPH (Pharmacist)           Pharmacy  Pharmacy  Antibiotics/Antifungals/Antivirals:  Medication:      Active Orders     None        Route:         None  Stop Date:  N/A  Reason:   Antihypertensives/Cardiac:  Medication:    Orders (72h ago through future)    Start     Ordered    05/03/17 1730  midodrine (PROAMATINE) tablet 15 mg   3 TIMES DAILY WITH MEALS     Comments:  IF BP IS HIGH WHILE SUPINE, THEN RAISE HEAD OF BED TO AT LEAST 40  DEGREES.    05/03/17 1231    05/03/17 1130  midodrine (PROAMATINE) tablet 10 mg   3 TIMES DAILY WITH MEALS,   Status:  Discontinued     Comments:  IF BP IS HIGH WHILE SUPINE, THEN RAISE HEAD OF BED TO AT LEAST 40 DEGREES.    05/03/17 0931    05/01/17 1730  midodrine (PROAMATINE) tablet 15 mg   3 TIMES DAILY WITH MEALS,   Status:  Discontinued     Comments:  IF BP IS HIGH WHILE SUPINE, THEN RAISE HEAD OF BED TO AT LEAST 40 DEGREES.    05/01/17 1525    04/29/17 1630  amlodipine (NORVASC) tablet 2.5 mg   EVERY EVENING      04/28/17 2211    04/29/17 0800  midodrine (PROAMATINE) tablet 10 mg   3 TIMES DAILY WITH MEALS,   Status:  Discontinued     Comments:  IF BP IS HIGH WHILE SUPINE, THEN RAISE HEAD OF BED TO AT LEAST 40 DEGREES.    04/28/17 2211    04/18/17 0900  amiodarone (CORDARONE) tablet 200 mg   DAILY      04/17/17 1724        Patient Vitals for the past 24 hrs:   BP Pulse   05/03/17 1515 144/78 mmHg 70   05/03/17 1016 110/68 mmHg -   05/03/17 0922 (!) 84/47 mmHg -   05/03/17 0919 105/56 mmHg -   05/03/17 0700 146/78 mmHg 70   05/02/17 1925 129/82 mmHg 70   05/02/17 1724 122/68 mmHg -       Anticoagulation:  Medication:  lovenox  INR:      Other key medications:        A review of the medication list reveals no issues at this time. Patient is currently on antihypertensive(s). Recommend home blood pressure monitoring/recording if antihypertensive(s) regimen(s) continue.    Section completed by:  Rafael Medina, [facility]     Kylah MCGRAW  Signed  REHAB-CM IDT Team Note 5/4/2017 11:11 AM      Expand All Collapse All    Case Management   DC Planning  DC destination/dispostion:  Dtr prefers transition to skilled care; [facility] has bed available for tomorrow 5/5  @ 11 w/  as accepting MD.      Referrals: [facility] and [facility]      DC Needs:  Continued therapy in skilled setting.      Barriers to discharge:   Poor safety awareness; needs supervision/sba with mobility and adl's.      Strengths: pleasant, cooperative, supportive     Section completed by:  RONNI Lowe, [facility]                                 Summary:  Dc to [facility] on Friday @ 1100 as requestd by dtr.     Physician Summary  Devon Reynolds MD participated and led team conference discussion.

## 2017-05-05 VITALS
RESPIRATION RATE: 18 BRPM | SYSTOLIC BLOOD PRESSURE: 148 MMHG | OXYGEN SATURATION: 98 % | BODY MASS INDEX: 24.83 KG/M2 | DIASTOLIC BLOOD PRESSURE: 82 MMHG | HEART RATE: 70 BPM | WEIGHT: 134.92 LBS | HEIGHT: 62 IN | TEMPERATURE: 98.1 F

## 2017-05-05 PROCEDURE — 700102 HCHG RX REV CODE 250 W/ 637 OVERRIDE(OP): Performed by: PHYSICAL MEDICINE & REHABILITATION

## 2017-05-05 PROCEDURE — 700102 HCHG RX REV CODE 250 W/ 637 OVERRIDE(OP): Performed by: HOSPITALIST

## 2017-05-05 PROCEDURE — 99232 SBSQ HOSP IP/OBS MODERATE 35: CPT | Performed by: HOSPITALIST

## 2017-05-05 PROCEDURE — A9270 NON-COVERED ITEM OR SERVICE: HCPCS | Performed by: HOSPITALIST

## 2017-05-05 PROCEDURE — 700111 HCHG RX REV CODE 636 W/ 250 OVERRIDE (IP): Performed by: PHYSICAL MEDICINE & REHABILITATION

## 2017-05-05 PROCEDURE — A9270 NON-COVERED ITEM OR SERVICE: HCPCS | Performed by: PHYSICAL MEDICINE & REHABILITATION

## 2017-05-05 RX ORDER — FLUDROCORTISONE ACETATE 0.1 MG/1
0.1 TABLET ORAL EVERY MORNING
Qty: 30 TAB
Start: 2017-05-05 | End: 2018-07-20

## 2017-05-05 RX ORDER — CALCIUM CARBONATE 500(1250)
500 TABLET ORAL
Qty: 90 TAB
Start: 2017-05-05 | End: 2018-07-20

## 2017-05-05 RX ORDER — AMLODIPINE BESYLATE 2.5 MG/1
2.5 TABLET ORAL EVERY EVENING
Qty: 30 TAB
Start: 2017-05-05 | End: 2017-06-16

## 2017-05-05 RX ORDER — OXYBUTYNIN CHLORIDE 5 MG/1
5 TABLET ORAL
Qty: 90 TAB
Start: 2017-05-05 | End: 2018-07-20

## 2017-05-05 RX ORDER — AMOXICILLIN 250 MG
2 CAPSULE ORAL 2 TIMES DAILY
Qty: 30 TAB | Refills: 0
Start: 2017-05-05 | End: 2018-07-20

## 2017-05-05 RX ORDER — ZINC SULFATE 50(220)MG
220 CAPSULE ORAL DAILY
Qty: 30 CAP | Refills: 0 | Status: ON HOLD
Start: 2017-05-05 | End: 2020-02-21

## 2017-05-05 RX ORDER — POTASSIUM CHLORIDE 20 MEQ/1
40 TABLET, EXTENDED RELEASE ORAL DAILY
Qty: 60 TAB | Refills: 11
Start: 2017-05-05 | End: 2018-07-20

## 2017-05-05 RX ORDER — TRAZODONE HYDROCHLORIDE 50 MG/1
50 TABLET ORAL
Qty: 30 TAB | Refills: 3
Start: 2017-05-05

## 2017-05-05 RX ORDER — OMEPRAZOLE 20 MG/1
20 CAPSULE, DELAYED RELEASE ORAL DAILY
Qty: 30 CAP
Start: 2017-05-05 | End: 2017-05-08

## 2017-05-05 RX ORDER — MIDODRINE HYDROCHLORIDE 10 MG/1
10 TABLET ORAL
Qty: 60 TAB
Start: 2017-05-05 | End: 2018-07-20

## 2017-05-05 RX ORDER — AMIODARONE HYDROCHLORIDE 200 MG/1
200 TABLET ORAL DAILY
Qty: 30 TAB | Status: ON HOLD
Start: 2017-05-05 | End: 2020-02-21

## 2017-05-05 RX ORDER — ONDANSETRON 4 MG/1
4 TABLET, ORALLY DISINTEGRATING ORAL EVERY 4 HOURS PRN
Qty: 10 TAB | Refills: 0
Start: 2017-05-05 | End: 2018-07-20

## 2017-05-05 RX ORDER — ASPIRIN 81 MG/1
81 TABLET ORAL DAILY
Qty: 30 TAB
Start: 2017-05-05 | End: 2018-07-20

## 2017-05-05 RX ADMIN — FLUDROCORTISONE ACETATE 0.1 MG: 0.1 TABLET ORAL at 08:26

## 2017-05-05 RX ADMIN — OMEPRAZOLE 20 MG: 20 CAPSULE, DELAYED RELEASE ORAL at 08:25

## 2017-05-05 RX ADMIN — ASPIRIN 81 MG: 81 TABLET, COATED ORAL at 08:25

## 2017-05-05 RX ADMIN — STANDARDIZED SENNA CONCENTRATE AND DOCUSATE SODIUM 2 TABLET: 8.6; 5 TABLET, FILM COATED ORAL at 08:25

## 2017-05-05 RX ADMIN — ENOXAPARIN SODIUM 40 MG: 100 INJECTION SUBCUTANEOUS at 08:25

## 2017-05-05 RX ADMIN — POTASSIUM CHLORIDE 40 MEQ: 1500 TABLET, EXTENDED RELEASE ORAL at 08:25

## 2017-05-05 RX ADMIN — MIDODRINE HYDROCHLORIDE 10 MG: 2.5 TABLET ORAL at 08:26

## 2017-05-05 RX ADMIN — THERA TABS 1 TABLET: TAB at 08:26

## 2017-05-05 RX ADMIN — CHOLECALCIFEROL TAB 25 MCG (1000 UNIT) 1000 UNITS: 25 TAB at 08:25

## 2017-05-05 RX ADMIN — Medication 220 MG: at 08:25

## 2017-05-05 RX ADMIN — Medication 500 MG: at 08:26

## 2017-05-05 RX ADMIN — AMIODARONE HYDROCHLORIDE 200 MG: 200 TABLET ORAL at 08:26

## 2017-05-05 ASSESSMENT — ENCOUNTER SYMPTOMS
ABDOMINAL PAIN: 0
DIARRHEA: 0
VOMITING: 0
NAUSEA: 0
NERVOUS/ANXIOUS: 0
FEVER: 0
SHORTNESS OF BREATH: 0
CHILLS: 0

## 2017-05-05 ASSESSMENT — PAIN SCALES - GENERAL: PAINLEVEL_OUTOF10: 0

## 2017-05-05 NOTE — DISCHARGE SUMMARY
Rehab Discharge Note    Admission Diagnosis:   Active Hospital Problems    Diagnosis   • Hip fracture (CMS-[facility])       Discharge Diagnosis:  Active Hospital Problems    Diagnosis   • Hip fracture (CMS-[facility])       Hospital Course    The patient is an 81-year-old female.  She was at [facility] and  had syncopal episode. She was brought April 9, 2017 to [facility] and found to have a femoral neck fracture.   Echocardiogram on 4/10/2017 with left ventricular ejection fraction 70%,  moderate concentric left ventricular hypertrophy, aortic sclerosis without  stenosis, RVSP with moderate pulmonary hypertension.  Carotid duplex on  4/11/2017 showed mild carotid atherosclerotic plaque with no stenosis or  occlusion noted.  Chest x-ray on April 10th shows portable fluoroscopy for    surgical repair.  X-ray of the femur, 2 view, on 4/9/2017 showed no change in  acutely mildly impacted left mid femoral neck fracture.  Mid and distal femur  are intact.  There is moderate degenerative change in the knee.  CT head shows white matter lucencies most consistent with small vessel ischemic change  versus demyelination or gliosis.  Otherwise, no acute findings.  CT hip  without contrast showed mildly impacted left subcapital femoral neck fracture.  Moderate degenerative change in both the hip joints.  There is moderate  degenerative change in the right SI joint, possibly sequelae of prior  sacroiliitis.  CTA of the chest on April 9 showed no evidence of acute  pulmonary embolism, no pneumonia or effusion.  There is atherosclerosis.his was surgically repaired by SURGEON:  Harinder Leblanc MD   during course of hospitalization.  During the  patient's hospitalization, she was having confusion secondary to medications  and anesthesia.  She eventually had improvement of this during her  hospitalization to what appeared to be her baseline. The patient requires assistance with self-care and mobility.    Patient was evaluated by  Rehab Medicine physician and therapists and determined to be appropriate for acute inpatient rehab and was transferred to [facility] on 2017.      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 Patient is on toe-touch weightbearing on the involved leg she has some difficulties in hearing to these precautions which made functional progress limited as she cannot safely do stairs and still required standby assistance and prompting for ADLs and max assistance for ambulation. Therefore the decision was made to discharge the patient to a skilled facility for further rehabilitation prior to returning home            Functional Status at Discharge  Eatin - Modified Independent  Eating Description:  Increased time  Groomin - Standby Prompting/Supervision or Set-up  Grooming Description:  Increased time, Supervision for safety  Bathin - Minimal Assistance  Bathing  Description:  Grab bar, Tub bench, Long handled bath tool, Supervision for safety, Verbal cueing (v/cs for safety, CGA in stance to wash buttocks, v/cs for sequencing and thoroughness, LHBS to wash LLE.  Daughter present, however req intermittent assist with bathing for cues and CGA)  Upper Body Dressin - Standby Prompting/Supervision or Set-up  Upper Body Dressing Description:   (Seated on manual wc, SBA/Sup to don/doff pull over shirt.)  Lower Body Dressin - Standby Prompting/Supervsion or Set-up  Lower Body Dressing Description:  5 - Standby Prompting/Supervsion or Set-up  Discharge Location : Skilled Nursing Facility  Patient Discharging with Assist of: Other (See Comments)  Level of Supervision Required: Intermittent Supervision  Recommended Equipment for Discharge: None  Recommended Services Upon Discharge: Other (See Comments) (SNF)  Long Term Goals Met: 1  Long Term Goals Not Met: 1  Reason(s) for Goals Not Met: TTWB, cognition  Criteria for Termination of Services: Maximum Function Achieved for Inpatient Rehabilitation  Walk:  2 - Max Assistance  Distance Walked:  Walks  feet  Walk Description:  Walker, Supervision for safety, Safety concerns, Extra time  Wheelchair:  5 - Standby Prompting/Supervision or Set-up  Distance Propelled:  Propels a minimum of 150 feet   Wheelchair Description:  Extra time, Verbal cueing  Stairs 0 - Not tested,unsafe activity  Stairs Description      Comprehension Mode:  Auditory  Comprehension:  4 - Minimal Assistance  Comprehension Description:  Verbal cues  Expression Mode:  Vocal  Expression:  5 - Stand-by Prompting/Supervision or Set-up  Expression Description:  Verbal cueing  Social Interaction:  5 - Stand-by Prompting/Supervision or Set-up  Social Interaction Description:  Increased time, Verbal cues  Problem Solving:  3 - Moderate Assistance  Problem Solving Description:  Verbal cueing, Increased time  Memory:  2 - Max Assistance  Memory Description:   Verbal cueing, Therapy schedule  Progress since Admit: Pt has made good, but slow progress since her admission.  She does have baseline memory deficits, but with repetition was able to increase her ability to recall safety information and transfer sequences.  Pt still requires mod A for orientation and min-mod A for basic problem solving.   Discharge Location : Skilled Nursing Facility  Patient Discharging with Assist of: Other (See Comments) (SNF)  Level of Supervision Required: 24 Hour Supervision  Recommended Services Upon Discharge: Home Health Speech Therapy  Long Term Goals Met: 1/1  Long Term Goals Not Met: 0/1  Reason(s) for Goals Not Met: n/a  Criteria for Termination of Services: Maximum Function Achieved for Inpatient Rehabilitation    Discharge Medication:     Medication List      START taking these medications       Instructions    amlodipine 2.5 MG Tabs   Last time this was given:  2.5 mg on 5/4/2017  5:21 PM   Commonly known as:  NORVASC    Take 1 Tab by mouth every evening.   Dose:  2.5 mg       calcium carbonate 500 MG Tabs   Last time this was given:  500 mg on 5/5/2017  8:26 AM   Commonly known as:  OS-DIANNA 500    Take 1 Tab by mouth 3 times a day, with meals.   Dose:  500 mg       Cholecalciferol 1000 UNIT Tabs   Last time this was given:  1,000 Units on 5/5/2017  8:25 AM   Commonly known as:  VITAMIND D3    Take 1 Tab by mouth every day.   Dose:  1000 Units       enoxaparin 40 MG/0.4ML Soln inj   Last time this was given:  40 mg on 5/5/2017  8:25 AM   Commonly known as:  LOVENOX    Inject 40 mg as instructed every day.   Dose:  40 mg       fludrocortisone 0.1 MG Tabs   Last time this was given:  0.1 mg on 5/5/2017  8:26 AM   Commonly known as:  FLORINEF    Take 1 Tab by mouth every morning.   Dose:  0.1 mg       FORMULATION R 0.25-3-14-71.9 % Oint   Last time this was given:  5/5/2017  2:49 AM    Apply bid prn       miconazole 2 % Crea   Last time this was given:  1 Applicator on 5/4/2017  9:25  PM   Commonly known as:  MONISTAT    Insert 1 Applicator in vagina every evening.   Dose:  1 Applicator       midodrine 10 MG tablet   Last time this was given:  10 mg on 5/5/2017  8:26 AM   Commonly known as:  PROAMATINE    Take 1 Tab by mouth 3 times a day, with meals.   Dose:  10 mg       multivitamin Tabs   Last time this was given:  1 Tab on 5/5/2017  8:26 AM    Take 1 Tab by mouth every day.   Dose:  1 Tab       ondansetron 4 MG Tbdp   Commonly known as:  ZOFRAN ODT    Take 1 Tab by mouth every four hours as needed for Nausea/Vomiting (give PO if IV route is unavailable. May give per feeding tube.).   Dose:  4 mg       oxybutynin 5 MG Tabs   Last time this was given:  5 mg on 5/4/2017  9:16 PM   Commonly known as:  DITROPAN    Take 1 Tab by mouth every bedtime.   Dose:  5 mg       potassium chloride SA 20 MEQ Tbcr   Last time this was given:  40 mEq on 5/5/2017  8:25 AM   Commonly known as:  Kdur    Take 2 Tabs by mouth every day.   Dose:  40 mEq       senna-docusate 8.6-50 MG Tabs   Last time this was given:  2 Tabs on 5/5/2017  8:25 AM   Commonly known as:  PERICOLACE or SENOKOT S    Take 2 Tabs by mouth 2 Times a Day.   Dose:  2 Tab       trazodone 50 MG Tabs   Last time this was given:  50 mg on 5/4/2017  9:16 PM   Commonly known as:  DESYREL    Take 1 Tab by mouth every bedtime.   Dose:  50 mg       zinc sulfate 220 MG Caps   Last time this was given:  220 mg on 5/5/2017  8:25 AM   Commonly known as:  ZINCATE    Take 1 Cap by mouth every day.   Dose:  220 mg         CHANGE how you take these medications       Instructions    amiodarone 200 MG Tabs   What changed:    - when to take this  - additional instructions   Last time this was given:  200 mg on 5/5/2017  8:26 AM   Commonly known as:  CORDARONE    Take 1 Tab by mouth every day.   Dose:  200 mg       aspirin 81 MG EC tablet   What changed:  additional instructions   Last time this was given:  81 mg on 5/5/2017  8:25 AM    Take 1 Tab by mouth every  "day.   Dose:  81 mg         CONTINUE taking these medications       Instructions    omeprazole 20 MG delayed-release capsule   Last time this was given:  20 mg on 5/5/2017  8:25 AM   Commonly known as:  PRILOSEC    Take 1 Cap by mouth every day.   Dose:  20 mg             Equipment:  None    Follow-up:    Discharge Date:  Friday 5/5/2017 @ 11:00am     Discharge Location  [facility]/ [place] time 11:00am.    Dr. Rodriguez is accepting MD     Medical Equipment that has been recommended is:       Other:  These items will be ordered by [facility] if still needed.     Patient was attending the [facility] 5 days a week,  and has services through [facility].               Follow-up With  Why  Contact Info    Dr. Harinder Leblanc, [facility]  5/16/20176 Ortho-Tuesday @ 10:45am.   [address]  [address]  [place] [address]  [phone]      Dr. Mcghee-  Primary Care.   Please follow up with your primary care once you are discharged from [facility].   [address], [address]  [place] [id number] [phone]                     Condition on Discharge:    Good condition    /82 mmHg  Pulse 70  Temp(Src) 36.7 °C (98.1 °F)  Resp 18  Ht 1.575 m (5' 2\")  Wt 61.2 kg (134 lb 14.7 oz)  BMI 24.67 kg/m2  SpO2 98%     Cardiac: regular rate and rhythm, nl S1/S2   Lungs: clear to auscultation bilaterally.   Abdomen: soft; NT, ND, BS present.   Extremities: minimal edema noted  Bilaterally  Incision: Clean and dry    Greater than 40 minutes were spent in total in the preparation of the patient's discharge. This included clinical coordination. reconciliation of medications and preparation of this report      Devon Reynolds M.D.             Neuro: No change  "

## 2017-05-05 NOTE — DISCHARGE INSTRUCTIONS
[facility] NURSING DISCHARGE INSTRUCTIONS    Blood Pressure : 147/84 mmHg  Weight: 61.2 kg (134 lb 14.7 oz)  Nursing recommendations for Marleen Mix at time of discharge are as follows:  Client and Family Member verbalized understanding of all discharge instructions and prescriptions.     Review all your home medications and newly ordered medications with your doctor and/or pharmacist. Follow medication instructions as directed by your doctor and/or pharmacist.    Pain Management:   Discharge Pain Medication Instructions:  Comfort Goal: Comfort at Rest, Comfort with Movement, Perform Activity, Stay Alert  Notify your primary care provider if pain is unrelieved with these measures, if the pain is new, or increased in intensity.    Discharge Skin Characteristics: Warm (dry heels)  Discharge Skin Exam: Clear  Surgical Incision  Incision Left Lateral Hip (Active)   Wound Bed Pink 5/4/2017 10:00 AM   Drainage  None 5/4/2017 10:00 AM   Periwound Skin Normal;Intact 5/4/2017 10:00 AM   Daily - Wound Closure Open to Air 5/4/2017 10:00 AM   Dressing Options Open to Air 5/4/2017 10:00 AM   Dressing Status / Change Clean;Dry;Intact 4/25/2017  9:00 AM   Daily - Dressing Change Observed 4/25/2017  9:00 AM   Dressing Change Frequency As Needed 4/25/2017  9:00 AM   Weekly Photo (Inpatient Only) 04/30/17 5/3/2017  7:15 AM     Skin / Wound Care Instructions: Please contact your primary care physician for any change in skin integrity.     If You Have Surgical Incisions / Wounds:  Monitor surgical site(s) for signs of increased swelling, redness or symptoms of drainage from the site or fever as this could indicate signs and symptoms of infection. If these symptoms are noted, notifiy your primary care provider.      Discharge Safety Instructions: Should Not Be Left Alone In The House     Discharge Safety Concerns: Wandering, Balance Problems (Dizziness, Light Headedness), History Of Falls, Unsteady Gait,  Weakness (forgetfulness)  The interdisciplinary team has made recommendation that you should not be left alone  in the house due to balance problem, history of falls, weakness and unsteady gait  Anti-embolic stockings are required during the day and off at night to increase circulation to the lower extremities.    Discharge Diet: regular     Discharge Liquids: thin  Discharge Bowel Function: Continent  Please contact your primary care physician for any changes in bowel habits.  Discharge Bowel Program:    Discharge Bladder Function: Continent  Discharge Urinary Devices:        Nursing Discharge Plan:   Influenza Vaccine Indication: Patient Refuses    Depression / Suicide Risk    As you are discharged from this [facility] facility, it is important to learn how to keep safe from harming yourself.    Recognize the warning signs:  · Abrupt changes in personality, positive or negative- including increase in energy   · Giving away possessions  · Change in eating patterns- significant weight changes-  positive or negative  · Change in sleeping patterns- unable to sleep or sleeping all the time   · Unwillingness or inability to communicate  · Depression  · Unusual sadness, discouragement and loneliness  · Talk of wanting to die  · Neglect of personal appearance   · Rebelliousness- reckless behavior  · Withdrawal from people/activities they love  · Confusion- inability to concentrate     If you or a loved one observes any of these behaviors or has concerns about self-harm, here's what you can do:  · Talk about it- your feelings and reasons for harming yourself  · Remove any means that you might use to hurt yourself (examples: pills, rope, extension cords, firearm)  · Get professional help from the community (Mental Health, Substance Abuse, psychological counseling)  · Do not be alone:Call your Safe Contact- someone whom you trust who will be there for you.  · Call your local CRISIS HOTLINE 909-8957 or 144-781-6075  · Call  your local Children's Mobile Crisis Response Team [place] (936) 467-5207 or www.[address].LiveOnDemand  · Call the toll free National Suicide Prevention Hotlines   · National Suicide Prevention Lifeline 826-848-UOKV (9194)  · National Hope Line Network 800-SUICIDE (554-1567)        Case Management Discharge Instructions:   Discharge Location: Skilled Nursing Facility  Agency Name/Address/Phone: [place] [address]  Home Health:    Outpatient Services:    DME Provider/Phone:    Medical Equipment Ordered: Front Wheel Walker, Three in One Commode, Wheelchair (Tub transfer bench)  Prescription Faxed to:        Discharge Medication Instructions:  Below are the medications your physician expects you to take upon discharge:      Occupational Therapy Discharge Instructions for Marleen Mix    5/4/2017    Level of Assist Required for Eating: Able to Complete Eating without Assist  Level of Assist Required for Grooming: Requires Supervision with Grooming  Level of Assist Required for Dressing: Requires Physical Assist with Dressing  Equipment for Dressing: Sock Aid, Reacher, Dressing Stick  Level of Assist Required for Toileting: Requires Physical Assist with Toileting  Level of Assist Required for Toilet Transfer: Requires Physical Assist with Toilet Transfer  Equipment for Toilet Transfer: Grab Bars by Toilet, Other (Front wheeled walker)  Level of Assist Required for Bathing: Requires Physical Assist with Bathing  Equipment for Bathing: Shower Chair, Grab Bars in Tub / Shower, Hand Held Shower Head, Long Handled Sponge  Level of Assist Required for Shower Transfer: Requires Physical Assist with Shower Transfer  Equipment for Shower Transfer: Shower Chair, Grab Bars in Tub / Shower, Other (Front wheeled walker)  Level of Assist Required for Home Mgmt: Requires Physical Assist with Home Management  Level of Assist Required for Meal Prep: Requires Physical Assist with Meal Preparation  Driving: May not Drive, Please Contact  Physician for Further Information  Home Exercise Program: None Issued      Speech Therapy Discharge Instructions for Marleen Mix    5/4/2017    Diet: Regular - all foods allowed, Thin Liquids - any liquid like water, coffee, tea, juices, or clear fluids like Gatorade, etc.  Swallow Strategies: none required   Supervision: 24 hour supervision is recommended for safety   Cognition / Communication: It is recommended that Marleen use a memory notebook when at home to keep track of important appointment information, medications, and any other information she might want to remember.  Make sure you slow down and only complete one task at a time.  When completing difficult activities make sure you do them in a quiet location, free from distractions so you are able to focus.   Break complex problems down into smaller parts.  Take breaks during tasks when you feel you are having difficulty maintaining attention.  Pay attention to your fatigue level, you may need more assistance with activities if you are tired.   Physical Therapy Discharge Instructions for Marleen Mix    5/5/2017    Weight Bearing Status - Patient Should: Bear Toe-Touch Weight Left Leg, Bear Weight as Tolerated Right Arm, Bear Weight as Tolerated Left Arm  Level of Assist Required for Ambulation: Supervision on Flat Surfaces, Should Not Attempt Stairs at This Time, Should Not Attempt Curbs at This Time  Distance Patient May Ambulate: 150 ft or more as tolerated, toe touch wt, bearing  Device Recommended for Ambulation: Front-Wheeled Walker  Level of Assist Required to Propel Wheelchair: Intermittent Physical Assist  Level of Assist Required for Transfers: Supervision  Device Recommended for Transfers: Front-Wheeled Walker  Home Exercise Program:  (continue with physical therapy)  Prosthesis / Orthosis Recommendation / Location: No Prosthesis  or Orthosis Recommended  Marleen, it was great to know and to help you get better. Remain cheerful and happy  always, best wishes for continued success, Olman ALANIZ, MEd

## 2017-05-05 NOTE — CARE PLAN
A/o x2 with moderate confusion and forgetfulness noted. Compliant with all prescribed HS meds without s/s of ADR noted. Denied any pain/discomfort. Administered Trazodone as scheduled, effective. At midnight, CNA helped pt to the bathroom and back to bed. Pt keeps pressing call light after and would ask if she urinated already. Safety precautions in place. Call light within reach.    Problem: Safety  Goal: Will remain free from injury  Intervention: Provide assistance with mobility  Assisted pt to the bathroom. Reminded to use call light.      Problem: Infection  Goal: Will remain free from infection  Intervention: Assess signs and symptoms of infection  Assessed L hip surgical incision, healing well, no s/s of infection noted. Open to air.

## 2017-05-05 NOTE — PROGRESS NOTES
Hospital Medicine Progress Note, Adult, Complex               Author: Obed Pierre Date & Time created: 5/5/2017  8:50 AM     Interval History:  No significant events or changes since last visit  Patient denies SOB, cough, or diarrhea  Patient slept ok last night  Continue managing hypertension, orthostatic hypotension      Review of Systems:  Review of Systems   Constitutional: Negative for fever and chills.   Respiratory: Negative for shortness of breath.    Cardiovascular: Negative for chest pain.   Gastrointestinal: Negative for nausea, vomiting, abdominal pain and diarrhea.   Psychiatric/Behavioral: The patient is not nervous/anxious.        Physical Exam:  Physical Exam   Constitutional: She is oriented to person, place, and time. She appears well-nourished.   HENT:   Head: Atraumatic.   Eyes: Conjunctivae are normal. Pupils are equal, round, and reactive to light.   Neck: Normal range of motion. Neck supple.   Cardiovascular: Normal rate, regular rhythm, S1 normal and S2 normal.    Pulmonary/Chest: Effort normal and breath sounds normal.   Abdominal: Soft. Bowel sounds are normal. Hernia confirmed negative in the right inguinal area and confirmed negative in the left inguinal area.   Neurological: She is alert and oriented to person, place, and time. No sensory deficit.   Skin: Skin is warm and dry. No cyanosis.   Psychiatric: She has a normal mood and affect. Her behavior is normal.   Nursing note and vitals reviewed.      Labs:        Invalid input(s): [id number]      No results for input(s): SODIUM, POTASSIUM, CHLORIDE, CO2, BUN, CREATININE, MAGNESIUM, PHOSPHORUS, CALCIUM in the last 72 hours.  No results for input(s): ALTSGPT, ASTSGOT, ALKPHOSPHAT, TBILIRUBIN, DBILIRUBIN, GAMMAGT, AMYLASE, LIPASE, ALB, PREALBUMIN, GLUCOSE in the last 72 hours.  No results for input(s): RBC, HEMOGLOBIN, HEMATOCRIT, PLATELETCT, PROTHROMBTM, APTT, INR, IRON, FERRITIN, TOTIRONBC in the last 72 hours.             Hemodynamics:  Temp (24hrs), Av.8 °C (98.2 °F), Min:36.7 °C (98.1 °F), Max:36.8 °C (98.2 °F)  Temperature: 36.7 °C (98.1 °F)  Pulse  Av.1  Min: 56  Max: 86   Blood Pressure : 148/82 mmHg     Respiratory:    Respiration: 18, Pulse Oximetry: 98 %        RUL Breath Sounds: Clear, RML Breath Sounds: Clear, RLL Breath Sounds: Clear, MARILOU Breath Sounds: Clear, LLL Breath Sounds: Clear  Fluids:    Intake/Output Summary (Last 24 hours) at 17 0850  Last data filed at 17 2017   Gross per 24 hour   Intake    780 ml   Output      0 ml   Net    780 ml        GI/Nutrition:  Orders Placed This Encounter   Procedures   • DIET ORDER     Standing Status: Standing      Number of Occurrences: 1      Standing Expiration Date:      Order Specific Question:  Diet:     Answer:  Regular [1]     Medical Decision Making, by Problem:  Active Hospital Problems    Diagnosis   • Hip fracture (CMS-HCC) [S72.009A]     *  S/P Left Hip Surgery  2nd to fall, 2nd to lightheadedness  Has hx of falls and      *  Orthostatic Hypotension  (): SBP was 150 and Midodrine was held   TSH: ok  Cortisol: ok  S/P IVF's x a few runs  Off salt tabs (5/3)  On Florinef: 0.1 mg bid --> 0.1 mg qam ()  On Midodrine: 15 mg tid --> 10 mg tid ()  Consider decreasing Midodrine again and observing    *  Hypertension  SBP recently 140s   On Norvasc: 2.5 mg daily  Note: also on Florinef & Midodrine  Cont to monitor    *  Hypo-K+  K+: 3.0 --> 3.5 ()  2nd to Florinef  On KCL: 40 meq daily   Monitor    *  Azotemia  Bun: 25 --> 28 ()  Encouraging fluid (water/juice) intake  Monitor    *  Tachybrady Syndrome: has permanent pacemaker placement  *  Dementia      Labs reviewed and Medications reviewed        DVT Prophylaxis: Enoxaparin (Lovenox)

## 2017-05-05 NOTE — PROGRESS NOTES
Patient discharged to [facility] per order.  Discharge instructions reviewed with patient and Daughter; they verbalize understanding and signed copies placed in chart.  Patient has all belongings; signed copy of form in chart.  Patient left facility at 1100 via w/c accompanied by [facility] transport staff and daughter. Report given to RN.   Have enjoyed working with this pleasant patient.

## 2017-05-08 ENCOUNTER — TELEPHONE (OUTPATIENT)
Dept: INTERNAL MEDICINE | Facility: MEDICAL CENTER | Age: 82
End: 2017-05-08

## 2017-06-16 ENCOUNTER — OFFICE VISIT (OUTPATIENT)
Dept: INTERNAL MEDICINE | Facility: MEDICAL CENTER | Age: 82
End: 2017-06-16
Payer: MEDICAID

## 2017-06-16 VITALS
DIASTOLIC BLOOD PRESSURE: 94 MMHG | SYSTOLIC BLOOD PRESSURE: 142 MMHG | TEMPERATURE: 97.9 F | OXYGEN SATURATION: 95 % | HEIGHT: 61 IN | HEART RATE: 70 BPM | WEIGHT: 136 LBS | BODY MASS INDEX: 25.68 KG/M2

## 2017-06-16 DIAGNOSIS — R41.89 COGNITIVE IMPAIRMENT: ICD-10-CM

## 2017-06-16 DIAGNOSIS — F41.9 ANXIETY: ICD-10-CM

## 2017-06-16 DIAGNOSIS — G47.00 INSOMNIA, UNSPECIFIED TYPE: ICD-10-CM

## 2017-06-16 DIAGNOSIS — N32.81 OAB (OVERACTIVE BLADDER): ICD-10-CM

## 2017-06-16 DIAGNOSIS — Z96.642 STATUS POST HIP REPLACEMENT, LEFT: ICD-10-CM

## 2017-06-16 DIAGNOSIS — I95.1 ORTHOSTATIC HYPOTENSION: ICD-10-CM

## 2017-06-16 DIAGNOSIS — I48.91 ATRIAL FIBRILLATION, UNSPECIFIED TYPE (HCC): ICD-10-CM

## 2017-06-16 DIAGNOSIS — K21.9 GASTROESOPHAGEAL REFLUX DISEASE WITHOUT ESOPHAGITIS: ICD-10-CM

## 2017-06-16 DIAGNOSIS — Z95.0 PACEMAKER: ICD-10-CM

## 2017-06-16 PROCEDURE — 99215 OFFICE O/P EST HI 40 MIN: CPT | Performed by: INTERNAL MEDICINE

## 2017-06-16 RX ORDER — (SALINE) 19; 7 G/133ML; G/133ML
ENEMA RECTAL
COMMUNITY
End: 2018-07-20

## 2017-06-16 RX ORDER — ACETAMINOPHEN 325 MG/1
650 TABLET ORAL EVERY 4 HOURS PRN
Status: ON HOLD | COMMUNITY
End: 2020-02-21

## 2017-06-16 NOTE — PROGRESS NOTES
"New Patient to Establish    Reason to establish: PCP switch    Chief Complaint   Patient presents with   • Establish Care   • Hip Injury     Broke Hip in April 9,2017. was in hospital x1 week. then 2 weeks in rehab and fall   • Pacemaker Check/Dysfunction     Has a Pace Maker   • Fall     HX of falling   • Dizziness     HX.   • Hypotension     HX   • Memory Loss     Short Term.        HPI  History was obtained from the patient, family (DTR), and a medical record review.   Per DTR, has seen \"a million\" doctors and is looking for a PCP.   Currently at SNF getting rehab and possible LTC.   Explained that I could see her but I would be unable to write orders until she leaves the SNF.    She and DTR are agreeable.    Pt has been having memory diff in last few years, worse in last year, randy after 2016 for MRSA infection and recent fall/fracture.    In the hospital in 2016, pt was delirious - per dtr, pt was \"psychotic.\"  Currently at SNF getting PT.  Fractured hip.  ORIF done.  Hardware is moving.  Ortho considering a revision per DTR.      THREE CHRONIC CONDITIONS  A fib, stable  HTN, stable  Syncope, stable    Past Medical History   Diagnosis Date   • Hypertension    • Atrial fibrillation (CMS-HCC)    • Pacemaker    • Macular degeneration    • Cataract    • Hemorrhoids    • Recurrent UTI    • Personal history of fall      April 2017 c/b L hip fracture needing repair   • Dementia    • Anxiety    • Insomnia    • Yeast infection    • Syncope      May 2017   • History of MRSA infection      in [place] 2016       Past Surgical History   Procedure Laterality Date   • Abdominal hysterectomy total     • Hip cannulated screw Left 4/10/2017     Procedure: HIP CANNULATED SCREW;  Surgeon: Harinder Leblanc M.D.;  Location: SURGERY [facility];  Service:    • Tonsillectomy         Current Outpatient Prescriptions   Medication Sig Dispense Refill   • acetaminophen (TYLENOL) 325 MG Tab Take 650 mg by mouth every four hours as " needed.     • Dextromethorphan-Benzocaine (SORE THROAT & COUGH LOZENGES MT) Spray  in mouth/throat as needed.     • Magnesium Hydroxide (MILK OF MAGNESIA PO) Take  by mouth.     • Sodium Phosphates (ENEMA) Enema Insert  in rectum.     • esomeprazole (NEXIUM) 20 MG capsule Take 1 Cap by mouth every morning before breakfast. 90 Cap 3   • amiodarone (CORDARONE) 200 MG Tab Take 1 Tab by mouth every day. 30 Tab    • aspirin EC 81 MG EC tablet Take 1 Tab by mouth every day. 30 Tab    • zinc sulfate (ZINCATE) 220 MG Cap Take 1 Cap by mouth every day. 30 Cap 0   • vitamin D (VITAMIND D3) 1000 UNIT Tab Take 1 Tab by mouth every day. 60 Tab    • trazodone (DESYREL) 50 MG Tab Take 1 Tab by mouth every bedtime. 30 Tab 3   • senna-docusate (PERICOLACE OR SENOKOT S) 8.6-50 MG Tab Take 2 Tabs by mouth 2 Times a Day. 30 Tab 0   • calcium carbonate (CALCIUM CARBONATE) 500 MG Tab Take 1 Tab by mouth 3 times a day, with meals. 90 Tab    • fludrocortisone (FLORINEF) 0.1 MG Tab Take 1 Tab by mouth every morning. 30 Tab    • miconazole (MONISTAT) 2 % Cream Insert 1 Applicator in vagina every evening. 1 Tube 0   • FORMULATION R 0.25-3-14-71.9 % Ointment Apply bid prn     • midodrine (PROAMATINE) 10 MG tablet Take 1 Tab by mouth 3 times a day, with meals. 60 Tab    • multivitamin (THERAGRAN) Tab Take 1 Tab by mouth every day. 30 Tab    • ondansetron (ZOFRAN ODT) 4 MG TABLET DISPERSIBLE Take 1 Tab by mouth every four hours as needed for Nausea/Vomiting (give PO if IV route is unavailable. May give per feeding tube.). 10 Tab 0   • oxybutynin (DITROPAN) 5 MG Tab Take 1 Tab by mouth every bedtime. 90 Tab    • potassium chloride SA (KDUR) 20 MEQ Tab CR Take 2 Tabs by mouth every day. 60 Tab 11     No current facility-administered medications for this visit.       Allergies as of 06/16/2017   • (No Known Allergies)       Social History     Social History   • Marital Status:      Spouse Name: N/A   • Number of Children: N/A   • Years of  "Education: N/A     Occupational History   • Not on file.     Social History Main Topics   • Smoking status: Never Smoker    • Smokeless tobacco: Never Used   • Alcohol Use: 0.0 oz/week     0 Standard drinks or equivalent per week      Comment: kellie 1 shot per day   • Drug Use: No   • Sexual Activity: Not on file     Other Topics Concern   • Not on file     Social History Narrative    From [facility].     Living at [facility].      Goal is to get back home with DTR.    Completed HS.         Family History   Problem Relation Age of Onset   • Alzheimer's Disease Mother    • Heart Disease Father        ROS:   A complete 14-system review of systems was obtained and is otherwise negative except as stated in the history of present illness, below, and/or the pre-visit questionnaire.      /94 mmHg  Pulse 70  Temp(Src) 36.6 °C (97.9 °F)  Ht 1.562 m (5' 1.5\")  Wt 61.689 kg (136 lb)  BMI 25.28 kg/m2  SpO2 95%    Physical Exam  General:  Alert and oriented.  No apparent distress.    Eyes: Pupils equal and reactive to light. No scleral icterus. No conjunctival injection.      Ears:  Ear canals patent. Tympanic membranes visualized.    ENMT: Moist mucous membranes. Oropharynx clear. No erythema or exudates noted.     Neck: Supple. Trachea midline.    Resp: Clear to auscultation bilaterally. No rales, rhonchi, or wheezes.    Cardiovascular: Regular rate and normal rhythm. No murmurs, rubs or gallops. 2+ radial and dorsalis pedis pulses.     Abdomen: Soft, nontender, nondistended. Positive bowel sounds.     Musculoskeletal: No clubbing, cyanosis, trc edema L slightly worse than R.    Skin: Multiple SKs, one on back of L knee  Thickened yellow toenails  Lymph: No cervical lymphadenopathy appreciated.    Neuro: Cranial nerves II through XII intact.    Psych: Mood euthymic. Affect congruent.    Other:     Labs/Studies  CT May 2017    FINDINGS:  The calvariae and skull base are unremarkable. There are no extraaxial fluid " collections. The ventricular system and basal cisterns are within normal limits. There are areas of hypodensity in the white matter most consistent with small vessel ischemic   change versus demyelination or gliosis. There are no hemorrhagic lesions. There are no mass effects or shift of midline structures.    The brainstem and posterior fossa structures are unremarkable.    Paranasal sinuses in the field of view are unremarkable.    Mastoids in the field of view are unremarkable.           Impression        1.  White matter lucencies most consistent with small vessel ischemic change versus demyelination or gliosis.    2.  Otherwise, Head CT without contrast with no acute findings. No evidence of acute cerebral infarction, hemorrhage or mass lesion.       Assessment and Plan    1. Cognitive impairment     2. Insomnia, unspecified type     3. Anxiety     4. Status post hip replacement, left     5. Pacemaker     6. Atrial fibrillation, unspecified type (CMS-HCC)     7. Gastroesophageal reflux disease without esophagitis     8. OAB (overactive bladder)     9. Orthostatic hypotension       Re: cogn impairment, in Feb 2017, Segun noted that she had MCI.  It is possible she could have progressed to a dementia as she is needing help with day to day functioning.  CT brain showed some vasc disease May 2017.  Unable to get brain MRI given PM.  TSH done in last few months was WNL.  Needs a B12 and folate if not done in last 6 months.  Also, consider neurocogn testing to get baseline.  Regarding anxiety, she is off of BZDs and seems to be controlled with behavioral measures.  In future, if needed, consider SSRI with anxiolytic properties.  Re: sleep, she sleeps well on trazadone.  OAB sxs at night seem to be controlled with this as well.   Re: a fib and PM, she needs to est with card and be seen at least q6m to assess PM.    GERD controlled with PPI  HM - defer until after SNF.     RTC after d/c from SNR or PRN sooner.      All questions were answered to the patient's and/or family's satisfaction and patient and/or family was/were appreciative of the time spent.      I spent over 40 minutes in face-to-face time with this patient and/or family and/or friends of which > 50% was devoted to counseling.  Topics included problems 1-9 above.    Follow-up  Return after d/c'd from SNF.    Signed by: Jory Funes M.D.

## 2017-06-16 NOTE — MR AVS SNAPSHOT
"Marleen Mix   2017 8:00 AM   Office Visit   MRN: [id number]    Department:  [facility] - Internal Med   Dept Phone:  [phone]    Description:  Female : 1935   Provider:  Jory Funes M.D.           Reason for Visit     Establish Care     Hip Injury Broke Hip in . was in hospital x1 week. then 2 weeks in rehab and fall    Pacemaker Check/Dysfunction Has a Pace Maker    Fall HX of falling    Dizziness HX.    Hypotension HX    Memory Loss Short Term.       Allergies as of 2017     No Known Allergies      You were diagnosed with     Dementia without behavioral disturbance, unspecified dementia type   [[id number]]       Status post hip replacement, left   [[id number]]       Pacemaker   [[id number]]         Vital Signs     Blood Pressure Pulse Temperature Height Weight Body Mass Index    142/94 mmHg 70 36.6 °C (97.9 °F) 1.562 m (5' 1.5\") 61.689 kg (136 lb) 25.28 kg/m2    Oxygen Saturation Smoking Status                95% Never Smoker           Basic Information     Date Of Birth Sex Race Ethnicity Preferred Language    1935 Female White Non- English      Your appointments     2017  3:00 PM   Established Patient with Jory Funes M.D.   [facility] - Internal Medicine (--)    [address] [place] [id number] [phone]           You will be receiving a confirmation call a few days before your appointment from our automated call confirmation system.              Problem List              ICD-10-CM Priority Class Noted - Resolved    Essential hypertension I10   3/30/2017 - Present    Atrial fibrillation (CMS-HCC) I48.91   3/30/2017 - Present    Insomnia G47.00   3/30/2017 - Present    Gastroesophageal reflux disease without esophagitis K21.9   3/30/2017 - Present    Femoral neck fracture (CMS-HCC) S72.009A   2017 - Present    Syncope R55   2017 - Present    Mood disorder (CMS-HCC) F39   4/10/2017 - Present    Acute " encephalopathy G93.40   4/11/2017 - Present    Hip fracture (CMS-[facility]) S72.009A   4/17/2017 - Present      Health Maintenance        Date Due Completion Dates    IMM DTaP/Tdap/Td Vaccine (1 - Tdap) 9/20/1954 ---    PAP SMEAR 9/20/1956 ---    MAMMOGRAM 9/20/1975 ---    IMM ZOSTER VACCINE 9/20/1995 ---    BONE DENSITY 9/20/2000 ---    IMM PNEUMOCOCCAL 65+ (ADULT) LOW/MEDIUM RISK SERIES (1 of 2 - PCV13) 9/20/2000 ---    COLONOSCOPY 4/12/2027 4/12/2017 (N/S)    Override on 4/12/2017: (N/S) (PER [facility] AND C NO COLONOSCOPY)            Current Immunizations     No immunizations on file.      Below and/or attached are the medications your provider expects you to take. Review all of your home medications and newly ordered medications with your provider and/or pharmacist. Follow medication instructions as directed by your provider and/or pharmacist. Please keep your medication list with you and share with your provider. Update the information when medications are discontinued, doses are changed, or new medications (including over-the-counter products) are added; and carry medication information at all times in the event of emergency situations     Allergies:  No Known Allergies          Medications  Valid as of: June 16, 2017 -  9:52 AM    Generic Name Brand Name Tablet Size Instructions for use    Acetaminophen (Tab) TYLENOL 325 MG Take 650 mg by mouth every four hours as needed.        Amiodarone HCl (Tab) CORDARONE 200 MG Take 1 Tab by mouth every day.        Aspirin (Tablet Delayed Response) aspirin 81 MG Take 1 Tab by mouth every day.        Cholecalciferol (Tab) VITAMIND D3 1000 UNIT Take 1 Tab by mouth every day.        Dextromethorphan-Benzocaine   Spray  in mouth/throat as needed.        Esomeprazole Magnesium (CAPSULE DELAYED RELEASE) NEXIUM 20 MG Take 1 Cap by mouth every morning before breakfast.        Fludrocortisone Acetate (Tab) FLORINEF 0.1 MG Take 1 Tab by mouth every morning.        Magnesium Hydroxide    Take  by mouth.        Miconazole Nitrate (Cream) MONISTAT 2 % Insert 1 Applicator in vagina every evening.        Midodrine HCl (Tab) PROAMATINE 10 MG Take 1 Tab by mouth 3 times a day, with meals.        Multiple Vitamin (Tab) THERAGRAN  Take 1 Tab by mouth every day.        Ondansetron (TABLET DISPERSIBLE) ZOFRAN ODT 4 MG Take 1 Tab by mouth every four hours as needed for Nausea/Vomiting (give PO if IV route is unavailable. May give per feeding tube.).        Oxybutynin Chloride (Tab) DITROPAN 5 MG Take 1 Tab by mouth every bedtime.        Oyster Shell (Tab) OS-DIANNA 500 500 MG Take 1 Tab by mouth 3 times a day, with meals.        Phenyleph-Shark Ysabel Oil-MO-Pet (Ointment) FORMULATION R 0.25-3-14-71.9 % Apply bid prn        Potassium Chloride Dede CR (Tab CR) Kdur 20 MEQ Take 2 Tabs by mouth every day.        Sennosides-Docusate Sodium (Tab) PERICOLACE or SENOKOT S 8.6-50 MG Take 2 Tabs by mouth 2 Times a Day.        Sodium Phosphates (Enema) Enema  Insert  in rectum.        TraZODone HCl (Tab) DESYREL 50 MG Take 1 Tab by mouth every bedtime.        Zinc Sulfate (Cap) ZINCATE 220 MG Take 1 Cap by mouth every day.        .                 Medicines prescribed today were sent to:     [facility][id number] - [place], [place] - [address]    [address] [place] [address]    Phone: [phone] Fax: [phone]    Open 24 Hours?: No      Medication refill instructions:       If your prescription bottle indicates you have medication refills left, it is not necessary to call your provider’s office. Please contact your pharmacy and they will refill your medication.    If your prescription bottle indicates you do not have any refills left, you may request refills at any time through one of the following ways: The online [employer] system (except Urgent Care), by calling your provider’s office, or by asking your pharmacy to contact your provider’s office with a refill request. Medication refills are processed only during  regular business hours and may not be available until the next business day. Your provider may request additional information or to have a follow-up visit with you prior to refilling your medication.   *Please Note: Medication refills are assigned a new Rx number when refilled electronically. Your pharmacy may indicate that no refills were authorized even though a new prescription for the same medication is available at the pharmacy. Please request the medicine by name with the pharmacy before contacting your provider for a refill.           MyChart Status: Patient Declined

## 2017-06-22 ENCOUNTER — APPOINTMENT (OUTPATIENT)
Dept: INTERNAL MEDICINE | Facility: MEDICAL CENTER | Age: 82
End: 2017-06-22
Payer: MEDICAID

## 2017-08-21 ENCOUNTER — HOSPITAL ENCOUNTER (OUTPATIENT)
Facility: MEDICAL CENTER | Age: 82
End: 2017-08-21
Attending: ORTHOPAEDIC SURGERY | Admitting: ORTHOPAEDIC SURGERY
Payer: MEDICAID

## 2017-09-21 VITALS — WEIGHT: 130.95 LBS | BODY MASS INDEX: 24.1 KG/M2 | HEIGHT: 62 IN

## 2017-09-21 DIAGNOSIS — Z01.812 PRE-OPERATIVE LABORATORY EXAMINATION: ICD-10-CM

## 2017-09-21 DIAGNOSIS — Z01.810 PRE-OPERATIVE CARDIOVASCULAR EXAMINATION: ICD-10-CM

## 2017-09-21 LAB
ANION GAP SERPL CALC-SCNC: 13 MMOL/L (ref 0–11.9)
BUN SERPL-MCNC: 21 MG/DL (ref 8–22)
CALCIUM SERPL-MCNC: 9.4 MG/DL (ref 8.5–10.5)
CHLORIDE SERPL-SCNC: 105 MMOL/L (ref 96–112)
CO2 SERPL-SCNC: 21 MMOL/L (ref 20–33)
CREAT SERPL-MCNC: 1.12 MG/DL (ref 0.5–1.4)
EKG IMPRESSION: NORMAL
ERYTHROCYTE [DISTWIDTH] IN BLOOD BY AUTOMATED COUNT: 53 FL (ref 35.9–50)
GFR SERPL CREATININE-BSD FRML MDRD: 47 ML/MIN/1.73 M 2
GLUCOSE SERPL-MCNC: 97 MG/DL (ref 65–99)
HCT VFR BLD AUTO: 45.4 % (ref 37–47)
HGB BLD-MCNC: 14.7 G/DL (ref 12–16)
MCH RBC QN AUTO: 30 PG (ref 27–33)
MCHC RBC AUTO-ENTMCNC: 32.4 G/DL (ref 33.6–35)
MCV RBC AUTO: 92.7 FL (ref 81.4–97.8)
PLATELET # BLD AUTO: 270 K/UL (ref 164–446)
PMV BLD AUTO: 10.8 FL (ref 9–12.9)
POTASSIUM SERPL-SCNC: 3.8 MMOL/L (ref 3.6–5.5)
RBC # BLD AUTO: 4.9 M/UL (ref 4.2–5.4)
SODIUM SERPL-SCNC: 139 MMOL/L (ref 135–145)
WBC # BLD AUTO: 8.3 K/UL (ref 4.8–10.8)

## 2017-09-21 PROCEDURE — 85027 COMPLETE CBC AUTOMATED: CPT

## 2017-09-21 PROCEDURE — 80048 BASIC METABOLIC PNL TOTAL CA: CPT

## 2017-09-21 PROCEDURE — 93005 ELECTROCARDIOGRAM TRACING: CPT

## 2017-09-21 PROCEDURE — 36415 COLL VENOUS BLD VENIPUNCTURE: CPT

## 2017-09-21 PROCEDURE — 93010 ELECTROCARDIOGRAM REPORT: CPT | Performed by: INTERNAL MEDICINE

## 2017-09-22 RX ORDER — TRAMADOL HYDROCHLORIDE 50 MG/1
50 TABLET ORAL EVERY 6 HOURS PRN
Status: ON HOLD | COMMUNITY
End: 2018-07-23

## 2017-09-22 RX ORDER — BISACODYL 10 MG
10 SUPPOSITORY, RECTAL RECTAL PRN
COMMUNITY
End: 2018-07-20

## 2017-09-22 RX ORDER — CITALOPRAM HYDROBROMIDE 10 MG/1
10 TABLET ORAL DAILY
COMMUNITY

## 2017-09-22 RX ORDER — LORATADINE 10 MG/1
10 TABLET ORAL DAILY
Status: ON HOLD | COMMUNITY
End: 2020-02-21

## 2017-09-22 NOTE — OR NURSING
Pt has pacemaker, has not seen anyone in [place].   Order clearance for surg. Only MD is in Nursing home Dr. Ольга Delgado.  Call to Alea at Dr. Hdz and she made and appt With Dr. BORDEN at [facility] for Cardiology clearance and pacemaker check.  Faxed info   to Dr. Borden's office along with pacer form to fill out and fax Back.

## 2017-09-22 NOTE — OR NURSING
preadmit appointment: Pt. Instructed to continue regularly prescribed medication through the day before surgery, instructed to take the following medications, the day of surgery, with a sip of water per anesthesia protocol :pt dementia lives in nursing home brought in by daughter, did not bring list of medications, daughter not sure of meds, tried multiple times to call nursing home (over 5 times, could not speak w/ nurse...daughter states will fax med list 9/21, called 9/22 didn't get list will bring in today?

## 2017-09-23 NOTE — OR NURSING
Dr Sexton's response regarding pt history-     The problem in this case is that we need someone with a healthcare proxy or power of  to determine what we can or cannot do for this patient. Additionally, the consent forms need to be signed by someone able to understand the procedures completely including risks and benefits.  Unless that gets resolved, I am not sure how we can even take the first step in the treatment of this patient. Is the daughter able to get the power of  documentation in order?   Rd Sexton M.D.  [facility]

## 2018-07-20 ENCOUNTER — APPOINTMENT (OUTPATIENT)
Dept: RADIOLOGY | Facility: MEDICAL CENTER | Age: 83
End: 2018-07-20
Attending: EMERGENCY MEDICINE
Payer: MEDICAID

## 2018-07-20 ENCOUNTER — HOSPITAL ENCOUNTER (OUTPATIENT)
Facility: MEDICAL CENTER | Age: 83
End: 2018-07-23
Attending: EMERGENCY MEDICINE | Admitting: HOSPITALIST
Payer: MEDICAID

## 2018-07-20 DIAGNOSIS — R26.2 INABILITY TO WALK: ICD-10-CM

## 2018-07-20 DIAGNOSIS — M25.552 PAIN OF LEFT HIP JOINT: ICD-10-CM

## 2018-07-20 LAB
ALBUMIN SERPL BCP-MCNC: 3.7 G/DL (ref 3.2–4.9)
ALBUMIN/GLOB SERPL: 1.3 G/DL
ALP SERPL-CCNC: 55 U/L (ref 30–99)
ALT SERPL-CCNC: 9 U/L (ref 2–50)
ANION GAP SERPL CALC-SCNC: 8 MMOL/L (ref 0–11.9)
APPEARANCE UR: CLEAR
APTT PPP: 29.8 SEC (ref 24.7–36)
AST SERPL-CCNC: 13 U/L (ref 12–45)
BACTERIA #/AREA URNS HPF: NEGATIVE /HPF
BASOPHILS # BLD AUTO: 0.3 % (ref 0–1.8)
BASOPHILS # BLD: 0.02 K/UL (ref 0–0.12)
BILIRUB SERPL-MCNC: 0.5 MG/DL (ref 0.1–1.5)
BILIRUB UR QL STRIP.AUTO: NEGATIVE
BNP SERPL-MCNC: 187 PG/ML (ref 0–100)
BUN SERPL-MCNC: 35 MG/DL (ref 8–22)
CALCIUM SERPL-MCNC: 9.6 MG/DL (ref 8.5–10.5)
CHLORIDE SERPL-SCNC: 100 MMOL/L (ref 96–112)
CO2 SERPL-SCNC: 24 MMOL/L (ref 20–33)
COLOR UR: YELLOW
CREAT SERPL-MCNC: 1.31 MG/DL (ref 0.5–1.4)
CRP SERPL HS-MCNC: 0.56 MG/DL (ref 0–0.75)
EKG IMPRESSION: NORMAL
EOSINOPHIL # BLD AUTO: 0.04 K/UL (ref 0–0.51)
EOSINOPHIL NFR BLD: 0.6 % (ref 0–6.9)
EPI CELLS #/AREA URNS HPF: NEGATIVE /HPF
ERYTHROCYTE [DISTWIDTH] IN BLOOD BY AUTOMATED COUNT: 47.1 FL (ref 35.9–50)
ERYTHROCYTE [SEDIMENTATION RATE] IN BLOOD BY WESTERGREN METHOD: 19 MM/HOUR (ref 0–30)
GLOBULIN SER CALC-MCNC: 2.8 G/DL (ref 1.9–3.5)
GLUCOSE SERPL-MCNC: 126 MG/DL (ref 65–99)
GLUCOSE UR STRIP.AUTO-MCNC: NEGATIVE MG/DL
HCT VFR BLD AUTO: 38.2 % (ref 37–47)
HGB BLD-MCNC: 12.9 G/DL (ref 12–16)
HYALINE CASTS #/AREA URNS LPF: NORMAL /LPF
IMM GRANULOCYTES # BLD AUTO: 0.02 K/UL (ref 0–0.11)
IMM GRANULOCYTES NFR BLD AUTO: 0.3 % (ref 0–0.9)
INR PPP: 1.42 (ref 0.87–1.13)
KETONES UR STRIP.AUTO-MCNC: NEGATIVE MG/DL
LEUKOCYTE ESTERASE UR QL STRIP.AUTO: ABNORMAL
LYMPHOCYTES # BLD AUTO: 1.81 K/UL (ref 1–4.8)
LYMPHOCYTES NFR BLD: 26.7 % (ref 22–41)
MCH RBC QN AUTO: 30.6 PG (ref 27–33)
MCHC RBC AUTO-ENTMCNC: 33.8 G/DL (ref 33.6–35)
MCV RBC AUTO: 90.7 FL (ref 81.4–97.8)
MICRO URNS: ABNORMAL
MONOCYTES # BLD AUTO: 1.01 K/UL (ref 0–0.85)
MONOCYTES NFR BLD AUTO: 14.9 % (ref 0–13.4)
NEUTROPHILS # BLD AUTO: 3.89 K/UL (ref 2–7.15)
NEUTROPHILS NFR BLD: 57.2 % (ref 44–72)
NITRITE UR QL STRIP.AUTO: NEGATIVE
NRBC # BLD AUTO: 0 K/UL
NRBC BLD-RTO: 0 /100 WBC
PH UR STRIP.AUTO: 7 [PH]
PLATELET # BLD AUTO: 247 K/UL (ref 164–446)
PMV BLD AUTO: 10.3 FL (ref 9–12.9)
POTASSIUM SERPL-SCNC: 4.2 MMOL/L (ref 3.6–5.5)
PROT SERPL-MCNC: 6.5 G/DL (ref 6–8.2)
PROT UR QL STRIP: NEGATIVE MG/DL
PROTHROMBIN TIME: 17 SEC (ref 12–14.6)
RBC # BLD AUTO: 4.21 M/UL (ref 4.2–5.4)
RBC # URNS HPF: NORMAL /HPF
RBC UR QL AUTO: NEGATIVE
SODIUM SERPL-SCNC: 132 MMOL/L (ref 135–145)
SP GR UR STRIP.AUTO: 1.01
TROPONIN I SERPL-MCNC: 0.02 NG/ML (ref 0–0.04)
UROBILINOGEN UR STRIP.AUTO-MCNC: 0.2 MG/DL
WBC # BLD AUTO: 6.8 K/UL (ref 4.8–10.8)
WBC #/AREA URNS HPF: NORMAL /HPF

## 2018-07-20 PROCEDURE — 83880 ASSAY OF NATRIURETIC PEPTIDE: CPT

## 2018-07-20 PROCEDURE — 85730 THROMBOPLASTIN TIME PARTIAL: CPT

## 2018-07-20 PROCEDURE — G0378 HOSPITAL OBSERVATION PER HR: HCPCS

## 2018-07-20 PROCEDURE — 84484 ASSAY OF TROPONIN QUANT: CPT

## 2018-07-20 PROCEDURE — 73551 X-RAY EXAM OF FEMUR 1: CPT | Mod: LT

## 2018-07-20 PROCEDURE — 81001 URINALYSIS AUTO W/SCOPE: CPT

## 2018-07-20 PROCEDURE — 80053 COMPREHEN METABOLIC PANEL: CPT

## 2018-07-20 PROCEDURE — 99285 EMERGENCY DEPT VISIT HI MDM: CPT

## 2018-07-20 PROCEDURE — 86140 C-REACTIVE PROTEIN: CPT

## 2018-07-20 PROCEDURE — 85652 RBC SED RATE AUTOMATED: CPT

## 2018-07-20 PROCEDURE — 73501 X-RAY EXAM HIP UNI 1 VIEW: CPT | Mod: LT

## 2018-07-20 PROCEDURE — 73700 CT LOWER EXTREMITY W/O DYE: CPT | Mod: LT

## 2018-07-20 PROCEDURE — 93005 ELECTROCARDIOGRAM TRACING: CPT | Performed by: EMERGENCY MEDICINE

## 2018-07-20 PROCEDURE — 71045 X-RAY EXAM CHEST 1 VIEW: CPT

## 2018-07-20 PROCEDURE — 99220 PR INITIAL OBSERVATION CARE,LEVL III: CPT | Performed by: HOSPITALIST

## 2018-07-20 PROCEDURE — 85025 COMPLETE CBC W/AUTO DIFF WBC: CPT

## 2018-07-20 PROCEDURE — 85610 PROTHROMBIN TIME: CPT

## 2018-07-20 RX ORDER — FUROSEMIDE 40 MG/1
40 TABLET ORAL DAILY
Status: ON HOLD | COMMUNITY
End: 2020-02-21

## 2018-07-20 RX ORDER — OXYCODONE HYDROCHLORIDE 5 MG/1
2.5 TABLET ORAL
Status: DISCONTINUED | OUTPATIENT
Start: 2018-07-20 | End: 2018-07-23 | Stop reason: HOSPADM

## 2018-07-20 RX ORDER — CITALOPRAM 20 MG/1
10 TABLET ORAL DAILY
Status: DISCONTINUED | OUTPATIENT
Start: 2018-07-21 | End: 2018-07-23 | Stop reason: HOSPADM

## 2018-07-20 RX ORDER — SODIUM CHLORIDE 9 MG/ML
1000 INJECTION, SOLUTION INTRAVENOUS ONCE
Status: COMPLETED | OUTPATIENT
Start: 2018-07-20 | End: 2018-07-21

## 2018-07-20 RX ORDER — POTASSIUM CHLORIDE 20 MEQ/1
20 TABLET, EXTENDED RELEASE ORAL DAILY
Status: DISCONTINUED | OUTPATIENT
Start: 2018-07-21 | End: 2018-07-23 | Stop reason: HOSPADM

## 2018-07-20 RX ORDER — AMOXICILLIN 250 MG
2 CAPSULE ORAL 2 TIMES DAILY
Status: DISCONTINUED | OUTPATIENT
Start: 2018-07-20 | End: 2018-07-23 | Stop reason: HOSPADM

## 2018-07-20 RX ORDER — OXYBUTYNIN CHLORIDE 5 MG/1
5 TABLET, EXTENDED RELEASE ORAL EVERY EVENING
COMMUNITY

## 2018-07-20 RX ORDER — AMIODARONE HYDROCHLORIDE 200 MG/1
200 TABLET ORAL DAILY
Status: DISCONTINUED | OUTPATIENT
Start: 2018-07-21 | End: 2018-07-23 | Stop reason: HOSPADM

## 2018-07-20 RX ORDER — FLUTICASONE PROPIONATE 50 MCG
1 SPRAY, SUSPENSION (ML) NASAL DAILY
COMMUNITY

## 2018-07-20 RX ORDER — OXYBUTYNIN CHLORIDE 5 MG/1
5 TABLET, EXTENDED RELEASE ORAL EVERY EVENING
Status: DISCONTINUED | OUTPATIENT
Start: 2018-07-20 | End: 2018-07-23 | Stop reason: HOSPADM

## 2018-07-20 RX ORDER — ACETAMINOPHEN 325 MG/1
650 TABLET ORAL EVERY 6 HOURS PRN
Status: DISCONTINUED | OUTPATIENT
Start: 2018-07-20 | End: 2018-07-23 | Stop reason: HOSPADM

## 2018-07-20 RX ORDER — FUROSEMIDE 40 MG/1
40 TABLET ORAL DAILY
Status: DISCONTINUED | OUTPATIENT
Start: 2018-07-21 | End: 2018-07-23 | Stop reason: HOSPADM

## 2018-07-20 RX ORDER — OXYCODONE HYDROCHLORIDE 5 MG/1
5 TABLET ORAL
Status: DISCONTINUED | OUTPATIENT
Start: 2018-07-20 | End: 2018-07-23 | Stop reason: HOSPADM

## 2018-07-20 RX ORDER — AMOXICILLIN 250 MG
1 CAPSULE ORAL 2 TIMES DAILY
COMMUNITY

## 2018-07-20 RX ORDER — CYCLOBENZAPRINE HCL 10 MG
10 TABLET ORAL 3 TIMES DAILY PRN
Status: DISCONTINUED | OUTPATIENT
Start: 2018-07-20 | End: 2018-07-21

## 2018-07-20 RX ORDER — BISACODYL 10 MG
10 SUPPOSITORY, RECTAL RECTAL
Status: DISCONTINUED | OUTPATIENT
Start: 2018-07-20 | End: 2018-07-23 | Stop reason: HOSPADM

## 2018-07-20 RX ORDER — POTASSIUM CHLORIDE 20MEQ/15ML
20 LIQUID (ML) ORAL DAILY
COMMUNITY

## 2018-07-20 RX ORDER — LISINOPRIL 10 MG/1
10 TABLET ORAL DAILY
Status: ON HOLD | COMMUNITY
End: 2020-02-21

## 2018-07-20 RX ORDER — LISINOPRIL 10 MG/1
10 TABLET ORAL DAILY
Status: DISCONTINUED | OUTPATIENT
Start: 2018-07-21 | End: 2018-07-23 | Stop reason: HOSPADM

## 2018-07-20 RX ORDER — POTASSIUM CHLORIDE 20MEQ/15ML
20 LIQUID (ML) ORAL DAILY
Status: DISCONTINUED | OUTPATIENT
Start: 2018-07-21 | End: 2018-07-20

## 2018-07-20 RX ORDER — POLYETHYLENE GLYCOL 3350 17 G/17G
1 POWDER, FOR SOLUTION ORAL
Status: DISCONTINUED | OUTPATIENT
Start: 2018-07-20 | End: 2018-07-23 | Stop reason: HOSPADM

## 2018-07-20 RX ORDER — TRAMADOL HYDROCHLORIDE 50 MG/1
50 TABLET ORAL EVERY 6 HOURS PRN
Status: DISCONTINUED | OUTPATIENT
Start: 2018-07-20 | End: 2018-07-23 | Stop reason: HOSPADM

## 2018-07-20 RX ORDER — MORPHINE SULFATE 4 MG/ML
2 INJECTION, SOLUTION INTRAMUSCULAR; INTRAVENOUS
Status: DISCONTINUED | OUTPATIENT
Start: 2018-07-20 | End: 2018-07-23 | Stop reason: HOSPADM

## 2018-07-20 ASSESSMENT — PAIN SCALES - GENERAL: PAINLEVEL_OUTOF10: 0

## 2018-07-20 NOTE — ED NOTES
Pt BIB EMS from [facility].  Pt was walking with family and was c/o left hip pain.  Pt family requested xray and fx to left hip reported per EMS.  Pt has h/o multiple fractures to left hip.  Pt has h/o dementia, pt A/O x4 at this time.  CMS intact.  Pt denies pain at this time.  ERP to see.

## 2018-07-21 PROBLEM — N17.9 AKI (ACUTE KIDNEY INJURY) (HCC): Status: ACTIVE | Noted: 2018-07-21

## 2018-07-21 PROBLEM — M25.552 LEFT HIP PAIN: Status: ACTIVE | Noted: 2018-07-21

## 2018-07-21 LAB
ANION GAP SERPL CALC-SCNC: 9 MMOL/L (ref 0–11.9)
BUN SERPL-MCNC: 29 MG/DL (ref 8–22)
CALCIUM SERPL-MCNC: 9.1 MG/DL (ref 8.5–10.5)
CHLORIDE SERPL-SCNC: 103 MMOL/L (ref 96–112)
CO2 SERPL-SCNC: 26 MMOL/L (ref 20–33)
CREAT SERPL-MCNC: 1.26 MG/DL (ref 0.5–1.4)
ERYTHROCYTE [DISTWIDTH] IN BLOOD BY AUTOMATED COUNT: 47 FL (ref 35.9–50)
GLUCOSE SERPL-MCNC: 96 MG/DL (ref 65–99)
HCT VFR BLD AUTO: 39.1 % (ref 37–47)
HGB BLD-MCNC: 13 G/DL (ref 12–16)
MCH RBC QN AUTO: 30.1 PG (ref 27–33)
MCHC RBC AUTO-ENTMCNC: 33.2 G/DL (ref 33.6–35)
MCV RBC AUTO: 90.5 FL (ref 81.4–97.8)
PLATELET # BLD AUTO: 255 K/UL (ref 164–446)
PMV BLD AUTO: 10.4 FL (ref 9–12.9)
POTASSIUM SERPL-SCNC: 4.2 MMOL/L (ref 3.6–5.5)
RBC # BLD AUTO: 4.32 M/UL (ref 4.2–5.4)
SODIUM SERPL-SCNC: 138 MMOL/L (ref 135–145)
WBC # BLD AUTO: 6.8 K/UL (ref 4.8–10.8)

## 2018-07-21 PROCEDURE — A9270 NON-COVERED ITEM OR SERVICE: HCPCS | Performed by: HOSPITALIST

## 2018-07-21 PROCEDURE — 700105 HCHG RX REV CODE 258: Performed by: HOSPITALIST

## 2018-07-21 PROCEDURE — 700102 HCHG RX REV CODE 250 W/ 637 OVERRIDE(OP): Performed by: HOSPITALIST

## 2018-07-21 PROCEDURE — 85027 COMPLETE CBC AUTOMATED: CPT

## 2018-07-21 PROCEDURE — G0378 HOSPITAL OBSERVATION PER HR: HCPCS

## 2018-07-21 PROCEDURE — 80048 BASIC METABOLIC PNL TOTAL CA: CPT

## 2018-07-21 PROCEDURE — 700105 HCHG RX REV CODE 258: Performed by: INTERNAL MEDICINE

## 2018-07-21 PROCEDURE — 36415 COLL VENOUS BLD VENIPUNCTURE: CPT

## 2018-07-21 PROCEDURE — 99226 PR SUBSEQUENT OBSERVATION CARE,LEVEL III: CPT | Performed by: INTERNAL MEDICINE

## 2018-07-21 RX ORDER — TRAZODONE HYDROCHLORIDE 50 MG/1
50 TABLET ORAL
Status: DISCONTINUED | OUTPATIENT
Start: 2018-07-21 | End: 2018-07-23 | Stop reason: HOSPADM

## 2018-07-21 RX ORDER — CYCLOBENZAPRINE HCL 10 MG
5 TABLET ORAL 3 TIMES DAILY PRN
Status: DISCONTINUED | OUTPATIENT
Start: 2018-07-21 | End: 2018-07-23 | Stop reason: HOSPADM

## 2018-07-21 RX ORDER — SODIUM CHLORIDE 9 MG/ML
INJECTION, SOLUTION INTRAVENOUS CONTINUOUS
Status: DISCONTINUED | OUTPATIENT
Start: 2018-07-21 | End: 2018-07-21

## 2018-07-21 RX ADMIN — LISINOPRIL 10 MG: 10 TABLET ORAL at 05:21

## 2018-07-21 RX ADMIN — FUROSEMIDE 40 MG: 40 TABLET ORAL at 05:23

## 2018-07-21 RX ADMIN — SODIUM CHLORIDE 1000 ML: 9 INJECTION, SOLUTION INTRAVENOUS at 00:15

## 2018-07-21 RX ADMIN — ACETAMINOPHEN 650 MG: 325 TABLET, FILM COATED ORAL at 22:40

## 2018-07-21 RX ADMIN — TRAZODONE HYDROCHLORIDE 50 MG: 50 TABLET ORAL at 00:15

## 2018-07-21 RX ADMIN — ACETAMINOPHEN 650 MG: 325 TABLET, FILM COATED ORAL at 16:14

## 2018-07-21 RX ADMIN — TRAZODONE HYDROCHLORIDE 50 MG: 50 TABLET ORAL at 21:27

## 2018-07-21 RX ADMIN — OXYBUTYNIN CHLORIDE 5 MG: 5 TABLET, FILM COATED, EXTENDED RELEASE ORAL at 17:30

## 2018-07-21 RX ADMIN — APIXABAN 2.5 MG: 2.5 TABLET, FILM COATED ORAL at 17:30

## 2018-07-21 RX ADMIN — POTASSIUM CHLORIDE 20 MEQ: 1500 TABLET, EXTENDED RELEASE ORAL at 05:22

## 2018-07-21 RX ADMIN — AMIODARONE HYDROCHLORIDE 200 MG: 200 TABLET ORAL at 05:23

## 2018-07-21 RX ADMIN — SODIUM CHLORIDE: 9 INJECTION, SOLUTION INTRAVENOUS at 12:30

## 2018-07-21 RX ADMIN — CITALOPRAM HYDROBROMIDE 10 MG: 20 TABLET ORAL at 05:22

## 2018-07-21 ASSESSMENT — ENCOUNTER SYMPTOMS
PHOTOPHOBIA: 0
DEPRESSION: 0
TINGLING: 0
PALPITATIONS: 0
CHILLS: 0
NAUSEA: 0
DIZZINESS: 0
COUGH: 0
FOCAL WEAKNESS: 0
SORE THROAT: 0
MYALGIAS: 0
SHORTNESS OF BREATH: 0
WHEEZING: 0
HEADACHES: 0
ABDOMINAL PAIN: 0
VOMITING: 0
DIARRHEA: 0
FEVER: 0

## 2018-07-21 ASSESSMENT — COGNITIVE AND FUNCTIONAL STATUS - GENERAL
HELP NEEDED FOR BATHING: A LOT
DRESSING REGULAR LOWER BODY CLOTHING: A LOT
STANDING UP FROM CHAIR USING ARMS: A LOT
TOILETING: A LOT
TURNING FROM BACK TO SIDE WHILE IN FLAT BAD: A LITTLE
SUGGESTED CMS G CODE MODIFIER MOBILITY: CL
MOVING FROM LYING ON BACK TO SITTING ON SIDE OF FLAT BED: A LOT
WALKING IN HOSPITAL ROOM: A LOT
SUGGESTED CMS G CODE MODIFIER DAILY ACTIVITY: CK
DRESSING REGULAR UPPER BODY CLOTHING: A LITTLE
MOVING TO AND FROM BED TO CHAIR: A LOT
PERSONAL GROOMING: A LITTLE
CLIMB 3 TO 5 STEPS WITH RAILING: A LOT
MOBILITY SCORE: 13
DAILY ACTIVITIY SCORE: 16

## 2018-07-21 ASSESSMENT — PAIN SCALES - GENERAL
PAINLEVEL_OUTOF10: 6
PAINLEVEL_OUTOF10: 2
PAINLEVEL_OUTOF10: 3
PAINLEVEL_OUTOF10: 6
PAINLEVEL_OUTOF10: 4
PAINLEVEL_OUTOF10: 6
PAINLEVEL_OUTOF10: 6
PAINLEVEL_OUTOF10: 4

## 2018-07-21 NOTE — CONSULTS
DATE OF SERVICE:  07/21/2018    CHIEF COMPLAINT:  Left hip pain.    HISTORY OF PRESENT ILLNESS:  This is a pleasant 82-year-old female seen on the   orthopedic floor at [facility] was left hip discomfort.  I spoke to ER   physician last evening.  As by report, she had had a ground level fall and   increased hip pain and there was concern of a fracture.  At that point, a CT   scan was pending and the decision had already been made to admit her for pain   management.  I have reviewed the office electronic records prior to the   contact and expressed that she has severe hip arthritis and lacking a fracture   or other indicators we might be able to manage this on an outpatient basis.    Patient currently sitting comfortably in a bedside chair and states that she   has been up to the bathroom with assistance and that she has no pain.  She was   relieved to know that she had no fracture.    PHYSICAL EXAMINATION:  GENERAL:  She is cooperative with evaluation.  She does have discomfort with   internal and external rotation of left hip consistent with her known severe   arthritis.  EXTREMITIES:  She has some mild tenderness over the lateral trochanter.  She   wiggles her toes.    IMAGING:  I reviewed outside films as well as plain films and CT recording of   this hospitalization.  She has very advanced left hip arthritis, which has   been developing over the past year.  There is no evidence of acute fracture   and AVN is suggested.    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    DISPOSITION:  The patient was seen at Dr. Leblanc's office less than 2 weeks   ago at which point the records reflect that she was being set for a steroid   injection under fluoroscopy guidance.  The patient is unsure if that has been   completed.  She has had a definite progression since her fracture in 04/2017   with avascular necrosis and now has all hardware out.  There is no evidence of   infection on her  admission labs with a sed rate of 19 and a normal white   count.    At this point, she is stable with known severe left hip arthritis.  There is   no acute fracture, no evidence of infection, and I have spoken to Dr. Martinez,   hospice, with regard to discharge disposition.  She is to be further worked up   on an outpatient basis and she can now ambulate with a walker or with assist.    I do have a phone call in to her daughter, Sun with a message left at   [phone] contact me back.       ____________________________________     MD RIGO Williamson / NTS    DD:  07/21/2018 12:26:00  DT:  07/21/2018 13:53:33    D#:  [id number]  Job#:  [id number]

## 2018-07-21 NOTE — CARE PLAN
Problem: Safety  Goal: Will remain free from falls  Outcome: PROGRESSING AS EXPECTED  Bed alarm in place. Pt educated on need to call before getting out of bed. Pt verbalizes understanding. Treaded socks on pt. Bed locked. DME in bathroom. Room safety check. Bed in lowest position. Pt calls for help appropriately on call light. Call light with in reach. Hourly rounding in place.        Problem: Venous Thromboembolism (VTW)/Deep Vein Thrombosis (DVT) Prevention:  Goal: Patient will participate in Venous Thrombosis (VTE)/Deep Vein Thrombosis (DVT)Prevention Measures  Outcome: PROGRESSING AS EXPECTED  SCDs to BLE and apixaban for DVT prophylaxis.

## 2018-07-21 NOTE — H&P
" Hospital Medicine History and Physical    Date of Service  7/20/2018    Chief Complaint  Chief Complaint   Patient presents with   • Hip Pain     left       History of Presenting Illness  82 y.o. female who presented on 7/20/2018 with left hip pain.  Patient denies any falls or other traumatic injuries.  She carries a history of a previous left hip fracture status post pinning and subsequent hardware removal.  Her pain has been ongoing for several weeks.  It has progressed to the point that she has now developed significant difficulties with ambulating or standing.  Pain is alleviated with rest and minimizing movement, it is aggravated with any significant movement.  Upon assessment in the emergency room, a CT scan was obtained which showed no acute fracture.  She will be admitted for symptomatic management and specialist consultation.  Primary Care Physician  Jory Funes M.D.    Consultants  Dr. Viera, orthopedic surgery    Code Status  Full    Review of Systems  Review of Systems   Constitutional: Negative for chills and fever.   HENT: Negative for congestion and sore throat.    Eyes: Negative for photophobia.   Respiratory: Negative for cough, shortness of breath and wheezing.    Cardiovascular: Negative for chest pain and palpitations.   Gastrointestinal: Negative for abdominal pain, diarrhea, nausea and vomiting.   Genitourinary: Negative for dysuria.   Musculoskeletal: Positive for joint pain (Left hip pain). Negative for myalgias.   Skin: Negative.    Neurological: Negative for dizziness, tingling, focal weakness and headaches.   Psychiatric/Behavioral: Negative for depression and suicidal ideas.        Past Medical History  Past Medical History:   Diagnosis Date   • Anesthesia     get very confused, \"psycotic\"   • Anxiety    • Atrial fibrillation (HCC)     artrial fib   • Cataract    • Dementia    • Hemorrhoids    • History of methicillin resistant staphylococcus aureus (MRSA)     arm   • History of MRSA " infection     in [place] 2016   • Hypertension    • Insomnia    • Macular degeneration    • Pacemaker    • Pain     hip   • Personal history of fall     April 2017 c/b L hip fracture needing repair   • Recurrent UTI    • Syncope     May 2017   • Yeast infection        Surgical History  Past Surgical History:   Procedure Laterality Date   • HIP CANNULATED SCREW Left 4/10/2017    Procedure: HIP CANNULATED SCREW;  Surgeon: Harinder Leblanc M.D.;  Location: SURGERY [facility];  Service:    • ABDOMINAL HYSTERECTOMY TOTAL     • TONSILLECTOMY         Medications  No current facility-administered medications on file prior to encounter.      Current Outpatient Prescriptions on File Prior to Encounter   Medication Sig Dispense Refill   • citalopram (CELEXA) 10 MG tablet Take 10 mg by mouth every day.     • loratadine (CLARITIN) 10 MG Tab Take 10 mg by mouth every day.     • tramadol (ULTRAM) 50 MG Tab Take 50 mg by mouth every 6 hours as needed.     • oyster shell calcium/D (OS-DIANNA) 500-200 MG-UNIT Tab Take 1 Tab by mouth 3 times a day, with meals.     • acetaminophen (TYLENOL) 325 MG Tab Take 650 mg by mouth every four hours as needed.     • amiodarone (CORDARONE) 200 MG Tab Take 1 Tab by mouth every day. 30 Tab    • zinc sulfate (ZINCATE) 220 MG Cap Take 1 Cap by mouth every day. 30 Cap 0   • vitamin D (VITAMIND D3) 1000 UNIT Tab Take 1 Tab by mouth every day. 60 Tab    • trazodone (DESYREL) 50 MG Tab Take 1 Tab by mouth every bedtime. 30 Tab 3   • multivitamin (THERAGRAN) Tab Take 1 Tab by mouth every day. 30 Tab        Family History  Family History   Problem Relation Age of Onset   • Alzheimer's Disease Mother    • Heart Disease Father        Social History  Social History   Substance Use Topics   • Smoking status: Never Smoker   • Smokeless tobacco: Never Used   • Alcohol use No       Allergies  Allergies   Allergen Reactions   • Ppd [Tuberculin Purified Protein Derivative]         Physical Exam  Laboratory    Hemodynamics  Temp (24hrs), Av.4 °C (97.5 °F), Min:36.4 °C (97.5 °F), Max:36.4 °C (97.5 °F)   Temperature: 36.4 °C (97.5 °F)  Pulse  Av.3  Min: 70  Max: 71 Heart Rate (Monitored): 70  Blood Pressure : 133/72, NIBP: 151/73      Respiratory      Respiration: 20, Pulse Oximetry: 98 %             Physical Exam   Constitutional: She is oriented to person, place, and time. No distress.   Frail, elderly   HENT:   Head: Normocephalic and atraumatic.   Right Ear: External ear normal.   Left Ear: External ear normal.   Eyes: EOM are normal. Right eye exhibits no discharge. Left eye exhibits no discharge.   Neck: Neck supple. No JVD present.   Cardiovascular: Normal rate, regular rhythm and normal heart sounds.    Pulmonary/Chest: Effort normal and breath sounds normal. No respiratory distress. She exhibits no tenderness.   Abdominal: Soft. Bowel sounds are normal. She exhibits no distension. There is no tenderness.   Musculoskeletal: Normal range of motion. She exhibits no edema.   Tenderness over the left greater trochanter   Neurological: She is alert and oriented to person, place, and time. No cranial nerve deficit.   Skin: Skin is warm and dry. She is not diaphoretic. No erythema.   Psychiatric: She has a normal mood and affect. Her behavior is normal.   Nursing note and vitals reviewed.    Capillary refill less than 3 seconds, distal pulses intact    Recent Labs      18   1640   WBC  6.8   RBC  4.21   HEMOGLOBIN  12.9   HEMATOCRIT  38.2   MCV  90.7   MCH  30.6   MCHC  33.8   RDW  47.1   PLATELETCT  247   MPV  10.3     Recent Labs      18   1640   SODIUM  132*   POTASSIUM  4.2   CHLORIDE  100   CO2  24   GLUCOSE  126*   BUN  35*   CREATININE  1.31   CALCIUM  9.6     Recent Labs      18   1640   ALTSGPT  9   ASTSGOT  13   ALKPHOSPHAT  55   TBILIRUBIN  0.5   GLUCOSE  126*     Recent Labs      18   1640   APTT  29.8   INR  1.42*     Recent Labs      18   1640   BNPBTYPENAT  187*          Lab Results   Component Value Date    TROPONINI 0.02 07/20/2018       Imaging  Ct-hip W/o Plus Recons Left    Result Date: 7/20/2018 7/20/2018 7:21 PM HISTORY/REASON FOR EXAM:  Atraumatic Pain/Deformity. Left hip pain TECHNIQUE/EXAM DESCRIPTION AND NUMBER OF VIEWS: CT scan of the LEFT lower extremity without contrast and including reconstructions. Thin-section noncontrast helical images were obtained. Coronal and sagittal reconstructions were generated from the axial images. Up to date radiation dose reduction adjustments have been utilized to meet ALARA standards for radiation dose reduction. COMPARISON: X-ray same day and CT scan April 9, 2017 FINDINGS: There is evidence of previous ORIF of left femoral neck fracture with old screw tracks identified. There is collapse and cortical irregularity of the femoral head with secondary osteoarthritis. The left hip osteoarthritis is severe. No acute fracture identified. No dislocation.     Old posttraumatic and post surgical change involving the proximal left femur with severe secondary osteoarthritis. No acute fracture or dislocation identified.    Dx-chest-limited (1 View)    Result Date: 7/20/2018 7/20/2018 5:07 PM HISTORY/REASON FOR EXAM:  Pain following fall TECHNIQUE/EXAM DESCRIPTION AND NUMBER OF VIEWS: Single AP view of the chest. COMPARISON: 4/9/2017 FINDINGS: The cardiac silhouette and mediastinal contours are grossly stable.  Pacemaker projects over the right hemithorax. No discrete opacity, pleural fluid, or pneumothorax. There is mild interstitial prominence. Degenerative changes are in the spine.     Mild interstitial prominence is likely related to portable AP technique. Mild interstitial edema could have a similar appearance.    Dx-hip-unilateral-with Pelvis-1 View Left    Result Date: 7/20/2018 7/20/2018 5:07 PM HISTORY/REASON FOR EXAM:  Pelvic/Hip Pain Following Trauma. Left hip pain following a fall TECHNIQUE/EXAM DESCRIPTION AND NUMBER OF VIEWS:  2  views of the LEFT hip. COMPARISON: None FINDINGS: No acute fracture is identified. There is evidence of prior hardware removal. There is irregularity of the left femoral head with loss of the cortex, possibly related to severe degenerative change. There is severe joint space loss. There is osteophytic spurring of the right acetabulum. Small osteophytes project from the right femoral neck. No pubic symphysis or SI joint diastases.     1.  No acute fracture is identified. 2.  Severe joint space loss in the left hip with cortical indistinctness, possibly related to severe osteoarthropathy. Septic arthritis cannot be excluded in the appropriate clinical setting. 3.  Mild osteoarthritis of the right hip joint.    Dx-femur-1 View Left    Result Date: 7/20/2018 7/20/2018 5:07 PM HISTORY/REASON FOR EXAM:  Left hip pain. . TECHNIQUE/EXAM DESCRIPTION AND NUMBER OF VIEWS:  1 views of the LEFT femur. COMPARISON: 4/7/2017 FINDINGS: There is evidence of prior hardware removal. There is osteophytic spurring of the left acetabulum. There is irregularity of the left femoral head with cortical indistinctness. The knee joint appears intact. The bones appear osteopenic.     1.  No acute fractures identified. 2.  Severe arthropathy of the left hip with evidence of prior hardware removal. Infection cannot be excluded in the appropriate clinical setting.     Assessment/Plan     Anticipate that patient will need less than 2 midnights for management of the discussed medical issues.    * Left hip pain   Assessment & Plan    Patient has a history of left hip fracture status post pinning and subsequent hardware removal.  Current CT of the hip shows no acute fractures.  Given her pain and inability to bear weight, I will check an MRI for better visualization.  She will be admitted to the orthopedic floor and started on symptomatic management for her pain.  Dr. Viera of orthopedic surgery was consulted by the emergency room physician and I look  forward to her recommendations.  Whether she is a candidate for surgical intervention or conservative therapy alone, she will require PT and OT both of which I have ordered.        Atrial fibrillation (HCC)- (present on admission)   Assessment & Plan    This is chronic and stable, continue home amiodarone and Eliquis.  We will need to hold Eliquis if orthopedic surgery would like to proceed to intervention.        Essential hypertension- (present on admission)   Assessment & Plan    This is chronic and stable, continue home Prinivil and Lasix.          Prophylaxis: Sequential compression devices for DVT prophylaxis, no PPI indicated, bowel protocol as needed

## 2018-07-21 NOTE — ED NOTES
Daughter left at this time. Her number is as followed.     [phone]    Daughter wishes to be calledof any updates as needed.

## 2018-07-21 NOTE — ASSESSMENT & PLAN NOTE
This is chronic and stable, continue home amiodarone and Eliquis.  We will need to hold Eliquis if orthopedic surgery would like to proceed to intervention.

## 2018-07-21 NOTE — PROGRESS NOTES
Two RN skin check with Izzy WOODALL. Abrasion to RLE on shin. Scar to left hip. No other skin abnormalities noted at this time. bnormalities noted at this time.

## 2018-07-21 NOTE — ASSESSMENT & PLAN NOTE
Unable to bear weight  CRP and ESR are normal  CT showed post surgical changes and severe osteoarthritis.  Unable to complete MRI because of pacemaker  This is chronic per Dr. Viera, patient to be discharged and f/u outpatient  PTOT, pending [facility] transfer

## 2018-07-21 NOTE — ED NOTES
Pt had a loose BM at this time. Cleaned up at this time. Transport here to take pt upstairs in stable condition.

## 2018-07-21 NOTE — CARE PLAN
Problem: Safety  Goal: Will remain free from injury  Outcome: PROGRESSING AS EXPECTED  Bed low and locked position, hourly rounding,  socks on, bed/chair alarm on, call light and belongings within reach.

## 2018-07-21 NOTE — PROGRESS NOTES
[facility] Hospitalist Progress Note    Date of Service: 2018    Chief Complaint  82 y.o. female admitted 2018 with afib, HTN, pacemaker who presented with left hip pain. ESR and CRP were normal. CT showed post surgical changes and severe osteoarthritis.    Interval Problem Update  Unable to complete MRI because of pacemaker  She has poor memory.  Continues to have pain with hip extension, feels better with keeping hip flexed. Has not received pain meds overnight. Will schedule flexeril.    Addendum: Patient able to ambulate with assistance. I spoke with Dr. Viera, no further inpatient workup indicated. I spoke with daughter Sun who says patient has physical therapy at [facility]. I will contact social work to set up transport back to [facility].    Consultants/Specialty  Ortho- Dr. Viera    Disposition  [facility]        Review of Systems   Constitutional: Negative for malaise/fatigue.   HENT: Negative for congestion.    Respiratory: Negative for shortness of breath.    Cardiovascular: Negative for chest pain.   Gastrointestinal: Negative for abdominal pain and nausea.   Genitourinary: Negative for dysuria.   Musculoskeletal: Positive for joint pain.   Skin: Negative for itching.   Neurological: Negative for headaches.      Physical Exam  Laboratory/Imaging   Hemodynamics  Temp (24hrs), Av.6 °C (97.8 °F), Min:36.3 °C (97.4 °F), Max:36.8 °C (98.2 °F)   Temperature: 36.3 °C (97.4 °F)  Pulse  Av.6  Min: 70  Max: 72 Heart Rate (Monitored): 70  Blood Pressure : 139/85, NIBP: 116/49      Respiratory      Respiration: 18, Pulse Oximetry: 96 %        RUL Breath Sounds: Clear, RML Breath Sounds: Clear, RLL Breath Sounds: Diminished, MARILOU Breath Sounds: Clear, LLL Breath Sounds: Diminished    Fluids    Intake/Output Summary (Last 24 hours) at 18 1203  Last data filed at 18 0800   Gross per 24 hour   Intake              360 ml   Output                0 ml   Net              360 ml        Nutrition  Orders Placed This Encounter   Procedures   • Diet Order Regular     Standing Status:   Standing     Number of Occurrences:   1     Order Specific Question:   Diet:     Answer:   Regular [1]     Physical Exam   Constitutional: She appears well-developed and well-nourished. She is cooperative.   HENT:   Head: Normocephalic and atraumatic.   Eyes: Conjunctivae are normal. Right eye exhibits no discharge. Left eye exhibits no discharge.   Cardiovascular: Normal rate, regular rhythm and normal heart sounds.    Pulmonary/Chest: Effort normal. She has no wheezes.   Abdominal: Soft. There is no tenderness. There is no rebound and no guarding.   Musculoskeletal: She exhibits no edema.   Tenderness to left lateral hip. Unable to extend left hip due to pain, no pain with internal and external rotation.   Neurological: She is alert.   Oriented to self and place, unable to recall events leading up to her hip pain.   Skin: Skin is warm and dry.   Nursing note and vitals reviewed.      Recent Labs      07/20/18   1640  07/21/18   0333   WBC  6.8  6.8   RBC  4.21  4.32   HEMOGLOBIN  12.9  13.0   HEMATOCRIT  38.2  39.1   MCV  90.7  90.5   MCH  30.6  30.1   MCHC  33.8  33.2*   RDW  47.1  47.0   PLATELETCT  247  255   MPV  10.3  10.4     Recent Labs      07/20/18   1640  07/21/18   0333   SODIUM  132*  138   POTASSIUM  4.2  4.2   CHLORIDE  100  103   CO2  24  26   GLUCOSE  126*  96   BUN  35*  29*   CREATININE  1.31  1.26   CALCIUM  9.6  9.1     Recent Labs      07/20/18   1640   APTT  29.8   INR  1.42*     Recent Labs      07/20/18   1640   BNPBTYPENAT  187*              Assessment/Plan     * Left hip pain   Assessment & Plan    Unable to bear weight  CRP and ESR are normal  CT showed post surgical changes and severe osteoarthritis.  Unable to complete MRI because of pacemaker  Continues to have pain with hip extension, feels better with keeping hip flexed. Has not received pain meds overnight. Will schedule  cuate.  PTOT  Dr. Viera with ortho to consult          ODALIS (acute kidney injury) (HCC)   Assessment & Plan    UA unremarkable  Improving with IVF, likely prerenal  Continue IVF and monitor BMP          Atrial fibrillation (HCC)- (present on admission)   Assessment & Plan    This is chronic and stable, continue home amiodarone and Eliquis.  We will need to hold Eliquis if orthopedic surgery would like to proceed to intervention.        Essential hypertension- (present on admission)   Assessment & Plan    This is chronic and stable, continue home Prinivil and Lasix.          Quality-Core Measures   Reviewed items::  EKG reviewed, Labs reviewed, Medications reviewed and Radiology images reviewed  Alvarado catheter::  No Alvarado  DVT: apixaban.

## 2018-07-21 NOTE — PROGRESS NOTES
Two RN skin check with Izzy WOODALL. Abrasion to RLE on shin. Scar to left hip. No other skin abnormalities noted at this time.

## 2018-07-21 NOTE — ED PROVIDER NOTES
"ED Provider Note    CHIEF COMPLAINT  Chief Complaint   Patient presents with   • Hip Pain     left       HPI  Marleen Mix is a 82 y.o. female who presents to the emergency department complaining of left hip pain.  Patient has had a previous left hip fracture and she has been followed by orthopedics.  She has had multiple surgeries to the place to remove hardware and ultimately the hardware was removed.  She has been complaining recently worsening left hip pain.  She has been seen by her orthopedic doctor.  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  This is of unclear chronicity.    Denies any recent falls or trauma.  The patient is a poor historian and history is mostly obtained by the daughter.  She is not sure if she fell.  No other acute concerns or complaints are noted.    REVIEW OF SYSTEMS  See HPI for further details. All other systems are negative.    PAST MEDICAL HISTORY  Past Medical History:   Diagnosis Date   • Anesthesia     get very confused, \"psycotic\"   • Anxiety    • Atrial fibrillation (HCC)     artrial fib   • Cataract    • Dementia    • Hemorrhoids    • History of methicillin resistant staphylococcus aureus (MRSA)     arm   • History of MRSA infection     in [place] 2016   • Hypertension    • Insomnia    • Macular degeneration    • Pacemaker    • Pain     hip   • Personal history of fall     April 2017 c/b L hip fracture needing repair   • Recurrent UTI    • Syncope     May 2017   • Yeast infection        FAMILY HISTORY  Family History   Problem Relation Age of Onset   • Alzheimer's Disease Mother    • Heart Disease Father        SOCIAL HISTORY  Social History     Social History   • Marital status:      Spouse name: N/A   • Number of children: N/A   • Years of education: N/A     Social History Main Topics   • Smoking status: Never Smoker   • Smokeless tobacco: Never Used   • Alcohol use No   • Drug use: No   • Sexual activity: Not " "on file     Other Topics Concern   • Not on file     Social History Narrative    From [facility].     Living at [facility].      Goal is to get back home with DTR.    Completed HS.         SURGICAL HISTORY  Past Surgical History:   Procedure Laterality Date   • HIP CANNULATED SCREW Left 4/10/2017    Procedure: HIP CANNULATED SCREW;  Surgeon: Harinder Leblanc M.D.;  Location: SURGERY [facility];  Service:    • ABDOMINAL HYSTERECTOMY TOTAL     • TONSILLECTOMY         CURRENT MEDICATIONS  Home Medications     Reviewed by Marcus Parker (Pharmacy Tech) on 07/20/18 at 1749  Med List Status: Complete   Medication Last Dose Status   acetaminophen (TYLENOL) 325 MG Tab 7/19/2018 Active   amiodarone (CORDARONE) 200 MG Tab 7/20/2018 Active   apixaban (ELIQUIS) 5mg Tab 7/20/2018 Active   citalopram (CELEXA) 10 MG tablet 7/20/2018 Active   fluticasone (FLONASE) 50 MCG/ACT nasal spray 7/20/2018 Active   furosemide (LASIX) 40 MG Tab 7/20/2018 Active   lisinopril (PRINIVIL) 10 MG Tab 7/20/2018 Active   loratadine (CLARITIN) 10 MG Tab 7/20/2018 Active   multivitamin (THERAGRAN) Tab 7/20/2018 Active   oxybutynin SR (DITROPAN-XL) 5 MG TABLET SR 24 HR 7/19/2018 Active   oyster shell calcium/D (OS-DIANNA) 500-200 MG-UNIT Tab 7/20/2018 Active   potassium chloride (KAYCIEL) 20 MEQ/15ML (10%) Solution 7/20/2018 Active   senna-docusate (PERICOLACE OR SENOKOT S) 8.6-50 MG Tab 7/20/2018 Active   tramadol (ULTRAM) 50 MG Tab 7/19/2018 Active   trazodone (DESYREL) 50 MG Tab 7/19/2018 Active   vitamin D (VITAMIND D3) 1000 UNIT Tab 7/20/2018 Active   zinc sulfate (ZINCATE) 220 MG Cap 7/20/2018 Active                ALLERGIES  Allergies   Allergen Reactions   • Ppd [Tuberculin Purified Protein Derivative]        PHYSICAL EXAM  VITAL SIGNS: /72   Pulse 70   Temp 36.4 °C (97.5 °F)   Resp 16   Ht 1.575 m (5' 2\")   Wt 59 kg (130 lb)   SpO2 98%   BMI 23.78 kg/m²    Constitutional: Well developed, Well nourished, No acute distress, " Non-toxic appearance.   HENT: Normocephalic, Atraumatic, Bilateral external ears normal, Oropharynx moist, No oral exudates, Nose normal.   Eyes: PERRL, EOMI, Conjunctiva normal, No discharge.   Neck: Normal range of motion, No tenderness, Supple, No stridor.   Cardiovascular: Normal heart rate, Normal rhythm, No murmurs, No rubs, No gallops.   Thorax & Lungs: Normal breath sounds, No respiratory distress, No wheezing  Abdomen: Bowel sounds normal, Soft, No tenderness,   Skin: Warm, Dry, No erythema, No rash.   Back: No tenderness, No CVA tenderness.   Musculoskeletal:  pain pain with any movement of the left hip.  Normal distal neurovascular exam.  No shortening or rotation to  Neurologic: Alertv No focal deficits noted.   Psychiatric: Affect pleasant    Results for orders placed or performed during the hospital encounter of 07/20/18   CBC w/ Differential   Result Value Ref Range    WBC 6.8 4.8 - 10.8 K/uL    RBC 4.21 4.20 - 5.40 M/uL    Hemoglobin 12.9 12.0 - 16.0 g/dL    Hematocrit 38.2 37.0 - 47.0 %    MCV 90.7 81.4 - 97.8 fL    MCH 30.6 27.0 - 33.0 pg    MCHC 33.8 33.6 - 35.0 g/dL    RDW 47.1 35.9 - 50.0 fL    Platelet Count 247 164 - 446 K/uL    MPV 10.3 9.0 - 12.9 fL    Neutrophils-Polys 57.20 44.00 - 72.00 %    Lymphocytes 26.70 22.00 - 41.00 %    Monocytes 14.90 (H) 0.00 - 13.40 %    Eosinophils 0.60 0.00 - 6.90 %    Basophils 0.30 0.00 - 1.80 %    Immature Granulocytes 0.30 0.00 - 0.90 %    Nucleated RBC 0.00 /100 WBC    Neutrophils (Absolute) 3.89 2.00 - 7.15 K/uL    Lymphs (Absolute) 1.81 1.00 - 4.80 K/uL    Monos (Absolute) 1.01 (H) 0.00 - 0.85 K/uL    Eos (Absolute) 0.04 0.00 - 0.51 K/uL    Baso (Absolute) 0.02 0.00 - 0.12 K/uL    Immature Granulocytes (abs) 0.02 0.00 - 0.11 K/uL    NRBC (Absolute) 0.00 K/uL   Complete Metabolic Panel (CMP)   Result Value Ref Range    Sodium 132 (L) 135 - 145 mmol/L    Potassium 4.2 3.6 - 5.5 mmol/L    Chloride 100 96 - 112 mmol/L    Co2 24 20 - 33 mmol/L    Anion Gap  8.0 0.0 - 11.9    Glucose 126 (H) 65 - 99 mg/dL    Bun 35 (H) 8 - 22 mg/dL    Creatinine 1.31 0.50 - 1.40 mg/dL    Calcium 9.6 8.5 - 10.5 mg/dL    AST(SGOT) 13 12 - 45 U/L    ALT(SGPT) 9 2 - 50 U/L    Alkaline Phosphatase 55 30 - 99 U/L    Total Bilirubin 0.5 0.1 - 1.5 mg/dL    Albumin 3.7 3.2 - 4.9 g/dL    Total Protein 6.5 6.0 - 8.2 g/dL    Globulin 2.8 1.9 - 3.5 g/dL    A-G Ratio 1.3 g/dL   Troponin   Result Value Ref Range    Troponin I 0.02 0.00 - 0.04 ng/mL   Btype Natriuretic Peptide (BNP)   Result Value Ref Range    B Natriuretic Peptide 187 (H) 0 - 100 pg/mL   Prothrombin (PT/INR)   Result Value Ref Range    PT 17.0 (H) 12.0 - 14.6 sec    INR 1.42 (H) 0.87 - 1.13   APTT   Result Value Ref Range    APTT 29.8 24.7 - 36.0 sec   ESTIMATED GFR   Result Value Ref Range    GFR If  47 (A) >60 mL/min/1.73 m 2    GFR If Non  39 (A) >60 mL/min/1.73 m 2   EKG (NOW)   Result Value Ref Range    Report       [facility] Emergency Dept.    Test Date:  2018  Pt Name:    DAMARIS FORRESTER               Department: ER  MRN:        [id number]                      Room:        22  Gender:     Female                       Technician: [id number]  :        [id number]                   Requested By:AG LIPSCOMB  Order #:    [id number]                    Reading MD:    Measurements  Intervals                                Axis  Rate:       71                           P:  NJ:                                      QRS:        -46  QRSD:       84                           T:          8  QT:         432  QTc:        470    Interpretive Statements  PACEMAKER SPIKES OR ARTIFACTS  ACCELERATED JUNCTIONAL RHYTHM  PROBABLE INFERIOR INFARCT, AGE INDETERMINATE  BORDERLINE R WAVE PROGRESSION, ANTERIOR LEADS  Compared to ECG 2017 16:17:58  Accelerated junctional rhythm now present  Myocardial infarct finding now present  AV dual-paced complex(es) or rhythm no longer present           RADIOLOGY/PROCEDURES  CT-HIP W/O PLUS RECONS LEFT   Final Result      Old posttraumatic and post surgical change involving the proximal left femur with severe secondary osteoarthritis.      No acute fracture or dislocation identified.      DX-FEMUR-1 VIEW LEFT   Final Result      1.  No acute fractures identified.      2.  Severe arthropathy of the left hip with evidence of prior hardware removal. Infection cannot be excluded in the appropriate clinical setting.      DX-CHEST-LIMITED (1 VIEW)   Final Result      Mild interstitial prominence is likely related to portable AP technique. Mild interstitial edema could have a similar appearance.      DX-HIP-UNILATERAL-WITH PELVIS-1 VIEW LEFT   Final Result      1.  No acute fracture is identified.      2.  Severe joint space loss in the left hip with cortical indistinctness, possibly related to severe osteoarthropathy. Septic arthritis cannot be excluded in the appropriate clinical setting.      3.  Mild osteoarthritis of the right hip joint.      MR-HIP WITH & W/O LEFT    (Results Pending)         COURSE & MEDICAL DECISION MAKING  Pertinent Labs & Imaging studies reviewed. (See chart for details)    The patient presents with hip pain has been progressive over 2 weeks.  It has been associated with increasing pain to the point where she cannot walk and she is in a wheelchair.    The patient has no recent history of trauma.  She is not febrile or toxic.    The patient had an x-ray done of the nursing home that showed a fracture and she was sent here.    X-ray here shows old change but no fracture.  Is concerned about septic arthritis.  Labs are unremarkable.  Including normal sed rate and CRP I do not think this is infected.  I follow this up with a CT which is chronic degenerative change.    After the x-ray before that she did spoke with orthopedic doctor, Dr. Viera.  He felt her x-rays here looked the same as they did in the clinic and there is no acute  change.    Overall the patient has progressive pain to the point where she is debilitated and wheelchair.  I am concerned about deconditioning with this discomfort and weakness.  I think she is to be admitted for pain control, possibly an MRI PT and OT and orthopedic surgery consultation.  She will be admitted to the hospitalist for continued workup and treatment.  Care is transferred to Dr. Arriola.  She will be seen by Dr. Viera tomorrow.    She may still have an occult fracture.  MRI will be ordered.    She has good pulses normal neurovascular exam.  No signs of sciatica.  I think this is truly her hip.  She is worked up for further workup and treatment    FINAL IMPRESSION  1. Pain of left hip joint    2. Inability to walk          2.   3.         Electronically signed by: Tigre Hernandez, 7/20/2018 5:36 PM

## 2018-07-22 LAB
ANION GAP SERPL CALC-SCNC: 9 MMOL/L (ref 0–11.9)
BASOPHILS # BLD AUTO: 0.4 % (ref 0–1.8)
BASOPHILS # BLD: 0.03 K/UL (ref 0–0.12)
BUN SERPL-MCNC: 17 MG/DL (ref 8–22)
CALCIUM SERPL-MCNC: 9 MG/DL (ref 8.5–10.5)
CHLORIDE SERPL-SCNC: 107 MMOL/L (ref 96–112)
CO2 SERPL-SCNC: 22 MMOL/L (ref 20–33)
CREAT SERPL-MCNC: 1.11 MG/DL (ref 0.5–1.4)
EOSINOPHIL # BLD AUTO: 0.03 K/UL (ref 0–0.51)
EOSINOPHIL NFR BLD: 0.4 % (ref 0–6.9)
ERYTHROCYTE [DISTWIDTH] IN BLOOD BY AUTOMATED COUNT: 46.9 FL (ref 35.9–50)
GLUCOSE SERPL-MCNC: 98 MG/DL (ref 65–99)
HCT VFR BLD AUTO: 38.9 % (ref 37–47)
HGB BLD-MCNC: 12.9 G/DL (ref 12–16)
IMM GRANULOCYTES # BLD AUTO: 0.03 K/UL (ref 0–0.11)
IMM GRANULOCYTES NFR BLD AUTO: 0.4 % (ref 0–0.9)
LYMPHOCYTES # BLD AUTO: 2.23 K/UL (ref 1–4.8)
LYMPHOCYTES NFR BLD: 31.5 % (ref 22–41)
MCH RBC QN AUTO: 30.1 PG (ref 27–33)
MCHC RBC AUTO-ENTMCNC: 33.2 G/DL (ref 33.6–35)
MCV RBC AUTO: 90.7 FL (ref 81.4–97.8)
MONOCYTES # BLD AUTO: 0.89 K/UL (ref 0–0.85)
MONOCYTES NFR BLD AUTO: 12.6 % (ref 0–13.4)
NEUTROPHILS # BLD AUTO: 3.88 K/UL (ref 2–7.15)
NEUTROPHILS NFR BLD: 54.7 % (ref 44–72)
NRBC # BLD AUTO: 0 K/UL
NRBC BLD-RTO: 0 /100 WBC
PLATELET # BLD AUTO: 245 K/UL (ref 164–446)
PMV BLD AUTO: 10.5 FL (ref 9–12.9)
POTASSIUM SERPL-SCNC: 3.6 MMOL/L (ref 3.6–5.5)
RBC # BLD AUTO: 4.29 M/UL (ref 4.2–5.4)
SODIUM SERPL-SCNC: 138 MMOL/L (ref 135–145)
WBC # BLD AUTO: 7.1 K/UL (ref 4.8–10.8)

## 2018-07-22 PROCEDURE — A9270 NON-COVERED ITEM OR SERVICE: HCPCS | Performed by: HOSPITALIST

## 2018-07-22 PROCEDURE — G8988 SELF CARE GOAL STATUS: HCPCS | Mod: CJ

## 2018-07-22 PROCEDURE — 85025 COMPLETE CBC W/AUTO DIFF WBC: CPT

## 2018-07-22 PROCEDURE — 700102 HCHG RX REV CODE 250 W/ 637 OVERRIDE(OP): Performed by: HOSPITALIST

## 2018-07-22 PROCEDURE — 99225 PR SUBSEQUENT OBSERVATION CARE,LEVEL II: CPT | Performed by: INTERNAL MEDICINE

## 2018-07-22 PROCEDURE — G8978 MOBILITY CURRENT STATUS: HCPCS | Mod: CK

## 2018-07-22 PROCEDURE — G8979 MOBILITY GOAL STATUS: HCPCS | Mod: CI

## 2018-07-22 PROCEDURE — G8987 SELF CARE CURRENT STATUS: HCPCS | Mod: CJ

## 2018-07-22 PROCEDURE — 80048 BASIC METABOLIC PNL TOTAL CA: CPT

## 2018-07-22 PROCEDURE — 97161 PT EVAL LOW COMPLEX 20 MIN: CPT

## 2018-07-22 PROCEDURE — 97165 OT EVAL LOW COMPLEX 30 MIN: CPT

## 2018-07-22 PROCEDURE — G0378 HOSPITAL OBSERVATION PER HR: HCPCS

## 2018-07-22 PROCEDURE — 36415 COLL VENOUS BLD VENIPUNCTURE: CPT

## 2018-07-22 RX ADMIN — ACETAMINOPHEN 650 MG: 325 TABLET, FILM COATED ORAL at 05:56

## 2018-07-22 RX ADMIN — TRAZODONE HYDROCHLORIDE 50 MG: 50 TABLET ORAL at 19:56

## 2018-07-22 RX ADMIN — APIXABAN 2.5 MG: 2.5 TABLET, FILM COATED ORAL at 05:58

## 2018-07-22 RX ADMIN — FUROSEMIDE 40 MG: 40 TABLET ORAL at 05:58

## 2018-07-22 RX ADMIN — APIXABAN 2.5 MG: 2.5 TABLET, FILM COATED ORAL at 18:17

## 2018-07-22 RX ADMIN — LISINOPRIL 10 MG: 10 TABLET ORAL at 05:57

## 2018-07-22 RX ADMIN — TRAMADOL HYDROCHLORIDE 50 MG: 50 TABLET, COATED ORAL at 13:42

## 2018-07-22 RX ADMIN — ACETAMINOPHEN 650 MG: 325 TABLET, FILM COATED ORAL at 19:54

## 2018-07-22 RX ADMIN — ACETAMINOPHEN 650 MG: 325 TABLET, FILM COATED ORAL at 13:42

## 2018-07-22 RX ADMIN — OXYBUTYNIN CHLORIDE 5 MG: 5 TABLET, FILM COATED, EXTENDED RELEASE ORAL at 18:17

## 2018-07-22 RX ADMIN — AMIODARONE HYDROCHLORIDE 200 MG: 200 TABLET ORAL at 05:59

## 2018-07-22 RX ADMIN — CITALOPRAM HYDROBROMIDE 10 MG: 20 TABLET ORAL at 05:59

## 2018-07-22 ASSESSMENT — PAIN SCALES - GENERAL
PAINLEVEL_OUTOF10: 3
PAINLEVEL_OUTOF10: 8
PAINLEVEL_OUTOF10: 4
PAINLEVEL_OUTOF10: 5
PAINLEVEL_OUTOF10: 10

## 2018-07-22 ASSESSMENT — COGNITIVE AND FUNCTIONAL STATUS - GENERAL
HELP NEEDED FOR BATHING: A LOT
DRESSING REGULAR UPPER BODY CLOTHING: A LITTLE
WALKING IN HOSPITAL ROOM: A LITTLE
TURNING FROM BACK TO SIDE WHILE IN FLAT BAD: A LITTLE
MOBILITY SCORE: 17
MOVING TO AND FROM BED TO CHAIR: A LITTLE
MOVING FROM LYING ON BACK TO SITTING ON SIDE OF FLAT BED: A LITTLE
SUGGESTED CMS G CODE MODIFIER DAILY ACTIVITY: CK
DRESSING REGULAR LOWER BODY CLOTHING: A LOT
SUGGESTED CMS G CODE MODIFIER MOBILITY: CK
TOILETING: A LITTLE
STANDING UP FROM CHAIR USING ARMS: A LITTLE
CLIMB 3 TO 5 STEPS WITH RAILING: A LOT
DAILY ACTIVITIY SCORE: 17
PERSONAL GROOMING: A LITTLE

## 2018-07-22 ASSESSMENT — GAIT ASSESSMENTS
DEVIATION: ANTALGIC;SHUFFLED GAIT;BRADYKINETIC
DISTANCE (FEET): 15
ASSISTIVE DEVICE: FRONT WHEEL WALKER
GAIT LEVEL OF ASSIST: MINIMAL ASSIST

## 2018-07-22 ASSESSMENT — ENCOUNTER SYMPTOMS
NAUSEA: 0
ABDOMINAL PAIN: 0
SHORTNESS OF BREATH: 0
HEADACHES: 0

## 2018-07-22 ASSESSMENT — PATIENT HEALTH QUESTIONNAIRE - PHQ9
1. LITTLE INTEREST OR PLEASURE IN DOING THINGS: NOT AT ALL
2. FEELING DOWN, DEPRESSED, IRRITABLE, OR HOPELESS: NOT AT ALL
SUM OF ALL RESPONSES TO PHQ9 QUESTIONS 1 AND 2: 0

## 2018-07-22 ASSESSMENT — ACTIVITIES OF DAILY LIVING (ADL): TOILETING: INDEPENDENT

## 2018-07-22 NOTE — PROGRESS NOTES
[facility] Hospitalist Progress Note    Date of Service: 2018    Chief Complaint  82 y.o. female admitted 2018 with afib, HTN, pacemaker who presented with left hip pain. ESR and CRP were normal. CT showed post surgical changes and severe osteoarthritis.    Interval Problem Update  Pain is stable. Coloring in bed. Getting into chair with assistance. SW assisting to transport back to [facility].     Consultants/Specialty  Ortho- Dr. Viera    Disposition  [facility] pending transport        Review of Systems   Constitutional: Negative for malaise/fatigue.   HENT: Negative for congestion.    Respiratory: Negative for shortness of breath.    Cardiovascular: Negative for chest pain.   Gastrointestinal: Negative for abdominal pain and nausea.   Musculoskeletal: Positive for joint pain.   Neurological: Negative for headaches.   Psychiatric/Behavioral: Depression: SNF - [facility].      Physical Exam  Laboratory/Imaging   Hemodynamics  Temp (24hrs), Av.4 °C (97.6 °F), Min:36.2 °C (97.1 °F), Max:36.8 °C (98.2 °F)   Temperature: 36.8 °C (98.2 °F)  Pulse  Av.8  Min: 70  Max: 74    Blood Pressure : 157/88      Respiratory      Respiration: 17, Pulse Oximetry: 98 %        RUL Breath Sounds: Clear, RML Breath Sounds: Clear, RLL Breath Sounds: Diminished, MARILOU Breath Sounds: Clear, LLL Breath Sounds: Diminished    Fluids    Intake/Output Summary (Last 24 hours) at 18 1209  Last data filed at 18 0819   Gross per 24 hour   Intake              480 ml   Output                0 ml   Net              480 ml       Nutrition  Orders Placed This Encounter   Procedures   • Diet Order Regular     Standing Status:   Standing     Number of Occurrences:   1     Order Specific Question:   Diet:     Answer:   Regular [1]     Physical Exam   Constitutional: She appears well-developed and well-nourished. She is cooperative.   HENT:   Head: Normocephalic and atraumatic.   Eyes: Conjunctivae are normal. Right eye  exhibits no discharge. Left eye exhibits no discharge.   Cardiovascular: Normal rate, regular rhythm and normal heart sounds.    Pulmonary/Chest: Effort normal. She has no wheezes.   Abdominal: Soft. There is no tenderness. There is no rebound and no guarding.   Musculoskeletal: She exhibits no edema.   Unable to extend left hip due to pain, no pain with internal and external rotation.   Neurological: She is alert.   Oriented to self and place, disoriented to date or recent events.   Skin: Skin is warm and dry.   Nursing note and vitals reviewed.      Recent Labs      07/20/18   1640  07/21/18   0333  07/22/18   0326   WBC  6.8  6.8  7.1   RBC  4.21  4.32  4.29   HEMOGLOBIN  12.9  13.0  12.9   HEMATOCRIT  38.2  39.1  38.9   MCV  90.7  90.5  90.7   MCH  30.6  30.1  30.1   MCHC  33.8  33.2*  33.2*   RDW  47.1  47.0  46.9   PLATELETCT  247  255  245   MPV  10.3  10.4  10.5     Recent Labs      07/20/18   1640  07/21/18   0333  07/22/18   0326   SODIUM  132*  138  138   POTASSIUM  4.2  4.2  3.6   CHLORIDE  100  103  107   CO2  24  26  22   GLUCOSE  126*  96  98   BUN  35*  29*  17   CREATININE  1.31  1.26  1.11   CALCIUM  9.6  9.1  9.0     Recent Labs      07/20/18   1640   APTT  29.8   INR  1.42*     Recent Labs      07/20/18   1640   BNPBTYPENAT  187*              Assessment/Plan     * Left hip pain- (present on admission)   Assessment & Plan    Unable to bear weight  CRP and ESR are normal  CT showed post surgical changes and severe osteoarthritis.  Unable to complete MRI because of pacemaker  This is chronic per Dr. Viera, patient to be discharged and f/u outpatient  PTOT, pending [facility] transfer          ODALIS (acute kidney injury) (HCC)   Assessment & Plan    UA unremarkable  Improved, likely prerenal          Atrial fibrillation (HCC)- (present on admission)   Assessment & Plan    This is chronic and stable, continue home amiodarone and Eliquis.  We will need to hold Eliquis if orthopedic surgery would like  to proceed to intervention.        Essential hypertension- (present on admission)   Assessment & Plan    This is chronic and stable, continue home Prinivil and Lasix.          Quality-Core Measures   Reviewed items::  EKG reviewed, Labs reviewed, Medications reviewed and Radiology images reviewed  Alvarado catheter::  No Alvarado  DVT: apixaban.

## 2018-07-22 NOTE — THERAPY
"Occupational Therapy Evaluation completed.   Functional Status:  Mod assist for LE self-care ; CGA to min assist for functional transfers with AE   Plan of Care: Will benefit from Occupational Therapy 3 times per week  Discharge Recommendations:  Equipment: Will Continue to Assess for Equipment Needs. Post-acute therapy Discharge to a transitional care facility for continued skilled therapy services.    See \"Rehab Therapy-Acute\" Patient Summary Report for complete documentation.    "

## 2018-07-22 NOTE — PROGRESS NOTES
"CNA called primary nurse when assisting Patient to bedside commode. Pt's daughter at bedside and yelling,\" The doctor said she can walk. I want to see it\". Informed pt's daughter that she has not been walking to the bathroom, she has only been pivoting to the commode and taking a few steps. Pt states,\" How do I get to the bathroom?. I forgot\". Pt informed that last night she was using a bedside commode, and that the report from the day was the same. Pt's daughter yelling, \" I said to the [place] doctor, \" What a few steps \".That's not walking, but she says she can walk\". Pt's daughter informed that there was no report of her walking or any documentation of it. Pt's daughter continued to insist and yell for fifteen minutes regarding the same situation. Pt's daughter informed that she could speak directly to the doctor at rounds, since there was an issue with communication. Pt's daughter states, \" I don't want to talk to her. There's no communication around here. Why did she say that then?\". Pt's daughter once again informed that she could speak to the doctor for clarification, but that there was no known documentation or report of patient walking more than a few steps. Pt's daughter continues to refuse to talk with the MD. Pt's daughter states, \" I just want to get her out of here\". Charge nurse notified of pt's daughters concerns.   "

## 2018-07-22 NOTE — DISCHARGE PLANNING
Anticipated Discharge Disposition: SNF    Action: Pt resides in [facility] and had expected to return there. [facility] has called and left message with admissions coordinator at [facility].    Barriers to Discharge:Acceptance back to [facility].    Plan: F/U with [facility] Monday.

## 2018-07-22 NOTE — CARE PLAN
Problem: Bowel/Gastric:  Goal: Normal bowel function is maintained or improved  Outcome: PROGRESSING AS EXPECTED  Pt had a bowel movement 7/21. Bowel sounds normoactive in all four quadrants.     Problem: Knowledge Deficit  Goal: Knowledge of disease process/condition, treatment plan, diagnostic tests, and medications will improve  Outcome: PROGRESSING SLOWER THAN EXPECTED  Reviewed POC including safety, mobility, pain management, medication administration, DVT prophylaxis, and discharge. No learning evidence. Pt is disoriented and needs frequent reorientation.

## 2018-07-22 NOTE — CARE PLAN
Problem: Safety  Goal: Will remain free from injury    Intervention: Provide assistance with mobility  Pt calls appropriately, treaded socks in use. Personal belongings and call light with in reach and bed is locked in lowest position.

## 2018-07-23 VITALS
RESPIRATION RATE: 16 BRPM | HEART RATE: 70 BPM | TEMPERATURE: 96.9 F | SYSTOLIC BLOOD PRESSURE: 137 MMHG | OXYGEN SATURATION: 97 % | WEIGHT: 130 LBS | DIASTOLIC BLOOD PRESSURE: 81 MMHG | BODY MASS INDEX: 23.92 KG/M2 | HEIGHT: 62 IN

## 2018-07-23 PROBLEM — N17.9 AKI (ACUTE KIDNEY INJURY) (HCC): Status: RESOLVED | Noted: 2018-07-21 | Resolved: 2018-07-23

## 2018-07-23 PROCEDURE — 90471 IMMUNIZATION ADMIN: CPT

## 2018-07-23 PROCEDURE — 90670 PCV13 VACCINE IM: CPT | Performed by: HOSPITALIST

## 2018-07-23 PROCEDURE — G0378 HOSPITAL OBSERVATION PER HR: HCPCS

## 2018-07-23 PROCEDURE — 700111 HCHG RX REV CODE 636 W/ 250 OVERRIDE (IP): Performed by: HOSPITALIST

## 2018-07-23 PROCEDURE — 700102 HCHG RX REV CODE 250 W/ 637 OVERRIDE(OP): Performed by: HOSPITALIST

## 2018-07-23 PROCEDURE — 99217 PR OBSERVATION CARE DISCHARGE: CPT | Performed by: INTERNAL MEDICINE

## 2018-07-23 PROCEDURE — A9270 NON-COVERED ITEM OR SERVICE: HCPCS | Performed by: INTERNAL MEDICINE

## 2018-07-23 PROCEDURE — 700102 HCHG RX REV CODE 250 W/ 637 OVERRIDE(OP): Performed by: INTERNAL MEDICINE

## 2018-07-23 PROCEDURE — A9270 NON-COVERED ITEM OR SERVICE: HCPCS | Performed by: HOSPITALIST

## 2018-07-23 RX ORDER — TRAMADOL HYDROCHLORIDE 50 MG/1
50 TABLET ORAL EVERY 6 HOURS PRN
Qty: 5 TAB | Refills: 0 | Status: SHIPPED | OUTPATIENT
Start: 2018-07-23 | End: 2018-07-30

## 2018-07-23 RX ADMIN — PNEUMOCOCCAL 13-VALENT CONJUGATE VACCINE 0.5 ML: 2.2; 2.2; 2.2; 2.2; 2.2; 4.4; 2.2; 2.2; 2.2; 2.2; 2.2; 2.2; 2.2 INJECTION, SUSPENSION INTRAMUSCULAR at 12:52

## 2018-07-23 RX ADMIN — CITALOPRAM HYDROBROMIDE 10 MG: 20 TABLET ORAL at 06:07

## 2018-07-23 RX ADMIN — TRAMADOL HYDROCHLORIDE 50 MG: 50 TABLET, COATED ORAL at 06:07

## 2018-07-23 RX ADMIN — STANDARDIZED SENNA CONCENTRATE AND DOCUSATE SODIUM 2 TABLET: 8.6; 5 TABLET, FILM COATED ORAL at 06:07

## 2018-07-23 RX ADMIN — FUROSEMIDE 40 MG: 40 TABLET ORAL at 06:07

## 2018-07-23 RX ADMIN — CYCLOBENZAPRINE 5 MG: 10 TABLET, FILM COATED ORAL at 03:07

## 2018-07-23 RX ADMIN — AMIODARONE HYDROCHLORIDE 200 MG: 200 TABLET ORAL at 06:07

## 2018-07-23 RX ADMIN — LISINOPRIL 10 MG: 10 TABLET ORAL at 06:07

## 2018-07-23 RX ADMIN — ACETAMINOPHEN 650 MG: 325 TABLET, FILM COATED ORAL at 03:07

## 2018-07-23 RX ADMIN — TRAMADOL HYDROCHLORIDE 50 MG: 50 TABLET, COATED ORAL at 12:52

## 2018-07-23 RX ADMIN — APIXABAN 2.5 MG: 2.5 TABLET, FILM COATED ORAL at 07:15

## 2018-07-23 RX ADMIN — POTASSIUM CHLORIDE 20 MEQ: 1500 TABLET, EXTENDED RELEASE ORAL at 06:05

## 2018-07-23 ASSESSMENT — PAIN SCALES - GENERAL
PAINLEVEL_OUTOF10: 7
PAINLEVEL_OUTOF10: 8
PAINLEVEL_OUTOF10: 3
PAINLEVEL_OUTOF10: 5

## 2018-07-23 NOTE — DISCHARGE PLANNING
Received Transport Form @ 2168  Spoke to Ananda @ [facility]    Transport is scheduled for [facility] @1400 going to [facility].

## 2018-07-23 NOTE — DISCHARGE INSTRUCTIONS
Discharge Instructions    Discharged to other by medical transportation with escort. Discharged via wheelchair, hospital escort: Yes.  Special equipment needed: Walker    Be sure to schedule a follow-up appointment with your primary care doctor or any specialists as instructed.     Discharge Plan:   Diet Plan: Discussed  Activity Level: Discussed  Confirmed Follow up Appointment: Patient to Call and Schedule Appointment  Confirmed Symptoms Management: Discussed  Medication Reconciliation Updated: Yes  Influenza Vaccine Indication: Indicated: Not available from distributor/    I understand that a diet low in cholesterol, fat, and sodium is recommended for good health. Unless I have been given specific instructions below for another diet, I accept this instruction as my diet prescription.   Other diet: Resume regular diet    Special Instructions: Discharge instructions for the Orthopedic Patient    Follow up with Primary Care Physician within 2 weeks of discharge to home, regarding:  Review of medications and diagnostic testing.  Surveillance for medical complications.  Workup and treatment of osteoporosis, if appropriate.     -Is this a Joint Replacement patient? No    -Is this patient being discharged with medication to prevent blood clots?  No    · Is patient discharged on Warfarin / Coumadin?   No     Depression / Suicide Risk    As you are discharged from this [facility] facility, it is important to learn how to keep safe from harming yourself.    Recognize the warning signs:  · Abrupt changes in personality, positive or negative- including increase in energy   · Giving away possessions  · Change in eating patterns- significant weight changes-  positive or negative  · Change in sleeping patterns- unable to sleep or sleeping all the time   · Unwillingness or inability to communicate  · Depression  · Unusual sadness, discouragement and loneliness  · Talk of wanting to die  · Neglect of personal  appearance   · Rebelliousness- reckless behavior  · Withdrawal from people/activities they love  · Confusion- inability to concentrate     If you or a loved one observes any of these behaviors or has concerns about self-harm, here's what you can do:  · Talk about it- your feelings and reasons for harming yourself  · Remove any means that you might use to hurt yourself (examples: pills, rope, extension cords, firearm)  · Get professional help from the community (Mental Health, Substance Abuse, psychological counseling)  · Do not be alone:Call your Safe Contact- someone whom you trust who will be there for you.  · Call your local CRISIS HOTLINE 746-4159 or 610-736-8841  · Call your local Children's Mobile Crisis Response Team [place] (528) 757-0715 or www.iTaggit  · Call the toll free National Suicide Prevention Hotlines   · National Suicide Prevention Lifeline 588-338-UHMY (7569)  · Contract Live Line Network 800-SUICIDE (277-4654)      Hip Pain  Your hip is the joint between your upper legs and your lower pelvis. The bones, cartilage, tendons, and muscles of your hip joint perform a lot of work each day supporting your body weight and allowing you to move around.  Hip pain can range from a minor ache to severe pain in one or both of your hips. Pain may be felt on the inside of the hip joint near the groin, or the outside near the buttocks and upper thigh. You may have swelling or stiffness as well.  Follow these instructions at home:  · Take medicines only as directed by your health care provider.  · Apply ice to the injured area:  ¨ Put ice in a plastic bag.  ¨ Place a towel between your skin and the bag.  ¨ Leave the ice on for 15-20 minutes at a time, 3-4 times a day.  · Keep your leg raised (elevated) when possible to lessen swelling.  · Avoid activities that cause pain.  · Follow specific exercises as directed by your health care provider.  · Sleep with a pillow between your legs on your most  comfortable side.  · Record how often you have hip pain, the location of the pain, and what it feels like.  Contact a health care provider if:  · You are unable to put weight on your leg.  · Your hip is red or swollen or very tender to touch.  · Your pain or swelling continues or worsens after 1 week.  · You have increasing difficulty walking.  · You have a fever.  Get help right away if:  · You have fallen.  · You have a sudden increase in pain and swelling in your hip.  This information is not intended to replace advice given to you by your health care provider. Make sure you discuss any questions you have with your health care provider.  Document Released: 06/07/2011 Document Revised: 05/25/2017 Document Reviewed: 08/14/2014  Elsevier Interactive Patient Education © 2017 [employer]

## 2018-07-23 NOTE — PROGRESS NOTES
Patient discharged to [facility]. Report called to receiving facility. Discharge packet with tramadol prescription sent with [facility] transporter. Family aware of discharge plan and has no questions or concerns at this time.

## 2018-07-23 NOTE — DISCHARGE SUMMARY
Discharge Summary    CHIEF COMPLAINT ON ADMISSION  Chief Complaint   Patient presents with   • Hip Pain     left       Reason for Admission  Left hip pain.    CODE STATUS  Full Code    HPI & HOSPITAL COURSE  This is a 82 y.o. female here with afib, HTN, pacemaker who presented with left hip pain. ESR and CRP were normal. CT showed post surgical changes and severe osteoarthritis. She was seen by Dr. Viera with ortho who is familiar with the patient. She has severe left hip avascular necrosis and her pain will continue to be chronic. She has outpatient plan for steroid injection for her hip and follow up with orthopedics that she can continue. No other workup or interventions recommended. She was evaluated by PT and OT who recommended therapy 3 times a week. Prior to discharge, she continues to have left hip pain but with improved control. She is able to pivot to bedside commode.     Therefore, she is discharged in good and stable condition To [facility].        FOLLOW UP ITEMS POST DISCHARGE  Ortho and steroid infection    DISCHARGE DIAGNOSES  Principal Problem:    Left hip pain POA: Yes  Active Problems:    Essential hypertension POA: Yes    Atrial fibrillation (HCC) POA: Yes  Resolved Problems:    ODALIS (acute kidney injury) (HCC) POA: Unknown      FOLLOW UP  No future appointments.  [facility] ([facility])  [address]  [place] [address]  [phone]          MEDICATIONS ON DISCHARGE     Medication List      CONTINUE taking these medications      Instructions   acetaminophen 325 MG Tabs  Commonly known as:  TYLENOL   Take 650 mg by mouth every four hours as needed.  Dose:  650 mg     amiodarone 200 MG Tabs  Commonly known as:  CORDARONE   Take 1 Tab by mouth every day.  Dose:  200 mg     CELEXA 10 MG tablet  Generic drug:  citalopram   Take 10 mg by mouth every day.  Dose:  10 mg     Cholecalciferol 1000 UNIT Tabs  Commonly known as:  VITAMIND D3   Take 1 Tab by mouth every day.  Dose:  1000  Units     ELIQUIS 5mg Tabs  Generic drug:  apixaban   Take 5 mg by mouth 2 Times a Day.  Dose:  5 mg     fluticasone 50 MCG/ACT nasal spray  Commonly known as:  FLONASE   Spray 1 Spray in nose every day.  Dose:  1 Spray     furosemide 40 MG Tabs  Commonly known as:  LASIX   Take 40 mg by mouth every day.  Dose:  40 mg     lisinopril 10 MG Tabs  Commonly known as:  PRINIVIL   Take 10 mg by mouth every day.  Dose:  10 mg     loratadine 10 MG Tabs  Commonly known as:  CLARITIN   Take 10 mg by mouth every day.  Dose:  10 mg     multivitamin Tabs   Take 1 Tab by mouth every day.  Dose:  1 Tab     oxybutynin SR 5 MG Tb24  Commonly known as:  DITROPAN-XL   Take 5 mg by mouth every evening.  Dose:  5 mg     oyster shell calcium/D 500-200 MG-UNIT Tabs  Commonly known as:  OS-DIANNA   Take 1 Tab by mouth 3 times a day, with meals.  Dose:  1 Tab     potassium chloride 20 MEQ/15ML (10%) Soln  Commonly known as:  KAYCIEL   Take 20 mEq by mouth every day.  Dose:  20 mEq     senna-docusate 8.6-50 MG Tabs  Commonly known as:  PERICOLACE or SENOKOT S   Take 1 Tab by mouth 2 times a day.  Dose:  1 Tab     tramadol 50 MG Tabs  Commonly known as:  ULTRAM   Take 50 mg by mouth every 6 hours as needed.  Dose:  50 mg     traZODone 50 MG Tabs  Commonly known as:  DESYREL   Take 1 Tab by mouth every bedtime.  Dose:  50 mg     zinc sulfate 220 (50 Zn) MG Caps  Commonly known as:  ZINCATE   Take 1 Cap by mouth every day.  Dose:  220 mg            Allergies  Allergies   Allergen Reactions   • Ppd [Tuberculin Purified Protein Derivative]        DIET  Orders Placed This Encounter   Procedures   • Diet Order Regular     Standing Status:   Standing     Number of Occurrences:   1     Order Specific Question:   Diet:     Answer:   Regular [1]       ACTIVITY  PT and OT 3 times a week    LINES, DRAINS, AND WOUNDS  This is an automated list. Peripheral IVs will be removed prior to discharge.  PIV Group Right Antecubital 20g Flexible Catheter (Active)    Line Secured Taped;Transparent 7/23/2018  9:00 AM   Site Condition / Description Assessed;Patent;Dry;Intact;Clean 7/23/2018  9:00 AM   Dressing Type / Description Dry;Intact;Transparent;Clean 7/23/2018  9:00 AM   Dressing Status Observed 7/23/2018  9:00 AM   Saline Locked Yes 7/23/2018  9:00 AM   Infiltration Grading Used by [facility] 0 7/23/2018  9:00 AM   Phlebitis Scale (Used by [facility]) 0 7/23/2018  9:00 AM   Date Primary Tubing Changed 07/21/18 7/22/2018  9:00 PM   NEXT Primary Tubing Change  07/24/18 7/21/2018 12:10 AM       Surgical Incision  Incision Left Lateral Hip (Active)                  MENTAL STATUS ON TRANSFER  Level of Consciousness: Alert  Orientation : Oriented x 4  Speech: Speech Clear    CONSULTATIONS  Ortho    PROCEDURES  None    LABORATORY  Lab Results   Component Value Date    SODIUM 138 07/22/2018    POTASSIUM 3.6 07/22/2018    CHLORIDE 107 07/22/2018    CO2 22 07/22/2018    GLUCOSE 98 07/22/2018    BUN 17 07/22/2018    CREATININE 1.11 07/22/2018        Lab Results   Component Value Date    WBC 7.1 07/22/2018    HEMOGLOBIN 12.9 07/22/2018    HEMATOCRIT 38.9 07/22/2018    PLATELETCT 245 07/22/2018        Total time of the discharge process exceeds 40 minutes.

## 2018-07-23 NOTE — PROGRESS NOTES
Assumed Care at 1900  VSS  No complaints of pain at this time   Call light within reach.  Patient resting comfortably    AXO3   Family present at bedside (daughter)

## 2018-07-23 NOTE — DISCHARGE PLANNING
Anticipated Discharge Disposition: SNF    Action: Pt has been accepted back to [facility]. Daughter requested and received AD packet. Called for notary, she will be here to witness signatures before 2pm.  Pt scheduled for transfer to [facility] at 1400 today.    Barriers to Discharge: none    Plan: [facility]

## 2018-07-23 NOTE — CARE PLAN
Problem: Venous Thromboembolism (VTW)/Deep Vein Thrombosis (DVT) Prevention:  Goal: Patient will participate in Venous Thrombosis (VTE)/Deep Vein Thrombosis (DVT)Prevention Measures    Intervention: Ensure patient wears graduated elastic stockings (RUTHIE hose) and/or SCDs, if ordered, when in bed or chair (Remove at least once per shift for skin check)  SCD in place

## 2018-07-23 NOTE — CARE PLAN
Problem: Safety  Goal: Will remain free from injury    Intervention: Provide assistance with mobility  Patient educated on  calling for assistance with mobility , appropriate signage in place, bed locked and in lowest position. Treaded slipper socks on , reinforced use of call light.

## 2018-07-23 NOTE — CARE PLAN
Problem: Safety  Goal: Will remain free from falls    Intervention: Assess risk factors for falls  Moderate fall risk, bed alarm in use.      Problem: Urinary Elimination:  Goal: Ability to reestablish a normal urinary elimination pattern will improve  Voiding adequate amounts without difficulty, some frequency/urgency but baseline per patient.

## 2018-07-23 NOTE — FACE TO FACE
Face to Face Supporting Documentation - Home Health    The encounter with this patient was in whole or in part the primary reason for home health admission.    Date of encounter:   Patient:                    MRN:                       YOB: 2018  Marleen Mix  [id number]  1935     Home health to see patient for:  Skilled Nursing care for assessment, interventions & education, Physical Therapy evaluation and treatment and Occupational therapy evaluation and treatment    Skilled need for:  Exacerbation of Chronic Disease State left hip avascular necrosis    Skilled nursing interventions to include:  Comment: fall risk assessment, medication management    Homebound status evidenced by:  Need the aid of supportive devices such as crutches, canes, wheelchairs or walkers or Needs the assistance of another person in order to leave the home. Leaving home requires a considerable and taxing effort. There is a normal inability to leave the home.    Community Physician to provide follow up care: Jory Funes M.D.     Optional Interventions? No      I certify the face to face encounter for this home health care referral meets the CMS requirements and the encounter/clinical assessment with the patient was, in whole, or in part, for the medical condition(s) listed above, which is the primary reason for home health care. Based on my clinical findings: the service(s) are medically necessary, support the need for home health care, and the homebound criteria are met.  I certify that this patient has had a face to face encounter by myself.  Sergei Martinez M.D. - NPI: [id number]

## 2019-07-09 NOTE — PROGRESS NOTES
MRI called primary nurse and was informed that patient was not able to have MRI due to the type of pacermaker she has. Dr Martinez on floor and was informed. Order obtained to d/c mri.   Stable.

## 2019-10-08 NOTE — PROGRESS NOTES
Patient assessed for the following post chemotherapy:    Dizziness   No  Lightheadedness  No      Acute nausea/vomiting No  Headache   No  Chest pain/pressure  No  Rash/itching   No  Shortness of breath  No    Patient opted to not stay for 20 minutes observation post infusion chemotherapy. Patient tolerated chemotherapy treatment Dacogen without any complications. Last vital signs:   /73   Pulse 68   Temp 97.6 °F (36.4 °C) (Oral)   Resp 16   Ht 6' (1.829 m)   Wt 178 lb 3.2 oz (80.8 kg)   SpO2 96%   BMI 24.17 kg/m²         Patient instructed if experience any of the above symptoms following today's infusion,he/she is to notify MD immediately or go to the emergency department. Discharge instructions given to patient. Verbalizes understanding. Ambulated off unit per self with belongings. Received shift report and assumed care of patient.  Patient sleeping, calm and stable, currently positioned in bed for comfort and safety; call light within reach.  No s/s of pain or discomfort at this time.  Will continue to monitor.

## 2020-02-16 ENCOUNTER — HOSPITAL ENCOUNTER (INPATIENT)
Facility: MEDICAL CENTER | Age: 85
LOS: 5 days | DRG: 640 | End: 2020-02-21
Attending: EMERGENCY MEDICINE | Admitting: INTERNAL MEDICINE
Payer: MEDICARE

## 2020-02-16 ENCOUNTER — APPOINTMENT (OUTPATIENT)
Dept: RADIOLOGY | Facility: MEDICAL CENTER | Age: 85
DRG: 640 | End: 2020-02-16
Attending: EMERGENCY MEDICINE
Payer: MEDICARE

## 2020-02-16 DIAGNOSIS — I48.0 PAROXYSMAL ATRIAL FIBRILLATION (HCC): ICD-10-CM

## 2020-02-16 DIAGNOSIS — R73.9 HYPERGLYCEMIA: ICD-10-CM

## 2020-02-16 DIAGNOSIS — E87.70 HYPERVOLEMIA, UNSPECIFIED HYPERVOLEMIA TYPE: ICD-10-CM

## 2020-02-16 DIAGNOSIS — G93.40 ACUTE ENCEPHALOPATHY: ICD-10-CM

## 2020-02-16 PROBLEM — I50.9 ACUTE HEART FAILURE (HCC): Status: ACTIVE | Noted: 2020-02-16

## 2020-02-16 LAB
ALBUMIN SERPL BCP-MCNC: 4.4 G/DL (ref 3.2–4.9)
ALBUMIN/GLOB SERPL: 1.3 G/DL
ALP SERPL-CCNC: 78 U/L (ref 30–99)
ALT SERPL-CCNC: 14 U/L (ref 2–50)
ANION GAP SERPL CALC-SCNC: 17 MMOL/L (ref 7–16)
AST SERPL-CCNC: 21 U/L (ref 12–45)
BASOPHILS # BLD AUTO: 0.1 % (ref 0–1.8)
BASOPHILS # BLD: 0.01 K/UL (ref 0–0.12)
BILIRUB SERPL-MCNC: 0.3 MG/DL (ref 0.1–1.5)
BUN SERPL-MCNC: 38 MG/DL (ref 8–22)
CALCIUM SERPL-MCNC: 9.5 MG/DL (ref 8.4–10.2)
CHLORIDE SERPL-SCNC: 102 MMOL/L (ref 96–112)
CO2 SERPL-SCNC: 22 MMOL/L (ref 20–33)
CREAT SERPL-MCNC: 1.34 MG/DL (ref 0.5–1.4)
EKG IMPRESSION: NORMAL
EOSINOPHIL # BLD AUTO: 0 K/UL (ref 0–0.51)
EOSINOPHIL NFR BLD: 0 % (ref 0–6.9)
ERYTHROCYTE [DISTWIDTH] IN BLOOD BY AUTOMATED COUNT: 58.1 FL (ref 35.9–50)
GLOBULIN SER CALC-MCNC: 3.4 G/DL (ref 1.9–3.5)
GLUCOSE SERPL-MCNC: 162 MG/DL (ref 65–99)
HCT VFR BLD AUTO: 39.4 % (ref 37–47)
HGB BLD-MCNC: 12.3 G/DL (ref 12–16)
IMM GRANULOCYTES # BLD AUTO: 0.05 K/UL (ref 0–0.11)
IMM GRANULOCYTES NFR BLD AUTO: 0.6 % (ref 0–0.9)
LYMPHOCYTES # BLD AUTO: 0.42 K/UL (ref 1–4.8)
LYMPHOCYTES NFR BLD: 5.4 % (ref 22–41)
MCH RBC QN AUTO: 29.4 PG (ref 27–33)
MCHC RBC AUTO-ENTMCNC: 31.2 G/DL (ref 33.6–35)
MCV RBC AUTO: 94 FL (ref 81.4–97.8)
MONOCYTES # BLD AUTO: 0.48 K/UL (ref 0–0.85)
MONOCYTES NFR BLD AUTO: 6.1 % (ref 0–13.4)
NEUTROPHILS # BLD AUTO: 6.87 K/UL (ref 2–7.15)
NEUTROPHILS NFR BLD: 87.8 % (ref 44–72)
NRBC # BLD AUTO: 0 K/UL
NRBC BLD-RTO: 0 /100 WBC
NT-PROBNP SERPL IA-MCNC: 3970 PG/ML (ref 0–125)
PLATELET # BLD AUTO: 235 K/UL (ref 164–446)
PMV BLD AUTO: 11.3 FL (ref 9–12.9)
POTASSIUM SERPL-SCNC: 4 MMOL/L (ref 3.6–5.5)
PROT SERPL-MCNC: 7.8 G/DL (ref 6–8.2)
RBC # BLD AUTO: 4.19 M/UL (ref 4.2–5.4)
SODIUM SERPL-SCNC: 141 MMOL/L (ref 135–145)
TROPONIN T SERPL-MCNC: 31 NG/L (ref 6–19)
TROPONIN T SERPL-MCNC: 36 NG/L (ref 6–19)
TSH SERPL DL<=0.005 MIU/L-ACNC: 0.95 UIU/ML (ref 0.38–5.33)
WBC # BLD AUTO: 7.8 K/UL (ref 4.8–10.8)

## 2020-02-16 PROCEDURE — 700102 HCHG RX REV CODE 250 W/ 637 OVERRIDE(OP): Performed by: INTERNAL MEDICINE

## 2020-02-16 PROCEDURE — 71045 X-RAY EXAM CHEST 1 VIEW: CPT

## 2020-02-16 PROCEDURE — 85025 COMPLETE CBC W/AUTO DIFF WBC: CPT

## 2020-02-16 PROCEDURE — 770020 HCHG ROOM/CARE - TELE (206)

## 2020-02-16 PROCEDURE — 99223 1ST HOSP IP/OBS HIGH 75: CPT | Performed by: INTERNAL MEDICINE

## 2020-02-16 PROCEDURE — 84484 ASSAY OF TROPONIN QUANT: CPT

## 2020-02-16 PROCEDURE — A9270 NON-COVERED ITEM OR SERVICE: HCPCS | Performed by: INTERNAL MEDICINE

## 2020-02-16 PROCEDURE — 700111 HCHG RX REV CODE 636 W/ 250 OVERRIDE (IP): Performed by: INTERNAL MEDICINE

## 2020-02-16 PROCEDURE — 700101 HCHG RX REV CODE 250: Performed by: INTERNAL MEDICINE

## 2020-02-16 PROCEDURE — 83880 ASSAY OF NATRIURETIC PEPTIDE: CPT

## 2020-02-16 PROCEDURE — 93005 ELECTROCARDIOGRAM TRACING: CPT | Performed by: EMERGENCY MEDICINE

## 2020-02-16 PROCEDURE — 84443 ASSAY THYROID STIM HORMONE: CPT

## 2020-02-16 PROCEDURE — 99285 EMERGENCY DEPT VISIT HI MDM: CPT

## 2020-02-16 PROCEDURE — 80053 COMPREHEN METABOLIC PANEL: CPT

## 2020-02-16 RX ORDER — POLYETHYLENE GLYCOL 3350 17 G/17G
1 POWDER, FOR SOLUTION ORAL
Status: DISCONTINUED | OUTPATIENT
Start: 2020-02-16 | End: 2020-02-21 | Stop reason: HOSPADM

## 2020-02-16 RX ORDER — OXYBUTYNIN CHLORIDE 5 MG/1
5 TABLET, EXTENDED RELEASE ORAL EVERY EVENING
Status: DISCONTINUED | OUTPATIENT
Start: 2020-02-16 | End: 2020-02-21 | Stop reason: HOSPADM

## 2020-02-16 RX ORDER — PREDNISONE 20 MG/1
40 TABLET ORAL DAILY
Status: ON HOLD | COMMUNITY
End: 2020-02-21

## 2020-02-16 RX ORDER — LOSARTAN POTASSIUM 25 MG/1
50 TABLET ORAL DAILY
Status: DISCONTINUED | OUTPATIENT
Start: 2020-02-17 | End: 2020-02-19

## 2020-02-16 RX ORDER — LOSARTAN POTASSIUM 50 MG/1
50 TABLET ORAL DAILY
COMMUNITY

## 2020-02-16 RX ORDER — AMOXICILLIN 250 MG
2 CAPSULE ORAL 2 TIMES DAILY
Status: DISCONTINUED | OUTPATIENT
Start: 2020-02-16 | End: 2020-02-21 | Stop reason: HOSPADM

## 2020-02-16 RX ORDER — IPRATROPIUM BROMIDE AND ALBUTEROL SULFATE 2.5; .5 MG/3ML; MG/3ML
3 SOLUTION RESPIRATORY (INHALATION) EVERY 4 HOURS PRN
COMMUNITY

## 2020-02-16 RX ORDER — FUROSEMIDE 10 MG/ML
20 INJECTION INTRAMUSCULAR; INTRAVENOUS
Status: DISCONTINUED | OUTPATIENT
Start: 2020-02-16 | End: 2020-02-17

## 2020-02-16 RX ORDER — IPRATROPIUM BROMIDE AND ALBUTEROL SULFATE 2.5; .5 MG/3ML; MG/3ML
3 SOLUTION RESPIRATORY (INHALATION)
Status: DISCONTINUED | OUTPATIENT
Start: 2020-02-16 | End: 2020-02-21 | Stop reason: HOSPADM

## 2020-02-16 RX ORDER — ONDANSETRON 4 MG/1
4 TABLET, ORALLY DISINTEGRATING ORAL EVERY 4 HOURS PRN
Status: DISCONTINUED | OUTPATIENT
Start: 2020-02-16 | End: 2020-02-21 | Stop reason: HOSPADM

## 2020-02-16 RX ORDER — AMIODARONE HYDROCHLORIDE 200 MG/1
200 TABLET ORAL DAILY
Status: DISCONTINUED | OUTPATIENT
Start: 2020-02-17 | End: 2020-02-20

## 2020-02-16 RX ORDER — CITALOPRAM 20 MG/1
10 TABLET ORAL DAILY
Status: DISCONTINUED | OUTPATIENT
Start: 2020-02-17 | End: 2020-02-21 | Stop reason: HOSPADM

## 2020-02-16 RX ORDER — DOXYCYCLINE HYCLATE 100 MG
100 TABLET ORAL 2 TIMES DAILY
Status: ON HOLD | COMMUNITY
End: 2020-02-21

## 2020-02-16 RX ORDER — TRAZODONE HYDROCHLORIDE 50 MG/1
50 TABLET ORAL
Status: DISCONTINUED | OUTPATIENT
Start: 2020-02-16 | End: 2020-02-21 | Stop reason: HOSPADM

## 2020-02-16 RX ORDER — GUAIFENESIN/DEXTROMETHORPHAN 100-10MG/5
10 SYRUP ORAL EVERY 6 HOURS PRN
Status: DISCONTINUED | OUTPATIENT
Start: 2020-02-16 | End: 2020-02-21 | Stop reason: HOSPADM

## 2020-02-16 RX ORDER — BISACODYL 10 MG
10 SUPPOSITORY, RECTAL RECTAL
Status: DISCONTINUED | OUTPATIENT
Start: 2020-02-16 | End: 2020-02-21 | Stop reason: HOSPADM

## 2020-02-16 RX ORDER — ONDANSETRON 2 MG/ML
4 INJECTION INTRAMUSCULAR; INTRAVENOUS EVERY 4 HOURS PRN
Status: DISCONTINUED | OUTPATIENT
Start: 2020-02-16 | End: 2020-02-21 | Stop reason: HOSPADM

## 2020-02-16 RX ORDER — POTASSIUM CHLORIDE 20 MEQ/1
20 TABLET, EXTENDED RELEASE ORAL DAILY
Status: DISCONTINUED | OUTPATIENT
Start: 2020-02-17 | End: 2020-02-20

## 2020-02-16 RX ORDER — LISINOPRIL 5 MG/1
10 TABLET ORAL DAILY
Status: DISCONTINUED | OUTPATIENT
Start: 2020-02-17 | End: 2020-02-17

## 2020-02-16 RX ADMIN — FUROSEMIDE 20 MG: 10 INJECTION, SOLUTION INTRAMUSCULAR; INTRAVENOUS at 23:01

## 2020-02-16 RX ADMIN — TRAZODONE HYDROCHLORIDE 50 MG: 50 TABLET ORAL at 23:02

## 2020-02-16 RX ADMIN — IPRATROPIUM BROMIDE AND ALBUTEROL SULFATE 3 ML: .5; 3 SOLUTION RESPIRATORY (INHALATION) at 23:51

## 2020-02-16 ASSESSMENT — LIFESTYLE VARIABLES
TOTAL SCORE: 0
ALCOHOL_USE: NO
CONSUMPTION TOTAL: NEGATIVE
EVER FELT BAD OR GUILTY ABOUT YOUR DRINKING: NO
EVER_SMOKED: NEVER
EVER_SMOKED: NEVER
ON A TYPICAL DAY WHEN YOU DRINK ALCOHOL HOW MANY DRINKS DO YOU HAVE: 0
HOW MANY TIMES IN THE PAST YEAR HAVE YOU HAD 5 OR MORE DRINKS IN A DAY: 0
EVER HAD A DRINK FIRST THING IN THE MORNING TO STEADY YOUR NERVES TO GET RID OF A HANGOVER: NO
TOTAL SCORE: 0
AVERAGE NUMBER OF DAYS PER WEEK YOU HAVE A DRINK CONTAINING ALCOHOL: 0
HAVE YOU EVER FELT YOU SHOULD CUT DOWN ON YOUR DRINKING: NO
TOTAL SCORE: 0
HAVE PEOPLE ANNOYED YOU BY CRITICIZING YOUR DRINKING: NO

## 2020-02-16 ASSESSMENT — COPD QUESTIONNAIRES
HAVE YOU SMOKED AT LEAST 100 CIGARETTES IN YOUR ENTIRE LIFE: NO/DON'T KNOW
DO YOU EVER COUGH UP ANY MUCUS OR PHLEGM?: NO/ONLY WITH OCCASIONAL COLDS OR INFECTIONS
DURING THE PAST 4 WEEKS HOW MUCH DID YOU FEEL SHORT OF BREATH: SOME OF THE TIME
COPD SCREENING SCORE: 3

## 2020-02-16 ASSESSMENT — PATIENT HEALTH QUESTIONNAIRE - PHQ9
2. FEELING DOWN, DEPRESSED, IRRITABLE, OR HOPELESS: NOT AT ALL
SUM OF ALL RESPONSES TO PHQ9 QUESTIONS 1 AND 2: 0
1. LITTLE INTEREST OR PLEASURE IN DOING THINGS: NOT AT ALL

## 2020-02-16 ASSESSMENT — ENCOUNTER SYMPTOMS
SPUTUM PRODUCTION: 1
HEADACHES: 0
NAUSEA: 0
FOCAL WEAKNESS: 0
HEMOPTYSIS: 0
DEPRESSION: 0
PALPITATIONS: 0
DIZZINESS: 0
WHEEZING: 1
ABDOMINAL PAIN: 0
COUGH: 1
BLOOD IN STOOL: 0
CHILLS: 0
VOMITING: 0
FEVER: 0
HALLUCINATIONS: 0
SORE THROAT: 0
HEARTBURN: 0
BACK PAIN: 0
SHORTNESS OF BREATH: 1
MYALGIAS: 0
WEAKNESS: 0
MEMORY LOSS: 1
ORTHOPNEA: 1
DIARRHEA: 0

## 2020-02-16 ASSESSMENT — COGNITIVE AND FUNCTIONAL STATUS - GENERAL
MOVING FROM LYING ON BACK TO SITTING ON SIDE OF FLAT BED: UNABLE
MOBILITY SCORE: 7
PERSONAL GROOMING: A LOT
DAILY ACTIVITIY SCORE: 8
TOILETING: TOTAL
WALKING IN HOSPITAL ROOM: TOTAL
MOVING TO AND FROM BED TO CHAIR: UNABLE
SUGGESTED CMS G CODE MODIFIER DAILY ACTIVITY: CM
EATING MEALS: A LOT
HELP NEEDED FOR BATHING: TOTAL
DRESSING REGULAR UPPER BODY CLOTHING: TOTAL
CLIMB 3 TO 5 STEPS WITH RAILING: TOTAL
TURNING FROM BACK TO SIDE WHILE IN FLAT BAD: A LOT
SUGGESTED CMS G CODE MODIFIER MOBILITY: CM
DRESSING REGULAR LOWER BODY CLOTHING: TOTAL
STANDING UP FROM CHAIR USING ARMS: TOTAL

## 2020-02-17 ENCOUNTER — APPOINTMENT (OUTPATIENT)
Dept: CARDIOLOGY | Facility: MEDICAL CENTER | Age: 85
DRG: 640 | End: 2020-02-17
Attending: INTERNAL MEDICINE
Payer: MEDICARE

## 2020-02-17 PROBLEM — R79.89 ELEVATED TROPONIN: Status: ACTIVE | Noted: 2020-02-17

## 2020-02-17 LAB
ALBUMIN SERPL BCP-MCNC: 3.9 G/DL (ref 3.2–4.9)
BASOPHILS # BLD AUTO: 0.1 % (ref 0–1.8)
BASOPHILS # BLD: 0.01 K/UL (ref 0–0.12)
BUN SERPL-MCNC: 35 MG/DL (ref 8–22)
CALCIUM SERPL-MCNC: 9 MG/DL (ref 8.4–10.2)
CHLORIDE SERPL-SCNC: 103 MMOL/L (ref 96–112)
CO2 SERPL-SCNC: 24 MMOL/L (ref 20–33)
CREAT SERPL-MCNC: 1.21 MG/DL (ref 0.5–1.4)
EOSINOPHIL # BLD AUTO: 0 K/UL (ref 0–0.51)
EOSINOPHIL NFR BLD: 0 % (ref 0–6.9)
ERYTHROCYTE [DISTWIDTH] IN BLOOD BY AUTOMATED COUNT: 58.6 FL (ref 35.9–50)
GLUCOSE SERPL-MCNC: 101 MG/DL (ref 65–99)
HCT VFR BLD AUTO: 37 % (ref 37–47)
HGB BLD-MCNC: 11.5 G/DL (ref 12–16)
IMM GRANULOCYTES # BLD AUTO: 0.05 K/UL (ref 0–0.11)
IMM GRANULOCYTES NFR BLD AUTO: 0.6 % (ref 0–0.9)
LV EJECT FRACT MOD 2C 99903: 54.52
LV EJECT FRACT MOD 4C 99902: 65.72
LV EJECT FRACT MOD BP 99901: 60.25
LYMPHOCYTES # BLD AUTO: 1.02 K/UL (ref 1–4.8)
LYMPHOCYTES NFR BLD: 12.7 % (ref 22–41)
MAGNESIUM SERPL-MCNC: 2 MG/DL (ref 1.5–2.5)
MCH RBC QN AUTO: 29.4 PG (ref 27–33)
MCHC RBC AUTO-ENTMCNC: 31.1 G/DL (ref 33.6–35)
MCV RBC AUTO: 94.6 FL (ref 81.4–97.8)
MONOCYTES # BLD AUTO: 0.94 K/UL (ref 0–0.85)
MONOCYTES NFR BLD AUTO: 11.7 % (ref 0–13.4)
NEUTROPHILS # BLD AUTO: 6.03 K/UL (ref 2–7.15)
NEUTROPHILS NFR BLD: 74.9 % (ref 44–72)
NRBC # BLD AUTO: 0 K/UL
NRBC BLD-RTO: 0 /100 WBC
PHOSPHATE SERPL-MCNC: 2.7 MG/DL (ref 2.5–4.5)
PLATELET # BLD AUTO: 189 K/UL (ref 164–446)
PMV BLD AUTO: 11.4 FL (ref 9–12.9)
POTASSIUM SERPL-SCNC: 3.5 MMOL/L (ref 3.6–5.5)
RBC # BLD AUTO: 3.91 M/UL (ref 4.2–5.4)
SODIUM SERPL-SCNC: 143 MMOL/L (ref 135–145)
TROPONIN T SERPL-MCNC: 38 NG/L (ref 6–19)
WBC # BLD AUTO: 8.1 K/UL (ref 4.8–10.8)

## 2020-02-17 PROCEDURE — 700102 HCHG RX REV CODE 250 W/ 637 OVERRIDE(OP): Performed by: INTERNAL MEDICINE

## 2020-02-17 PROCEDURE — 700101 HCHG RX REV CODE 250: Performed by: INTERNAL MEDICINE

## 2020-02-17 PROCEDURE — 700102 HCHG RX REV CODE 250 W/ 637 OVERRIDE(OP): Performed by: HOSPITALIST

## 2020-02-17 PROCEDURE — 700111 HCHG RX REV CODE 636 W/ 250 OVERRIDE (IP): Performed by: HOSPITALIST

## 2020-02-17 PROCEDURE — 84484 ASSAY OF TROPONIN QUANT: CPT

## 2020-02-17 PROCEDURE — 770020 HCHG ROOM/CARE - TELE (206)

## 2020-02-17 PROCEDURE — 99233 SBSQ HOSP IP/OBS HIGH 50: CPT | Performed by: HOSPITALIST

## 2020-02-17 PROCEDURE — 93306 TTE W/DOPPLER COMPLETE: CPT | Mod: 26 | Performed by: INTERNAL MEDICINE

## 2020-02-17 PROCEDURE — 93306 TTE W/DOPPLER COMPLETE: CPT

## 2020-02-17 PROCEDURE — 80069 RENAL FUNCTION PANEL: CPT

## 2020-02-17 PROCEDURE — A9270 NON-COVERED ITEM OR SERVICE: HCPCS

## 2020-02-17 PROCEDURE — 700102 HCHG RX REV CODE 250 W/ 637 OVERRIDE(OP)

## 2020-02-17 PROCEDURE — A9270 NON-COVERED ITEM OR SERVICE: HCPCS | Performed by: INTERNAL MEDICINE

## 2020-02-17 PROCEDURE — 94640 AIRWAY INHALATION TREATMENT: CPT

## 2020-02-17 PROCEDURE — 700111 HCHG RX REV CODE 636 W/ 250 OVERRIDE (IP): Performed by: INTERNAL MEDICINE

## 2020-02-17 PROCEDURE — A9270 NON-COVERED ITEM OR SERVICE: HCPCS | Performed by: HOSPITALIST

## 2020-02-17 PROCEDURE — 85025 COMPLETE CBC W/AUTO DIFF WBC: CPT

## 2020-02-17 PROCEDURE — 83735 ASSAY OF MAGNESIUM: CPT

## 2020-02-17 RX ORDER — ALPRAZOLAM 0.25 MG/1
0.25 TABLET ORAL EVERY 8 HOURS PRN
Status: DISCONTINUED | OUTPATIENT
Start: 2020-02-17 | End: 2020-02-21 | Stop reason: HOSPADM

## 2020-02-17 RX ORDER — FUROSEMIDE 10 MG/ML
40 INJECTION INTRAMUSCULAR; INTRAVENOUS
Status: DISCONTINUED | OUTPATIENT
Start: 2020-02-17 | End: 2020-02-19

## 2020-02-17 RX ORDER — POTASSIUM CHLORIDE 20 MEQ/1
TABLET, EXTENDED RELEASE ORAL
Status: COMPLETED
Start: 2020-02-17 | End: 2020-02-17

## 2020-02-17 RX ADMIN — SENNOSIDES AND DOCUSATE SODIUM 2 TABLET: 8.6; 5 TABLET ORAL at 06:19

## 2020-02-17 RX ADMIN — APIXABAN 5 MG: 5 TABLET, FILM COATED ORAL at 06:19

## 2020-02-17 RX ADMIN — OXYBUTYNIN CHLORIDE 5 MG: 5 TABLET, EXTENDED RELEASE ORAL at 17:22

## 2020-02-17 RX ADMIN — ALPRAZOLAM 0.25 MG: 0.25 TABLET ORAL at 04:00

## 2020-02-17 RX ADMIN — SENNOSIDES AND DOCUSATE SODIUM 2 TABLET: 8.6; 5 TABLET ORAL at 17:22

## 2020-02-17 RX ADMIN — AMIODARONE HYDROCHLORIDE 200 MG: 200 TABLET ORAL at 06:19

## 2020-02-17 RX ADMIN — POTASSIUM CHLORIDE 20 MEQ: 1500 TABLET, EXTENDED RELEASE ORAL at 06:26

## 2020-02-17 RX ADMIN — FUROSEMIDE 40 MG: 10 INJECTION, SOLUTION INTRAMUSCULAR; INTRAVENOUS at 17:22

## 2020-02-17 RX ADMIN — IPRATROPIUM BROMIDE AND ALBUTEROL SULFATE 3 ML: .5; 3 SOLUTION RESPIRATORY (INHALATION) at 20:38

## 2020-02-17 RX ADMIN — CITALOPRAM HYDROBROMIDE 10 MG: 20 TABLET ORAL at 06:19

## 2020-02-17 RX ADMIN — GUAIFENESIN AND DEXTROMETHORPHAN 10 ML: 100; 10 SYRUP ORAL at 09:51

## 2020-02-17 RX ADMIN — ALBUTEROL SULFATE 2.5 MG: 2.5 SOLUTION RESPIRATORY (INHALATION) at 07:58

## 2020-02-17 RX ADMIN — ALBUTEROL SULFATE 2.5 MG: 2.5 SOLUTION RESPIRATORY (INHALATION) at 11:12

## 2020-02-17 RX ADMIN — FUROSEMIDE 20 MG: 10 INJECTION, SOLUTION INTRAMUSCULAR; INTRAVENOUS at 06:19

## 2020-02-17 RX ADMIN — APIXABAN 5 MG: 5 TABLET, FILM COATED ORAL at 17:22

## 2020-02-17 RX ADMIN — POTASSIUM CHLORIDE 20 MEQ: 20 TABLET, EXTENDED RELEASE ORAL at 06:26

## 2020-02-17 RX ADMIN — LOSARTAN POTASSIUM 50 MG: 25 TABLET ORAL at 06:19

## 2020-02-17 ASSESSMENT — CHA2DS2 SCORE
CHF OR LEFT VENTRICULAR DYSFUNCTION: YES
AGE 75 OR GREATER: NO
PRIOR STROKE OR TIA OR THROMBOEMBOLISM: NO
HYPERTENSION: YES
CHA2DS2 VASC SCORE: 4
AGE 65 TO 74: YES
SEX: FEMALE
DIABETES: NO
VASCULAR DISEASE: NO

## 2020-02-17 NOTE — PROGRESS NOTES
Report received from Shola WOODALL. Plan of care discussed. Patient resting comfortably in bed. Safety precautions in place.

## 2020-02-17 NOTE — CARE PLAN
Problem: Safety  Goal: Will remain free from injury  Outcome: PROGRESSING AS EXPECTED  Intervention: Provide assistance with mobility  Flowsheets (Taken 2/17/2020 3241)  Assistance: Assistance of Two or More  Ambulation Tolerance: General Weakness     Problem: Knowledge Deficit  Goal: Knowledge of disease process/condition, treatment plan, diagnostic tests, and medications will improve  Outcome: PROGRESSING AS EXPECTED  Intervention: Explain information regarding disease process/condition, treatment plan, diagnostic tests, and medications and document in education  Note: Pt and family educated about POC, tests. All questions answered.

## 2020-02-17 NOTE — ED PROVIDER NOTES
"ED Provider Note    CHIEF COMPLAINT  Chief Complaint   Patient presents with   • Cough       HPI  Marleen Mix is a 84 y.o. female who presents to the emergency department with complaint of cough.  She was diagnosed with a cough on Tuesday of this 12 February was placed on doxycycline and steroids and albuterol nebulized solution.  Since then she is had increasing symptoms.  Per her daughter, she is had a more productive cough, feels weak, she is not had a fever and she is had swelling of her lower extremities.  She does have a history of atrial fibrillation and CHF but the daughter states she is had increasing swelling to her lower extremities.  X-ray 3 days ago showed evidence of atelectasis but no pneumonia.  The daughter is concerned the patient has pneumonia or something else causing her to have severe coughing.  REVIEW OF SYSTEMS  Positives as above. Pertinent negatives include fever, nausea, vomiting, lightheadedness, dizziness all other review of systems are negative    PAST MEDICAL HISTORY  Past Medical History:   Diagnosis Date   • Anesthesia     get very confused, \"psycotic\"   • Anxiety    • Atrial fibrillation (HCC)     artrial fib   • Cataract    • Dementia    • Hemorrhoids    • History of methicillin resistant staphylococcus aureus (MRSA)     arm   • History of MRSA infection     in [place] 2016   • Hypertension    • Insomnia    • Macular degeneration    • Pacemaker    • Pain     hip   • Personal history of fall     April 2017 c/b L hip fracture needing repair   • Recurrent UTI    • Syncope     May 2017   • Yeast infection        FAMILY HISTORY  Noncontributory    SOCIAL HISTORY  Social History     Socioeconomic History   • Marital status:      Spouse name: Not on file   • Number of children: Not on file   • Years of education: Not on file   • Highest education level: Not on file   Occupational History   • Not on file   Social Needs   • Financial resource strain: Not on file   • Food " insecurity     Worry: Not on file     Inability: Not on file   • Transportation needs     Medical: Not on file     Non-medical: Not on file   Tobacco Use   • Smoking status: Never Smoker   • Smokeless tobacco: Never Used   Substance and Sexual Activity   • Alcohol use: No     Alcohol/week: 0.0 oz   • Drug use: No   • Sexual activity: Not on file   Lifestyle   • Physical activity     Days per week: Not on file     Minutes per session: Not on file   • Stress: Not on file   Relationships   • Social connections     Talks on phone: Not on file     Gets together: Not on file     Attends Gnosticist service: Not on file     Active member of club or organization: Not on file     Attends meetings of clubs or organizations: Not on file     Relationship status: Not on file   • Intimate partner violence     Fear of current or ex partner: Not on file     Emotionally abused: Not on file     Physically abused: Not on file     Forced sexual activity: Not on file   Other Topics Concern   • Not on file   Social History Narrative    From [facility].     Living at [facility].      Goal is to get back home with DTR.    Completed HS.         SURGICAL HISTORY  Past Surgical History:   Procedure Laterality Date   • HIP CANNULATED SCREW Left 4/10/2017    Procedure: HIP CANNULATED SCREW;  Surgeon: Harinder Leblanc M.D.;  Location: SURGERY [facility];  Service:    • ABDOMINAL HYSTERECTOMY TOTAL     • TONSILLECTOMY         CURRENT MEDICATIONS  Home Medications     Reviewed by Do Day R.N. (Registered Nurse) on 02/16/20 at 1711  Med List Status: Complete   Medication Last Dose Status   acetaminophen (TYLENOL) 325 MG Tab  Active   amiodarone (CORDARONE) 200 MG Tab 2/16/2020 Active   apixaban (ELIQUIS) 5mg Tab 2/16/2020 Active   citalopram (CELEXA) 10 MG tablet 2/16/2020 Active   doxycycline (VIBRAMYCIN) 100 MG Tab 2/16/2020 Active   fluticasone (FLONASE) 50 MCG/ACT nasal spray 2/16/2020 Active   furosemide (LASIX) 40 MG Tab 2/16/2020  "Active   lisinopril (PRINIVIL) 10 MG Tab  Active   loratadine (CLARITIN) 10 MG Tab  Active   losartan (COZAAR) 50 MG Tab 2/16/2020 Active   multivitamin (THERAGRAN) Tab 2/16/2020 Active   oxybutynin SR (DITROPAN-XL) 5 MG TABLET SR 24 HR  Active   oyster shell calcium/D (OS-DIANNA) 500-200 MG-UNIT Tab 2/16/2020 Active   potassium chloride (KAYCIEL) 20 MEQ/15ML (10%) Solution 2/16/2020 Active   predniSONE (DELTASONE) 20 MG Tab 2/15/2020 Active   senna-docusate (PERICOLACE OR SENOKOT S) 8.6-50 MG Tab 2/16/2020 Active   trazodone (DESYREL) 50 MG Tab 2/15/2020 Active   vitamin D (VITAMIND D3) 1000 UNIT Tab  Active   zinc sulfate (ZINCATE) 220 MG Cap 2/16/2020 Active                ALLERGIES  Allergies   Allergen Reactions   • Ppd [Tuberculin Purified Protein Derivative]        PHYSICAL EXAM  VITAL SIGNS: /65   Pulse 94   Temp 37.2 °C (99 °F) (Temporal)   Resp 19   Ht 1.6 m (5' 3\")   Wt 70 kg (154 lb 5.2 oz)   SpO2 97%   BMI 27.34 kg/m²      Constitutional: Well developed, Well nourished, No acute distress, Non-toxic appearance.   Eyes: PERRLA, EOMI, Conjunctiva normal, No discharge.   Cardiovascular: Normal heart rate, Normal rhythm, No murmurs, No rubs, No gallops, and intact distal pulses.   Thorax & Lungs:  No respiratory distress, rales and rhonchi bilaterally, productive cough, no use of accessory muscles   abdomen: Bowel sounds normal, Soft, No tenderness, No guarding, No rebound, No pulsatile masses.   Skin: Warm, Dry, No erythema, No rash.   Extremities: Full range of motion, no deformity, 1+ pitting edema.  Neurologic: Alert & oriented x 3, No focal deficits noted, acting appropriately on exam.  Psychiatric: Affect normal for clinical presentation.      RADIOLOGY/PROCEDURES  DX-CHEST-PORTABLE (1 VIEW)   Final Result      Stable cardiomegaly and cardiac pacer.      Mild interstitial prominence suggesting mild vascular congestion.        Results for orders placed or performed during the hospital " encounter of 02/16/20   proBrain Natriuretic Peptide, NT   Result Value Ref Range    NT-proBNP 3970 (H) 0 - 125 pg/mL   CBC w/ Differential   Result Value Ref Range    WBC 7.8 4.8 - 10.8 K/uL    RBC 4.19 (L) 4.20 - 5.40 M/uL    Hemoglobin 12.3 12.0 - 16.0 g/dL    Hematocrit 39.4 37.0 - 47.0 %    MCV 94.0 81.4 - 97.8 fL    MCH 29.4 27.0 - 33.0 pg    MCHC 31.2 (L) 33.6 - 35.0 g/dL    RDW 58.1 (H) 35.9 - 50.0 fL    Platelet Count 235 164 - 446 K/uL    MPV 11.3 9.0 - 12.9 fL    Neutrophils-Polys 87.80 (H) 44.00 - 72.00 %    Lymphocytes 5.40 (L) 22.00 - 41.00 %    Monocytes 6.10 0.00 - 13.40 %    Eosinophils 0.00 0.00 - 6.90 %    Basophils 0.10 0.00 - 1.80 %    Immature Granulocytes 0.60 0.00 - 0.90 %    Nucleated RBC 0.00 /100 WBC    Neutrophils (Absolute) 6.87 2.00 - 7.15 K/uL    Lymphs (Absolute) 0.42 (L) 1.00 - 4.80 K/uL    Monos (Absolute) 0.48 0.00 - 0.85 K/uL    Eos (Absolute) 0.00 0.00 - 0.51 K/uL    Baso (Absolute) 0.01 0.00 - 0.12 K/uL    Immature Granulocytes (abs) 0.05 0.00 - 0.11 K/uL    NRBC (Absolute) 0.00 K/uL   Complete Metabolic Panel (CMP)   Result Value Ref Range    Sodium 141 135 - 145 mmol/L    Potassium 4.0 3.6 - 5.5 mmol/L    Chloride 102 96 - 112 mmol/L    Co2 22 20 - 33 mmol/L    Anion Gap 17.0 (H) 7.0 - 16.0    Glucose 162 (H) 65 - 99 mg/dL    Bun 38 (H) 8 - 22 mg/dL    Creatinine 1.34 0.50 - 1.40 mg/dL    Calcium 9.5 8.4 - 10.2 mg/dL    AST(SGOT) 21 12 - 45 U/L    ALT(SGPT) 14 2 - 50 U/L    Alkaline Phosphatase 78 30 - 99 U/L    Total Bilirubin 0.3 0.1 - 1.5 mg/dL    Albumin 4.4 3.2 - 4.9 g/dL    Total Protein 7.8 6.0 - 8.2 g/dL    Globulin 3.4 1.9 - 3.5 g/dL    A-G Ratio 1.3 g/dL   Troponin STAT   Result Value Ref Range    Troponin T 31 (H) 6 - 19 ng/L   ESTIMATED GFR   Result Value Ref Range    GFR If  46 (A) >60 mL/min/1.73 m 2    GFR If Non  38 (A) >60 mL/min/1.73 m 2   EKG   Result Value Ref Range    Report       [facility]  Emergency Dept.    Test Date:  2020  Pt Name:    DAMARIS FORRESTER               Department: [facility]  MRN:        [id number]                      Room:       -ROOM 1  Gender:     Female                       Technician: CLIFF  :        [id number]                   Requested By:PAULINE SU  Order #:    [id number]                    Reading MD: PAULINE SU DO    Measurements  Intervals                                Axis  Rate:       91                           P:  KY:                                      QRS:        -43  QRSD:       94                           T:          -86  QT:         482  QTc:        594    Interpretive Statements  Atrial fibrillation  Paired ventricular premature complexes  Abnormal R-wave progression, late transition  Inferior infarct, age indeterminate  Lateral leads are also involved  Prolonged QT interval  Compared to ECG 2018 16:55:18  Ventricular premature complex(es) now present  Prolonged QT inte rval now present  Accelerated junctional rhythm no longer present  Myocardial infarct finding still present  Electronically Signed On 2020 19:34:50 PST by PAULINE SU DO           COURSE & MEDICAL DECISION MAKING  Pertinent Labs & Imaging studies reviewed. (See chart for details)  This is a pleasant 84-year-old female that presents with congestion, cough.  Is concern for possible pneumonia therefore x-rays completed.  X-ray does show evidence of pulmonary edema.  I do believe the patient has CHF exacerbation with an elevated BNP of 3009 and 70 x-ray consistent with pulmonary congestion.  The patient has a negative EKG for ST elevation myocardial infarction although she has A. fib she is anticoagulated on Eliquis.  I do believe the patient required diuresis therefore discussed the patient with Dr. Apple for further evaluation and management hospitalization.  She is graciously excepted the patient for hospitalization.  The patient is having organ damage  requiring emergent cardiology consultation or transfer to a facility with a cath.    FINAL IMPRESSION  CHF exacerbation  Cough  Shortness of breath         Electronically signed by: Kolton Johsn D.O., 2/16/2020 5:27 PM

## 2020-02-17 NOTE — H&P
Hospital Medicine History & Physical Note    Date of Service  2/16/2020    Primary Care Physician  Emily Rodriguez M.D.    Consultants  None    Code Status  Full    Chief Complaint  Cough    History of Presenting Illness  84 y.o. female with a history of mild cognitive impairment, atrial fibrillation on Eliquis, hypertension who presented 2/16/2020 with cough and wheezing.    History is obtained from the patient's daughter who is at bedside.  She states that the patient has had a cough and wheezing for the last 10 days.  The wheezing progressed and she subsequently developed a cough productive of clear sputum.  Patient did not have any fever however she has been fatigued and lethargic.  The patient's daughter also states that she has been sleeping upright in a chair for the last several weeks to months.  She feels that her mom is not getting the care that she needs at the skilled nursing facility.  When the cough and wheezing wheezing failed to resolve, she took her to her primary care physician's office.  They did an x-ray and it showed haziness which they said was likely bronchitis therefore they prescribed prednisone and doxycycline therapy.  The patient has been taking this for the last 4 days but has had no improvement in fact, has continued to get worse.  She has lower extremity swelling which right now is moderate.  Denies any change in bowel or urinary habits.  Vomiting.    Patient does follow as an outpatient with cardiology, Dr. Borden at [facility].    ER course: Noted to have wheezing on exam, glucose 162.  Oxygen saturations are in the low 90s on room air.  Chest x-ray was performed and reveals pulmonary edema.  BNP elevated.  She will be admitted for IV diuretics and echocardiogram    Review of Systems  Review of Systems   Constitutional: Positive for malaise/fatigue. Negative for chills and fever.   HENT: Negative for sore throat.    Respiratory: Positive for cough, sputum production, shortness of breath  and wheezing. Negative for hemoptysis.    Cardiovascular: Positive for orthopnea and leg swelling. Negative for chest pain and palpitations.   Gastrointestinal: Negative for abdominal pain, blood in stool, diarrhea, heartburn, nausea and vomiting.   Genitourinary: Negative for dysuria and frequency.   Musculoskeletal: Negative for back pain and myalgias.   Neurological: Negative for dizziness, focal weakness, weakness and headaches.   Psychiatric/Behavioral: Positive for memory loss. Negative for depression and hallucinations.   All other systems reviewed and are negative.      Past Medical History   has a past medical history of Anesthesia, Anxiety, Atrial fibrillation (HCC), Cataract, Dementia, Hemorrhoids, History of methicillin resistant staphylococcus aureus (MRSA), History of MRSA infection, Hypertension, Insomnia, Macular degeneration, Pacemaker, Pain, Personal history of fall, Recurrent UTI, Syncope, and Yeast infection.    Surgical History   has a past surgical history that includes abdominal hysterectomy total; hip cannulated screw (Left, 4/10/2017); and tonsillectomy.     Family History  family history includes Alzheimer's Disease in her mother; Heart Disease in her father.     Social History   reports that she has never smoked. She has never used smokeless tobacco. She reports that she does not drink alcohol or use drugs.    Allergies  Allergies   Allergen Reactions   • Ppd [Tuberculin Purified Protein Derivative]        Medications  Prior to Admission Medications   Prescriptions Last Dose Informant Patient Reported? Taking?   acetaminophen (TYLENOL) 325 MG Tab  MAR from Other Facility Yes No   Sig: Take 650 mg by mouth every four hours as needed.   amiodarone (CORDARONE) 200 MG Tab 2/16/2020 at 0800 MAR from Other Facility No No   Sig: Take 1 Tab by mouth every day.   apixaban (ELIQUIS) 5mg Tab 2/16/2020 at 0800 MAR from Other Facility Yes No   Sig: Take 5 mg by mouth 2 Times a Day.   citalopram  (CELEXA) 10 MG tablet 2/16/2020 at 0800 MAR from Other Facility Yes No   Sig: Take 10 mg by mouth every day.   doxycycline (VIBRAMYCIN) 100 MG Tab 2/16/2020 at 0800  Yes Yes   Sig: Take 100 mg by mouth 2 times a day.   fluticasone (FLONASE) 50 MCG/ACT nasal spray 2/16/2020 at 0800 MAR from Other Facility Yes No   Sig: Spray 1 Spray in nose every day.   furosemide (LASIX) 40 MG Tab 2/16/2020 at 0800 MAR from Other Facility Yes No   Sig: Take 40 mg by mouth every day.   lisinopril (PRINIVIL) 10 MG Tab  MAR from Other Facility Yes No   Sig: Take 10 mg by mouth every day.   loratadine (CLARITIN) 10 MG Tab  MAR from Other Facility Yes No   Sig: Take 10 mg by mouth every day.   losartan (COZAAR) 50 MG Tab 2/16/2020 at 0800  Yes Yes   Sig: Take 50 mg by mouth every day.   multivitamin (THERAGRAN) Tab 2/16/2020 at 0800 MAR from Other Facility No No   Sig: Take 1 Tab by mouth every day.   oxybutynin SR (DITROPAN-XL) 5 MG TABLET SR 24 HR  MAR from Other Facility Yes No   Sig: Take 5 mg by mouth every evening.   oyster shell calcium/D (OS-DIANNA) 500-200 MG-UNIT Tab 2/16/2020 at 0800 MAR from Other Facility Yes No   Sig: Take 1 Tab by mouth 3 times a day, with meals.   potassium chloride (KAYCIEL) 20 MEQ/15ML (10%) Solution 2/16/2020 at 0800 MAR from Other Facility Yes No   Sig: Take 20 mEq by mouth every day.   predniSONE (DELTASONE) 20 MG Tab 2/15/2020 at 2000  Yes Yes   Sig: Take 40 mg by mouth every day.   senna-docusate (PERICOLACE OR SENOKOT S) 8.6-50 MG Tab 2/16/2020 at 0800 MAR from Other Facility Yes No   Sig: Take 1 Tab by mouth 2 times a day.   trazodone (DESYREL) 50 MG Tab 2/15/2020 at 2000 MAR from Other Facility No No   Sig: Take 1 Tab by mouth every bedtime.   vitamin D (VITAMIND D3) 1000 UNIT Tab  MAR from Other Facility No No   Sig: Take 1 Tab by mouth every day.   zinc sulfate (ZINCATE) 220 MG Cap 2/16/2020 at 0800 MAR from Other Facility No No   Sig: Take 1 Cap by mouth every day.      Facility-Administered  Medications: None       Physical Exam  Temp:  [37.2 °C (99 °F)] 37.2 °C (99 °F)  Pulse:  [83-99] 94  Resp:  [15-24] 19  BP: (142-178)/() 154/65  SpO2:  [95 %-97 %] 97 %    Physical Exam  Constitutional:       General: She is not in acute distress.     Appearance: She is ill-appearing.      Comments: Lying in bed, eyes closed   HENT:      Nose: Nose normal.      Mouth/Throat:      Mouth: Mucous membranes are dry.   Neck:      Musculoskeletal: No muscular tenderness.   Cardiovascular:      Rate and Rhythm: Normal rate. Rhythm irregular.      Pulses: Normal pulses.      Heart sounds: No murmur.   Pulmonary:      Effort: Respiratory distress present.      Breath sounds: Wheezing and rales present.   Chest:      Chest wall: No tenderness.   Abdominal:      General: There is no distension.      Palpations: Abdomen is soft.   Musculoskeletal:         General: Swelling present.   Lymphadenopathy:      Cervical: No cervical adenopathy.   Skin:     General: Skin is warm and dry.      Findings: No rash.   Neurological:      General: No focal deficit present.      Mental Status: She is alert and oriented to person, place, and time.      Motor: Weakness present.   Psychiatric:         Mood and Affect: Mood normal.         Thought Content: Thought content normal.         Laboratory:  Recent Labs     02/16/20  1737   WBC 7.8   RBC 4.19*   HEMOGLOBIN 12.3   HEMATOCRIT 39.4   MCV 94.0   MCH 29.4   MCHC 31.2*   RDW 58.1*   PLATELETCT 235   MPV 11.3     Recent Labs     02/16/20  1737   SODIUM 141   POTASSIUM 4.0   CHLORIDE 102   CO2 22   GLUCOSE 162*   BUN 38*   CREATININE 1.34   CALCIUM 9.5     Recent Labs     02/16/20  1737   ALTSGPT 14   ASTSGOT 21   ALKPHOSPHAT 78   TBILIRUBIN 0.3   GLUCOSE 162*         Recent Labs     02/16/20  1737   NTPROBNP 3970*         Recent Labs     02/16/20  1737   TROPONINT 31*       Urinalysis:    No results found     Imaging:  DX-CHEST-PORTABLE (1 VIEW)   Final Result      Stable cardiomegaly and  cardiac pacer.      Mild interstitial prominence suggesting mild vascular congestion.      EC-ECHOCARDIOGRAM COMPLETE W/O CONT    (Results Pending)         Assessment/Plan:  I anticipate this patient will require at least two midnights for appropriate medical management, necessitating inpatient admission.    * Acute heart failure (HCC)  Assessment & Plan  I suspect this is right heart failure versus diastolic as she had an echocardiogram in 2017 which had a normal ejection fraction however moderately elevated pulmonary pressures.  This also may be rate related or exacerbated by recent steroids  check echo  Telemetry  Trend troponin  Lasix 20 IV twice daily  Continue blood pressure medications  I recommend outpatient sleep study    Hyperglycemia  Assessment & Plan  No history of diabetes  Check A1c  This may be secondary to recent prednisone treatment    Mood disorder (HCC)- (present on admission)  Assessment & Plan  With mild cognitive impairment, lives at a skilled nursing facility  Continue Celexa    Gastroesophageal reflux disease without esophagitis- (present on admission)  Assessment & Plan  .     Atrial fibrillation (HCC)- (present on admission)  Assessment & Plan  Chronic A. fib, rate is currently controlled  Continue amiodarone  Eliquis  Echo  Tele  Check TSH  Trend Trop        VTE prophylaxis: Eliquis

## 2020-02-17 NOTE — PROGRESS NOTES
Dr. Amos notifed that patient is anxoius biting skin around fingers. Patient's family requested medication for anxiety. Dr. Amos ordered Xanax 0.25 mg PO Q8 PRN for anxiety, order read back and verified.

## 2020-02-17 NOTE — PROGRESS NOTES
Telemetry Shift Summary    Rhythm Paced/Afib  HR Range   Ectopy rPVC  Measurements -/0.08/-        Normal Values  Rhythm SR  HR Range    Measurements 0.12-0.20 / 0.06-0.10  / 0.30-0.52

## 2020-02-17 NOTE — FLOWSHEET NOTE
02/17/20 1112   Events/Summary/Plan   Events/Summary/Plan SVN given.   Vital Signs   Pulse (!) 108   Respiration (!) 22   Chest Exam   Work Of Breathing / Effort Mild   Breath Sounds   RUL Breath Sounds Crackles  (upper airway wheezing.)   RML Breath Sounds Crackles   RLL Breath Sounds Diminished   MARILOU Breath Sounds Crackles   LLL Breath Sounds Diminished   Secretions   Cough Moist;Non Productive;Strong   How Sputum Obtained Spontaneous   Oxygen   O2 (LPM) 2   O2 Delivery Device Silicone Nasal Cannula

## 2020-02-17 NOTE — PROGRESS NOTES
Completed admit profile, assessment, and administered scheduled medications. Family requested skin check be done in the AM so patient can rest. Bed in low position, locked, and appropriate alarms set. Personal belongings and call light are within reach. Patient has no additional needs at this time.

## 2020-02-17 NOTE — FLOWSHEET NOTE
02/17/20 0758   Events/Summary/Plan   Events/Summary/Plan SVN given   Vital Signs   Pulse (!) 107   Respiration 18   Pulse Oximetry 98 %   Respiratory Assessment   Level of Consciousness Confused   Breath Sounds   RUL Breath Sounds Crackles   RML Breath Sounds Crackles   RLL Breath Sounds Crackles;Expiratory Wheezes   MARILOU Breath Sounds Crackles   LLL Breath Sounds Crackles;Diminished   Oxygen   O2 (LPM) 2   O2 Delivery Device Silicone Nasal Cannula

## 2020-02-17 NOTE — FLOWSHEET NOTE
02/16/20 2351   Vital Signs   Pulse 72   Respiration 18   Pulse Oximetry 95 %   Respiratory Assessment   Level of Consciousness Confused   Chest Exam   Work Of Breathing / Effort Mild   Breath Sounds   RUL Breath Sounds Rhonchi   RML Breath Sounds Rhonchi   RLL Breath Sounds Rhonchi   MARILOU Breath Sounds Rhonchi   LLL Breath Sounds Rhonchi   Secretions   Cough Moist;Non Productive   Oxygen   O2 (LPM) 2   O2 Delivery Device Nasal Cannula   Smoking History   Have you ever smoked Never   COPD Risk Screening   Do you have a history of COPD? No   OTHER   During the past 4 weeks, how much did you feel short of breath? 1   COPD Population Screener   Do you ever cough up any mucus or phlegm? 0   In the past 12 months, you do less than you used to because of your breathing problems 0   Have you smoked at least 100 cigarettes in your entire life? 0   How old are you? 2   COPD Screening Score 3   COPD Coordinator Recommended Yes

## 2020-02-17 NOTE — ED NOTES
Brought by ambulance from care home. Daughter states she is being treated for bronchitis, but cough keeps getting worse.

## 2020-02-17 NOTE — PROGRESS NOTES
[facility] Medicine Daily Progress Note    Date of Service  2/17/2020    Chief Complaint  84 y.o. female admitted 2/16/2020 with cough.    Hospital Course    per HPI  84 y.o. female with a history of mild cognitive impairment, atrial fibrillation on Eliquis, hypertension who presented 2/16/2020 with cough and wheezing.     History is obtained from the patient's daughter who is at bedside.  She states that the patient has had a cough and wheezing for the last 10 days.  The wheezing progressed and she subsequently developed a cough productive of clear sputum.  Patient did not have any fever however she has been fatigued and lethargic.  The patient's daughter also states that she has been sleeping upright in a chair for the last several weeks to months.  She feels that her mom is not getting the care that she needs at the skilled nursing facility.  When the cough and wheezing wheezing failed to resolve, she took her to her primary care physician's office.  They did an x-ray and it showed haziness which they said was likely bronchitis therefore they prescribed prednisone and doxycycline therapy.  The patient has been taking this for the last 4 days but has had no improvement in fact, has continued to get worse.  She has lower extremity swelling which right now is moderate.  Denies any change in bowel or urinary habits.  Vomiting.           Interval Problem Update  No acute complaints, patient is a poor historian, majority of the information was obtained by the patient's daughter at bedside.  Apparently patient has been sounding crackly over the past several weeks, she has been seen by cardiology Dr. Prabhakar in the past and they have been apparently increasing patient's Lasix but patient's daughter feels that this is heart related. No fevers/chills were noted. She recently had antibiotics for her pulmonary symptoms.    Consultants/Specialty  None    Code Status  Full Code    Disposition  TBD    Review of Systems  Review of  Systems   Unable to perform ROS: Dementia        Physical Exam  Temp:  [36.4 °C (97.6 °F)-37.2 °C (99 °F)] 36.4 °C (97.6 °F)  Pulse:  [] 107  Resp:  [15-49] 18  BP: (132-178)/() 132/85  SpO2:  [93 %-99 %] 98 %    Physical Exam  Vitals signs and nursing note reviewed.   Constitutional:       General: She is not in acute distress.     Appearance: Normal appearance. She is not ill-appearing, toxic-appearing or diaphoretic.   HENT:      Head: Normocephalic and atraumatic.      Nose: No congestion.      Mouth/Throat:      Mouth: Mucous membranes are moist.      Pharynx: Oropharynx is clear. No oropharyngeal exudate or posterior oropharyngeal erythema.   Eyes:      Extraocular Movements: Extraocular movements intact.      Conjunctiva/sclera: Conjunctivae normal.      Pupils: Pupils are equal, round, and reactive to light.   Neck:      Musculoskeletal: Normal range of motion and neck supple. No neck rigidity.   Cardiovascular:      Rate and Rhythm: Normal rate. Rhythm irregular.      Pulses: Normal pulses.      Heart sounds: No murmur.   Pulmonary:      Effort: Pulmonary effort is normal. No respiratory distress.      Breath sounds: Rales present. No wheezing.      Comments: Course crackles b/l lung bases  Abdominal:      General: Abdomen is flat. Bowel sounds are normal. There is no distension.      Palpations: Abdomen is soft.      Tenderness: There is no abdominal tenderness. There is no guarding.   Musculoskeletal: Normal range of motion.         General: No swelling or tenderness.   Skin:     General: Skin is warm and dry.      Capillary Refill: Capillary refill takes less than 2 seconds.   Neurological:      General: No focal deficit present.      Mental Status: She is alert. Mental status is at baseline. She is disoriented.      Cranial Nerves: No cranial nerve deficit.   Psychiatric:         Behavior: Behavior normal.         Fluids  No intake or output data in the 24 hours ending 02/17/20  0845    Laboratory  Recent Labs     02/16/20  1737 02/17/20  0144   WBC 7.8 8.1   RBC 4.19* 3.91*   HEMOGLOBIN 12.3 11.5*   HEMATOCRIT 39.4 37.0   MCV 94.0 94.6   MCH 29.4 29.4   MCHC 31.2* 31.1*   RDW 58.1* 58.6*   PLATELETCT 235 189   MPV 11.3 11.4     Recent Labs     02/16/20  1737 02/17/20  0144   SODIUM 141 143   POTASSIUM 4.0 3.5*   CHLORIDE 102 103   CO2 22 24   GLUCOSE 162* 101*   BUN 38* 35*   CREATININE 1.34 1.21   CALCIUM 9.5 9.0                   Imaging  EC-ECHOCARDIOGRAM COMPLETE W/O CONT         DX-CHEST-PORTABLE (1 VIEW)   Final Result      Stable cardiomegaly and cardiac pacer.      Mild interstitial prominence suggesting mild vascular congestion.           Assessment/Plan  * Acute heart failure (HCC)  Assessment & Plan  Systolic vs diastolic  Pro-BNP 3970  Echocardiogram pending  Resume IV lasix, 40mg BID  Strict I/Os  Daily Weights  TSH WNL.      Elevated troponin  Assessment & Plan  Mild suspect demand ischemia, patient poor historian but denies pain anywhere.    Hyperglycemia  Assessment & Plan  Possibly related to steroids  No hx of diabetes.  A1c pending.      Mood disorder (HCC)- (present on admission)  Assessment & Plan  With mild cognitive impairment,  Lives in SNF ([facility])  Resume current dose of Celexa    Gastroesophageal reflux disease without esophagitis- (present on admission)  Assessment & Plan  Resume PPIs    Atrial fibrillation (HCC)- (present on admission)  Assessment & Plan  Rate controlled  Resume Amiodarone, Eliquis  TSH WNL            Patient plan of care discussed at multidisplinary team rounds and with patient and R.N at beside.    VTE prophylaxis: Eliquis

## 2020-02-17 NOTE — PROGRESS NOTES
Gave bedside report to JOSE C Danielson and Ethan. Plan of care discussed. Safety precautions in place. Personal belongings and call light are with in reach. Patient has no additional needs at this time.

## 2020-02-18 PROBLEM — E87.70 FLUID OVERLOAD: Status: ACTIVE | Noted: 2020-02-16

## 2020-02-18 LAB
ALBUMIN SERPL BCP-MCNC: 3.5 G/DL (ref 3.2–4.9)
ALBUMIN/GLOB SERPL: 1.2 G/DL
ALP SERPL-CCNC: 61 U/L (ref 30–99)
ALT SERPL-CCNC: 12 U/L (ref 2–50)
ANION GAP SERPL CALC-SCNC: 16 MMOL/L (ref 7–16)
AST SERPL-CCNC: 20 U/L (ref 12–45)
BASOPHILS # BLD AUTO: 0.3 % (ref 0–1.8)
BASOPHILS # BLD: 0.02 K/UL (ref 0–0.12)
BILIRUB SERPL-MCNC: 0.4 MG/DL (ref 0.1–1.5)
BUN SERPL-MCNC: 33 MG/DL (ref 8–22)
CALCIUM SERPL-MCNC: 8.3 MG/DL (ref 8.4–10.2)
CHLORIDE SERPL-SCNC: 98 MMOL/L (ref 96–112)
CO2 SERPL-SCNC: 25 MMOL/L (ref 20–33)
CREAT SERPL-MCNC: 1.06 MG/DL (ref 0.5–1.4)
EOSINOPHIL # BLD AUTO: 0 K/UL (ref 0–0.51)
EOSINOPHIL NFR BLD: 0 % (ref 0–6.9)
ERYTHROCYTE [DISTWIDTH] IN BLOOD BY AUTOMATED COUNT: 56.4 FL (ref 35.9–50)
GLOBULIN SER CALC-MCNC: 3 G/DL (ref 1.9–3.5)
GLUCOSE SERPL-MCNC: 95 MG/DL (ref 65–99)
HCT VFR BLD AUTO: 38.6 % (ref 37–47)
HGB BLD-MCNC: 12 G/DL (ref 12–16)
IMM GRANULOCYTES # BLD AUTO: 0.03 K/UL (ref 0–0.11)
IMM GRANULOCYTES NFR BLD AUTO: 0.5 % (ref 0–0.9)
LYMPHOCYTES # BLD AUTO: 1.31 K/UL (ref 1–4.8)
LYMPHOCYTES NFR BLD: 22.4 % (ref 22–41)
MAGNESIUM SERPL-MCNC: 1.9 MG/DL (ref 1.5–2.5)
MCH RBC QN AUTO: 29.3 PG (ref 27–33)
MCHC RBC AUTO-ENTMCNC: 31.1 G/DL (ref 33.6–35)
MCV RBC AUTO: 94.4 FL (ref 81.4–97.8)
MONOCYTES # BLD AUTO: 0.68 K/UL (ref 0–0.85)
MONOCYTES NFR BLD AUTO: 11.6 % (ref 0–13.4)
NEUTROPHILS # BLD AUTO: 3.81 K/UL (ref 2–7.15)
NEUTROPHILS NFR BLD: 65.2 % (ref 44–72)
NRBC # BLD AUTO: 0 K/UL
NRBC BLD-RTO: 0 /100 WBC
PLATELET # BLD AUTO: 187 K/UL (ref 164–446)
PMV BLD AUTO: 11.4 FL (ref 9–12.9)
POTASSIUM SERPL-SCNC: 3.5 MMOL/L (ref 3.6–5.5)
PROT SERPL-MCNC: 6.5 G/DL (ref 6–8.2)
RBC # BLD AUTO: 4.09 M/UL (ref 4.2–5.4)
SODIUM SERPL-SCNC: 139 MMOL/L (ref 135–145)
WBC # BLD AUTO: 5.9 K/UL (ref 4.8–10.8)

## 2020-02-18 PROCEDURE — 99497 ADVNCD CARE PLAN 30 MIN: CPT | Performed by: INTERNAL MEDICINE

## 2020-02-18 PROCEDURE — 80053 COMPREHEN METABOLIC PANEL: CPT

## 2020-02-18 PROCEDURE — A9270 NON-COVERED ITEM OR SERVICE: HCPCS | Performed by: INTERNAL MEDICINE

## 2020-02-18 PROCEDURE — 83735 ASSAY OF MAGNESIUM: CPT

## 2020-02-18 PROCEDURE — 700102 HCHG RX REV CODE 250 W/ 637 OVERRIDE(OP): Performed by: INTERNAL MEDICINE

## 2020-02-18 PROCEDURE — 85025 COMPLETE CBC W/AUTO DIFF WBC: CPT

## 2020-02-18 PROCEDURE — 99232 SBSQ HOSP IP/OBS MODERATE 35: CPT | Mod: 25 | Performed by: INTERNAL MEDICINE

## 2020-02-18 PROCEDURE — 700111 HCHG RX REV CODE 636 W/ 250 OVERRIDE (IP): Performed by: HOSPITALIST

## 2020-02-18 PROCEDURE — 770020 HCHG ROOM/CARE - TELE (206)

## 2020-02-18 RX ORDER — POTASSIUM CHLORIDE 20 MEQ/1
40 TABLET, EXTENDED RELEASE ORAL ONCE
Status: COMPLETED | OUTPATIENT
Start: 2020-02-18 | End: 2020-02-18

## 2020-02-18 RX ADMIN — POTASSIUM CHLORIDE 40 MEQ: 1500 TABLET, EXTENDED RELEASE ORAL at 09:53

## 2020-02-18 RX ADMIN — FUROSEMIDE 40 MG: 10 INJECTION, SOLUTION INTRAMUSCULAR; INTRAVENOUS at 05:38

## 2020-02-18 RX ADMIN — SENNOSIDES AND DOCUSATE SODIUM 2 TABLET: 8.6; 5 TABLET ORAL at 05:35

## 2020-02-18 RX ADMIN — AMIODARONE HYDROCHLORIDE 200 MG: 200 TABLET ORAL at 05:35

## 2020-02-18 RX ADMIN — APIXABAN 5 MG: 5 TABLET, FILM COATED ORAL at 17:16

## 2020-02-18 RX ADMIN — FUROSEMIDE 40 MG: 10 INJECTION, SOLUTION INTRAMUSCULAR; INTRAVENOUS at 17:15

## 2020-02-18 RX ADMIN — LOSARTAN POTASSIUM 50 MG: 25 TABLET ORAL at 05:36

## 2020-02-18 RX ADMIN — OXYBUTYNIN CHLORIDE 5 MG: 5 TABLET, EXTENDED RELEASE ORAL at 17:15

## 2020-02-18 RX ADMIN — APIXABAN 5 MG: 5 TABLET, FILM COATED ORAL at 05:36

## 2020-02-18 RX ADMIN — CITALOPRAM HYDROBROMIDE 10 MG: 20 TABLET ORAL at 05:37

## 2020-02-18 RX ADMIN — POTASSIUM CHLORIDE 20 MEQ: 20 TABLET, EXTENDED RELEASE ORAL at 05:35

## 2020-02-18 RX ADMIN — TRAZODONE HYDROCHLORIDE 50 MG: 50 TABLET ORAL at 20:55

## 2020-02-18 ASSESSMENT — PATIENT HEALTH QUESTIONNAIRE - PHQ9
SUM OF ALL RESPONSES TO PHQ9 QUESTIONS 1 AND 2: 0
1. LITTLE INTEREST OR PLEASURE IN DOING THINGS: NOT AT ALL
2. FEELING DOWN, DEPRESSED, IRRITABLE, OR HOPELESS: NOT AT ALL

## 2020-02-18 ASSESSMENT — PAIN SCALES - WONG BAKER: WONGBAKER_NUMERICALRESPONSE: DOESN'T HURT AT ALL

## 2020-02-18 NOTE — PROGRESS NOTES
Received report from JOSE C Danielson. Pt has daughter at the bedside. POC is to continue IV lasix BID for acute heart failure. Per day shift RN pt is alert and oriented to 2-3, this is her baseline. Purewick in place. Safety measures in place, care assumed.

## 2020-02-18 NOTE — CARE PLAN
Problem: Communication  Goal: The ability to communicate needs accurately and effectively will improve  Outcome: PROGRESSING AS EXPECTED  Note: Pt will feel comfortable communicating questions and concerns with treatment team. Will continue to foster this relationship.        Problem: Safety  Goal: Will remain free from injury  Outcome: PROGRESSING AS EXPECTED  Note: Bed alarm on. Personal belongings within reach.

## 2020-02-18 NOTE — PROGRESS NOTES
Telemetry Shift Summary    Rhythm paced, a fib  HR Range 81-94  Ectopy rPVC  Measurements -/0.08/-    Per Meghann MT    Normal Values  Rhythm SR  HR Range    Measurements 0.12-0.20 / 0.06-0.10  / 0.30-0.52

## 2020-02-18 NOTE — PROGRESS NOTES
Spiritual Care Note    Patient Information     Patient's Name: Marleen Mix   MRN: [id number]    YOB: 1935   Age and Gender: 84 y.o. female   Service Area: Medical [facility]   Room (and Bed): [address] [place] [address]   Ethnicity or Nationality:     Primary Language: English   Protestant/Spiritual preference: Methodist   Place of Residence: [facility]   Clinical Team Present: BS Nurse, Johnnie   Medical Diagnosis(-es)/Procedure(s): Acute heart failure   Code Status: DNAR/DNI    Date of Admission: 2/16/2020   Length of Stay: 1 days        Spiritual Care Provider Information:  Name of Spiritual Care Provider: Sandra Wright  Title of Spiritual Care Provider: Associate   Phone Number: [phone]  E-mail: [email or web address]  Total time : 10 minutes    Spiritual Screen Results:    Gen Nursing  Spiritual Screen  Is your spiritual health or inner well-being important to you as you cope with your medical condition?: Yes  Would you like to receive a visit from our Spiritual Care team or your own Hinduism or spiritual leader?: Yes(Methodist)  Was spiritual care education provided to the patient?: Declined  Sources of Hope/[place]: Family, Animals     Palliative Care  PC Protestant/Spiritual Screening  Was spiritual care education provided to the patient?: Declined      Encounter/Request Information  Encounter/Request Type   Visited With: Patient not available  Nature of the Visit: Initial, On shift  Continue Visiting: Yes  Next Follow-up Date: 02/18/20  General Visit: Yes  Referral From/ Origin of Request: [facility] nursing  Referral To: Community clergy(WILL REFER TO  TOMORROW 2/18)    Religous Needs/Values       Spiritual Assessment   Spiritual Care Encounters    Interacton/Conversation: Staff called to pt's bed,  not able to visit at this time.  will refer pt for a  to visit tomorrow.    Assessment: (unable to assess)    Plan: ( WILL REFER TO   TOMORROW)    Notes:

## 2020-02-18 NOTE — PROGRESS NOTES
Telemetry Shift Summary    Rhythm A fib/ V paced  HR Range   Ectopy R PVCs  Measurements -/.08/-        Normal Values  Rhythm SR  HR Range    Measurements 0.12-0.20 / 0.06-0.10  / 0.30-0.52

## 2020-02-18 NOTE — DISCHARGE PLANNING
Received Choice form [id number]  Agency/Facility Name: [facility]   Referral sent per Choice form @ [phone]

## 2020-02-18 NOTE — PROGRESS NOTES
Report given to Xochilt WOODALL. Plan of care discussed. Patient resting comfortably in bed. Safety precautions in place.

## 2020-02-18 NOTE — FLOWSHEET NOTE
02/17/20 2038   Vital Signs   Pulse 87   Respiration 18   Pulse Oximetry 99 %   Respiratory Assessment   Level of Consciousness Confused   Breath Sounds   RUL Breath Sounds   (upper airway forced expiratory wheeze)   RML Breath Sounds   (no change post treatment)   Oxygen   O2 (LPM) 2   O2 Delivery Device Nasal Cannula

## 2020-02-18 NOTE — PROGRESS NOTES
2 RN skin check with Vandana RN    Bilateral great toenails thick and short, left with an old scab. Bilateral ears, elbows, clean dry intact. Right lateral heel with red non-blanching spot. Heels floated on pillow. Sacrum clean, dry, intact.

## 2020-02-18 NOTE — DISCHARGE PLANNING
LSW spoke with Ananda at [facility]. Pt is a resident there and has been the past 4 years. Pt used to use a walker but pts hip pain has gotten worse and pt relies on a wheelchair. Pt normally oriented and participates at SNF. Pts dtr Sun ([phone]) is listed as DPOA. Per Ananda, pt is welcome to come back to SNF if pt and family are in agreement to return. CM team following for d/c planning needs.         LSW spoke with dtr Sun at bedside. Sun is ok with pt returning to [facility].     Care Transition Team Assessment    Information Source  Orientation : Disoriented to Place, Disoriented to Time  Information Given By: Relative  Informant's Name: Sun  Who is responsible for making decisions for patient? : Adult child  Name(s) of Primary Decision Maker: (Sun)    Readmission Evaluation  Is this a readmission?: No    Elopement Risk  Legal Hold: No  Ambulatory or Self Mobile in Wheelchair: No-Not an Elopement Risk  Disoriented: No  Psychiatric Symptoms: None  Elopement Risk: Not at Risk for Elopement    Interdisciplinary Discharge Planning  Does Admitting Nurse Feel This Could be a Complex Discharge?: No  Primary Care Physician: Ciara  Lives with - Patient's Self Care Capacity: Other (Comments)([facility])  Patient or legal guardian wants to designate a caregiver (see row info): No  Support Systems: Family Member(s)  Housing / Facility: Skilled Nursing Facility  Name of Care Facility: [facility]  Do You Take your Prescribed Medications Regularly: Yes  Able to Return to Previous ADL's: Yes(Wheelchair)  Mobility Issues: Yes(Wheelchair)  Prior Services: None  Patient Expects to be Discharged to:: [facility]  Assistance Needed: No  Durable Medical Equipment: (Wheelchair)    Discharge Preparedness  What is your plan after discharge?: Skilled nursing facility  What are your discharge supports?: Child  Prior Functional Level: Ambulatory, Needs Assist with Activities of Daily Living, Needs Assist with Medication Management, Uses  Wheelchair    Functional Assesment  Prior Functional Level: Ambulatory, Needs Assist with Activities of Daily Living, Needs Assist with Medication Management, Uses Wheelchair    Finances  Financial Barriers to Discharge: No  Prescription Coverage: Yes    Vision / Hearing Impairment  Vision Impairment : Yes  Hearing Impairment : Yes  Hearing Impairment: Left Ear, Hearing Device Not Available  Does Pt Need Special Equipment for the Hearing Impaired?: No         Advance Directive  Advance Directive?: DPOA for Health Care  Durable Power of  Name and Contact : (Sun Page [phone])    Domestic Abuse  Have you ever been the victim of abuse or violence?: No  Physical Abuse or Sexual Abuse: No  Verbal Abuse or Emotional Abuse: No  Possible Abuse Reported to:: Not Applicable    Psychological Assessment  History of Substance Abuse: None  History of Psychiatric Problems: No  Non-compliant with Treatment: No    Discharge Risks or Barriers  Discharge risks or barriers?: No  Patient risk factors: Complex medical needs, Vulnerable adult    Anticipated Discharge Information  Anticipated discharge disposition: SNF  Discharge Address: ([address] [address])  Discharge Contact Phone Number: ([phone])

## 2020-02-18 NOTE — PROGRESS NOTES
Received report from day shift nurse. Assumed patient's cares Pt is up in bed. Pt denies any pain, discomfort, or any needs at this time. Encouraged Pt to call for assistance if needed. Call light within reach. Bed alarm is on.

## 2020-02-18 NOTE — DISCHARGE PLANNING
Anticipated Discharge Disposition: SNF    Action: LSW faxed choice for [facility]. Pt to return once medically cleared     Barriers to Discharge: none    Plan: LSW to arrange transport

## 2020-02-19 ENCOUNTER — APPOINTMENT (OUTPATIENT)
Dept: RADIOLOGY | Facility: MEDICAL CENTER | Age: 85
DRG: 640 | End: 2020-02-19
Attending: INTERNAL MEDICINE
Payer: MEDICARE

## 2020-02-19 LAB
ANION GAP SERPL CALC-SCNC: 14 MMOL/L (ref 7–16)
ANISOCYTOSIS BLD QL SMEAR: ABNORMAL
BASOPHILS # BLD AUTO: 0 % (ref 0–1.8)
BASOPHILS # BLD: 0 K/UL (ref 0–0.12)
BUN SERPL-MCNC: 35 MG/DL (ref 8–22)
CALCIUM SERPL-MCNC: 8.6 MG/DL (ref 8.4–10.2)
CHLORIDE SERPL-SCNC: 99 MMOL/L (ref 96–112)
CO2 SERPL-SCNC: 25 MMOL/L (ref 20–33)
CREAT SERPL-MCNC: 1.07 MG/DL (ref 0.5–1.4)
EOSINOPHIL # BLD AUTO: 0 K/UL (ref 0–0.51)
EOSINOPHIL NFR BLD: 0 % (ref 0–6.9)
ERYTHROCYTE [DISTWIDTH] IN BLOOD BY AUTOMATED COUNT: 53.8 FL (ref 35.9–50)
GLUCOSE SERPL-MCNC: 102 MG/DL (ref 65–99)
HCT VFR BLD AUTO: 40.8 % (ref 37–47)
HGB BLD-MCNC: 12.8 G/DL (ref 12–16)
LYMPHOCYTES # BLD AUTO: 1.53 K/UL (ref 1–4.8)
LYMPHOCYTES NFR BLD: 25 % (ref 22–41)
MANUAL DIFF BLD: NORMAL
MCH RBC QN AUTO: 29.4 PG (ref 27–33)
MCHC RBC AUTO-ENTMCNC: 31.4 G/DL (ref 33.6–35)
MCV RBC AUTO: 93.6 FL (ref 81.4–97.8)
MONOCYTES # BLD AUTO: 0.55 K/UL (ref 0–0.85)
MONOCYTES NFR BLD AUTO: 9 % (ref 0–13.4)
NEUTROPHILS # BLD AUTO: 4.03 K/UL (ref 2–7.15)
NEUTROPHILS NFR BLD: 65 % (ref 44–72)
NEUTS BAND NFR BLD MANUAL: 1 % (ref 0–10)
NRBC # BLD AUTO: 0 K/UL
NRBC BLD-RTO: 0 /100 WBC
NT-PROBNP SERPL IA-MCNC: 936 PG/ML (ref 0–125)
PLATELET # BLD AUTO: 170 K/UL (ref 164–446)
PLATELET BLD QL SMEAR: NORMAL
PMV BLD AUTO: 11.5 FL (ref 9–12.9)
POTASSIUM SERPL-SCNC: 3.7 MMOL/L (ref 3.6–5.5)
PROCALCITONIN SERPL-MCNC: 0.09 NG/ML
RBC # BLD AUTO: 4.36 M/UL (ref 4.2–5.4)
RBC BLD AUTO: PRESENT
SODIUM SERPL-SCNC: 138 MMOL/L (ref 135–145)
WBC # BLD AUTO: 6.1 K/UL (ref 4.8–10.8)

## 2020-02-19 PROCEDURE — 85027 COMPLETE CBC AUTOMATED: CPT

## 2020-02-19 PROCEDURE — 700101 HCHG RX REV CODE 250: Performed by: INTERNAL MEDICINE

## 2020-02-19 PROCEDURE — 83880 ASSAY OF NATRIURETIC PEPTIDE: CPT

## 2020-02-19 PROCEDURE — 80048 BASIC METABOLIC PNL TOTAL CA: CPT

## 2020-02-19 PROCEDURE — 770020 HCHG ROOM/CARE - TELE (206)

## 2020-02-19 PROCEDURE — 700105 HCHG RX REV CODE 258: Performed by: INTERNAL MEDICINE

## 2020-02-19 PROCEDURE — 85007 BL SMEAR W/DIFF WBC COUNT: CPT

## 2020-02-19 PROCEDURE — 99232 SBSQ HOSP IP/OBS MODERATE 35: CPT | Performed by: INTERNAL MEDICINE

## 2020-02-19 PROCEDURE — 700111 HCHG RX REV CODE 636 W/ 250 OVERRIDE (IP): Performed by: HOSPITALIST

## 2020-02-19 PROCEDURE — 97165 OT EVAL LOW COMPLEX 30 MIN: CPT

## 2020-02-19 PROCEDURE — 84145 PROCALCITONIN (PCT): CPT

## 2020-02-19 PROCEDURE — 700102 HCHG RX REV CODE 250 W/ 637 OVERRIDE(OP): Performed by: INTERNAL MEDICINE

## 2020-02-19 PROCEDURE — 71260 CT THORAX DX C+: CPT

## 2020-02-19 PROCEDURE — 97162 PT EVAL MOD COMPLEX 30 MIN: CPT

## 2020-02-19 PROCEDURE — A9270 NON-COVERED ITEM OR SERVICE: HCPCS | Performed by: INTERNAL MEDICINE

## 2020-02-19 PROCEDURE — 700117 HCHG RX CONTRAST REV CODE 255: Performed by: INTERNAL MEDICINE

## 2020-02-19 RX ORDER — SODIUM CHLORIDE 9 MG/ML
250 INJECTION, SOLUTION INTRAVENOUS ONCE
Status: COMPLETED | OUTPATIENT
Start: 2020-02-19 | End: 2020-02-19

## 2020-02-19 RX ORDER — FUROSEMIDE 40 MG/1
40 TABLET ORAL
Status: DISCONTINUED | OUTPATIENT
Start: 2020-02-20 | End: 2020-02-19

## 2020-02-19 RX ORDER — FUROSEMIDE 10 MG/ML
40 INJECTION INTRAMUSCULAR; INTRAVENOUS
Status: DISCONTINUED | OUTPATIENT
Start: 2020-02-20 | End: 2020-02-19

## 2020-02-19 RX ADMIN — LOSARTAN POTASSIUM 50 MG: 25 TABLET ORAL at 05:11

## 2020-02-19 RX ADMIN — AMIODARONE HYDROCHLORIDE 200 MG: 200 TABLET ORAL at 05:11

## 2020-02-19 RX ADMIN — SODIUM CHLORIDE 250 ML: 9 INJECTION, SOLUTION INTRAVENOUS at 13:27

## 2020-02-19 RX ADMIN — CITALOPRAM HYDROBROMIDE 10 MG: 20 TABLET ORAL at 05:12

## 2020-02-19 RX ADMIN — APIXABAN 5 MG: 5 TABLET, FILM COATED ORAL at 18:18

## 2020-02-19 RX ADMIN — APIXABAN 5 MG: 5 TABLET, FILM COATED ORAL at 05:12

## 2020-02-19 RX ADMIN — POTASSIUM CHLORIDE 20 MEQ: 20 TABLET, EXTENDED RELEASE ORAL at 05:11

## 2020-02-19 RX ADMIN — FUROSEMIDE 40 MG: 10 INJECTION, SOLUTION INTRAMUSCULAR; INTRAVENOUS at 05:11

## 2020-02-19 RX ADMIN — IOHEXOL 75 ML: 350 INJECTION, SOLUTION INTRAVENOUS at 16:49

## 2020-02-19 RX ADMIN — OXYBUTYNIN CHLORIDE 5 MG: 5 TABLET, EXTENDED RELEASE ORAL at 18:17

## 2020-02-19 RX ADMIN — DOXYCYCLINE 100 MG: 100 INJECTION, POWDER, LYOPHILIZED, FOR SOLUTION INTRAVENOUS at 18:18

## 2020-02-19 RX ADMIN — TRAZODONE HYDROCHLORIDE 50 MG: 50 TABLET ORAL at 20:39

## 2020-02-19 ASSESSMENT — COGNITIVE AND FUNCTIONAL STATUS - GENERAL
MOVING TO AND FROM BED TO CHAIR: A LOT
SUGGESTED CMS G CODE MODIFIER MOBILITY: CL
WALKING IN HOSPITAL ROOM: TOTAL
DAILY ACTIVITIY SCORE: 10
MOVING FROM LYING ON BACK TO SITTING ON SIDE OF FLAT BED: A LOT
STANDING UP FROM CHAIR USING ARMS: A LOT
CLIMB 3 TO 5 STEPS WITH RAILING: TOTAL
TOILETING: TOTAL
HELP NEEDED FOR BATHING: TOTAL
EATING MEALS: A LITTLE
DRESSING REGULAR LOWER BODY CLOTHING: TOTAL
DRESSING REGULAR UPPER BODY CLOTHING: TOTAL
PERSONAL GROOMING: A LITTLE
SUGGESTED CMS G CODE MODIFIER DAILY ACTIVITY: CL
MOBILITY SCORE: 10
TURNING FROM BACK TO SIDE WHILE IN FLAT BAD: A LOT

## 2020-02-19 ASSESSMENT — ACTIVITIES OF DAILY LIVING (ADL): TOILETING: REQUIRES ASSIST

## 2020-02-19 ASSESSMENT — GAIT ASSESSMENTS: GAIT LEVEL OF ASSIST: UNABLE TO PARTICIPATE

## 2020-02-19 NOTE — PROGRESS NOTES
Monitor Summary     Rhythm: Afib/Vpaced 70-80  Measurements: -/0.08/-  ECTOPIES: n/a        Normal Values  Rhythm SR  HR Range    Measurements 0.12-0.20 / 0.06-0.10  / 0.30-0.52

## 2020-02-19 NOTE — PROGRESS NOTES
Pt BP 87/55  MAP 64  HR 94    Pt recently transfered chair to commode back to chair. Will recheck. MD notified

## 2020-02-19 NOTE — PROGRESS NOTES
[facility] Medicine Daily Progress Note    Date of Service  2/18/2020    Chief Complaint  84 y.o. female admitted 2/16/2020 with cough.    Hospital Course    per HPI  84 y.o. female with a history of mild cognitive impairment, atrial fibrillation on Eliquis, hypertension who presented 2/16/2020 with cough and wheezing.     History is obtained from the patient's daughter who is at bedside.  She states that the patient has had a cough and wheezing for the last 10 days.  The wheezing progressed and she subsequently developed a cough productive of clear sputum.  Patient did not have any fever however she has been fatigued and lethargic.  The patient's daughter also states that she has been sleeping upright in a chair for the last several weeks to months.  She feels that her mom is not getting the care that she needs at the skilled nursing facility.  When the cough and wheezing wheezing failed to resolve, she took her to her primary care physician's office.  They did an x-ray and it showed haziness which they said was likely bronchitis therefore they prescribed prednisone and doxycycline therapy.  The patient has been taking this for the last 4 days but has had no improvement in fact, has continued to get worse.  She has lower extremity swelling which right now is moderate.  Denies any change in bowel or urinary habits.  Vomiting.           Interval Problem Update  2/18.  Patient still showing signs of fluid overload.  Patient has dementia and unable to finish review of system.  Patient's breathing condition slightly improved however very lethargic per family.  Continue diuretics.  Patient complains of general body achiness Patient's pain is general, 2-3/10, intermittent and does not radiate to other location, sharp and with some tingling. Can be controlled by pain meds.     Consultants/Specialty  None    Code Status  Full Code    Disposition  Nursing home    Review of Systems  Review of Systems   Unable to perform ROS:  Dementia        Physical Exam  Temp:  [37.1 °C (98.7 °F)-37.5 °C (99.5 °F)] 37.3 °C (99.2 °F)  Pulse:  [] 79  Resp:  [18-20] 20  BP: (122-152)/(64-88) 127/80  SpO2:  [96 %-99 %] 98 %    Physical Exam  Vitals signs and nursing note reviewed.   Constitutional:       General: She is not in acute distress.     Appearance: Normal appearance. She is normal weight. She is not ill-appearing.   HENT:      Head: Normocephalic and atraumatic.      Right Ear: Ear canal and external ear normal.      Left Ear: Ear canal and external ear normal.      Nose: Nose normal. No congestion.      Mouth/Throat:      Mouth: Mucous membranes are moist.      Pharynx: Oropharynx is clear. No oropharyngeal exudate or posterior oropharyngeal erythema.   Eyes:      Extraocular Movements: Extraocular movements intact.      Conjunctiva/sclera: Conjunctivae normal.      Pupils: Pupils are equal, round, and reactive to light.   Neck:      Musculoskeletal: Normal range of motion and neck supple. No neck rigidity or muscular tenderness.      Vascular: No carotid bruit.   Cardiovascular:      Rate and Rhythm: Normal rate. Rhythm irregular.      Pulses: Normal pulses.      Heart sounds: Normal heart sounds. No murmur. No friction rub. No gallop.    Pulmonary:      Effort: Pulmonary effort is normal. No respiratory distress.      Breath sounds: No stridor. Rales present. No wheezing.      Comments: Course crackles b/l lung bases  Chest:      Chest wall: No tenderness.   Abdominal:      General: Abdomen is flat. Bowel sounds are normal. There is no distension.      Palpations: Abdomen is soft. There is no mass.      Tenderness: There is no abdominal tenderness. There is no guarding or rebound.      Hernia: No hernia is present.   Musculoskeletal: Normal range of motion.         General: No swelling, tenderness or signs of injury.   Lymphadenopathy:      Cervical: No cervical adenopathy.   Skin:     General: Skin is warm and dry.      Capillary  Refill: Capillary refill takes less than 2 seconds.      Coloration: Skin is not jaundiced or pale.   Neurological:      General: No focal deficit present.      Mental Status: She is alert. Mental status is at baseline. She is disoriented.      Cranial Nerves: No cranial nerve deficit.   Psychiatric:         Mood and Affect: Mood normal.         Behavior: Behavior normal.         Fluids    Intake/Output Summary (Last 24 hours) at 2/18/2020 1609  Last data filed at 2/18/2020 1300  Gross per 24 hour   Intake 120 ml   Output 1500 ml   Net -1380 ml       Laboratory  Recent Labs     02/16/20 1737 02/17/20  0144 02/18/20  0156   WBC 7.8 8.1 5.9   RBC 4.19* 3.91* 4.09*   HEMOGLOBIN 12.3 11.5* 12.0   HEMATOCRIT 39.4 37.0 38.6   MCV 94.0 94.6 94.4   MCH 29.4 29.4 29.3   MCHC 31.2* 31.1* 31.1*   RDW 58.1* 58.6* 56.4*   PLATELETCT 235 189 187   MPV 11.3 11.4 11.4     Recent Labs     02/16/20 1737 02/17/20 0144 02/18/20  0156   SODIUM 141 143 139   POTASSIUM 4.0 3.5* 3.5*   CHLORIDE 102 103 98   CO2 22 24 25   GLUCOSE 162* 101* 95   BUN 38* 35* 33*   CREATININE 1.34 1.21 1.06   CALCIUM 9.5 9.0 8.3*                   Imaging  EC-ECHOCARDIOGRAM COMPLETE W/O CONT   Final Result      DX-CHEST-PORTABLE (1 VIEW)   Final Result      Stable cardiomegaly and cardiac pacer.      Mild interstitial prominence suggesting mild vascular congestion.           Assessment/Plan  * Fluid overload  Assessment & Plan  Echocardiogram showing ejection fraction 60%  Pro-BNP 3970  Still showing signs of fluid overload anasarca  Cont IV lasix, 40mg BID  Strict I/Os  Daily Weights  TSH WNL.      Elevated troponin  Assessment & Plan  Mild suspect demand ischemia, patient poor historian but denies pain anywhere.  Continue treat for fluid overload    Hyperglycemia  Assessment & Plan  Possibly related to steroids  No hx of diabetes.  A1c pending.      Mood disorder (HCC)- (present on admission)  Assessment & Plan  With mild cognitive impairment,  Lives in  SNF ([facility])  Resume current dose of Celexa  stable    Gastroesophageal reflux disease without esophagitis- (present on admission)  Assessment & Plan  Resume PPIs    Atrial fibrillation (HCC)- (present on admission)  Assessment & Plan  Rate controlled  Resume Amiodarone, Eliquis  TSH WNL    ACP:    Total time spent on advanced care planning, excluding time spent on daily care: 16 minutes.    1st 30 minutes [id number]   I discussed extensively with patient and patient's family is regarding code status and plan of care. We also discussed advanced care planning including diagnosis, prognosis, plan of care, risks and benefits of any therapies that could be offered, as well as alternatives including palliation and hospice, as appropriate. My discussion is summarized above in detail. Confirmed with DNR      Patient plan of care discussed at multidisplinary team rounds and with patient and R.N at beside.    I have seen and examined patient on 2/18/2020. I have reviewed vitals, new labs and imaging. I have discussed POC with RN. There are no changes from (2/17/2020) except for what is mentioned above.       VTE prophylaxis: Eliquis      Current Facility-Administered Medications:   •  albuterol (PROVENTIL) 2.5mg/0.5ml nebulizer solution 2.5 mg, 2.5 mg, Nebulization, Q2HRS PRN (RT), Gabby Mills D.O.  •  ALPRAZolam (XANAX) tablet 0.25 mg, 0.25 mg, Oral, Q8HRS PRN, Phyllis Amos M.D., 0.25 mg at 02/17/20 0400  •  furosemide (LASIX) injection 40 mg, 40 mg, Intravenous, BID DIURETIC, Slim Hoover M.D., 40 mg at 02/18/20 0538  •  amiodarone (CORDARONE) tablet 200 mg, 200 mg, Oral, DAILY, Gabby Mills D.O., 200 mg at 02/18/20 0535  •  apixaban (ELIQUIS) tablet 5 mg, 5 mg, Oral, BID, Gabby Mills D.O., 5 mg at 02/18/20 0536  •  citalopram (CELEXA) tablet 10 mg, 10 mg, Oral, DAILY, Gabby Mills D.O., 10 mg at 02/18/20 0537  •  losartan (COZAAR) tablet 50 mg, 50 mg, Oral, DAILY, Gabby Mills D.O., 50 mg at  02/18/20 0536  •  oxybutynin SR (DITROPAN-XL) tablet 5 mg, 5 mg, Oral, Q EVENING, Gabby Mills D.O., 5 mg at 02/17/20 1722  •  potassium chloride SA (Kdur) tablet 20 mEq, 20 mEq, Oral, DAILY, Gabby Mills D.O., 20 mEq at 02/18/20 0535  •  traZODone (DESYREL) tablet 50 mg, 50 mg, Oral, QHS, CÉSAR BarbosaO., 50 mg at 02/16/20 2302  •  senna-docusate (PERICOLACE or SENOKOT S) 8.6-50 MG per tablet 2 Tab, 2 Tab, Oral, BID, 2 Tab at 02/18/20 0535 **AND** polyethylene glycol/lytes (MIRALAX) PACKET 1 Packet, 1 Packet, Oral, QDAY PRN **AND** magnesium hydroxide (MILK OF MAGNESIA) suspension 30 mL, 30 mL, Oral, QDAY PRN **AND** bisacodyl (DULCOLAX) suppository 10 mg, 10 mg, Rectal, QDAY PRN, Gabby Mills D.O.  •  Respiratory Therapy Consult, , Nebulization, Continuous RT, Gabby Mills D.O.  •  guaiFENesin dextromethorphan (ROBITUSSIN DM) 100-10 MG/5ML syrup 10 mL, 10 mL, Oral, Q6HRS PRN, Gabby Mills D.O., 10 mL at 02/17/20 0951  •  ondansetron (ZOFRAN) syringe/vial injection 4 mg, 4 mg, Intravenous, Q4HRS PRN, Gabby Mills D.O.  •  ondansetron (ZOFRAN ODT) dispertab 4 mg, 4 mg, Oral, Q4HRS PRN, Gabby Mills D.O.  •  ipratropium-albuterol (DUONEB) nebulizer solution, 3 mL, Nebulization, Q4H PRN (RT), Gabby Mills D.O., 3 mL at 02/17/20 2038

## 2020-02-19 NOTE — PROGRESS NOTES
Rounding completed, pt asleep in bed. No needs at this time. Bed in low locked position, bed alarm on, call light in reach, will continue to  monitor.

## 2020-02-19 NOTE — PROGRESS NOTES
Took report from Danuta WOODALL, pt is resting in bed with daughter at bedside. Pt is on 2L o2, no needs a this time. Bed in low locked position, call light in reach, will continue to monitor.

## 2020-02-19 NOTE — CARE PLAN
Problem: Safety  Goal: Will remain free from falls  Outcome: PROGRESSING AS EXPECTED  Intervention: Assess risk factors for falls  Note: Rounded on frequently, educated to call for ambulation, daughter at bedside frequently for reorientation       Problem: Knowledge Deficit  Goal: Knowledge of disease process/condition, treatment plan, diagnostic tests, and medications will improve  Outcome: PROGRESSING AS EXPECTED  Intervention: Explain information regarding disease process/condition, treatment plan, diagnostic tests, and medications and document in education  Note: Pt and daughter educated about POC, all questions answered.

## 2020-02-19 NOTE — PROGRESS NOTES
[facility] Medicine Daily Progress Note    Date of Service  2/19/2020    Chief Complaint  84 y.o. female admitted 2/16/2020 with cough.    Hospital Course    per HPI  84 y.o. female with a history of mild cognitive impairment, atrial fibrillation on Eliquis, hypertension who presented 2/16/2020 with cough and wheezing.     History is obtained from the patient's daughter who is at bedside.  She states that the patient has had a cough and wheezing for the last 10 days.  The wheezing progressed and she subsequently developed a cough productive of clear sputum.  Patient did not have any fever however she has been fatigued and lethargic.  The patient's daughter also states that she has been sleeping upright in a chair for the last several weeks to months.  She feels that her mom is not getting the care that she needs at the skilled nursing facility.  When the cough and wheezing wheezing failed to resolve, she took her to her primary care physician's office.  They did an x-ray and it showed haziness which they said was likely bronchitis therefore they prescribed prednisone and doxycycline therapy.  The patient has been taking this for the last 4 days but has had no improvement in fact, has continued to get worse.  She has lower extremity swelling which right now is moderate.  Denies any change in bowel or urinary habits.  Vomiting.           Interval Problem Update  2/18.  Patient still showing signs of fluid overload.  Patient has dementia and unable to finish review of system.  Patient's breathing condition slightly improved however very lethargic per family.  Continue diuretics.  Patient complains of general body achiness Patient's pain is general, 2-3/10, intermittent and does not radiate to other location, sharp and with some tingling. Can be controlled by pain meds.   2/19.  Patient still very lethargic.  Tolerated diuretics.  Blood pressure started showing low signs.  However patient is asymptomatic.  PT OT recommend  nursing home. Patient otherwise denies fever, chills, nausea, vomiting, adb pain, SOB, CP, headache, constipation, diarrhea, cough, or sputum.      Consultants/Specialty  None    Code Status  Full Code    Disposition  Nursing home    Review of Systems  Review of Systems   Unable to perform ROS: Dementia        Physical Exam  Temp:  [36.4 °C (97.6 °F)-37.5 °C (99.5 °F)] 37.1 °C (98.7 °F)  Pulse:  [71-81] 74  Resp:  [18] 18  BP: (107-148)/(70-92) 115/71  SpO2:  [96 %-100 %] 96 %    Physical Exam  Vitals signs and nursing note reviewed.   Constitutional:       Appearance: Normal appearance. She is normal weight. She is not toxic-appearing or diaphoretic.   HENT:      Head: Normocephalic and atraumatic.      Right Ear: Ear canal and external ear normal.      Left Ear: Ear canal and external ear normal.      Nose: Nose normal. No congestion.      Mouth/Throat:      Mouth: Mucous membranes are moist.      Pharynx: Oropharynx is clear. No oropharyngeal exudate or posterior oropharyngeal erythema.   Eyes:      Extraocular Movements: Extraocular movements intact.      Conjunctiva/sclera: Conjunctivae normal.      Pupils: Pupils are equal, round, and reactive to light.   Neck:      Musculoskeletal: Normal range of motion and neck supple. No neck rigidity or muscular tenderness.      Vascular: No carotid bruit.   Cardiovascular:      Rate and Rhythm: Normal rate. Rhythm irregular.      Pulses: Normal pulses.      Heart sounds: Normal heart sounds. No friction rub. No gallop.    Pulmonary:      Effort: Pulmonary effort is normal. No respiratory distress.      Breath sounds: Rales present. No wheezing or rhonchi.      Comments: Course crackles b/l lung bases  Chest:      Chest wall: No tenderness.   Abdominal:      General: Abdomen is flat. Bowel sounds are normal. There is no distension.      Palpations: Abdomen is soft. There is no mass.      Tenderness: There is no guarding or rebound.   Musculoskeletal: Normal range of  motion.         General: No swelling, tenderness or signs of injury.   Lymphadenopathy:      Cervical: No cervical adenopathy.   Skin:     General: Skin is warm and dry.      Capillary Refill: Capillary refill takes less than 2 seconds.      Coloration: Skin is not jaundiced or pale.   Neurological:      General: No focal deficit present.      Mental Status: She is alert. Mental status is at baseline. She is disoriented.      Cranial Nerves: No cranial nerve deficit.   Psychiatric:         Mood and Affect: Mood normal.         Behavior: Behavior normal.         Fluids    Intake/Output Summary (Last 24 hours) at 2/19/2020 1245  Last data filed at 2/19/2020 1024  Gross per 24 hour   Intake 220 ml   Output 2350 ml   Net -2130 ml       Laboratory  Recent Labs     02/17/20  0144 02/18/20  0156 02/19/20  0429   WBC 8.1 5.9 6.1   RBC 3.91* 4.09* 4.36   HEMOGLOBIN 11.5* 12.0 12.8   HEMATOCRIT 37.0 38.6 40.8   MCV 94.6 94.4 93.6   MCH 29.4 29.3 29.4   MCHC 31.1* 31.1* 31.4*   RDW 58.6* 56.4* 53.8*   PLATELETCT 189 187 170   MPV 11.4 11.4 11.5     Recent Labs     02/17/20 0144 02/18/20  0156 02/19/20  0429   SODIUM 143 139 138   POTASSIUM 3.5* 3.5* 3.7   CHLORIDE 103 98 99   CO2 24 25 25   GLUCOSE 101* 95 102*   BUN 35* 33* 35*   CREATININE 1.21 1.06 1.07   CALCIUM 9.0 8.3* 8.6                   Imaging  EC-ECHOCARDIOGRAM COMPLETE W/O CONT   Final Result      DX-CHEST-PORTABLE (1 VIEW)   Final Result      Stable cardiomegaly and cardiac pacer.      Mild interstitial prominence suggesting mild vascular congestion.           Assessment/Plan  * Fluid overload  Assessment & Plan  Echocardiogram showing ejection fraction 60%  Pro-BNP 3970->900  More euvolemic.  Patient does have pulmonary hypertension likely.  Change diuretics from IV to p.o. since patient start showing signs of low blood pressure  Strict I/Os  Daily Weights  TSH WNL.      Elevated troponin  Assessment & Plan  Mild suspect demand ischemia, patient poor historian  but denies pain anywhere.  Continue treat for fluid overload    Hyperglycemia  Assessment & Plan  Possibly related to steroids  No hx of diabetes.  Currently stable      Mood disorder (HCC)- (present on admission)  Assessment & Plan  With mild cognitive impairment,  Lives in SNF ([facility])  Resume current dose of Celexa  Stable  Likely will go back to nursing care facility    Gastroesophageal reflux disease without esophagitis- (present on admission)  Assessment & Plan  Resume PPIs    Atrial fibrillation (HCC)- (present on admission)  Assessment & Plan  Rate controlled  Resume Amiodarone, Eliquis  TSH WNL        Patient plan of care discussed at multidisplinary team rounds and with patient and R.N at [facility].    I have seen and examined patient on 2/19/2020. I have reviewed vitals, new labs and imaging. I have discussed POC with RN. There are no changes from (2/18/2020) except for what is mentioned above.       VTE prophylaxis: Eliquis      Current Facility-Administered Medications:   •  [START ON 2/20/2020] furosemide (LASIX) tablet 40 mg, 40 mg, Oral, Q DAY, Sheyla Hernández M.D.  •  albuterol (PROVENTIL) 2.5mg/0.5ml nebulizer solution 2.5 mg, 2.5 mg, Nebulization, Q2HRS PRN (RT), HUSSEIN Barbosa.O.  •  ALPRAZolam (XANAX) tablet 0.25 mg, 0.25 mg, Oral, Q8HRS PRN, Phyllis Amos M.D., 0.25 mg at 02/17/20 0400  •  amiodarone (CORDARONE) tablet 200 mg, 200 mg, Oral, DAILY, Gabby Mills D.O., 200 mg at 02/19/20 0511  •  apixaban (ELIQUIS) tablet 5 mg, 5 mg, Oral, BID, HUSSEIN aBrbosa.O., 5 mg at 02/19/20 0512  •  citalopram (CELEXA) tablet 10 mg, 10 mg, Oral, DAILY, Gabby Mills D.O., 10 mg at 02/19/20 0512  •  oxybutynin SR (DITROPAN-XL) tablet 5 mg, 5 mg, Oral, Q EVENING, Gabby Mills D.O., 5 mg at 02/18/20 1715  •  potassium chloride SA (Kdur) tablet 20 mEq, 20 mEq, Oral, DAILY, Gabby Mills D.O., 20 mEq at 02/19/20 0511  •  traZODone (DESYREL) tablet 50 mg, 50 mg, Oral, QHS, Gabby Mills D.O.,  50 mg at 02/18/20 2055  •  senna-docusate (PERICOLACE or SENOKOT S) 8.6-50 MG per tablet 2 Tab, 2 Tab, Oral, BID, Stopped at 02/18/20 1800 **AND** polyethylene glycol/lytes (MIRALAX) PACKET 1 Packet, 1 Packet, Oral, QDAY PRN **AND** magnesium hydroxide (MILK OF MAGNESIA) suspension 30 mL, 30 mL, Oral, QDAY PRN **AND** bisacodyl (DULCOLAX) suppository 10 mg, 10 mg, Rectal, QDAY PRN, CÉSAR BarbosaO.  •  Respiratory Therapy Consult, , Nebulization, Continuous RT, Gabby Mills D.O.  •  guaiFENesin dextromethorphan (ROBITUSSIN DM) 100-10 MG/5ML syrup 10 mL, 10 mL, Oral, Q6HRS PRN, CÉSAR BarbosaO., 10 mL at 02/17/20 0951  •  ondansetron (ZOFRAN) syringe/vial injection 4 mg, 4 mg, Intravenous, Q4HRS PRN, CÉSAR BarbosaO.  •  ondansetron (ZOFRAN ODT) dispertab 4 mg, 4 mg, Oral, Q4HRS PRN, CÉSAR BarbosaO.  •  ipratropium-albuterol (DUONEB) nebulizer solution, 3 mL, Nebulization, Q4H PRN (RT), Gabby Mills D.O., 3 mL at 02/17/20 2038

## 2020-02-19 NOTE — CARE PLAN
Problem: Knowledge Deficit  Goal: Knowledge of disease process/condition, treatment plan, diagnostic tests, and medications will improve  Outcome: PROGRESSING AS EXPECTED  Note: Educated pt and family on plan of care and encouraged to ask questions and voice concerns. All concerns addressed at this time.      Problem: Respiratory:  Goal: Respiratory status will improve  Outcome: PROGRESSING SLOWER THAN EXPECTED  Note: Pt is on 2Lo2, with non productive cough and wheezing.

## 2020-02-19 NOTE — PROGRESS NOTES
Spoke with daughter Sun. She is concerned about her mothers discharge back to assisted living as she does not want her to return to [facility]. Sun stated she spoke with social work but feels like she did not get enough time to make the decision or the help she needed to make the decision, will notify day RN to talk with social work. Pt would like to talk about pts discharge again.

## 2020-02-19 NOTE — THERAPY
"Physical Therapy Evaluation completed.   Bed Mobility:  Supine to Sit: Moderate Assist  Transfers: Sit to Stand: Moderate Assist, maxA transfer  Gait: Level Of Assist: Unable to Participate   Plan of Care: Patient with no further skilled PT needs in the acute care setting at this time  Discharge Recommendations: Equipment: No Equipment Needed. Post-acute therapy Recommend home health transitional care for continued physical therapy services.     Pt is a 85 yo female with diagnosis of heart failure, fluid overload. Pt resides at [facility] and at baseline requires assist to transfer to w/c, is non-ambulatory. Unable to determine level of assist pt requires at baseline, pt is a poor historian. There are no further acute PT needs, pt will need placement at [facility]. Recommend HHPT f/u for further transfer training in case pt is not quite at functional baseline. Patient will not be actively followed for physical therapy services at this time, however may be seen if requested by physician for 1 more visit within 30 days to address any discharge or equipment needs    See \"Rehab Therapy-Acute\" Patient Summary Report for complete documentation.     "

## 2020-02-19 NOTE — THERAPY
"Occupational Therapy Evaluation completed.   Functional Status:  84 y.o. female admitted to the hospital with cough, wheezing and heart failure. Pt with h/o cognitive impairment and a-fib. Per chart, pt lives at an assisted living facility. Pt reports that she is typically independent with self-feeding and grooming but otherwise requires assist with ADLs and functional transfers. Pt appears to be at her functional baseline. Pt transferred to the recliner with max assist. She completed basic grooming and self-feeding seated with set-up.   Plan of Care: Patient will not be actively followed for occupational therapy services in the acute care setting at this time, however may be seen if requested by physician for 1 more visit within 30 days to address any discharge or equipment needs.   Discharge Recommendations: If family feels that the patient is weaker than her baseline she may benefit from post-acute placement for additional occupational therapy services, otherwise will likely return to assisted living facility        See \"Rehab Therapy-Acute\" Patient Summary Report for complete documentation.    "

## 2020-02-19 NOTE — PROGRESS NOTES
Telemetry summary:  Rhythm: Paced. A-Fib     HR Range: 70s to 90s      Measurements from strip printed at 07:07:16   IL: -   QRS 0.08   QT 0.32     Ectopies: none.

## 2020-02-19 NOTE — PROGRESS NOTES
Telemetry Strip       Measurements from am strip were as follows:  Rhythm: Afib paced  HR: 70-89  Measurements: 0.08  Ectopy: r PVC             Normal Values  Rhythm SR  HR Range    Measurements 0.12-0.20 / 0.06-0.10  / 0.30-0.52

## 2020-02-20 PROBLEM — I27.20 PULMONARY HYPERTENSION (HCC): Status: ACTIVE | Noted: 2020-02-20

## 2020-02-20 PROBLEM — I51.89 DIASTOLIC DYSFUNCTION WITHOUT HEART FAILURE: Status: ACTIVE | Noted: 2020-02-20

## 2020-02-20 LAB
ANION GAP SERPL CALC-SCNC: 10 MMOL/L (ref 7–16)
BASOPHILS # BLD AUTO: 0 % (ref 0–1.8)
BASOPHILS # BLD: 0 K/UL (ref 0–0.12)
BUN SERPL-MCNC: 35 MG/DL (ref 8–22)
CALCIUM SERPL-MCNC: 9 MG/DL (ref 8.4–10.2)
CHLORIDE SERPL-SCNC: 101 MMOL/L (ref 96–112)
CO2 SERPL-SCNC: 27 MMOL/L (ref 20–33)
CREAT SERPL-MCNC: 1.09 MG/DL (ref 0.5–1.4)
EOSINOPHIL # BLD AUTO: 0.06 K/UL (ref 0–0.51)
EOSINOPHIL NFR BLD: 1 % (ref 0–6.9)
ERYTHROCYTE [DISTWIDTH] IN BLOOD BY AUTOMATED COUNT: 52.8 FL (ref 35.9–50)
GLUCOSE SERPL-MCNC: 104 MG/DL (ref 65–99)
HCT VFR BLD AUTO: 40.6 % (ref 37–47)
HGB BLD-MCNC: 12.9 G/DL (ref 12–16)
LYMPHOCYTES # BLD AUTO: 2.68 K/UL (ref 1–4.8)
LYMPHOCYTES NFR BLD: 47 % (ref 22–41)
MANUAL DIFF BLD: NORMAL
MCH RBC QN AUTO: 29.1 PG (ref 27–33)
MCHC RBC AUTO-ENTMCNC: 31.8 G/DL (ref 33.6–35)
MCV RBC AUTO: 91.4 FL (ref 81.4–97.8)
MONOCYTES # BLD AUTO: 0.63 K/UL (ref 0–0.85)
MONOCYTES NFR BLD AUTO: 11 % (ref 0–13.4)
MYELOCYTES NFR BLD MANUAL: 2 %
NEUTROPHILS # BLD AUTO: 2.22 K/UL (ref 2–7.15)
NEUTROPHILS NFR BLD: 39 % (ref 44–72)
NRBC # BLD AUTO: 0 K/UL
NRBC BLD-RTO: 0 /100 WBC
PLATELET # BLD AUTO: 179 K/UL (ref 164–446)
PLATELET BLD QL SMEAR: NORMAL
PMV BLD AUTO: 11.1 FL (ref 9–12.9)
POTASSIUM SERPL-SCNC: 3.8 MMOL/L (ref 3.6–5.5)
RBC # BLD AUTO: 4.44 M/UL (ref 4.2–5.4)
RBC BLD AUTO: NORMAL
SODIUM SERPL-SCNC: 138 MMOL/L (ref 135–145)
VARIANT LYMPHS BLD QL SMEAR: NORMAL
WBC # BLD AUTO: 5.7 K/UL (ref 4.8–10.8)

## 2020-02-20 PROCEDURE — 700102 HCHG RX REV CODE 250 W/ 637 OVERRIDE(OP): Performed by: INTERNAL MEDICINE

## 2020-02-20 PROCEDURE — 99232 SBSQ HOSP IP/OBS MODERATE 35: CPT | Performed by: INTERNAL MEDICINE

## 2020-02-20 PROCEDURE — 80048 BASIC METABOLIC PNL TOTAL CA: CPT

## 2020-02-20 PROCEDURE — 85007 BL SMEAR W/DIFF WBC COUNT: CPT

## 2020-02-20 PROCEDURE — A9270 NON-COVERED ITEM OR SERVICE: HCPCS | Performed by: INTERNAL MEDICINE

## 2020-02-20 PROCEDURE — 99223 1ST HOSP IP/OBS HIGH 75: CPT | Performed by: INTERNAL MEDICINE

## 2020-02-20 PROCEDURE — 700105 HCHG RX REV CODE 258: Performed by: INTERNAL MEDICINE

## 2020-02-20 PROCEDURE — 770020 HCHG ROOM/CARE - TELE (206)

## 2020-02-20 PROCEDURE — 700101 HCHG RX REV CODE 250: Performed by: INTERNAL MEDICINE

## 2020-02-20 PROCEDURE — 85027 COMPLETE CBC AUTOMATED: CPT

## 2020-02-20 RX ORDER — TORSEMIDE 10 MG/1
10 TABLET ORAL 2 TIMES DAILY
Status: DISCONTINUED | OUTPATIENT
Start: 2020-02-20 | End: 2020-02-21 | Stop reason: HOSPADM

## 2020-02-20 RX ORDER — DOXYCYCLINE 100 MG/1
100 TABLET ORAL EVERY 12 HOURS
Status: DISCONTINUED | OUTPATIENT
Start: 2020-02-20 | End: 2020-02-21 | Stop reason: HOSPADM

## 2020-02-20 RX ADMIN — TORSEMIDE 10 MG: 10 TABLET ORAL at 10:51

## 2020-02-20 RX ADMIN — OXYBUTYNIN CHLORIDE 5 MG: 5 TABLET, EXTENDED RELEASE ORAL at 18:05

## 2020-02-20 RX ADMIN — SENNOSIDES AND DOCUSATE SODIUM 2 TABLET: 8.6; 5 TABLET ORAL at 18:05

## 2020-02-20 RX ADMIN — POTASSIUM CHLORIDE 20 MEQ: 20 TABLET, EXTENDED RELEASE ORAL at 05:55

## 2020-02-20 RX ADMIN — DOXYCYCLINE 100 MG: 100 INJECTION, POWDER, LYOPHILIZED, FOR SOLUTION INTRAVENOUS at 05:46

## 2020-02-20 RX ADMIN — APIXABAN 5 MG: 5 TABLET, FILM COATED ORAL at 05:55

## 2020-02-20 RX ADMIN — TRAZODONE HYDROCHLORIDE 50 MG: 50 TABLET ORAL at 20:41

## 2020-02-20 RX ADMIN — AMIODARONE HYDROCHLORIDE 200 MG: 200 TABLET ORAL at 05:54

## 2020-02-20 RX ADMIN — CITALOPRAM HYDROBROMIDE 10 MG: 20 TABLET ORAL at 05:55

## 2020-02-20 RX ADMIN — TORSEMIDE 10 MG: 10 TABLET ORAL at 18:05

## 2020-02-20 RX ADMIN — DOXYCYCLINE 100 MG: 100 TABLET, FILM COATED ORAL at 18:05

## 2020-02-20 RX ADMIN — APIXABAN 5 MG: 5 TABLET, FILM COATED ORAL at 18:05

## 2020-02-20 NOTE — CONSULTS
Cardiology Consult Note:    Puma Brasher M.D.  Date & Time note created:    2/20/2020   2:46 PM     Referring MD:  Dr. Hernández    Patient ID:   Name:             Marleen Mix   YOB: 1935  Age:                 84 y.o.  female   MRN:               [id number]                                                             Reason for Consult:      SOB, CHF, pulm htn    History of Present Illness:    84-year-old female with past medical history of atrial fibrillation on amiodarone.  She has been to the hospital for shortness of breath.  She was diuresed and is now getting more short of breath and having wheezes.  The daughter who takes care of her and takes her to doctor's appointments noticed that she has had numerous instances of lower extremity edema as well as shortness of breath and crackles.  Recently she heard her mom having wheezes.  She is taken care of at a long-term care facility who seems to take diuretics as sort of a as needed measurement.  When they stopped she gets fluid overloaded and short of breath.  She has been told before that she has asthma, COPD, fluid retention, bronchitis.  She is concerned because she has been told she has pulmonary hypertension and heart failure.  Reviewing her echocardiogram it is clear that she has some degree of diastolic dysfunction.  She has atrial fibrillation and is on amiodarone however today she is in atrial fibrillation.    Review of Systems:      Constitutional: Denies fevers, Denies weight changes  Eyes: Denies changes in vision, no eye pain  Ears/Nose/Throat/Mouth: Denies nasal congestion or sore throat   Cardiovascular: no chest pain, no palpitations   Respiratory: + shortness of breath , Denies cough  Gastrointestinal/Hepatic: Denies abdominal pain, nausea, vomiting, diarrhea, constipation or GI bleeding   Genitourinary: Denies dysuria or frequency  Musculoskeletal/Rheum: Denies  joint pain and swelling, + edema  Skin: Denies  "rash  Neurological: Denies headache, confusion, memory loss or focal weakness/parasthesias  Psychiatric: denies mood disorder   Endocrine: Crystal thyroid problems  Heme/Oncology/Lymph Nodes: Denies enlarged lymph nodes, denies brusing or known bleeding disorder  All other systems were reviewed and are negative (AMA/CMS criteria)                Past Medical History:   Past Medical History:   Diagnosis Date   • Anesthesia     get very confused, \"psycotic\"   • Anxiety    • Atrial fibrillation (HCC)     artrial fib   • Cataract    • Dementia    • Hemorrhoids    • History of methicillin resistant staphylococcus aureus (MRSA)     arm   • History of MRSA infection     in [place] 2016   • Hypertension    • Insomnia    • Macular degeneration    • Pacemaker    • Pain     hip   • Personal history of fall     April 2017 c/b L hip fracture needing repair   • Recurrent UTI    • Syncope     May 2017   • Yeast infection      Active Hospital Problems    Diagnosis   • Elevated troponin [R79.89]   • Hyperglycemia [R73.9]   • Fluid overload [E87.70]   • Mood disorder (HCC) [F39]   • Atrial fibrillation (HCC) [I48.91]   • Gastroesophageal reflux disease without esophagitis [K21.9]       Past Surgical History:  Past Surgical History:   Procedure Laterality Date   • HIP CANNULATED SCREW Left 4/10/2017    Procedure: HIP CANNULATED SCREW;  Surgeon: Harinder Leblanc M.D.;  Location: [facility];  Service:    • ABDOMINAL HYSTERECTOMY TOTAL     • TONSILLECTOMY         Hospital Medications:  Current Facility-Administered Medications   Medication Dose   • doxycycline monohydrate (ADOXA) tablet 100 mg  100 mg   • torsemide (DEMADEX) TABS 10 mg  10 mg   • albuterol (PROVENTIL) 2.5mg/0.5ml nebulizer solution 2.5 mg  2.5 mg   • ALPRAZolam (XANAX) tablet 0.25 mg  0.25 mg   • amiodarone (CORDARONE) tablet 200 mg  200 mg   • apixaban (ELIQUIS) tablet 5 mg  5 mg   • citalopram (CELEXA) tablet 10 mg  10 mg   • oxybutynin SR (DITROPAN-XL) " tablet 5 mg  5 mg   • traZODone (DESYREL) tablet 50 mg  50 mg   • senna-docusate (PERICOLACE or SENOKOT S) 8.6-50 MG per tablet 2 Tab  2 Tab    And   • polyethylene glycol/lytes (MIRALAX) PACKET 1 Packet  1 Packet    And   • magnesium hydroxide (MILK OF MAGNESIA) suspension 30 mL  30 mL    And   • bisacodyl (DULCOLAX) suppository 10 mg  10 mg   • Respiratory Therapy Consult     • guaiFENesin dextromethorphan (ROBITUSSIN DM) 100-10 MG/5ML syrup 10 mL  10 mL   • ondansetron (ZOFRAN) syringe/vial injection 4 mg  4 mg   • ondansetron (ZOFRAN ODT) dispertab 4 mg  4 mg   • ipratropium-albuterol (DUONEB) nebulizer solution  3 mL         Current Outpatient Medications:  Medications Prior to Admission   Medication Sig Dispense Refill Last Dose   • losartan (COZAAR) 50 MG Tab Take 50 mg by mouth every day.   2/16/2020 at 0800   • predniSONE (DELTASONE) 20 MG Tab Take 40 mg by mouth every day.   2/15/2020 at 2000   • doxycycline (VIBRAMYCIN) 100 MG Tab Take 100 mg by mouth 2 times a day.   2/16/2020 at 0800   • ipratropium-albuterol (DUONEB) 0.5-2.5 (3) MG/3ML nebulizer solution 3 mL by Nebulization route every four hours as needed for Shortness of Breath.   2/16/2020 at 1400   • apixaban (ELIQUIS) 5mg Tab Take 5 mg by mouth 2 Times a Day.   2/16/2020 at 1600   • fluticasone (FLONASE) 50 MCG/ACT nasal spray Spray 1 Spray in nose every day.   2/16/2020 at 0800   • furosemide (LASIX) 40 MG Tab Take 40 mg by mouth every day.   2/16/2020 at 0800   • lisinopril (PRINIVIL) 10 MG Tab Take 10 mg by mouth every day.      • oxybutynin SR (DITROPAN-XL) 5 MG TABLET SR 24 HR Take 5 mg by mouth every evening.   7/19/2018 at 2103   • potassium chloride (KAYCIEL) 20 MEQ/15ML (10%) Solution Take 20 mEq by mouth every day.   2/16/2020 at 0800   • senna-docusate (PERICOLACE OR SENOKOT S) 8.6-50 MG Tab Take 1 Tab by mouth 2 times a day.   2/16/2020 at 0800   • citalopram (CELEXA) 10 MG tablet Take 10 mg by mouth every day.   2/16/2020 at 0800   •  loratadine (CLARITIN) 10 MG Tab Take 10 mg by mouth every day.   2/16/2020 at 0800   • oyster shell calcium/D (OS-DIANNA) 500-200 MG-UNIT Tab Take 1 Tab by mouth 3 times a day, with meals.   2/16/2020 at 0800   • acetaminophen (TYLENOL) 325 MG Tab Take 650 mg by mouth every four hours as needed.   2/16/2020 at 1400   • amiodarone (CORDARONE) 200 MG Tab Take 1 Tab by mouth every day. 30 Tab  2/16/2020 at 0800   • zinc sulfate (ZINCATE) 220 MG Cap Take 1 Cap by mouth every day. 30 Cap 0 2/16/2020 at 0800   • vitamin D (VITAMIND D3) 1000 UNIT Tab Take 1 Tab by mouth every day. 60 Tab  2/16/2020 at 1600   • trazodone (DESYREL) 50 MG Tab Take 1 Tab by mouth every bedtime. 30 Tab 3 2/15/2020 at 2000   • multivitamin (THERAGRAN) Tab Take 1 Tab by mouth every day. 30 Tab  2/16/2020 at 0800       Medication Allergy:  Allergies   Allergen Reactions   • Ppd [Tuberculin Purified Protein Derivative]        Family History:  Family History   Problem Relation Age of Onset   • Alzheimer's Disease Mother    • Heart Disease Father        Social History:  Social History     Socioeconomic History   • Marital status:      Spouse name: Not on file   • Number of children: Not on file   • Years of education: Not on file   • Highest education level: Not on file   Occupational History   • Not on file   Social Needs   • Financial resource strain: Not on file   • Food insecurity     Worry: Not on file     Inability: Not on file   • Transportation needs     Medical: Not on file     Non-medical: Not on file   Tobacco Use   • Smoking status: Never Smoker   • Smokeless tobacco: Never Used   Substance and Sexual Activity   • Alcohol use: No     Alcohol/week: 0.0 oz   • Drug use: No   • Sexual activity: Not on file   Lifestyle   • Physical activity     Days per week: Not on file     Minutes per session: Not on file   • Stress: Not on file   Relationships   • Social connections     Talks on phone: Not on file     Gets together: Not on file      "Attends Protestant service: Not on file     Active member of club or organization: Not on file     Attends meetings of clubs or organizations: Not on file     Relationship status: Not on file   • Intimate partner violence     Fear of current or ex partner: Not on file     Emotionally abused: Not on file     Physically abused: Not on file     Forced sexual activity: Not on file   Other Topics Concern   • Not on file   Social History Narrative    From [facility].     [facility] at [facility].      Goal is to get back home with DTR.    Completed HS.           Physical Exam:  Vitals/ General Appearance:   Weight/BMI: Body mass index is 28.24 kg/m².  /67   Pulse 68   Temp 37 °C (98.6 °F) (Oral)   Resp 18   Ht 1.6 m (5' 3\")   Wt 72.3 kg (159 lb 6.3 oz)   SpO2 98%   Vitals:    02/20/20 0335 02/20/20 0554 02/20/20 0742 02/20/20 1124   BP: 113/63 128/74 148/88 112/67   Pulse: 71 73 72 68   Resp: 18  18 18   Temp: 36.8 °C (98.3 °F)  36.6 °C (97.9 °F) 37 °C (98.6 °F)   TempSrc: Oral  Oral Oral   SpO2: 95%  96% 98%   Weight:       Height:         Oxygen Therapy:  Pulse Oximetry: 98 %, O2 (LPM): 2, O2 Delivery Device: None - Room Air    Constitutional:   Well developed, Well nourished, No acute distress  HENMT:  Normocephalic, Atraumatic, Oropharynx moist mucous membranes, No oral exudates, Nose normal.  No thyromegaly.  Eyes:  EOMI, Conjunctiva normal, No discharge.  Neck:  Normal range of motion, No cervical tenderness,  no JVD.  Cardiovascular: Irregularly irregular, JVD to mid neck, Normal rhythm, No murmurs, No rubs, No gallops.   Extremitites with intact distal pulses, no cyanosis, or edema.  Lungs:  Normal breath sounds, breath sounds clear to auscultation bilaterally,  no crackles, no wheezing.   Abdomen: Bowel sounds normal, Soft, No tenderness, No guarding, No rebound, No masses, No hepatosplenomegaly.  Skin: Warm, Dry, No erythema, No rash, no induration.  Neurologic: Alert & oriented x 3, No focal deficits noted, " cranial nerves II through X are grossly intact.  Psychiatric: Affect normal, Judgment normal, Mood normal.      MDM (Data Review):     Records reviewed and summarized in current documentation    Lab Data Review:  Recent Results (from the past 24 hour(s))   CBC WITH DIFFERENTIAL    Collection Time: 02/20/20  4:07 AM   Result Value Ref Range    WBC 5.7 4.8 - 10.8 K/uL    RBC 4.44 4.20 - 5.40 M/uL    Hemoglobin 12.9 12.0 - 16.0 g/dL    Hematocrit 40.6 37.0 - 47.0 %    MCV 91.4 81.4 - 97.8 fL    MCH 29.1 27.0 - 33.0 pg    MCHC 31.8 (L) 33.6 - 35.0 g/dL    RDW 52.8 (H) 35.9 - 50.0 fL    Platelet Count 179 164 - 446 K/uL    MPV 11.1 9.0 - 12.9 fL    Neutrophils-Polys 39.00 (L) 44.00 - 72.00 %    Lymphocytes 47.00 (H) 22.00 - 41.00 %    Monocytes 11.00 0.00 - 13.40 %    Eosinophils 1.00 0.00 - 6.90 %    Basophils 0.00 0.00 - 1.80 %    Nucleated RBC 0.00 /100 WBC    Neutrophils (Absolute) 2.22 2.00 - 7.15 K/uL    Lymphs (Absolute) 2.68 1.00 - 4.80 K/uL    Monos (Absolute) 0.63 0.00 - 0.85 K/uL    Eos (Absolute) 0.06 0.00 - 0.51 K/uL    Baso (Absolute) 0.00 0.00 - 0.12 K/uL    NRBC (Absolute) 0.00 K/uL   Basic Metabolic Panel    Collection Time: 02/20/20  4:07 AM   Result Value Ref Range    Sodium 138 135 - 145 mmol/L    Potassium 3.8 3.6 - 5.5 mmol/L    Chloride 101 96 - 112 mmol/L    Co2 27 20 - 33 mmol/L    Glucose 104 (H) 65 - 99 mg/dL    Bun 35 (H) 8 - 22 mg/dL    Creatinine 1.09 0.50 - 1.40 mg/dL    Calcium 9.0 8.4 - 10.2 mg/dL    Anion Gap 10.0 7.0 - 16.0   ESTIMATED GFR    Collection Time: 02/20/20  4:07 AM   Result Value Ref Range    GFR If  58 (A) >60 mL/min/1.73 m 2    GFR If Non  48 (A) >60 mL/min/1.73 m 2   DIFFERENTIAL MANUAL    Collection Time: 02/20/20  4:07 AM   Result Value Ref Range    Myelocytes 2.00 %    Manual Diff Status PERFORMED    PLATELET ESTIMATE    Collection Time: 02/20/20  4:07 AM   Result Value Ref Range    Plt Estimation Normal    MORPHOLOGY    Collection Time:  02/20/20  4:07 AM   Result Value Ref Range    RBC Morphology Normal     Reactive Lymphocytes Few        Imaging/Procedures Review:    Chest Xray:  Reviewed    EKG:   Personally viewed by myself showing atrial fibrillation with a controlled ventricular response    ECHO:  Personally viewed by myself showing a grossly normal LV systolic function, no valvular disease, tissue Dopplers look highly impaired.  There is a restrictive filling pattern according to the mitral valve deceleration time.    MDM (Assessment and Plan):     Active Hospital Problems    Diagnosis   • Elevated troponin [R79.89]   • Hyperglycemia [R73.9]   • Fluid overload [E87.70]   • Mood disorder (HCC) [F39]   • Atrial fibrillation (HCC) [I48.91]   • Gastroesophageal reflux disease without esophagitis [K21.9]     84-year-old female with what appears to be diastolic heart failure.  I will reinstitute diuretics at 10 mg of torsemide twice daily.  She will need follow-up on this.  I would stop her amiodarone as she is currently in atrial fibrillation.  Please keep her on the Eliquis.  She will need daily weights.    1. A-fib    - stop amiodarone    - cont eliquis 5 BID    2. Diastolc CHF    - no rate control needed    - outpatient [facility]    - start torsemide 10 BID    - daily weights    3. Pulm Htn    - WHO 2, treat per above

## 2020-02-20 NOTE — PROGRESS NOTES
Telemetry Strip       Measurements from am strip were as follows:  Rhythm: Afib paced  HR: 70-90  Measurements: 0.0.8  Ectopy: r PVC             Normal Values  Rhythm SR  HR Range    Measurements 0.12-0.20 / 0.06-0.10  / 0.30-0.52

## 2020-02-20 NOTE — PROGRESS NOTES
[facility] Medicine Daily Progress Note    Date of Service  2/20/2020    Chief Complaint  84 y.o. female admitted 2/16/2020 with cough.    Hospital Course    per HPI  84 y.o. female with a history of mild cognitive impairment, atrial fibrillation on Eliquis, hypertension who presented 2/16/2020 with cough and wheezing.     History is obtained from the patient's daughter who is at bedside.  She states that the patient has had a cough and wheezing for the last 10 days.  The wheezing progressed and she subsequently developed a cough productive of clear sputum.  Patient did not have any fever however she has been fatigued and lethargic.  The patient's daughter also states that she has been sleeping upright in a chair for the last several weeks to months.  She feels that her mom is not getting the care that she needs at the skilled nursing facility.  When the cough and wheezing wheezing failed to resolve, she took her to her primary care physician's office.  They did an x-ray and it showed haziness which they said was likely bronchitis therefore they prescribed prednisone and doxycycline therapy.  The patient has been taking this for the last 4 days but has had no improvement in fact, has continued to get worse.  She has lower extremity swelling which right now is moderate.  Denies any change in bowel or urinary habits.  Vomiting.           Interval Problem Update  2/18.  Patient still showing signs of fluid overload.  Patient has dementia and unable to finish review of system.  Patient's breathing condition slightly improved however very lethargic per family.  Continue diuretics.  Patient complains of general body achiness Patient's pain is general, 2-3/10, intermittent and does not radiate to other location, sharp and with some tingling. Can be controlled by pain meds.   2/19.  Patient still very lethargic.  Tolerated diuretics.  Blood pressure started showing low signs.  However patient is asymptomatic.  PT OT recommend  nursing home. Patient otherwise denies fever, chills, nausea, vomiting, adb pain, SOB, CP, headache, constipation, diarrhea, cough, or sputum.  2/20.  Patient feels less congested.  Tolerated doxycycline.  Procalcitonin 0.09.  Cardiologist was consulted and echo showing pulmonary hypertension with diastolic dysfunction.  Patient currently is more euvolemic.  Cardiologist recommend torsemide.  We will give a trial today. Patient's pain is general, 2-3/10, intermittent and does not radiate to other location, sharp and with some tingling. Can be controlled by pain meds.   Patient currently does not require oxygen    Consultants/Specialty  None    Code Status  Full Code    Disposition  Nursing home    Review of Systems  Review of Systems   Unable to perform ROS: Dementia        Physical Exam  Temp:  [36.6 °C (97.9 °F)-37.2 °C (98.9 °F)] 37 °C (98.6 °F)  Pulse:  [68-81] 68  Resp:  [18] 18  BP: (112-148)/(57-88) 112/67  SpO2:  [94 %-98 %] 98 %    Physical Exam  Vitals signs and nursing note reviewed.   Constitutional:       General: She is not in acute distress.     Appearance: Normal appearance. She is normal weight. She is not toxic-appearing or diaphoretic.   HENT:      Head: Normocephalic and atraumatic.      Right Ear: Ear canal and external ear normal.      Left Ear: Ear canal and external ear normal. There is no impacted cerumen.      Nose: Nose normal. No congestion.      Mouth/Throat:      Mouth: Mucous membranes are moist.      Pharynx: Oropharynx is clear. No oropharyngeal exudate or posterior oropharyngeal erythema.   Eyes:      Extraocular Movements: Extraocular movements intact.      Conjunctiva/sclera: Conjunctivae normal.      Pupils: Pupils are equal, round, and reactive to light.   Neck:      Musculoskeletal: Normal range of motion and neck supple. No muscular tenderness.      Vascular: No carotid bruit.   Cardiovascular:      Rate and Rhythm: Normal rate. Rhythm irregular.      Pulses: Normal pulses.       Heart sounds: Normal heart sounds. No murmur. No friction rub.   Pulmonary:      Effort: Pulmonary effort is normal. No respiratory distress.      Breath sounds: Rales present. No rhonchi.      Comments: Course crackles b/l lung bases  Chest:      Chest wall: No tenderness.   Abdominal:      General: Abdomen is flat. Bowel sounds are normal. There is no distension.      Palpations: Abdomen is soft. There is no mass.      Tenderness: There is no guarding or rebound.      Hernia: No hernia is present.   Musculoskeletal: Normal range of motion.         General: No swelling, tenderness or signs of injury.   Lymphadenopathy:      Cervical: No cervical adenopathy.   Skin:     General: Skin is warm and dry.      Capillary Refill: Capillary refill takes less than 2 seconds.      Coloration: Skin is not jaundiced or pale.   Neurological:      General: No focal deficit present.      Mental Status: She is alert. Mental status is at baseline. She is disoriented.      Cranial Nerves: No cranial nerve deficit.   Psychiatric:         Mood and Affect: Mood normal.         Behavior: Behavior normal.         Fluids    Intake/Output Summary (Last 24 hours) at 2/20/2020 1530  Last data filed at 2/20/2020 1325  Gross per 24 hour   Intake 110 ml   Output --   Net 110 ml       Laboratory  Recent Labs     02/18/20  0156 02/19/20  0429 02/20/20  0407   WBC 5.9 6.1 5.7   RBC 4.09* 4.36 4.44   HEMOGLOBIN 12.0 12.8 12.9   HEMATOCRIT 38.6 40.8 40.6   MCV 94.4 93.6 91.4   MCH 29.3 29.4 29.1   MCHC 31.1* 31.4* 31.8*   RDW 56.4* 53.8* 52.8*   PLATELETCT 187 170 179   MPV 11.4 11.5 11.1     Recent Labs     02/18/20  0156 02/19/20  0429 02/20/20  0407   SODIUM 139 138 138   POTASSIUM 3.5* 3.7 3.8   CHLORIDE 98 99 101   CO2 25 25 27   GLUCOSE 95 102* 104*   BUN 33* 35* 35*   CREATININE 1.06 1.07 1.09   CALCIUM 8.3* 8.6 9.0                   Imaging  CT-CHEST (THORAX) WITH   Final Result      1.  Minimal parenchymal opacities in the LEFT upper and  lower lobes suggesting developing multifocal pneumonia.   2.  Probable bilateral lower lobe atelectasis.   3.  Trace LEFT pleural effusion.            EC-ECHOCARDIOGRAM COMPLETE W/O CONT   Final Result      DX-CHEST-PORTABLE (1 VIEW)   Final Result      Stable cardiomegaly and cardiac pacer.      Mild interstitial prominence suggesting mild vascular congestion.           Assessment/Plan  Pulmonary hypertension and diastolic dysfunction without heart failure:  -Patient does have pulmonary hypertension  -Echocardiogram showing RVSP 35  -Cardiologist was consulted and discontinue patient's amiodarone  -Change patient's diuretics to torsemide 10 mg twice daily and give her first trial today.-If patient tolerated today can go home with torsemide  -Monitor fluid status currently euvolemic  -Patient CT chest showing resolving pneumonia.  Continue another 3 days of doxycycline.    * Fluid overload  Assessment & Plan  Echocardiogram showing ejection fraction 60%  Pro-BNP 3970->900  More euvolemic.  Patient does have pulmonary hypertension likely.  Start patient on torsemide as recommended by cardiologist  Strict I/Os  Daily Weights  TSH WNL.      Elevated troponin  Assessment & Plan  Mild suspect demand ischemia, patient poor historian but denies pain anywhere.  Continue treat for fluid overload    Hyperglycemia  Assessment & Plan  Possibly related to steroids  No hx of diabetes.  Currently stable      Mood disorder (HCC)- (present on admission)  Assessment & Plan  With mild cognitive impairment,  Lives in SNF ([facility])  Resume current dose of Celexa  Stable  Likely will go back to nursing care facility    Gastroesophageal reflux disease without esophagitis- (present on admission)  Assessment & Plan  Resume PPIs    Atrial fibrillation (HCC)- (present on admission)  Assessment & Plan  Rate controlled  Resume Eliquis, discontinue amiodarone per cardiology  TSH WNL        Patient plan of care discussed at multidisplinary team  rounds and with patient and R.N at [facility].    I have seen and examined patient on 2/20/2020. I have reviewed vitals, new labs and imaging. I have discussed POC with RN. There are no changes from (2/19/2020) except for what is mentioned above.       VTE prophylaxis: Eliquis      Current Facility-Administered Medications:   •  doxycycline monohydrate (ADOXA) tablet 100 mg, 100 mg, Oral, Q12HRS, Sheyla Hernández M.D.  •  torsemide (DEMADEX) TABS 10 mg, 10 mg, Oral, BID, Puma Brasher M.D., 10 mg at 02/20/20 1051  •  albuterol (PROVENTIL) 2.5mg/0.5ml nebulizer solution 2.5 mg, 2.5 mg, Nebulization, Q2HRS PRN (RT), HUSSEIN Barbosa.O.  •  ALPRAZolam (XANAX) tablet 0.25 mg, 0.25 mg, Oral, Q8HRS PRN, Phyllis Amos M.D., 0.25 mg at 02/17/20 0400  •  apixaban (ELIQUIS) tablet 5 mg, 5 mg, Oral, BID, Gabby Mills D.O., 5 mg at 02/20/20 0555  •  citalopram (CELEXA) tablet 10 mg, 10 mg, Oral, DAILY, Gabby Mills D.O., 10 mg at 02/20/20 0555  •  oxybutynin SR (DITROPAN-XL) tablet 5 mg, 5 mg, Oral, Q EVENING, Gabby Mills D.O., 5 mg at 02/19/20 1817  •  traZODone (DESYREL) tablet 50 mg, 50 mg, Oral, QHS, Gbaby Mills D.O., 50 mg at 02/19/20 2039  •  senna-docusate (PERICOLACE or SENOKOT S) 8.6-50 MG per tablet 2 Tab, 2 Tab, Oral, BID, Stopped at 02/18/20 1800 **AND** polyethylene glycol/lytes (MIRALAX) PACKET 1 Packet, 1 Packet, Oral, QDAY PRN **AND** magnesium hydroxide (MILK OF MAGNESIA) suspension 30 mL, 30 mL, Oral, QDAY PRN **AND** bisacodyl (DULCOLAX) suppository 10 mg, 10 mg, Rectal, QDAY PRN, HUSSEIN Barbosa.O.  •  Respiratory Therapy Consult, , Nebulization, Continuous RT, Gabby Mills D.O.  •  guaiFENesin dextromethorphan (ROBITUSSIN DM) 100-10 MG/5ML syrup 10 mL, 10 mL, Oral, Q6HRS PRN, Gabby Mills, HUSSEIN.O., 10 mL at 02/17/20 0951  •  ondansetron (ZOFRAN) syringe/vial injection 4 mg, 4 mg, Intravenous, Q4HRS PRN, HUSSEIN Barbosa.O.  •  ondansetron (ZOFRAN ODT) dispertab 4 mg, 4 mg,  Oral, Q4HRS PRN, Gabby Mills D.O.  •  ipratropium-albuterol (DUONEB) nebulizer solution, 3 mL, Nebulization, Q4H PRN (RT), Gabby Mills D.O., 3 mL at 02/17/20 2038

## 2020-02-20 NOTE — PROGRESS NOTES
Telemetry Shift Summary    Rhythm Afib/ V-paced on demand  HR Range 70s-80s  Ectopy None  Measurements V-paced/afib        Normal Values  Rhythm SR  HR Range    Measurements 0.12-0.20 / 0.06-0.10  / 0.30-0.52

## 2020-02-20 NOTE — CARE PLAN
Problem: Safety  Goal: Will remain free from falls  Outcome: PROGRESSING AS EXPECTED  Note: Educated pt to call for assistance, bed alarm on call light in reach.      Problem: Respiratory:  Goal: Respiratory status will improve  Outcome: PROGRESSING AS EXPECTED  Note: Pt is improving, still having intermittent wheezing. Pt is on 2 L O2

## 2020-02-20 NOTE — PROGRESS NOTES
Rounding completed, pt asleep in bed. No needs at this time. Bed in low locked position, call light in reach, bed alarm on.

## 2020-02-20 NOTE — PROGRESS NOTES
Bedside report received from JOSE C Main. Assumed care of patient. Patient resting comfortably sitting up in the chair. Declines any needs at this time. Call light in reach. Chair alarm on. Safety measures in place.

## 2020-02-21 VITALS
DIASTOLIC BLOOD PRESSURE: 64 MMHG | OXYGEN SATURATION: 99 % | RESPIRATION RATE: 18 BRPM | BODY MASS INDEX: 28.24 KG/M2 | HEART RATE: 70 BPM | HEIGHT: 63 IN | WEIGHT: 159.39 LBS | SYSTOLIC BLOOD PRESSURE: 102 MMHG | TEMPERATURE: 98.4 F

## 2020-02-21 PROBLEM — E87.70 FLUID OVERLOAD: Status: RESOLVED | Noted: 2020-02-16 | Resolved: 2020-02-21

## 2020-02-21 PROBLEM — R79.89 ELEVATED TROPONIN: Status: RESOLVED | Noted: 2020-02-17 | Resolved: 2020-02-21

## 2020-02-21 LAB
ANION GAP SERPL CALC-SCNC: 12 MMOL/L (ref 7–16)
BASOPHILS # BLD AUTO: 0 % (ref 0–1.8)
BASOPHILS # BLD: 0 K/UL (ref 0–0.12)
BUN SERPL-MCNC: 34 MG/DL (ref 8–22)
CALCIUM SERPL-MCNC: 9 MG/DL (ref 8.4–10.2)
CHLORIDE SERPL-SCNC: 99 MMOL/L (ref 96–112)
CO2 SERPL-SCNC: 27 MMOL/L (ref 20–33)
CREAT SERPL-MCNC: 1.17 MG/DL (ref 0.5–1.4)
EOSINOPHIL # BLD AUTO: 0 K/UL (ref 0–0.51)
EOSINOPHIL NFR BLD: 0 % (ref 0–6.9)
ERYTHROCYTE [DISTWIDTH] IN BLOOD BY AUTOMATED COUNT: 50.9 FL (ref 35.9–50)
GLUCOSE SERPL-MCNC: 101 MG/DL (ref 65–99)
HCT VFR BLD AUTO: 41.5 % (ref 37–47)
HGB BLD-MCNC: 13.2 G/DL (ref 12–16)
LYMPHOCYTES # BLD AUTO: 3.57 K/UL (ref 1–4.8)
LYMPHOCYTES NFR BLD: 47 % (ref 22–41)
MANUAL DIFF BLD: NORMAL
MCH RBC QN AUTO: 28.9 PG (ref 27–33)
MCHC RBC AUTO-ENTMCNC: 31.8 G/DL (ref 33.6–35)
MCV RBC AUTO: 90.8 FL (ref 81.4–97.8)
MONOCYTES # BLD AUTO: 1.14 K/UL (ref 0–0.85)
MONOCYTES NFR BLD AUTO: 15 % (ref 0–13.4)
NEUTROPHILS # BLD AUTO: 2.89 K/UL (ref 2–7.15)
NEUTROPHILS NFR BLD: 38 % (ref 44–72)
NRBC # BLD AUTO: 0 K/UL
NRBC BLD-RTO: 0 /100 WBC
PLATELET # BLD AUTO: 175 K/UL (ref 164–446)
PLATELET BLD QL SMEAR: NORMAL
PMV BLD AUTO: 11.5 FL (ref 9–12.9)
POTASSIUM SERPL-SCNC: 3.9 MMOL/L (ref 3.6–5.5)
RBC # BLD AUTO: 4.57 M/UL (ref 4.2–5.4)
RBC BLD AUTO: NORMAL
SODIUM SERPL-SCNC: 138 MMOL/L (ref 135–145)
WBC # BLD AUTO: 7.6 K/UL (ref 4.8–10.8)

## 2020-02-21 PROCEDURE — 700102 HCHG RX REV CODE 250 W/ 637 OVERRIDE(OP): Performed by: INTERNAL MEDICINE

## 2020-02-21 PROCEDURE — 85027 COMPLETE CBC AUTOMATED: CPT

## 2020-02-21 PROCEDURE — 99239 HOSP IP/OBS DSCHRG MGMT >30: CPT | Performed by: INTERNAL MEDICINE

## 2020-02-21 PROCEDURE — 80048 BASIC METABOLIC PNL TOTAL CA: CPT

## 2020-02-21 PROCEDURE — A9270 NON-COVERED ITEM OR SERVICE: HCPCS | Performed by: INTERNAL MEDICINE

## 2020-02-21 PROCEDURE — 85007 BL SMEAR W/DIFF WBC COUNT: CPT

## 2020-02-21 RX ORDER — DOXYCYCLINE 100 MG/1
100 CAPSULE ORAL 2 TIMES DAILY
Qty: 4 CAP | Refills: 0
Start: 2020-02-21 | End: 2020-02-23

## 2020-02-21 RX ORDER — TORSEMIDE 10 MG/1
10 TABLET ORAL 2 TIMES DAILY
Qty: 30 TAB | Refills: 3
Start: 2020-02-21

## 2020-02-21 RX ADMIN — APIXABAN 5 MG: 5 TABLET, FILM COATED ORAL at 06:26

## 2020-02-21 RX ADMIN — CITALOPRAM HYDROBROMIDE 10 MG: 20 TABLET ORAL at 06:26

## 2020-02-21 RX ADMIN — TORSEMIDE 10 MG: 10 TABLET ORAL at 06:27

## 2020-02-21 RX ADMIN — DOXYCYCLINE 100 MG: 100 TABLET, FILM COATED ORAL at 06:26

## 2020-02-21 NOTE — PROGRESS NOTES
Report received from JOSE C Lemon. Pt resting comfortably in bed. Declines any needs at this time. Call light within reach, bed low/locked, bed alarm on. Will continue to monitor.

## 2020-02-21 NOTE — PROGRESS NOTES
Took report from Aruan RN, pt is sitting up in chair, daughter at bedside. Pt has no needs at this time. Chair alarm on, call light in reach. Will continue to monitor.

## 2020-02-21 NOTE — DISCHARGE PLANNING
Agency/Facility Name: [facility]   Spoke To: Ananda  Outcome: Transport is set up for 1330 today. [facility] will be providing transport via their van.    Ghada RUDOLPH notified of transport time.

## 2020-02-21 NOTE — DISCHARGE SUMMARY
Discharge Summary    CHIEF COMPLAINT ON ADMISSION  Chief Complaint   Patient presents with   • Cough       Reason for Admission  Acute heart failure (HCC)     CODE STATUS  DNAR/DNI    HPI & HOSPITAL COURSE    84 y.o. female with a history of mild cognitive impairment, atrial fibrillation on Eliquis, hypertension who presented 2/16/2020 with cough and wheezing.     History is obtained from the patient's daughter who is at bedside.  She states that the patient has had a cough and wheezing for the last 10 days.    Patient was noticed to have fluid overload.  Patient echocardiogram showing pulmonary hypertension with normal ejection fraction.  Patient was then put on diuretics with good response.  Cardiologist was consulted as requested by family and recommend to use torsemide as outpatient for diuretics.  Also recommend patient to stop using amiodarone.  Patient currently euvolemic and symptoms significantly improved and ready to be discharged to nursing care facility.  Detailed hospital stay please see below.    Patient should to be waited daily     diastolic dysfunction without heart failure:  -Patient does have pulmonary hypertension  -Echocardiogram showing RVSP 35  -Cardiologist was consulted and discontinue patient's amiodarone  -Change patient's diuretics to torsemide 10 mg twice daily   -Monitor fluid status currently euvolemic  -Patient CT chest showing resolving pneumonia.  Continue another 3 days of doxycycline.  -DC amiodarone and continue adequate     * Fluid overload  Assessment & Plan  resolved      Elevated troponin  Assessment & Plan  Mild suspect demand ischemia, patient poor historian but denies pain anywhere.  Continue treat for fluid overload     Hyperglycemia  Assessment & Plan  Possibly related to steroids  No hx of diabetes.  Currently stable        Mood disorder (HCC)- (present on admission)  Assessment & Plan  With mild cognitive impairment,  Lives in SNF ([place])  Resume current dose of  Celexa  Stable  Likely will go back to nursing care facility     Gastroesophageal reflux disease without esophagitis- (present on admission)  Assessment & Plan  Resume PPIs     Atrial fibrillation (HCC)- (present on admission)  Assessment & Plan  Rate controlled  Resume Eliquis, discontinue amiodarone per cardiology  TSH WNL    Therefore, she is discharged in fair and stable condition to skilled nursing facility.    The patient met 2-midnight criteria for an inpatient stay at the time of discharge.      FOLLOW UP ITEMS POST DISCHARGE  none    DISCHARGE DIAGNOSES  Principal Problem (Resolved):    Fluid overload POA: Yes  Active Problems:    Atrial fibrillation (HCC) POA: Yes    Gastroesophageal reflux disease without esophagitis POA: Yes    Mood disorder (HCC) POA: Yes    Hyperglycemia POA: Yes    Pulmonary hypertension (HCC) POA: Yes    Diastolic dysfunction without heart failure POA: Yes  Resolved Problems:    Elevated troponin POA: Yes      FOLLOW UP  No future appointments.  [facility]  [address] [place] [address]  Fernando Guevara [address]  [phone]          MEDICATIONS ON DISCHARGE     Medication List      START taking these medications      Instructions   doxycycline 100 MG capsule  Commonly known as:  MONODOX   Take 1 Cap by mouth 2 times a day for 2 days.  Dose:  100 mg     torsemide 10 MG tablet  Commonly known as:  DEMADEX   Take 1 Tab by mouth 2 Times a Day.  Dose:  10 mg        CONTINUE taking these medications      Instructions   CeleXA 10 MG tablet  Generic drug:  citalopram   Take 10 mg by mouth every day.  Dose:  10 mg     Eliquis 5mg Tabs  Generic drug:  apixaban   Take 5 mg by mouth 2 Times a Day.  Dose:  5 mg     fluticasone 50 MCG/ACT nasal spray  Commonly known as:  FLONASE   Spray 1 Spray in nose every day.  Dose:  1 Spray     ipratropium-albuterol 0.5-2.5 (3) MG/3ML nebulizer solution  Commonly known as:  DUONEB   3 mL by Nebulization route every four hours as needed for Shortness of Breath.  Dose:   3 mL     losartan 50 MG Tabs  Commonly known as:  COZAAR   Take 50 mg by mouth every day.  Dose:  50 mg     oxybutynin SR 5 MG Tb24  Commonly known as:  DITROPAN-XL   Take 5 mg by mouth every evening.  Dose:  5 mg     oyster shell calcium/D 500-200 MG-UNIT Tabs  Commonly known as:  OS-DIANNA   Take 1 Tab by mouth 3 times a day, with meals.  Dose:  1 Tab     potassium chloride 20 MEQ/15ML (10%) Soln  Commonly known as:  KAYCIEL   Take 20 mEq by mouth every day.  Dose:  20 mEq     senna-docusate 8.6-50 MG Tabs  Commonly known as:  PERICOLACE or SENOKOT S   Take 1 Tab by mouth 2 times a day.  Dose:  1 Tab     traZODone 50 MG Tabs  Commonly known as:  DESYREL   Take 1 Tab by mouth every bedtime.  Dose:  50 mg        STOP taking these medications    acetaminophen 325 MG Tabs  Commonly known as:  TYLENOL     amiodarone 200 MG Tabs  Commonly known as:  CORDARONE     Cholecalciferol 1000 UNIT Tabs  Commonly known as:  VITAMIND D3     doxycycline 100 MG Tabs  Commonly known as:  VIBRAMYCIN     furosemide 40 MG Tabs  Commonly known as:  LASIX     lisinopril 10 MG Tabs  Commonly known as:  PRINIVIL     loratadine 10 MG Tabs  Commonly known as:  CLARITIN     multivitamin Tabs     predniSONE 20 MG Tabs  Commonly known as:  DELTASONE     zinc sulfate 220 (50 Zn) MG Caps  Commonly known as:  ZINCATE            Allergies  Allergies   Allergen Reactions   • Ppd [Tuberculin Purified Protein Derivative]        DIET  Orders Placed This Encounter   Procedures   • Diet Order Cardiac     Standing Status:   Standing     Number of Occurrences:   1     Order Specific Question:   Diet:     Answer:   Cardiac [6]     Order Specific Question:   Consistency/Fluid modifications:     Answer:   1500 ml Fluid Restriction [9]       ACTIVITY  As tolerated.  Weight bearing as tolerated    LINES, DRAINS, AND WOUNDS  This is an automated list. Peripheral IVs will be removed prior to discharge.  Peripheral IV 02/20/20 22 G Right Wrist (Active)   Site  Assessment Clean;Dry;Intact 2/21/2020  8:00 AM   Dressing Type Transparent;Occlusive 2/21/2020  8:00 AM   Line Status Scrubbed the hub prior to access;Flushed;Saline locked 2/21/2020  8:00 AM   Dressing Status Clean;Dry;Intact 2/21/2020  8:00 AM   Dressing Intervention N/A 2/21/2020  8:00 AM   Infiltration Grading ([facility], [facility]) 0 2/21/2020  8:00 AM   Phlebitis Scale ([facility] Only) 0 2/21/2020  8:00 AM          Peripheral IV 02/20/20 22 G Right Wrist (Active)   Site Assessment Clean;Dry;Intact 2/21/2020  8:00 AM   Dressing Type Transparent;Occlusive 2/21/2020  8:00 AM   Line Status Scrubbed the hub prior to access;Flushed;Saline locked 2/21/2020  8:00 AM   Dressing Status Clean;Dry;Intact 2/21/2020  8:00 AM   Dressing Intervention N/A 2/21/2020  8:00 AM   Infiltration Grading ([facility], [facility]) 0 2/21/2020  8:00 AM   Phlebitis Scale ([facility]) 0 2/21/2020  8:00 AM               MENTAL STATUS ON TRANSFER  Level of Consciousness: Confused  Orientation : Disoriented to Event, Disoriented to Place, Disoriented to Time  Speech: Speech Clear    CONSULTATIONS  card    PROCEDURES  None    CT-CHEST (THORAX) WITH   Final Result      1.  Minimal parenchymal opacities in the LEFT upper and lower lobes suggesting developing multifocal pneumonia.   2.  Probable bilateral lower lobe atelectasis.   3.  Trace LEFT pleural effusion.            EC-ECHOCARDIOGRAM COMPLETE W/O CONT   Final Result      DX-CHEST-PORTABLE (1 VIEW)   Final Result      Stable cardiomegaly and cardiac pacer.      Mild interstitial prominence suggesting mild vascular congestion.          PE:  Gen: AAOx1, NAD  Eyes: PELLA  Neck: no JVD, no lymphadenopathy  Cardia: RRR, no mrg  Lungs: CTAB, no rales, rhonci or wheezing  Abd: NABS, soft, non extended, no mass  EXT: No C/C/E, peripheral pulse 2+ b/l  Neuro: CN II-XII intact, non focal, reflex 2+ symmetrical  Skin: Intact, no lesion, warm  Psych: Appropriate.      LABORATORY  Lab Results   Component Value Date     SODIUM 138 02/21/2020    POTASSIUM 3.9 02/21/2020    CHLORIDE 99 02/21/2020    CO2 27 02/21/2020    GLUCOSE 101 (H) 02/21/2020    BUN 34 (H) 02/21/2020    CREATININE 1.17 02/21/2020        Lab Results   Component Value Date    WBC 7.6 02/21/2020    HEMOGLOBIN 13.2 02/21/2020    HEMATOCRIT 41.5 02/21/2020    PLATELETCT 175 02/21/2020        Total time of the discharge process exceeds 38 minutes.

## 2020-02-21 NOTE — PROGRESS NOTES
Telemetry Strip       Measurements from am strip were as follows:  Rhythm: afib paced  HR: 78-91  Measurements: 0.08  Ectopy: r PVC             Normal Values  Rhythm SR  HR Range    Measurements 0.12-0.20 / 0.06-0.10  / 0.30-0.52

## 2020-02-21 NOTE — CARE PLAN
Problem: Communication  Goal: The ability to communicate needs accurately and effectively will improve  Outcome: PROGRESSING AS EXPECTED  Note: Pt easily reoriented. Pt call for assistance when needed.      Problem: Safety  Goal: Will remain free from falls  Outcome: PROGRESSING AS EXPECTED  Note: Pt calls for assistance. Bed in low locked position, call light in reach, bed alarm on

## 2020-02-21 NOTE — DISCHARGE INSTRUCTIONS
Continue antibiotics for another 2 days.  Monitor daily weight daily  Return to nursing care facility and continue torsemide as recommended by cardiologist.      Discharge Instructions    Discharged to home by car with relative. Discharged via wheelchair, hospital escort: Yes.  Special equipment needed: Not Applicable    Be sure to schedule a follow-up appointment with your primary care doctor or any specialists as instructed.     Discharge Plan:   Diet Plan: Discussed  Activity Level: Discussed  Confirmed Follow up Appointment: Patient to Call and Schedule Appointment  Confirmed Symptoms Management: Discussed  Medication Reconciliation Updated: Yes  Influenza Vaccine Indication: Patient Refuses    I understand that a diet low in cholesterol, fat, and sodium is recommended for good health. Unless I have been given specific instructions below for another diet, I accept this instruction as my diet prescription.   Other diet: as before    Special Instructions: None    · Is patient discharged on Warfarin / Coumadin?   No     Depression / Suicide Risk    As you are discharged from this [facility] facility, it is important to learn how to keep safe from harming yourself.    Recognize the warning signs:  · Abrupt changes in personality, positive or negative- including increase in energy   · Giving away possessions  · Change in eating patterns- significant weight changes-  positive or negative  · Change in sleeping patterns- unable to sleep or sleeping all the time   · Unwillingness or inability to communicate  · Depression  · Unusual sadness, discouragement and loneliness  · Talk of wanting to die  · Neglect of personal appearance   · Rebelliousness- reckless behavior  · Withdrawal from people/activities they love  · Confusion- inability to concentrate     If you or a loved one observes any of these behaviors or has concerns about self-harm, here's what you can do:  · Talk about it- your feelings and reasons for harming  yourself  · Remove any means that you might use to hurt yourself (examples: pills, rope, extension cords, firearm)  · Get professional help from the community (Mental Health, Substance Abuse, psychological counseling)  · Do not be alone:Call your Safe Contact- someone whom you trust who will be there for you.  · Call your local CRISIS HOTLINE 475-1959 or 370-131-7686  · Call your local Children's Mobile Crisis Response Team [place] (988) 775-5813 or wwwAnergis  · Call the toll free National Suicide Prevention Hotlines   · National Suicide Prevention Lifeline 325-403-PWMI (9564)  · National Hope Line Network 800-SUICIDE (034-3678)      Pulmonary Hypertension  Pulmonary hypertension is high blood pressure within the arteries in your lungs (pulmonary arteries). It is different than having high blood pressure elsewhere in your body, such as blood pressure that is measured with a blood pressure cuff. Pulmonary hypertension makes it harder for blood to flow through the lungs. As a result, the heart must work harder to pump blood through the lungs, and it may be harder for you to breathe. Over time, this can weaken the heart muscle. Pulmonary hypertension is a serious condition and it can be fatal.  What are the causes?  Many different medical conditions can cause pulmonary hypertension. Pulmonary hypertension can be categorized by cause into five groups:  Group 1   Pulmonary hypertension that is caused by abnormal growth of small blood vessels in the lungs (pulmonary arterial hypertension). The abnormal blood vessel growth may have no known cause, or it may be:  · Passed along from a parent (hereditary).  · Caused by another disease, such as a connective tissue disease (including lupus or scleroderma) or HIV.  · Caused by certain drugs or toxins.  Group 2   Pulmonary hypertension that is caused by weakness of the main chamber of the heart (left ventricle) or heart valve disease.  Group 3   Pulmonary  hypertension that is caused by lung disease or low oxygen levels. Causes in this group include:  · Emphysema or chronic obstructive pulmonary disease (COPD).  · Untreated sleep apnea.  · Pulmonary fibrosis.  Group 4   Pulmonary hypertension that is caused by blood clots in the lungs (pulmonary emboli).  Group 5   Other causes of pulmonary hypertension, such as sickle cell anemia, or a mix of multiple causes.  What are the signs or symptoms?  Symptoms of this condition include:  · Shortness of breath. You may notice shortness of breath with:  ¨ Activity, such as walking.  ¨ No activity.  · Tiredness and fatigue.  · Dizziness or fainting.  · Rapid heartbeat or feeling your heart flutter or skip a beat (palpitations).  · Neck vein enlargement.  · Bluish color to your lips and fingertips.  How is this diagnosed?  This condition may be diagnosed by:  · Chest X-ray.  · Arterial blood gases. This test checks the acidity of your blood as well as your blood oxygen and carbon dioxide levels.  · CT scan. This test can provide detailed images of your lungs.  · Pulmonary function test. This test measures how much air your lungs can hold. It also tests how well air moves in and out of your lungs.  · Electrocardiogram (ECG). This test traces the electrical activity of your heart.  · Echocardiogram. This test is used to look at your heart in motion and check how it is functioning.  · Heart catheterization. This test can measure the pressure in your pulmonary artery and the right side of your heart.  · Lung biopsy. This procedure involves checking a sample of lung tissue to find underlying causes.  How is this treated?  There is no cure for pulmonary hypertension, but treatment can help to relieve symptoms and slow the progress of the condition. Treatment can involve:  · Medicines, such as:  ¨ Blood pressure medicines.  ¨ Medicines to relax (dilate) the pulmonary blood vessels.  ¨ Water pills to get rid of extra fluid (diuretic  medicines).  ¨ Blood-thinning medicines.  · Surgery. For severe pulmonary hypertension that does not respond to medical treatment, heart-lung or lung transplant may be needed.  Follow these instructions at home:  · Take medicines only as directed by your health care provider. These include over-the-counter medicines and prescription medicines. Take all medicines exactly as instructed. Do not change or stop medicines without first checking with your health care provider.  · Do not smoke. If you need help quitting, ask your health care provider.  · Eat a healthy diet.  · Limit your salt (sodium) intake to less than 2,300 mg per day.  · Stay as active as possible. Exercise as directed by your health care provider. Talk with your health care provider about what type of exercise is safe for you.  · Avoid high altitudes.  · Avoid hot tubs and saunas.  · Avoid becoming pregnant, if this applies. Talk with your health care provider about safe methods of birth control.  · Keep all follow-up visits as directed by your health care provider. This is important.  Get help right away if:  · You have severe shortness of breath.  · You develop chest pain or pressure in your chest.  · You cough up blood.  · You develop swelling of your feet or legs.  · You have a significant increase in weight within 1-2 days.  This information is not intended to replace advice given to you by your health care provider. Make sure you discuss any questions you have with your health care provider.  Document Released: 10/14/2008 Document Revised: 07/07/2017 Document Reviewed: 06/09/2014  [employer] Interactive Patient Education © 2017 [employer]      Heart Failure  Heart failure means your heart has trouble pumping blood. This makes it hard for your body to work well. Heart failure is usually a long-term (chronic) condition. You must take good care of yourself and follow your doctor's treatment plan.  HOME CARE  · Take your heart medicine as told by  your doctor.  ¨ Do not stop taking medicine unless your doctor tells you to.  ¨ Do not skip any dose of medicine.  ¨ Refill your medicines before they run out.  ¨ Take other medicines only as told by your doctor or pharmacist.  · Stay active if told by your doctor. The elderly and people with severe heart failure should talk with a doctor about physical activity.  · Eat heart-healthy foods. Choose foods that are without trans fat and are low in saturated fat, cholesterol, and salt (sodium). This includes fresh or frozen fruits and vegetables, fish, lean meats, fat-free or low-fat dairy foods, whole grains, and high-fiber foods. Lentils and dried peas and beans (legumes) are also good choices.  · Limit salt if told by your doctor.  · Cook in a healthy way. Roast, grill, broil, bake, poach, steam, or stir-quinones foods.  · Limit fluids as told by your doctor.  · Weigh yourself every morning. Do this after you pee (urinate) and before you eat breakfast. Write down your weight to give to your doctor.  · Take your blood pressure and write it down if your doctor tells you to.  · Ask your doctor how to check your pulse. Check your pulse as told.  · Lose weight if told by your doctor.  · Stop smoking or chewing tobacco. Do not use gum or patches that help you quit without your doctor's approval.  · Schedule and go to doctor visits as told.  · Nonpregnant women should have no more than 1 drink a day. Men should have no more than 2 drinks a day. Talk to your doctor about drinking alcohol.  · Stop illegal drug use.  · Stay current with shots (immunizations).  · Manage your health conditions as told by your doctor.  · Learn to manage your stress.  · Rest when you are tired.  · If it is really hot outside:  ¨ Avoid intense activities.  ¨ Use air conditioning or fans, or get in a cooler place.  ¨ Avoid caffeine and alcohol.  ¨ Wear loose-fitting, lightweight, and light-colored clothing.  · If it is really cold outside:  ¨ Avoid  intense activities.  ¨ Layer your clothing.  ¨ Wear mittens or gloves, a hat, and a scarf when going outside.  ¨ Avoid alcohol.  · Learn about heart failure and get support as needed.  · Get help to maintain or improve your quality of life and your ability to care for yourself as needed.  GET HELP IF:   · You gain weight quickly.  · You are more short of breath than usual.  · You cannot do your normal activities.  · You tire easily.  · You cough more than normal, especially with activity.  · You have any or more puffiness (swelling) in areas such as your hands, feet, ankles, or belly (abdomen).  · You cannot sleep because it is hard to breathe.  · You feel like your heart is beating fast (palpitations).  · You get dizzy or light-headed when you stand up.  GET HELP RIGHT AWAY IF:   · You have trouble breathing.  · There is a change in mental status, such as becoming less alert or not being able to focus.  · You have chest pain or discomfort.  · You faint.  MAKE SURE YOU:   · Understand these instructions.  · Will watch your condition.  · Will get help right away if you are not doing well or get worse.  This information is not intended to replace advice given to you by your health care provider. Make sure you discuss any questions you have with your health care provider.  Document Released: 09/26/2009 Document Revised: 01/08/2016 Document Reviewed: 02/03/2014  [employer] Interactive Patient Education © 2017 [employer]    Doxycycline oral tablets (Periodontitis)  What is this medicine?  DOXYCYCLINE (dox isaias hernandez) is a tetracycline antibiotic. It kills certain bacteria or stops their growth. This medicine is used to treat a dental infection called periodontitis.  This medicine may be used for other purposes; ask your health care provider or pharmacist if you have questions.  COMMON BRAND NAME(S): Oraxyl, Periostat  What should I tell my health care provider before I take this medicine?  They need to know if you have  any of these conditions:  -liver disease  -long exposure to sunlight like working outdoors  -stomach problems like colitis  -an unusual or allergic reaction to doxycycline, tetracycline antibiotics, other medicines, foods, dyes, or preservatives  -pregnant or trying to get pregnant  -breast-feeding  How should I use this medicine?  Take this medicine by mouth with a full glass of water. Follow the directions on the prescription label. Take this medicine at least 1 hour before or 2 hours after food. Take your medicine at regular intervals. Do not take your medicine more often than directed. Take all of your medicine as directed even if you think you are better. Do not skip doses or stop your medicine early.  Talk to your pediatrician regarding the use of this medicine in children. Special care may be needed.  Overdosage: If you think you have taken too much of this medicine contact a poison control center or emergency room at once.  NOTE: This medicine is only for you. Do not share this medicine with others.  What if I miss a dose?  If you miss a dose, take it as soon as you can. If it is almost time for your next dose, take only that dose. Do not take double or extra doses.  What may interact with this medicine?  -antacids  -barbiturates  -birth control pills  -bismuth subsalicylate  -carbamazepine  -methoxyflurane  -other antibiotics  -phenytoin  -vitamins that contain iron  -warfarin  This list may not describe all possible interactions. Give your health care provider a list of all the medicines, herbs, non-prescription drugs, or dietary supplements you use. Also tell them if you smoke, drink alcohol, or use illegal drugs. Some items may interact with your medicine.  What should I watch for while using this medicine?  Tell your doctor or health care professional if your symptoms do not improve.  Do not treat diarrhea with over the counter products. Contact your doctor if you have diarrhea that lasts more than 2  days or if it is severe and watery.  Do not take this medicine just before going to bed. It may not dissolve properly when you lay down and can cause pain in your throat. Drink plenty of fluids while taking this medicine to also help reduce irritation in your throat.  This medicine can make you more sensitive to the sun. Keep out of the sun. If you cannot avoid being in the sun, wear protective clothing and use sunscreen. Do not use sun lamps or tanning beds/booths.  Birth control pills may not work properly while you are taking this medicine. Talk to your doctor about using an extra method of birth control.  If you are being treated for a sexually transmitted infection, avoid sexual contact until you have finished your treatment. Your sexual partner may also need treatment.  Avoid antacids, aluminum, calcium, magnesium, and iron products for 4 hours before and 2 hours after taking a dose of this medicine.  What side effects may I notice from receiving this medicine?  Side effects that you should report to your doctor or health care professional as soon as possible:  -allergic reactions like skin rash, itching or hives, swelling of the face, lips, or tongue  -difficulty breathing  -fever  -itching in the rectal or genital area  -pain on swallowing  -redness, blistering, peeling or loosening of the skin, including inside the mouth  -severe stomach pain or cramps  -unusual bleeding or bruising  -unusually weak or tired  -yellowing of the eyes or skin  Side effects that usually do not require medical attention (report to your doctor or health care professional if they continue or are bothersome):  -diarrhea  -loss of appetite  -nausea, vomiting  This list may not describe all possible side effects. Call your doctor for medical advice about side effects. You may report side effects to FDA at 9-062-FDA-5209.  Where should I keep my medicine?  Keep out of the reach of children.  Store at room temperature between 15 and 30  degrees C (59 and 86 degrees F). Protect from light. Keep container tightly closed. Throw away any unused medicine after the expiration date. Taking this medicine after the expiration date can make you seriously ill.  NOTE: This sheet is a summary. It may not cover all possible information. If you have questions about this medicine, talk to your doctor, pharmacist, or health care provider.  © 2018 [employer] Standard (2009-04-16 14:50:34)    Torsemide tablets  What is this medicine?  TORSEMIDE (TORE se mide) is a diuretic. It helps you make more urine and to lose salt and excess water from your body. This medicine is used to treat high blood pressure, and edema or swelling from heart, kidney, or liver disease.  This medicine may be used for other purposes; ask your health care provider or pharmacist if you have questions.  COMMON BRAND NAME(S): Demadex  What should I tell my health care provider before I take this medicine?  They need to know if you have any of these conditions:  -abnormal blood electrolytes  -diabetes  -gout  -heart disease  -kidney disease  -liver disease  -small amounts of urine, or difficulty passing urine  -an unusual or allergic reaction to torsemide, sulfa drugs, other medicines, foods, dyes, or preservatives  -pregnant or trying to get pregnant  -breast-feeding  How should I use this medicine?  Take this medicine by mouth with a glass of water. Follow the directions on the prescription label. You may take this medicine with or without food. If it upsets your stomach, take it with food or milk. Do not take your medicine more often than directed. Remember that you will need to pass more urine after taking this medicine. Do not take your medicine at a time of day that will cause you problems. Do not take at bedtime.  Talk to your pediatrician regarding the use of this medicine in children. Special care may be needed.  Overdosage: If you think you have taken too much of this medicine contact  a poison control center or emergency room at once.  NOTE: This medicine is only for you. Do not share this medicine with others.  What if I miss a dose?  If you miss a dose, take it as soon as you can. If it is almost time for your next dose, take only that dose. Do not take double or extra doses.  What may interact with this medicine?  -alcohol  -certain antibiotics given by injection  certain heart medicines like digoxin  -diuretics  -lithium  -medicines for diabetes  -medicines for blood pressure  -medicines for cholesterol like cholestyramine  -medicines that relax muscles for surgery  -NSAIDs, medicines for pain and inflammation, like ibuprofen or naproxen  -OTC supplements like ginseng and ephedra  -probenecid  -steroid medicines like prednisone or cortisone  This list may not describe all possible interactions. Give your health care provider a list of all the medicines, herbs, non-prescription drugs, or dietary supplements you use. Also tell them if you smoke, drink alcohol, or use illegal drugs. Some items may interact with your medicine.  What should I watch for while using this medicine?  Visit your doctor or health care professional for regular checks on your progress. Check your blood pressure regularly. Ask your doctor or health care professional what your blood pressure should be, and when you should contact him or her. If you are a diabetic, check your blood sugar as directed.  You may need to be on a special diet while taking this medicine. Check with your doctor. Also, ask how many glasses of fluid you need to drink a day. You must not get dehydrated.  You may get drowsy or dizzy. Do not drive, use machinery, or do anything that needs mental alertness until you know how this drug affects you. Do not stand or sit up quickly, especially if you are an older patient. This reduces the risk of dizzy or fainting spells. Alcohol can make you more drowsy and dizzy. Avoid alcoholic drinks.  What side effects  may I notice from receiving this medicine?  Side effects that you should report to your doctor or health care professional as soon as possible:  -allergic reactions such as skin rash or itching, hives, swelling of the lips, mouth, tongue or throat  -blood in urine or stool  -dry mouth  -hearing loss or ringing in the ears  -irregular heartbeat  -muscle pain, weakness or cramps  -pain or difficulty passing urine  -unusually weak or tired  -vomiting or diarrhea  Side effects that usually do not require medical attention (report to your doctor or health care professional if they continue or are bothersome):  -dizzy or lightheaded  -headache  -increased thirst  -passing large amounts of urine  -sexual difficulties  -stomach pain, upset or nausea  This list may not describe all possible side effects. Call your doctor for medical advice about side effects. You may report side effects to FDA at 9-091-FDA-0828.  Where should I keep my medicine?  Keep out of the reach of children.  Store at room temperature between 15 and 30 degrees C (59 and 86 degrees F). Throw away any unused medicine after the expiration date.  NOTE: This sheet is a summary. It may not cover all possible information. If you have questions about this medicine, talk to your doctor, pharmacist, or health care provider.  © 2018 [employer] Standard (2009-09-03 11:35:45)

## 2020-02-22 NOTE — PROGRESS NOTES
Telemetry Shift Summary     Rhythm AFIb, paced  HR Range 60s-85  Ectopy RCoup, RPVCs  Measurements paced           Normal Values  Rhythm SR  HR Range    Measurements 0.12-0.20 / 0.06-0.10  / 0.30-0.52

## 2021-01-14 DIAGNOSIS — Z23 NEED FOR VACCINATION: ICD-10-CM

## 2021-06-02 ENCOUNTER — HOSPITAL ENCOUNTER (OUTPATIENT)
Dept: RADIOLOGY | Facility: MEDICAL CENTER | Age: 86
End: 2021-06-02
Attending: INTERNAL MEDICINE
Payer: MEDICARE

## 2021-06-02 DIAGNOSIS — G45.9 INTERMITTENT CEREBRAL ISCHEMIA: ICD-10-CM

## 2021-06-02 PROCEDURE — 700117 HCHG RX CONTRAST REV CODE 255: Performed by: INTERNAL MEDICINE

## 2021-06-02 PROCEDURE — 70496 CT ANGIOGRAPHY HEAD: CPT | Mod: MH

## 2021-06-02 RX ADMIN — IOHEXOL 80 ML: 350 INJECTION, SOLUTION INTRAVENOUS at 16:15

## 2022-02-01 NOTE — TELEPHONE ENCOUNTER
Dr. Reyes and I spoke on Friday, Prilosec isn't covered, she was going to  change to Nexium but I think she forgot. Please send in new rx.    HPI   Jayne Naidu  is a 61 y.o. female who presents for replacement of her pessary.  She has a #3 ring pessary in place.  It has become worn and the patient would like a new pessary.  She presents today for placement.    Chief Complaint   Patient presents with   • Follow-up     Patient is here for a f/u for a pessary fitting.       Past Medical History:   Diagnosis Date   • Abnormal Pap smear of cervix    • Benign paroxysmal positional vertigo    • Cancer (HCC)     CERVICAL   • Disease of thyroid gland    • GERD (gastroesophageal reflux disease)    • Hyperlipidemia    • Hypothyroidism    • Menopause    • Osteopenia    • Vasospastic angina (HCC)    • Vertigo        Past Surgical History:   Procedure Laterality Date   • CERVICAL BIOPSY  W/ LOOP ELECTRODE EXCISION     • CHOLECYSTECTOMY     • COLONOSCOPY  2011    Q10 years   • MOHS SURGERY  2018    2016, 2018 - basal cell   • OOPHORECTOMY     • OTHER SURGICAL HISTORY      Arterial Catherization   • TONSILLECTOMY     • TOTAL ABDOMINAL HYSTERECTOMY      atypical endometrial hyperplasia   • UTERINE FIBROID SURGERY         Social History     Socioeconomic History   • Marital status:    Tobacco Use   • Smoking status: Never Smoker   • Smokeless tobacco: Never Used   Substance and Sexual Activity   • Alcohol use: Yes   • Drug use: No   • Sexual activity: Yes     Partners: Male     Comment: hyst       The following portions of the patient's history were reviewed and updated as appropriate: allergies, current medications, past family history, past medical history, past social history, past surgical history and problem list.    Review of Systems          Physical Exam  Vitals and nursing note reviewed.   Constitutional:       Appearance: Normal appearance.   Genitourinary:     Comments: Normal female external genitalia  The vagina is atrophic.  The patient's existing pessary was removed and cleaned.  The vagina was inspected without the pessary.  There were no  ulcerations or erosions.  The patient's new #3 ring pessary was placed without difficulty.  She tolerated the procedure well.  Neurological:      Mental Status: She is alert.         Assessment    Diagnoses and all orders for this visit:    1. Pessary maintenance (Primary)        Plan  1. The patient's existing pessary was removed and a new #3 ring pessary was placed.  Fit was appropriate.  The patient tolerated the procedure well.    No follow-ups on file.    Social History     Tobacco Use   Smoking Status Never Smoker   Smokeless Tobacco Never Used

## 2022-11-09 ENCOUNTER — PATIENT MESSAGE (OUTPATIENT)
Dept: HEALTH INFORMATION MANAGEMENT | Facility: OTHER | Age: 87
End: 2022-11-09

## 2023-05-18 NOTE — ASSESSMENT & PLAN NOTE
Mild suspect demand ischemia, patient poor historian but denies pain anywhere.  
Possibly related to steroids  No hx of diabetes.  A1c pending.    
Rate controlled  Resume Amiodarone, Eliquis  TSH WNL    
Resume PPIs  
Systolic vs diastolic  Pro-BNP 3970  Echocardiogram pending  Resume IV lasix, 40mg BID  Strict I/Os  Daily Weights  TSH WNL.    
With mild cognitive impairment,  Lives in SNF ([facility])  Resume current dose of Celexa  
[Negative] : Heme/Lymph

## 2023-12-04 NOTE — DISCHARGE PLANNING
Agency/Facility Name: [facility]  Spoke To: Ananda  Outcome: Accepted, needs a new referral. This [facility] faxed referral to [facility]     See Physician's progress note.

## 2025-04-24 NOTE — ED NOTES
"Med rec updated and complete  Allergies reviewed  Pts daughter states \"No antibiotics in the last 30 days\".    " 2

## 2025-05-28 NOTE — PATIENT INSTRUCTIONS
Follow up in 2 wks   Bring BP log  Cardiology referral made  For Insomnia start taking trazadone  Rx sent to pharmacy  Labs before next visit.       11B/on unit

## 2025-07-04 NOTE — CARE PLAN
Problem: Communication  Goal: The ability to communicate needs accurately and effectively will improve  Intervention: [place] patient and significant other/support system to call light to alert staff of needs    04/16/17 0101   OTHER   Oriented to: All of the Following : Location of Bathroom, Visiting Policy, Unit Routine, Call Light and Bedside Controls, Bedside Rail Policy, Smoking Policy, Rights and Responsibilities, Bedside Report, and Patient Education Notebook               
Problem: Communication  Goal: The ability to communicate needs accurately and effectively will improve  Oriented patient to the call light in order to alert staff of her needs. Educated patient about the plan of care, procedures, treatments, medications, and allowing for patient questions and answering them appropriately    Problem: Safety  Goal: Will remain free from falls  Assessing patient for risk factors for falls and implementing fall precautions as needed. Bed alarm is on, bed controls are on and bed is locked in a low position, treaded slipper socks on patient, CLIP        
Problem: Communication  Goal: The ability to communicate needs accurately and effectively will improve  Oriented patient to the call light in order to alert staff of her needs. Educated patient about the plan of care, procedures, treatments, medications, and allowing for patient questions and answering them appropriately. Reorienting patient as needed due to confusion    Problem: Safety  Goal: Will remain free from falls  Assessing patient for risk factors for falls and implementing fall precautions as needed. Bed alarm is on, bed controls are on and bed is locked in a low position, treaded slipper socks on patient, CLIP    Problem: Mobility  Goal: Risk for activity intolerance will decrease  Encouraging patient to mobilize frequently to strengthen her as well as decrease risk for debility after admission, patient verbalizes understanding at this time. Will continue to remind patient due to confusion        
Problem: Discharge Barriers/Planning  Goal: Patient’s continuum of care needs will be met  Assess for discharge challenges related to placement due to the patient's need of care.  Order PT and OT evaluations.  Communicate with family about transitional care.        
Problem: Safety  Goal: Will remain free from falls  Intervention: Implement fall precautions    04/15/17 0836 04/16/17 0000 04/16/17 0824   OTHER   Environmental Precautions --  --  Treaded Slipper Socks on Patient;Personal Belongings, Wastebasket, Call Bell etc. in Easy Reach;Transferred to Stronger Side;Report Given to Other Health Care Providers Regarding Fall Risk;Bed in Low Position;Communication Sign for Patients & Families;Mobility Assessed & Appropriate Sign Placed   IV Pole on Same Side of Bed as Bathroom (sl) --  --    Bedrails --  --  Bedrails Closest to Bathroom Down   Chair/Bed Strip Alarm --  --  Yes - Alarm On   Bed Alarm (Built in - for ICU ONLY) --  Yes - Alarm On --            Problem: Skin Integrity  Goal: Risk for impaired skin integrity will decrease  Intervention: Implement precautions to protect skin integrity in collaboration with the interdisciplinary team    04/15/17 0836 04/16/17 0824   OTHER   Skin Preventative Measures --  Pillows in Use for Support / Positioning   Bed Types --  Pressure Redistribution Mattress (Atmosair)   Friction Interventions --  Draw Sheet / Pad Used for Repositioning   Moisturizers --  Moisturizer    PT / OT Involved in Care --  Physical Therapy Involved;Occupational Therapy Involved   Activity  Bed;Edge of bed --    Patient Turns / Repositioning --  Patient Turns Self from Side to Side   Assistance / Tolerance for Turning/Repositioning --  Assistance of One   Patient is Receiving Nutrition --  Oral Intake Adequate   Vitamin Therapy in Use --  No               
Problem: Safety  Goal: Will remain free from falls  Outcome: PROGRESSING AS EXPECTED  Fall precautions in place. Pt calm and cooperative at this time. Bed alarm on. Hourly rounding in place. Room close to nursing station.    Problem: Skin Integrity  Goal: Risk for impaired skin integrity will decrease  Outcome: PROGRESSING AS EXPECTED  Pt turns self from side to side. Pt skin kept clean and dry. Draw sheet used for repositioning. Barrier cream used as needed. Dressing to left hip CDI.        
Problem: Safety  Goal: Will remain free from injury  Intervention: Provide assistance with mobility    04/16/17 0101   OTHER   Assistance / Tolerance * * Assistance               
Problem: Safety  Goal: Will remain free from injury  Outcome : Progressing as expected.                    Safety Precaution in effect. Call light within reach. Reminded patient to call for assist. Re-oriented patient has confusion. Hourly rounds in effect. Discussed with                                             granddaughter who's visiting. Verbalized understanding.    Problem: Knowledge Deficit  Goal: Knowledge of disease process/condition, treatment plan, diagnostic tests, and medications will improve  Outcome : Progressing as expected.                    Discussed Plan of care. Works with PT. Questions answered. Verbalized understanding.        
Problem: Safety  Goal: Will remain free from injury  Outcome: PROGRESSING AS EXPECTED  Bed alarm on at all times. Pt medicated for agitation as needed. Family at bedside. Hourly rounding in place. Pt confused, unable to verbalize understanding of safety instructions.    Problem: Mobility  Goal: Risk for activity intolerance will decrease  Outcome: PROGRESSING SLOWER THAN EXPECTED  PT/OT to work with pt. Pt confused, not fully cooperative. Pt moves around in bed, needs reminding of hip precautions. Will continue to reinforce mobility needs.        
Problem: Safety  Goal: Will remain free from injury  Outcome: PROGRESSING AS EXPECTED  Bed locked and low, controls and alarms on, call light button within reach.  Pt calls appropriately for needs    Pt on bed rest. And is on her most comfortable position.    Problem: Knowledge Deficit  Goal: Knowledge of disease process/condition, treatment plan, diagnostic tests, and medications will improve  Outcome: PROGRESSING AS EXPECTED  Pt and family updated with POCs, questions answered. tm        
Problem: Safety  Goal: Will remain free from injury  Outcome: PROGRESSING AS EXPECTED  Call light and personal belongings within reach. Bed in lowest position. Bed rails up x2. Hourly rounding. Treaded slippers in place.     Problem: Knowledge Deficit  Goal: Knowledge of disease process/condition, treatment plan, diagnostic tests, and medications will improve  Outcome: PROGRESSING AS EXPECTED  Pt updated on POC. All of pt's concerns and questions addressed.         
Problem: Safety  Goal: Will remain free from injury  Outcome: PROGRESSING AS EXPECTED  Intervention: Provide assistance with mobility  Generalized weakness and confusion, fall precaution in placed, bed alarm on at all times, call light within       Problem: Knowledge Deficit  Goal: Knowledge of disease process/condition, treatment plan, diagnostic tests, and medications will improve  Outcome: PROGRESSING AS EXPECTED  Intervention: Explain information regarding disease process/condition, treatment plan, diagnostic tests, and medications and document in education  Daughter st bedside updated with plan of care, monitored closely, reorientation.          
Problem: Safety  Goal: Will remain free from injury  Outcome: PROGRESSING AS EXPECTED  PT WILL BE FREE FROM INJURY, INSTRUCTION FOR USE OF CALL/BR CALL LIGHT GIVEN.    Problem: Knowledge Deficit  Goal: Knowledge of disease process/condition, treatment plan, diagnostic tests, and medications will improve  Outcome: PROGRESSING AS EXPECTED  PT AND FAMILY MEMBER  UPDATED ON PTS PLAN OF CARE,NURSES COMMUNICATIONS WITH DOCTORS.        
Problem: Safety  Goal: Will remain free from injury  Patient alert and oriented to self only. Bed alarm in place with wheels of bed locked and call bell at side.    Problem: Knowledge Deficit  Goal: Knowledge of disease process/condition, treatment plan, diagnostic tests, and medications will improve  The plan of care for today discussed with patient with no evidence of learning (Rest, pain intervention as needed, movement with physical therapy and occupational therapy)        
no

## (undated) DEVICE — STAPLER SKIN DISP - (6/BX 10BX/CA) VISISTAT

## (undated) DEVICE — SENSOR SPO2 NEO LNCS ADHESIVE (20/BX) SEE USER NOTES

## (undated) DEVICE — KIT ROOM DECONTAMINATION

## (undated) DEVICE — GLOVE, LITE (PAIR)

## (undated) DEVICE — DRAPE VERTICAL ISOLATION - (10EA/CA)

## (undated) DEVICE — TUBE CONNECTING SUCTION - CLEAR PLASTIC STERILE 72 IN (50EA/CA)

## (undated) DEVICE — NEEDLE SAFETY 18 GA X 1 1/2 IN (100EA/BX)

## (undated) DEVICE — SUTURE 0 VICRYL PLUS CT-1 - 36 INCH (36/BX)

## (undated) DEVICE — PROTECTOR ULNA NERVE - (36PR/CA)

## (undated) DEVICE — PACK MAJOR ORTHO - (2EA/CA)

## (undated) DEVICE — GLOVE SURGICAL PROTEXIS PI 8.0 LF - (50PR/BX)

## (undated) DEVICE — DRAPE SURGICAL U 77X120 - (10/CA)

## (undated) DEVICE — GLOVE BIOGEL PI INDICATOR SZ 8.0 SURGICAL PF LF -(50/BX 4BX/CA)

## (undated) DEVICE — SODIUM CHL IRRIGATION 0.9% 1000ML (12EA/CA)

## (undated) DEVICE — CANISTER SUCTION RIGID RED 1500CC (40EA/CA)

## (undated) DEVICE — LACTATED RINGERS INJ 1000 ML - (14EA/CA 60CA/PF)

## (undated) DEVICE — SYRINGE DISP. 60 CC LL - (30/BX, 12BX/CA)**WHEN THESE ARE GONE ORDER #500206**

## (undated) DEVICE — GOWN WARMING STANDARD FLEX - (30/CA)

## (undated) DEVICE — TUBE E-T HI-LO CUFF 7.0MM (10EA/PK)

## (undated) DEVICE — CHLORAPREP 26 ML APPLICATOR - ORANGE TINT(25/CA)

## (undated) DEVICE — WIRE GUIDE SYN 2.8 300MM TROC (2CSX10+COLLNRX5=25)

## (undated) DEVICE — GLOVE BIOGEL SZ 8 SURGICAL PF LTX - (50PR/BX 4BX/CA)

## (undated) DEVICE — ELECTRODE 850 FOAM ADHESIVE - HYDROGEL RADIOTRNSPRNT (50/PK)

## (undated) DEVICE — SUTURE 2-0 VICRYL PLUS CT-1 - 8 X 18 INCH(12/BX)

## (undated) DEVICE — DRESSING TRANSPARENT FILM TEGADERM 4 X 4.75" (50EA/BX)"

## (undated) DEVICE — DRAPE C-ARM LARGE 41IN X 74 IN - (10/BX 2BX/CA)

## (undated) DEVICE — HEAD HOLDER JUNIOR/ADULT

## (undated) DEVICE — MASK ANESTHESIA ADULT  - (100/CA)

## (undated) DEVICE — CORDS BIPOLAR COAGULATION - 12FT STERILE DISP. (10EA/BX)

## (undated) DEVICE — PAD LAP STERILE 18 X 18 - (5/PK 40PK/CA)

## (undated) DEVICE — SUTURE GENERAL

## (undated) DEVICE — KIT ANESTHESIA W/CIRCUIT & 3/LT BAG W/FILTER (20EA/CA)

## (undated) DEVICE — HUMID-VENT HEAT AND MOISTURE EXCHANGE- (50/BX)

## (undated) DEVICE — Device

## (undated) DEVICE — SUCTION INSTRUMENT YANKAUER BULBOUS TIP W/O VENT (50EA/CA)